# Patient Record
Sex: MALE | Race: WHITE | NOT HISPANIC OR LATINO | Employment: OTHER | URBAN - METROPOLITAN AREA
[De-identification: names, ages, dates, MRNs, and addresses within clinical notes are randomized per-mention and may not be internally consistent; named-entity substitution may affect disease eponyms.]

---

## 2017-04-05 ENCOUNTER — ALLSCRIPTS OFFICE VISIT (OUTPATIENT)
Dept: OTHER | Facility: OTHER | Age: 82
End: 2017-04-05

## 2017-06-12 ENCOUNTER — ALLSCRIPTS OFFICE VISIT (OUTPATIENT)
Dept: OTHER | Facility: OTHER | Age: 82
End: 2017-06-12

## 2017-09-21 ENCOUNTER — APPOINTMENT (OUTPATIENT)
Dept: LAB | Facility: MEDICAL CENTER | Age: 82
End: 2017-09-21
Payer: COMMERCIAL

## 2017-09-21 DIAGNOSIS — E78.5 HYPERLIPIDEMIA: ICD-10-CM

## 2017-09-21 DIAGNOSIS — I10 ESSENTIAL (PRIMARY) HYPERTENSION: ICD-10-CM

## 2017-09-21 DIAGNOSIS — E03.9 HYPOTHYROIDISM: ICD-10-CM

## 2017-09-21 LAB
ALBUMIN SERPL BCP-MCNC: 3.5 G/DL (ref 3.5–5)
ALP SERPL-CCNC: 57 U/L (ref 46–116)
ALT SERPL W P-5'-P-CCNC: 11 U/L (ref 12–78)
ANION GAP SERPL CALCULATED.3IONS-SCNC: 6 MMOL/L (ref 4–13)
AST SERPL W P-5'-P-CCNC: 20 U/L (ref 5–45)
BASOPHILS # BLD AUTO: 0.03 THOUSANDS/ΜL (ref 0–0.1)
BASOPHILS NFR BLD AUTO: 1 % (ref 0–1)
BILIRUB SERPL-MCNC: 0.61 MG/DL (ref 0.2–1)
BUN SERPL-MCNC: 26 MG/DL (ref 5–25)
CALCIUM SERPL-MCNC: 8.9 MG/DL (ref 8.3–10.1)
CHLORIDE SERPL-SCNC: 106 MMOL/L (ref 100–108)
CHOLEST SERPL-MCNC: 201 MG/DL (ref 50–200)
CO2 SERPL-SCNC: 29 MMOL/L (ref 21–32)
CREAT SERPL-MCNC: 1.24 MG/DL (ref 0.6–1.3)
EOSINOPHIL # BLD AUTO: 0.29 THOUSAND/ΜL (ref 0–0.61)
EOSINOPHIL NFR BLD AUTO: 6 % (ref 0–6)
ERYTHROCYTE [DISTWIDTH] IN BLOOD BY AUTOMATED COUNT: 13.4 % (ref 11.6–15.1)
GFR SERPL CREATININE-BSD FRML MDRD: 51 ML/MIN/1.73SQ M
GLUCOSE P FAST SERPL-MCNC: 91 MG/DL (ref 65–99)
HCT VFR BLD AUTO: 43.7 % (ref 36.5–49.3)
HDLC SERPL-MCNC: 56 MG/DL (ref 40–60)
HGB BLD-MCNC: 14.5 G/DL (ref 12–17)
LDLC SERPL CALC-MCNC: 120 MG/DL (ref 0–100)
LYMPHOCYTES # BLD AUTO: 2.27 THOUSANDS/ΜL (ref 0.6–4.47)
LYMPHOCYTES NFR BLD AUTO: 48 % (ref 14–44)
MCH RBC QN AUTO: 32.5 PG (ref 26.8–34.3)
MCHC RBC AUTO-ENTMCNC: 33.2 G/DL (ref 31.4–37.4)
MCV RBC AUTO: 98 FL (ref 82–98)
MONOCYTES # BLD AUTO: 0.46 THOUSAND/ΜL (ref 0.17–1.22)
MONOCYTES NFR BLD AUTO: 10 % (ref 4–12)
NEUTROPHILS # BLD AUTO: 1.61 THOUSANDS/ΜL (ref 1.85–7.62)
NEUTS SEG NFR BLD AUTO: 35 % (ref 43–75)
NRBC BLD AUTO-RTO: 0 /100 WBCS
PLATELET # BLD AUTO: 157 THOUSANDS/UL (ref 149–390)
PMV BLD AUTO: 11.1 FL (ref 8.9–12.7)
POTASSIUM SERPL-SCNC: 4 MMOL/L (ref 3.5–5.3)
PROT SERPL-MCNC: 7.2 G/DL (ref 6.4–8.2)
RBC # BLD AUTO: 4.46 MILLION/UL (ref 3.88–5.62)
SODIUM SERPL-SCNC: 141 MMOL/L (ref 136–145)
TRIGL SERPL-MCNC: 124 MG/DL
TSH SERPL DL<=0.05 MIU/L-ACNC: 1.92 UIU/ML (ref 0.36–3.74)
WBC # BLD AUTO: 4.68 THOUSAND/UL (ref 4.31–10.16)

## 2017-09-21 PROCEDURE — 80053 COMPREHEN METABOLIC PANEL: CPT

## 2017-09-21 PROCEDURE — 85025 COMPLETE CBC W/AUTO DIFF WBC: CPT

## 2017-09-21 PROCEDURE — 36415 COLL VENOUS BLD VENIPUNCTURE: CPT

## 2017-09-21 PROCEDURE — 84443 ASSAY THYROID STIM HORMONE: CPT

## 2017-09-21 PROCEDURE — 80061 LIPID PANEL: CPT

## 2017-09-25 DIAGNOSIS — I10 ESSENTIAL (PRIMARY) HYPERTENSION: ICD-10-CM

## 2017-09-25 DIAGNOSIS — E03.9 HYPOTHYROIDISM: ICD-10-CM

## 2017-09-25 DIAGNOSIS — E78.5 HYPERLIPIDEMIA: ICD-10-CM

## 2017-10-17 ENCOUNTER — ALLSCRIPTS OFFICE VISIT (OUTPATIENT)
Dept: OTHER | Facility: OTHER | Age: 82
End: 2017-10-17

## 2017-10-18 NOTE — PROGRESS NOTES
Assessment  1  Hyperlipidemia (272 4) (E78 5)   2  Hypertension (401 9) (I10)   3  Hypothyroidism (244 9) (E03 9)   4  Encounter for preventive health examination (V70 0) (Z00 00)    Plan  Chronic gouty arthropathy, Gout    · Allopurinol 100 MG Oral Tablet; take 2 tablets by mouth once daily  Health Maintenance    · Glucosamine HCl - 500 MG Oral Tablet; TAKE AS DIRECTED  Hypertension    · Triamterene-HCTZ 37 5-25 MG Oral Capsule; take 1 capsule daily  Hypothyroidism    · Levothyroxine Sodium 100 MCG Oral Tablet (Synthroid); Take 1 tablet by mouth    every day  Peripheral neuropathy    · Gabapentin 100 MG Oral Capsule    Chief Complaint  The patient is here today for a follow up  History of Present Illness  Patient is here for follow-up we tried gabapentin for his peripheral neuropathy  It did not help and it made him very sleepy during the day  He is not taking it any more  also treat him for gout  He takes allopurinol 200 mg every day he has not had any acute get gout attacks lately  is also treated for hypertension with max side his blood pressure is excellent  He also takes levothyroxine for his hypothyroidism his last TSH was normal   has no specific complaints today and overall feels well      Active Problems  1  Actinic keratosis (702 0) (L57 0)   2  Acute upper respiratory infection (465 9) (J06 9)   3  Chronic gouty arthropathy (274 02) (M1A 00X0)   4  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)   5  Diarrhea (787 91) (R19 7)   6  Disc degeneration, 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)   7  Gait disturbance (781 2) (R26 9)   8  Ganglion (727 43) (M67 40)   9  Gout (274 9) (M10 9)   10  HL (hearing loss) (389 9) (H91 90)   11  Hyperlipidemia (272 4) (E78 5)   12  Hypertension (401 9) (I10)   13  Hypothyroidism (244 9) (E03 9)   14  Leg pain (729 5) (M79 606)   15  Lower back pain (724 2) (M54 5)   16  Lumbar canal stenosis (724 02) (M48 061)   17  Lumbar radiculopathy (724 4) (M54 16)   18   Lumbar radiculopathy (724 4) (M54 16)   19  Need for diphtheria-tetanus-pertussis (Tdap) vaccine (V06 1) (Z23)   20  Need for prophylactic vaccination and inoculation against influenza (V04 81) (Z23)   21  Need for vaccination with 13-polyvalent pneumococcal conjugate vaccine (V03 82) (Z23)   22  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)   23  Pain in joint of right hip (719 45) (M25 551)   24  Peripheral neuropathy (356 9) (G62 9)   25  Preoperative evaluation to rule out surgical contraindication (V72 83) (Z01 818)   26  Sacroiliitis (720 2) (M46 1)   27  Screening for skin condition (V82 0) (Z13 89)   28  Seborrheic keratosis (702 19) (L82 1)   29  Spondylosis of lumbar region without myelopathy or radiculopathy (721 3) (M47 816)   30  Staggering Gait (781 2)   31  Tingling (782 0) (R20 2)   32  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)   33  Verrucous keratosis (702 8) (L82 1)    Past Medical History  1  History of Dizziness and giddiness (780 4) (R42)   2  History of hypertension (V12 59) (Z86 79)   3  History of sleep disturbance (V13 89) (Z87 898)   4  History of thyroid disease (V12 29) (Z86 39)   5  History of Hyperthyroidism (242 90) (E05 90)   6  History of Loss Of Hair From Head   7  History of Memory Lapses Or Loss (780 93)   8  Need for prophylactic vaccination and inoculation against influenza (V04 81) (Z23)    The active problems and past medical history were reviewed and updated today  Surgical History  1  Denied: History Of Prior Surgery   2  History of Kidney Surgery   3  History of Knee Replacement   4  History of Prostate Surgery    The surgical history was reviewed and updated today  Family History  Mother    1  Family history of Brain Tumor   2  Family history of Mother  At Age 58  Father    3  Family history of Father  At Age 80  Sister    3  Family history of Cancer  Brother    5  Family history of Diabetes Mellitus (V18 0)  Family History    6  Family history of Arthritis (V17 7)   7  Family history of Diabetes Mellitus (V18 0)   8  Family history of Knee Replacement   9  Family history of Thyroid Disorder (V18 19)    The family history was reviewed and updated today  Social History   · Being A Social Drinker   · Caffeine Use   · Denied: History of Drug Use   · Former smoker (V15 82) (T70 469)   · Never A Smoker   · Sexually Active   · Denied: History of Sexually Active With Persons At Risk For HIV-related Disease   · Work History  The social history was reviewed and updated today  The social history was reviewed and is unchanged  Current Meds   1  Allopurinol 100 MG Oral Tablet; take 2 tablets by mouth once daily; Therapy: 53Ayh5933 to (Evaluate:16Apr2018)  Requested for: 46Lnt1993; Last   Rx:00Wzw7594 Ordered   2  Aspirin 325 MG Oral Tablet; Take 1 tablet daily Recorded   3  Fluocinonide 0 05 % External Cream; APPLY TO AFFECTED AREA TWICE A DAY; Therapy: 23Ays4674 to (Evaluate:43Bpw7669)  Requested for: 50WXX1059; Last   Rx:22Hzi1889 Ordered   4  Gabapentin 100 MG Oral Capsule; TAKE ONE OR TWO CAPSULES BY MOUTH THREE   TIMES DAILY AS NEEDED; Therapy: 10AHJ2337 to (77 873 135)  Requested for: 20Hrv6031; Last   Rx:09Rch3298 Ordered   5  Levothyroxine Sodium 100 MCG Oral Tablet; Take 1 tablet by mouth   every day; Therapy: 82TWX5292 to (Evaluate:21Jan2018)  Requested for: 14Mzi0765; Last   Rx:60Ftz7970 Ordered   6  Triamterene-HCTZ 37 5-25 MG Oral Capsule; take 1 capsule daily; Therapy: 60GLW3848 to (Evaluate:21Jan2018)  Requested for: 05Ehp2169; Last   Rx:92Txk1329 Ordered   7  Zostavax 70190 UNT/0 65ML Subcutaneous Solution Reconstituted; adm  one dose as   directed; Therapy: 10AOP8754 to (Last Rx:05Oct2016) Ordered    The medication list was reviewed and updated today  Allergies  1   No Known Drug Allergies    Vitals  Vital Signs    Recorded: 27LAD8833 02:08PM   Heart Rate 80   Respiration 18   Systolic 363   Diastolic 76   Weight 443 lb 2 oz   BMI Calculated 25 26   BSA Calculated 1 89     Physical Exam    Constitutional   General appearance: No acute distress, well appearing and well nourished  Ears, Nose, Mouth, and Throat   External inspection of ears and nose: Normal     Otoscopic examination: Tympanic membrance translucent with normal light reflex  Canals patent without erythema  Nasal mucosa, septum, and turbinates: Normal without edema or erythema  Oropharynx: Normal with no erythema, edema, exudate or lesions  Pulmonary   Auscultation of lungs: Clear to auscultation, equal breath sounds bilaterally, no wheezes, no rales, no rhonci  Cardiovascular   Auscultation of heart: Normal rate and rhythm, normal S1 and S2, without murmurs  Abdomen   Abdomen: Non-tender, no masses  Health Management  Health Maintenance   Medicare Annual Wellness Visit; every 1 year; Last 11OIF2571; Next Due: 40PRD1431; Active    Future Appointments    Date/Time Provider Specialty Site   04/17/2018 01:00 PM BK Rodriguez  [address] [facility]   06/18/2018 12:15 PM BK Tabares   Dermatology [facility]     Signatures   Electronically signed by : BK Still ; Oct 17 2017  2:42PM EST                       (Author)

## 2018-01-12 VITALS
DIASTOLIC BLOOD PRESSURE: 74 MMHG | HEART RATE: 80 BPM | BODY MASS INDEX: 25.47 KG/M2 | RESPIRATION RATE: 18 BRPM | WEIGHT: 167.5 LBS | SYSTOLIC BLOOD PRESSURE: 146 MMHG

## 2018-01-14 VITALS
RESPIRATION RATE: 18 BRPM | BODY MASS INDEX: 25.26 KG/M2 | SYSTOLIC BLOOD PRESSURE: 126 MMHG | HEART RATE: 80 BPM | WEIGHT: 166.13 LBS | DIASTOLIC BLOOD PRESSURE: 76 MMHG

## 2018-01-18 ENCOUNTER — ALLSCRIPTS OFFICE VISIT (OUTPATIENT)
Dept: OTHER | Facility: OTHER | Age: 83
End: 2018-01-18

## 2018-01-19 NOTE — PROGRESS NOTES
Assessment   1  Actinic keratosis (702 0) (L57 0)   2  Seborrheic keratosis (702 19) (L82 1)   3  Screening for skin condition (V82 0) (Z13 89)    Plan    · Follow-up visit in 1 year Evaluation and Treatment  Follow-up  Status: Hold For -    Scheduling  Requested for: 91JYL7684   · Use a sun block product with an SPF of 15 or more ; Status:Complete;   Done:    43HWR1852    Discussion/Summary   Discussion Summary- [facility]:      Assessment #1: actinic keratosis  Care Plan:      Patient advise lesions are precancer should resolve with cryosurgery if not to let us know sunblock recommended  Assessment #2: Seborrheic keratosis  Care Plan:      Patient reassured these are normal growths we acquire with age no treatment needed  Assessment #3: Screening for dermatologic disorders  Care Plan:      Nothing else of concern noted on complete exam sun protection recommended follow-up yearly  Chief Complaint   Chief Complaint Free Text Note Form: Patient here to check head for spots and a check up      History of Present Illness   HPI: 42-year-old male presents for overall skin check concerned some scaling areas on his scalp no specific other concerns noted      Review of Systems   Complete Male Dermatology [facility] Patient:      Constitutional: Denies constitutional symptoms  Eyes: Denies eye symptoms  ENT:  denies ear symptoms, nasal symptoms, mouth or throat symptoms  Cardiovascular: Denies cardiovascular symptoms  Respiratory: Denies respiratory symptoms  Gastrointestinal: Denies gastrointestinal symptoms  Musculoskeletal: Denies musculoskeletal symptoms  Integumentary: Denies skin, hair and nail symptoms  Neurological: Denies neurologic symptoms  Psychiatric: Denies psychiatric symptoms  Endocrine: Denies endocrine symptoms  Hematologic/Lymphatic: Denies hematologic symptoms  Active Problems   1   Actinic keratosis (702 0) (L57 0)   2  Acute upper respiratory infection (465 9) (J06 9)   3  Chronic gouty arthropathy (274 02) (M1A 00X0)   4  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)   5  Diarrhea (787 91) (R19 7)   6  Disc degeneration, 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)   7  Gait disturbance (781 2) (R26 9)   8  Ganglion (727 43) (M67 40)   9  Gout (274 9) (M10 9)   10  HL (hearing loss) (389 9) (H91 90)   11  Hyperlipidemia (272 4) (E78 5)   12  Hypertension (401 9) (I10)   13  Hypothyroidism (244 9) (E03 9)   14  Leg pain (729 5) (M79 606)   15  Lower back pain (724 2) (M54 5)   16  Lumbar canal stenosis (724 02) (M48 061)   17  Lumbar radiculopathy (724 4) (M54 16)   18  Lumbar radiculopathy (724 4) (M54 16)   19  Need for diphtheria-tetanus-pertussis (Tdap) vaccine (V06 1) (Z23)   20  Need for prophylactic vaccination and inoculation against influenza (V04 81) (Z23)   21  Need for vaccination with 13-polyvalent pneumococcal conjugate vaccine (V03 82) (Z23)   22  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)   23  Pain in joint of right hip (719 45) (M25 551)   24  Peripheral neuropathy (356 9) (G62 9)   25  Preoperative evaluation to rule out surgical contraindication (V72 83) (Z01 818)   26  Sacroiliitis (720 2) (M46 1)   27  Screening for skin condition (V82 0) (Z13 89)   28  Seborrheic keratosis (702 19) (L82 1)   29  Spondylosis of lumbar region without myelopathy or radiculopathy (721 3) (M47 816)   30  Staggering Gait (781 2)   31  Tingling (782 0) (R20 2)   32  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)   33  Verrucous keratosis (702 8) (L82 1)    Past Medical History   1  History of Dizziness and giddiness (780 4) (R42)   2  History of hypertension (V12 59) (Z86 79)   3  History of sleep disturbance (V13 89) (Z87 898)   4  History of thyroid disease (V12 29) (Z86 39)   5  History of Hyperthyroidism (242 90) (E05 90)   6  History of Loss Of Hair From Head   7  History of Memory Lapses Or Loss (780 93)   8   Need for prophylactic vaccination and inoculation against influenza (V04 81) (Z23)  Past Medical History Reviewed- Derm:    The past medical history was reviewed  Surgical History   1  Denied: History Of Prior Surgery   2  History of Kidney Surgery   3  History of Knee Replacement   4  History of Prostate Surgery  Surgical History Reviewed [facility]- D[facility]:    Surgical History reviewed      Family History   Mother    1  Family history of Brain Tumor   2  Family history of Mother  At Age 58  Father    3  Family history of Father  At Age 80  Sister    3  Family history of Cancer  Brother    5  Family history of Diabetes Mellitus (V18 0)  Family History    6  Family history of Arthritis (V17 7)   7  Family history of Diabetes Mellitus (V18 0)   8  Family history of Knee Replacement   9  Family history of Thyroid Disorder (V18 19)  Family History Reviewed- Derm:    Family History was reviewed      Social History    · Being A Social Drinker   · Caffeine Use   · Denied: History of Drug Use   · Former smoker (V15 82) (L34 658)   · Never A Smoker   · Sexually Active   · Denied: History of Sexually Active With Persons At Risk For HIV-related Disease   · Work History  Social History Reviewed [facility]- Derm: The social history was reviewed      Current Meds    1  Allopurinol 100 MG Oral Tablet; take 2 tablets by mouth once daily; Therapy: 12Nqy1480 to (Adriel Martins)  Requested for: 04VYO3882; Last     Rx:2018 Ordered   2  Aspirin 325 MG Oral Tablet; Take 1 tablet daily Recorded   3  Glucosamine HCl - 500 MG Oral Tablet; TAKE AS DIRECTED; Therapy: 22BGU5087 to (Last Rx:2017) Ordered   4  Levothyroxine Sodium 100 MCG Oral Tablet; take 1 tablet by mouth once daily; Therapy: 94PXH1338 to (Adreil Martins)  Requested for: 77GUT6154; Last     Rx:2018 Ordered   5  Triamterene-HCTZ 37 5-25 MG Oral Capsule; take 1 capsule by mouth once daily; Therapy: 23TCN2016 to (Adriel Martins)  Requested for: 15YTT7598;  Last Rx:33Rkf3188 Ordered   6  Zostavax 74378 UNT/0 65ML Subcutaneous Solution Reconstituted; adm  one dose as     directed; Therapy: 17QJF3801 to (Last Rx:05Oct2016) Ordered  Medication List Reviewed: The medication list was reviewed and updated today  Allergies   1  No Known Drug Allergies    Physical Exam        Constitutional      General appearance: Appears healthy and well developed  Lymphatic      No visible disturbance  Musculoskeletal      Digits and nails: No clubbing, cyanosis or edema  Cutaneous and nail exam normal        Skin      Scalp skin texture and hair distribution: Abnormal        Head: Abnormal        Neck: Normal turgor, no rashes, no lesions  Chest: Normal turgor, no rashes, no lesions  Abdomen: Normal turgor, no rashes, no lesions  Back: Normal turgor, no rashes, no lesions  Right upper extremity: Normal turgor, no rashes, no lesions  Left upper extremity: Normal turgor, no rashes, no lesions  Right lower extremity: Normal turgor, no rashes, no lesions  Left lower extremity: Normal turgor, no rashes, no lesions  Examination for skin lesions: Abnormal   Skin Lesions: Actinic Keratosis: on area number 3 on the diagram           Neuro/Psych      Alert and oriented x 3  Displays comfort and cooperation during encounterl  Affect is normal        Finding Scaling erythematous areas noted above normal keratotic papules with greasy stuck on appearance nothing else atypical noted on exam       Procedure        Procedure: destruction of lesion  Indications for the procedure include actinic keratosis  Risks, benefits, alternatives, infection risk, bleeding risk, risk of allergic reaction and the risk of scarring were discussed with the patient--   verbal consent was obtained prior to the procedure  Procedure Note:      The lesion location: See Map        Destruction Technique: cryotherapy with liquid nitrogen application-- and-- 37-71 seconds via cryospray--   Destruction of 3 lesions  Post-Procedure:      Patient Status: the patient tolerated the procedure well  Complications: there were no complications  Health Management   Health Maintenance   Medicare Annual Wellness Visit; every 1 year; Last 15AHK8137; Next Due: 59ZLJ8900; Active    Future Appointments      Date/Time Provider Specialty Site   04/17/2018 01:00 PM BK Loya  [address] [facility]   06/18/2018 12:15 PM BK Vallejo   Dermatology [facility]     Signatures    Electronically signed by : BK Padilla ; Jan 18 2018  1:26PM EST                       (Author)

## 2018-04-17 ENCOUNTER — OFFICE VISIT (OUTPATIENT)
Dept: FAMILY MEDICINE CLINIC | Facility: MEDICAL CENTER | Age: 83
End: 2018-04-17
Payer: COMMERCIAL

## 2018-04-17 VITALS
BODY MASS INDEX: 24.75 KG/M2 | DIASTOLIC BLOOD PRESSURE: 70 MMHG | WEIGHT: 162.8 LBS | RESPIRATION RATE: 16 BRPM | HEART RATE: 68 BPM | SYSTOLIC BLOOD PRESSURE: 122 MMHG

## 2018-04-17 DIAGNOSIS — E03.9 ACQUIRED HYPOTHYROIDISM: ICD-10-CM

## 2018-04-17 DIAGNOSIS — M1A.09X0 IDIOPATHIC CHRONIC GOUT OF MULTIPLE SITES WITHOUT TOPHUS: ICD-10-CM

## 2018-04-17 DIAGNOSIS — M47.816 SPONDYLOSIS OF LUMBAR REGION WITHOUT MYELOPATHY OR RADICULOPATHY: ICD-10-CM

## 2018-04-17 DIAGNOSIS — M25.562 CHRONIC PAIN OF LEFT KNEE: Primary | ICD-10-CM

## 2018-04-17 DIAGNOSIS — I10 ESSENTIAL HYPERTENSION: ICD-10-CM

## 2018-04-17 DIAGNOSIS — Z13.220 SCREENING FOR LIPID DISORDERS: ICD-10-CM

## 2018-04-17 DIAGNOSIS — E78.00 PURE HYPERCHOLESTEROLEMIA: ICD-10-CM

## 2018-04-17 DIAGNOSIS — G89.29 CHRONIC PAIN OF LEFT KNEE: Primary | ICD-10-CM

## 2018-04-17 PROCEDURE — 99215 OFFICE O/P EST HI 40 MIN: CPT | Performed by: FAMILY MEDICINE

## 2018-04-17 PROCEDURE — 1101F PT FALLS ASSESS-DOCD LE1/YR: CPT | Performed by: FAMILY MEDICINE

## 2018-04-17 PROCEDURE — 3725F SCREEN DEPRESSION PERFORMED: CPT | Performed by: FAMILY MEDICINE

## 2018-04-17 RX ORDER — ASPIRIN 325 MG
1 TABLET ORAL DAILY
COMMUNITY
End: 2021-07-01 | Stop reason: HOSPADM

## 2018-04-17 RX ORDER — LEVOTHYROXINE SODIUM 0.1 MG/1
1 TABLET ORAL DAILY
COMMUNITY
Start: 2012-11-02 | End: 2018-10-17 | Stop reason: SDUPTHER

## 2018-04-17 RX ORDER — GLUCOSAMINE HCL 500 MG
TABLET ORAL
COMMUNITY
Start: 2017-10-17 | End: 2020-08-14 | Stop reason: ALTCHOICE

## 2018-04-17 RX ORDER — TRIAMTERENE AND HYDROCHLOROTHIAZIDE 37.5; 25 MG/1; MG/1
1 CAPSULE ORAL DAILY
COMMUNITY
Start: 2012-11-02 | End: 2018-10-17 | Stop reason: SDUPTHER

## 2018-04-17 RX ORDER — ALLOPURINOL 100 MG/1
2 TABLET ORAL DAILY
COMMUNITY
Start: 2014-08-20 | End: 2018-10-17 | Stop reason: SDUPTHER

## 2018-04-17 NOTE — ASSESSMENT & PLAN NOTE
Blood pressure is well controlled with triamterene HCTZ  He is tolerating the medication well  Continue triamterene HCTZ    Continue routine follow-up

## 2018-04-17 NOTE — ASSESSMENT & PLAN NOTE
Patient's thyroid disease has been well managed with levothyroxine 100 mcg  His last TSH was therapeutic  Continue levothyroxine  He will be due for TSH this fall

## 2018-04-17 NOTE — PROGRESS NOTES
Assessment/Plan:    Acquired hypothyroidism  Patient's thyroid disease has been well managed with levothyroxine 100 mcg  His last TSH was therapeutic  Continue levothyroxine  He will be due for TSH this fall  Essential hypertension  Blood pressure is well controlled with triamterene HCTZ  He is tolerating the medication well  Continue triamterene HCTZ  Continue routine follow-up    Idiopathic chronic gout of multiple sites without tophus  Patient was started on allopurinol within the last year to for chronic recurring gout  He is tolerating the allopurinol well  He has not had any gout attacks since we started the allopurinol  Continue allopurinol  Spondylosis of lumbar region without myelopathy or radiculopathy  This is a finding on x-ray  Patient really does not have any back pain  Of continue observation, treat if needed  Pure hypercholesterolemia  Patient has borderline hyperlipidemia  His last total cholesterol was 201  He tries to eat a low-fat diet  He does try to walk frequently  Continue lifestyle changes  Diagnoses and all orders for this visit:    Chronic pain of left knee  -     Ambulatory referral to Orthopedic Surgery; Future  -     Cancel: CBC and differential; Future  -     CBC and differential; Future    Acquired hypothyroidism  -     Cancel: TSH, 3rd generation with T4 reflex; Future  -     TSH, 3rd generation with T4 reflex; Future    Essential hypertension  -     Cancel: Comprehensive metabolic panel; Future  -     Cancel: CBC and differential; Future  -     Comprehensive metabolic panel; Future  -     CBC and differential; Future    Idiopathic chronic gout of multiple sites without tophus  -     Cancel: Comprehensive metabolic panel; Future  -     Cancel: CBC and differential; Future    Pure hypercholesterolemia  -     Cancel: Lipid Panel with Direct LDL reflex; Future  -     Lipid Panel with Direct LDL reflex;  Future  -     Comprehensive metabolic panel; Future    Screening for lipid disorders    Spondylosis of lumbar region without myelopathy or radiculopathy    Other orders  -     allopurinol (ZYLOPRIM) 100 mg tablet; Take 2 tablets by mouth daily  -     aspirin 325 mg tablet; Take 1 tablet by mouth daily  -     Glucosamine HCl 500 MG TABS; Take by mouth  -     levothyroxine 100 mcg tablet; Take 1 tablet by mouth daily  -     triamterene-hydrochlorothiazide (DYAZIDE) 37 5-25 mg per capsule; Take 1 capsule by mouth daily          Subjective:      Patient ID: Paco Amezquita is a 80 y o  male  Here for routine follow-up of ongoing medical problems  Please see assessment and plan  Donovan Skaggs is a vital energitic young appearing 80-year-old man  He lives with his niece in [place]  He is active in his [employer]  He still does all of his ADLs  He drives  The following portions of the patient's history were reviewed and updated as appropriate: allergies, current medications, past family history, past medical history, past social history, past surgical history and problem list     Review of Systems   Constitutional: Negative for activity change, fatigue and fever  HENT: Negative for congestion, ear discharge, ear pain, postnasal drip, rhinorrhea, sinus pain, sneezing and sore throat  Eyes: Negative for photophobia, pain, discharge and redness  Respiratory: Negative for apnea, cough, shortness of breath and wheezing  Cardiovascular: Negative for chest pain and palpitations  Gastrointestinal: Negative for abdominal pain, blood in stool, constipation, diarrhea, nausea and vomiting  Endocrine: Negative for polydipsia, polyphagia and polyuria  Genitourinary: Negative for decreased urine volume, difficulty urinating, discharge, dysuria, frequency, penile pain and urgency  Musculoskeletal: Positive for arthralgias (Pain over left knee, over the lateral malleolus  )  Negative for gait problem, joint swelling and neck pain     Skin: Negative for color change and rash  Neurological: Negative for dizziness, tremors, seizures, weakness and headaches  Psychiatric/Behavioral: Negative for agitation and sleep disturbance  The patient is not nervous/anxious  Objective:      /70 (BP Location: Left arm, Patient Position: Sitting, Cuff Size: Adult)   Pulse 68   Resp 16   Wt 73 8 kg (162 lb 12 8 oz)   BMI 24 75 kg/m²          Physical Exam   Constitutional: He is oriented to person, place, and time  Vital signs are normal  He appears well-developed and well-nourished  He is cooperative  HENT:   Head: Normocephalic  Right Ear: External ear normal    Left Ear: External ear normal    Nose: Nose normal    Mouth/Throat: Oropharynx is clear and moist    Eyes: Conjunctivae, EOM and lids are normal  Pupils are equal, round, and reactive to light  Neck: Normal range of motion  Neck supple  Carotid bruit is not present  No thyromegaly present  Cardiovascular: Normal rate, regular rhythm, S1 normal, S2 normal, normal heart sounds, intact distal pulses and normal pulses  No murmur heard  Pulmonary/Chest: Effort normal and breath sounds normal  No respiratory distress  He has no wheezes  He has no rales  Abdominal: Soft  Normal appearance and bowel sounds are normal  He exhibits no mass  There is no hepatosplenomegaly  There is no tenderness  Musculoskeletal: Normal range of motion  Lymphadenopathy:     He has no cervical adenopathy  Neurological: He is alert and oriented to person, place, and time  He has normal strength and normal reflexes  No cranial nerve deficit or sensory deficit  Skin: Skin is warm, dry and intact  No rash noted  No pallor  Psychiatric: He has a normal mood and affect  His behavior is normal  Judgment and thought content normal  Cognition and memory are normal    Nursing note and vitals reviewed

## 2018-04-17 NOTE — ASSESSMENT & PLAN NOTE
Patient has borderline hyperlipidemia  His last total cholesterol was 201  He tries to eat a low-fat diet  He does try to walk frequently  Continue lifestyle changes

## 2018-04-17 NOTE — ASSESSMENT & PLAN NOTE
Patient was started on allopurinol within the last year to for chronic recurring gout  He is tolerating the allopurinol well  He has not had any gout attacks since we started the allopurinol  Continue allopurinol

## 2018-04-17 NOTE — ASSESSMENT & PLAN NOTE
This is a finding on x-ray  Patient really does not have any back pain  Of continue observation, treat if needed

## 2018-05-09 ENCOUNTER — OFFICE VISIT (OUTPATIENT)
Dept: OBGYN CLINIC | Facility: MEDICAL CENTER | Age: 83
End: 2018-05-09
Payer: COMMERCIAL

## 2018-05-09 ENCOUNTER — APPOINTMENT (OUTPATIENT)
Dept: RADIOLOGY | Facility: MEDICAL CENTER | Age: 83
End: 2018-05-09
Payer: COMMERCIAL

## 2018-05-09 VITALS
HEART RATE: 73 BPM | WEIGHT: 160 LBS | HEIGHT: 68 IN | RESPIRATION RATE: 18 BRPM | SYSTOLIC BLOOD PRESSURE: 127 MMHG | DIASTOLIC BLOOD PRESSURE: 65 MMHG | BODY MASS INDEX: 24.25 KG/M2

## 2018-05-09 DIAGNOSIS — M22.2X2 PATELLOFEMORAL DISORDER OF LEFT KNEE: Primary | ICD-10-CM

## 2018-05-09 DIAGNOSIS — M25.562 ACUTE PAIN OF LEFT KNEE: ICD-10-CM

## 2018-05-09 PROCEDURE — 99203 OFFICE O/P NEW LOW 30 MIN: CPT | Performed by: ORTHOPAEDIC SURGERY

## 2018-05-09 PROCEDURE — 73562 X-RAY EXAM OF KNEE 3: CPT

## 2018-05-09 NOTE — PROGRESS NOTES
80 y o male presents as a new patient in consultation for left chronic knee pain which she states has been occurring for over a year  He describes the pain as located at the superior lateral aspect of his patella in the area of the quadriceps  He states that he has done two courses of physical therapy with knee strengthening with no relief  States the pain is more difficult with sitting for long periods time and improved with extension of his leg  He has difficulty with going up stairs as well  He denies any recent fevers or chills  He denies any effusions, giving way, or other mechanical symptoms  Review of Systems  Review of systems negative unless otherwise specified in HPI    Past Medical History  Past Medical History:   Diagnosis Date    Arthritis     Gout     Hypertension     Hyperthyroidism     Memory loss     Sleep disturbance     Thyroid disease        Past Surgical History  Past Surgical History:   Procedure Laterality Date    KIDNEY SURGERY      description: 30 yrs ago    PROSTATE SURGERY      description: stone removal 30 yrs ago    REPLACEMENT TOTAL KNEE BILATERAL      description: 18 yrs ago       Current Medications  Current Outpatient Prescriptions on File Prior to Visit   Medication Sig Dispense Refill    allopurinol (ZYLOPRIM) 100 mg tablet Take 2 tablets by mouth daily      aspirin 325 mg tablet Take 1 tablet by mouth daily      Glucosamine HCl 500 MG TABS Take by mouth      levothyroxine 100 mcg tablet Take 1 tablet by mouth daily      triamterene-hydrochlorothiazide (DYAZIDE) 37 5-25 mg per capsule Take 1 capsule by mouth daily       No current facility-administered medications on file prior to visit          Recent Labs [facility])    0  Lab Value Date/Time   HCT 43 7 09/21/2017 0827   HCT 43 1 09/21/2015 0815   HGB 14 5 09/21/2017 0827   HGB 14 1 09/21/2015 0815   WBC 4 68 09/21/2017 0827   WBC 4 94 09/21/2015 0815   ESR 16 (H) 02/17/2014 1412   CRP 2 8 02/17/2014 1412   GLUCOSE 92 09/19/2016 0812   GLUCOSE 98 09/21/2015 0815         Physical exam  General: Awake, Alert, Oriented  HEENT: No scleral injection, no evidence of facial trauma  Heart: Extremities warm and well perfused  Lungs: No audible wheezing  ·    left  Knee exam  · Midline incision well healed with no evidence of erythema, fluctuance, purulence  · Active range of motion 0-110 degrees without pain  · Able to perform straight leg raise, extensor mechanism intact  · No medial lateral joint line tenderness  · No tenderness over ITB band over Gerdy's tubercle, or pes anserine bursa  · Knee is stable to varus and valgus stress at full extension and 30° of flexion  · Patient has mild tenderness to palpation at the superolateral aspect of the patella at the area of the quadriceps muscle  · Pain with patellar grind    Imaging  Plain films of left knee reveal contact of patellar surface with metal of femoral component, lateral>medial   No evidence otherwise of osteolysis or loosening or malpositioning of implants    Assessment plan:  35-year-old male with left total knee arthroplasty with the pain consistent with wearing down of poly component of patella causing lateral patellofemoral contact  Weightbear as tolerated left lower extremity  Instructed patient on short arc extension knee exercises which she will do at home, no physical therapy formal course prescribed  Will call patient in 2 months to assess symptoms, no scheduled follow-up required        Rhina Farmer  05/09/18

## 2018-06-13 ENCOUNTER — APPOINTMENT (OUTPATIENT)
Dept: RADIOLOGY | Facility: MEDICAL CENTER | Age: 83
End: 2018-06-13
Payer: COMMERCIAL

## 2018-06-13 ENCOUNTER — OFFICE VISIT (OUTPATIENT)
Dept: FAMILY MEDICINE CLINIC | Facility: MEDICAL CENTER | Age: 83
End: 2018-06-13
Payer: COMMERCIAL

## 2018-06-13 VITALS
DIASTOLIC BLOOD PRESSURE: 60 MMHG | WEIGHT: 159 LBS | OXYGEN SATURATION: 91 % | BODY MASS INDEX: 24.18 KG/M2 | SYSTOLIC BLOOD PRESSURE: 124 MMHG | TEMPERATURE: 98.9 F | HEART RATE: 99 BPM

## 2018-06-13 DIAGNOSIS — R06.2 WHEEZING: ICD-10-CM

## 2018-06-13 DIAGNOSIS — R79.81 LOW O2 SATURATION: ICD-10-CM

## 2018-06-13 DIAGNOSIS — R05.9 COUGH: Primary | ICD-10-CM

## 2018-06-13 DIAGNOSIS — R06.89 ABNORMAL BREATH SOUNDS: ICD-10-CM

## 2018-06-13 DIAGNOSIS — R05.9 COUGH: ICD-10-CM

## 2018-06-13 PROCEDURE — 99214 OFFICE O/P EST MOD 30 MIN: CPT | Performed by: FAMILY MEDICINE

## 2018-06-13 PROCEDURE — 71046 X-RAY EXAM CHEST 2 VIEWS: CPT

## 2018-06-13 RX ORDER — ALBUTEROL SULFATE 2.5 MG/3ML
2.5 SOLUTION RESPIRATORY (INHALATION) ONCE
Status: SHIPPED | OUTPATIENT
Start: 2018-06-13

## 2018-06-13 RX ORDER — PREDNISONE 20 MG/1
40 TABLET ORAL DAILY
Qty: 10 TABLET | Refills: 0 | Status: SHIPPED | OUTPATIENT
Start: 2018-06-13 | End: 2018-06-18

## 2018-06-13 NOTE — PROGRESS NOTES
Assessment/Plan:    No problem-specific Assessment & Plan notes found for this encounter  Diagnoses and all orders for this visit:    Cough  -     albuterol inhalation solution 2 5 mg; Take 3 mL (2 5 mg total) by nebulization once   -     Nebulizer therapy; Future  -     XR chest pa & lateral; Future  -     predniSONE 20 mg tablet; Take 2 tablets (40 mg total) by mouth daily for 5 days    Wheezing  -     albuterol inhalation solution 2 5 mg; Take 3 mL (2 5 mg total) by nebulization once   -     Nebulizer therapy; Future  -     XR chest pa & lateral; Future  -     predniSONE 20 mg tablet; Take 2 tablets (40 mg total) by mouth daily for 5 days    Low O2 saturation  -     albuterol inhalation solution 2 5 mg; Take 3 mL (2 5 mg total) by nebulization once   -     Nebulizer therapy; Future  -     XR chest pa & lateral; Future  -     predniSONE 20 mg tablet; Take 2 tablets (40 mg total) by mouth daily for 5 days    Abnormal breath sounds  -     XR chest pa & lateral; Future  -     predniSONE 20 mg tablet; Take 2 tablets (40 mg total) by mouth daily for 5 days      Symptoms are new onset and requires workup  Patient to get nebulizer treatment in the office after which his lung sounds greatly improved but his oxygen only mildly improved  Exam findings prior to the nebulizer however were suspicious for pneumonia  I did have patient get a chest x-ray which was clear for pneumonia  Patient is likely having some reactive airway disease possibly to an upper respiratory infection  No antibiotics at this time  I did recommend a five day course of prednisone  Potential side effects of prednisone discussed and patient was agreeable to starting the medication  Follow-up in two days or sooner if needed  Subjective:      Patient ID: Paco Amezquita is a 80 y o  male  Patient presents with a cough  It started four days ago  It is described as productive of yellow sputum but no blood  He does feel it is improving  Nothing seems to exacerbate the cough but nothing seems to make it better  He has taken no over-the-counter medications for the cough  He does have an associated sore throat and runny nose  He is a former smoker  He smoked for about 20 years  The following portions of the patient's history were reviewed and updated as appropriate: allergies, current medications and problem list     Review of Systems   Constitutional: Negative for fever  Respiratory: Negative for shortness of breath  Cardiovascular: Negative for chest pain  Objective:      /60 (Cuff Size: Standard)   Pulse 99   Temp 98 9 °F (37 2 °C)   Wt 72 1 kg (159 lb)   SpO2 90%   BMI 24 18 kg/m²          Physical Exam   Constitutional: He appears well-developed and well-nourished  Cardiovascular: Normal rate, regular rhythm and normal heart sounds  Pulmonary/Chest: Effort normal  No accessory muscle usage  No respiratory distress  He has wheezes (  Diffuse expiratory wheezing with some coarse breath sounds as well ) in the right upper field, the right middle field, the right lower field, the left upper field, the left middle field and the left lower field  Wheezing resolved and course breath sounds resolved as well following nebulizer treatment  Oxygen however only increased to 91%

## 2018-06-15 ENCOUNTER — OFFICE VISIT (OUTPATIENT)
Dept: FAMILY MEDICINE CLINIC | Facility: MEDICAL CENTER | Age: 83
End: 2018-06-15
Payer: COMMERCIAL

## 2018-06-15 VITALS
WEIGHT: 155 LBS | BODY MASS INDEX: 23.57 KG/M2 | TEMPERATURE: 97.8 F | HEART RATE: 81 BPM | DIASTOLIC BLOOD PRESSURE: 70 MMHG | SYSTOLIC BLOOD PRESSURE: 130 MMHG | OXYGEN SATURATION: 96 %

## 2018-06-15 DIAGNOSIS — R05.9 COUGH: Primary | ICD-10-CM

## 2018-06-15 PROCEDURE — 99213 OFFICE O/P EST LOW 20 MIN: CPT | Performed by: FAMILY MEDICINE

## 2018-06-15 NOTE — PROGRESS NOTES
Assessment/Plan:    No problem-specific Assessment & Plan notes found for this encounter  Diagnoses and all orders for this visit:    Cough  Cough is much improved  Lung sounds are normal   Oxygen saturation is nice and high  Patient encouraged to complete the entire course of steroids  Follow-up in two weeks if symptoms persist or sooner if needed  Subjective:      Patient ID: Jose Ramon Valencia is a 80 y o  male  Patient presents for follow-up  His cough is improved  His oxygen is much better  He still feels little under the weather  Overall he is doing fine  He did mow his lawn yesterday with no problems  The following portions of the patient's history were reviewed and updated as appropriate: allergies, current medications and problem list     Review of Systems   Constitutional: Negative for fever  Respiratory: Negative for shortness of breath  Cardiovascular: Negative for chest pain  Objective:      /70 (BP Location: Left arm, Patient Position: Sitting, Cuff Size: Adult)   Pulse 81   Temp 97 8 °F (36 6 °C)   Wt 70 3 kg (155 lb)   SpO2 96%   BMI 23 57 kg/m²          Physical Exam   Constitutional: He appears well-developed and well-nourished  Cardiovascular: Normal rate, regular rhythm and normal heart sounds      Pulmonary/Chest: Effort normal and breath sounds normal

## 2018-07-10 ENCOUNTER — HOSPITAL ENCOUNTER (EMERGENCY)
Facility: HOSPITAL | Age: 83
Discharge: HOME | End: 2018-07-10
Payer: COMMERCIAL

## 2018-07-10 VITALS
HEART RATE: 67 BPM | RESPIRATION RATE: 16 BRPM | WEIGHT: 154 LBS | TEMPERATURE: 98.1 F | DIASTOLIC BLOOD PRESSURE: 70 MMHG | OXYGEN SATURATION: 98 % | SYSTOLIC BLOOD PRESSURE: 147 MMHG | BODY MASS INDEX: 23.42 KG/M2

## 2018-07-10 DIAGNOSIS — A69.20 ERYTHEMA MIGRANS (LYME DISEASE): Primary | ICD-10-CM

## 2018-07-10 PROCEDURE — 36415 COLL VENOUS BLD VENIPUNCTURE: CPT | Performed by: PHYSICIAN ASSISTANT

## 2018-07-10 PROCEDURE — 86617 LYME DISEASE ANTIBODY: CPT | Performed by: PHYSICIAN ASSISTANT

## 2018-07-10 PROCEDURE — 99283 EMERGENCY DEPT VISIT LOW MDM: CPT

## 2018-07-10 PROCEDURE — 86618 LYME DISEASE ANTIBODY: CPT | Performed by: PHYSICIAN ASSISTANT

## 2018-07-10 RX ORDER — DOXYCYCLINE HYCLATE 100 MG/1
100 CAPSULE ORAL 2 TIMES DAILY
Qty: 21 CAPSULE | Refills: 0 | Status: SHIPPED | OUTPATIENT
Start: 2018-07-10 | End: 2018-07-31

## 2018-07-10 NOTE — ED PROVIDER NOTES
History  Chief Complaint   Patient presents with    Insect Bite - Marked Reaction     Pt  with large red area to right forearm, states noticed yesterday and put neosporin on but today has gotten worse  States does a lot of gardening  History provided by:  Patient  Rash   Location: right forearm  Quality: redness    Severity:  Mild  Onset quality:  Gradual  Duration:  2 days  Timing:  Constant  Progression:  Unchanged  Chronicity:  New  Context: plant contact    Context: not animal contact, not chemical exposure, not diapers, not eggs, not exposure to similar rash, not food, not hot tub use, not insect bite/sting, not medications, not new detergent/soap, not nuts, not pollen, not pregnancy, not sick contacts and not sun exposure    Relieved by:  Nothing  Worsened by:  Nothing  Ineffective treatments:  None tried  Associated symptoms: no abdominal pain, no diarrhea, no fatigue, no fever, no headaches, no hoarse voice, no induration, no joint pain, no myalgias, no nausea, no periorbital edema, no shortness of breath, no sore throat, no throat swelling, no tongue swelling, no URI, not vomiting and not wheezing        Prior to Admission Medications   Prescriptions Last Dose Informant Patient Reported? Taking?    Glucosamine HCl 500 MG TABS   Yes No   Sig: Take by mouth   allopurinol (ZYLOPRIM) 100 mg tablet   Yes No   Sig: Take 2 tablets by mouth daily   aspirin 325 mg tablet   Yes No   Sig: Take 1 tablet by mouth daily   levothyroxine 100 mcg tablet   Yes No   Sig: Take 1 tablet by mouth daily   triamterene-hydrochlorothiazide (DYAZIDE) 37 5-25 mg per capsule   Yes No   Sig: Take 1 capsule by mouth daily      Facility-Administered Medications Last Administration Doses Remaining   albuterol inhalation solution 2 5 mg None recorded 1          Past Medical History:   Diagnosis Date    Arthritis     Gout     Hypertension     Hyperthyroidism     Memory loss     Sleep disturbance     Thyroid disease Past Surgical History:   Procedure Laterality Date    KIDNEY SURGERY      description: 30 yrs ago    PROSTATE SURGERY      description: stone removal 30 yrs ago    REPLACEMENT TOTAL KNEE BILATERAL      description: 25 yrs ago       Family History   Problem Relation Age of Onset    Other Mother         Brain tumor    Cancer Sister     Diabetes Brother     Arthritis Family     Diabetes Family     Other Family         knee replacement    Thyroid disease Family      I have reviewed and agree with the history as documented  Social History   Substance Use Topics    Smoking status: Never Smoker    Smokeless tobacco: Never Used      Comment: Per allscripts - never a smoker and former smoker quit approx 1997    Alcohol use Yes      Comment: social        Review of Systems   Constitutional: Negative for activity change, appetite change, chills, diaphoresis, fatigue and fever  HENT: Negative for congestion, dental problem, ear discharge, facial swelling, hoarse voice, mouth sores, postnasal drip, rhinorrhea, sinus pain, sinus pressure, sore throat and trouble swallowing  Eyes: Negative for pain, discharge, redness and itching  Respiratory: Negative for chest tightness, shortness of breath and wheezing  Gastrointestinal: Negative for abdominal pain, diarrhea, nausea and vomiting  Musculoskeletal: Negative for arthralgias and myalgias  Skin: Positive for color change and rash  Neurological: Negative for weakness and headaches  Hematological: Negative for adenopathy  Does not bruise/bleed easily  Psychiatric/Behavioral: Negative for confusion  All other systems reviewed and are negative  Physical Exam  Physical Exam   Constitutional: He is oriented to person, place, and time  He appears well-developed and well-nourished  No distress  HENT:   Head: Normocephalic     Right Ear: External ear normal    Left Ear: External ear normal    Nose: Nose normal    Mouth/Throat: Oropharynx is clear and moist    Eyes: Conjunctivae are normal  Right eye exhibits no discharge  Left eye exhibits no discharge  Neck: Neck supple  No JVD present  No tracheal deviation present  No thyromegaly present  Cardiovascular: Normal rate, regular rhythm and normal heart sounds  Exam reveals no gallop and no friction rub  No murmur heard  Pulmonary/Chest: Effort normal and breath sounds normal  No stridor  No respiratory distress  He has no wheezes  Musculoskeletal: He exhibits no edema  Lymphadenopathy:     He has no cervical adenopathy  Neurological: He is alert and oriented to person, place, and time  Skin: Capillary refill takes less than 2 seconds  He is not diaphoretic  Erythema migrans rash anterior right forearm  Psychiatric: He has a normal mood and affect  His behavior is normal  Judgment and thought content normal    Nursing note and vitals reviewed  Vital Signs  ED Triage Vitals [07/10/18 1525]   Temperature Pulse Respirations Blood Pressure SpO2   98 1 °F (36 7 °C) 67 16 147/70 98 %      Temp Source Heart Rate Source Patient Position - Orthostatic VS BP Location FiO2 (%)   Oral Monitor Sitting Left arm --      Pain Score       No Pain           Vitals:    07/10/18 1525   BP: 147/70   Pulse: 67   Patient Position - Orthostatic VS: Sitting       Visual Acuity      ED Medications  Medications - No data to display    Diagnostic Studies  Results Reviewed     Procedure Component Value Units Date/Time    Lyme Antibody Profile with reflex to [facility] [[id number]] Collected:  07/10/18 1551    Lab Status:   In process Specimen:  Blood from Arm, Left Updated:  07/10/18 1556                 No orders to display              Procedures  Procedures       Phone Contacts  ED Phone Contact    ED Course                               MDM  Number of Diagnoses or Management Options  Erythema migrans (Lyme disease): new and requires workup     Amount and/or Complexity of Data Reviewed  Clinical lab tests: ordered  Tests in the medicine section of CPT®: ordered    Risk of Complications, Morbidity, and/or Mortality  Presenting problems: moderate  Diagnostic procedures: moderate  Management options: moderate  General comments: Patient presents emergency room with an insect bite  He was seen and evaluated and diagnosed with erythema migrans  A Lyme titer was drawn  The patient was given a prescription for doxycycline  He was instructed to follow up with his family doctor in 2-3 days for recheck evaluation  We will notify him if his Lyme titer is positive  Patient Progress  Patient progress: stable    CritCare Time    Disposition  Final diagnoses:   Erythema migrans (Lyme disease)     Time reflects when diagnosis was documented in both MDM as applicable and the Disposition within this note     Time User Action Codes Description Comment    7/10/2018  3:41 PM Michelle Artist Add [A69 20] Erythema migrans (Lyme disease)       ED Disposition     ED Disposition Condition Comment    Discharge  Mariano Prim discharge to home/self care  Condition at discharge: Good        Follow-up Information     Follow up With Specialties Details Why [address] Freeman MD Family Medicine In 1 week  [facility]   [address] [place] [address] [address]  [phone]            Discharge Medication List as of 7/10/2018  3:42 PM      START taking these medications    Details   doxycycline hyclate (VIBRAMYCIN) 100 mg capsule Take 1 capsule (100 mg total) by mouth 2 (two) times a day for 21 days, Starting Tue 7/10/2018, Until Tue 7/31/2018, Normal         CONTINUE these medications which have NOT CHANGED    Details   allopurinol (ZYLOPRIM) 100 mg tablet Take 2 tablets by mouth daily, Starting Wed 8/20/2014, Historical Med      aspirin 325 mg tablet Take 1 tablet by mouth daily, Historical Med      Glucosamine HCl 500 MG TABS Take by mouth, Starting Tue 10/17/2017, Historical Med      levothyroxine 100 mcg tablet Take 1 tablet by mouth daily, Starting Fri 11/2/2012, Historical Med      triamterene-hydrochlorothiazide (DYAZIDE) 37 5-25 mg per capsule Take 1 capsule by mouth daily, Starting Fri 11/2/2012, Historical Med           No discharge procedures on file      ED Provider  Electronically Signed by           Gayle Bey PA-C  07/10/18 7214

## 2018-07-10 NOTE — DISCHARGE INSTRUCTIONS
Lyme Disease   WHAT YOU NEED TO KNOW:   Lyme disease is a bacterial infection caused by the bite of an infected tick  DISCHARGE INSTRUCTIONS:   Call 911 for any of the following:   · Your heart is beating faster than usual and you feel dizzy  · You have chest pain or trouble breathing  · You suddenly cannot talk or see well, or you have trouble moving an area of your body  Return to the emergency department if:   · You have a headache and a stiff neck  · You have trouble concentrating or thinking clearly  · You have numbness or tingling in your arms or legs, or you have trouble walking  Contact your healthcare provider if:   · Your rash grows or spreads to other areas of your body  · You suddenly have trouble falling or staying asleep  · You have new or worsening pain and swelling in your joints  · You have new or worsening weakness and muscle pain  · You have a new tick bite  · You have questions or concerns about your condition or care  Medicines:   · Antibiotics  treat a bacterial infection  · NSAIDs , such as ibuprofen, help decrease swelling, pain, and fever  This medicine is available with or without a doctor's order  NSAIDs can cause stomach bleeding or kidney problems in certain people  If you take blood thinner medicine, always ask your healthcare provider if NSAIDs are safe for you  Always read the medicine label and follow directions  · Take your medicine as directed  Contact your healthcare provider if you think your medicine is not helping or if you have side effects  Tell him of her if you are allergic to any medicine  Keep a list of the medicines, vitamins, and herbs you take  Include the amounts, and when and why you take them  Bring the list or the pill bottles to follow-up visits  Carry your medicine list with you in case of an emergency    Follow up with your healthcare provider as directed:  Write down your questions so you remember to ask them during your visits  Prevent a tick bite:  Ticks live in areas covered by brush and grass  They may even be found in your lawn if you live in certain areas  Outdoor pets can carry ticks inside the house  Ticks can grab onto you or your clothes when you walk by grass or brush  If you go into areas that contain many trees, tall grasses, and underbrush, do the following:  · Wear light colored pants and a long-sleeved shirt  Tuck your pants into your socks or boots  Tuck in your shirt  Wear sleeves that fit close to the skin at your wrists and neck  This will help prevent ticks from crawling through gaps in your clothing and onto your skin  Wear a hat in areas with trees  · Apply insect repellant on your skin  The insect repellant should contain DEET  Do not put insect repellant on skin that is cut, scratched, or irritated  Always use soap and water to wash the insect repellant off as soon as possible once you are indoors  Do not apply insect repellant on your child's face or hands  · Spray insect repellant onto your clothes  Use permethrin spray  This spray kills ticks that crawl on your clothing  Be sure to spray the tops of your boots, bottom of pant legs, and sleeve cuffs  As soon as possible, wash and dry clothing in hot water and high heat  · Check your and your child's clothing, hair, and skin for ticks  Shower within 2 hours of coming indoors  Carefully check the hairline, armpits, neck, and waist      · Decrease the risk for ticks in your yard  Ticks like to live in shady, moist areas  Sigmund Shaggy your lawn regularly to keep the grass short  Trim the grass around birdbaths and fences  Cut branches that are overgrown and take them out of the yard  Clear out leaf piles  [place] firewood in a dry, edith area  · Treat pets with tick control products  as directed  This will decrease your risk for a tick bite  Check your pets for ticks  Remove ticks from pets the same way as you remove them from people   Ask your pet's  about the best product to use on your pet  · Remove a tick with tweezers  Wear gloves  Grasp the tick as close to your skin as possible  Pull the tick straight up and out  Do not touch the tick with your bare hands  Check to make sure you removed the whole tick, including the head  Clean the area with soap and water or rubbing alcohol  Then wash your hands with soap and water  For more information:   · Centers for Disease Control and Prevention ([facility])  [address] , [address]  Phone: 9- [phone] - [phone]  Web Address: [employer] com br  © 2017 [address] Information is for End User's use only and may not be sold, redistributed or otherwise used for commercial purposes  All illustrations and images included in CareNotes® are the copyrighted property of [employer]  or Mateusz Barraza  The above information is an  only  It is not intended as medical advice for individual conditions or treatments  Talk to your doctor, nurse or pharmacist before following any medical regimen to see if it is safe and effective for you

## 2018-07-11 LAB
B BURGDOR IGG SER IA-ACNC: 0.8
B BURGDOR IGM SER IA-ACNC: 0.2

## 2018-07-12 LAB
B BURGDOR IGG PATRN SER IB-IMP: NEGATIVE
B BURGDOR IGM PATRN SER IB-IMP: NEGATIVE
B BURGDOR18KD IGG SER QL IB: ABNORMAL
B BURGDOR23KD IGG SER QL IB: PRESENT
B BURGDOR23KD IGM SER QL IB: ABNORMAL
B BURGDOR28KD IGG SER QL IB: ABNORMAL
B BURGDOR30KD IGG SER QL IB: ABNORMAL
B BURGDOR39KD IGG SER QL IB: ABNORMAL
B BURGDOR39KD IGM SER QL IB: ABNORMAL
B BURGDOR41KD IGG SER QL IB: ABNORMAL
B BURGDOR41KD IGM SER QL IB: ABNORMAL
B BURGDOR45KD IGG SER QL IB: ABNORMAL
B BURGDOR58KD IGG SER QL IB: ABNORMAL
B BURGDOR66KD IGG SER QL IB: ABNORMAL
B BURGDOR93KD IGG SER QL IB: ABNORMAL

## 2018-07-13 ENCOUNTER — TELEPHONE (OUTPATIENT)
Dept: FAMILY MEDICINE CLINIC | Facility: MEDICAL CENTER | Age: 83
End: 2018-07-13

## 2018-07-13 NOTE — TELEPHONE ENCOUNTER
DAVY ---Patient was seen at ER for a tick bite and treated  He was told to f/u with you  I said you normally didn't need to see back unless continuing problems  He said he was doing fine  I advised he should call if anything arises

## 2018-07-24 ENCOUNTER — OFFICE VISIT (OUTPATIENT)
Dept: FAMILY MEDICINE CLINIC | Facility: MEDICAL CENTER | Age: 83
End: 2018-07-24
Payer: COMMERCIAL

## 2018-07-24 VITALS
RESPIRATION RATE: 16 BRPM | DIASTOLIC BLOOD PRESSURE: 70 MMHG | HEART RATE: 70 BPM | SYSTOLIC BLOOD PRESSURE: 140 MMHG | BODY MASS INDEX: 23.59 KG/M2 | WEIGHT: 155.13 LBS
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
## 2018-07-24 PROCEDURE — 99213 OFFICE O/P EST LOW 20 MIN: CPT | Performed by: FAMILY MEDICINE

## 2018-07-24 PROCEDURE — 1160F RVW MEDS BY RX/DR IN RCRD: CPT | Performed by: FAMILY MEDICINE

## 2018-07-24 NOTE — PROGRESS NOTES
Patient was seen in the emergency room a couple of weeks ago  He had had a tick bite and within 24 hr had a fairly large red area surrounding it  The PA who saw him diagnosis Lyme disease and started a three week course of doxycycline  Lab work did come back from the ER as negative for Lyme disease  He is concerned that he is having symptoms of side effects from the doxycycline, most notably on sun-exposed areas  He also just does not feel right  /70   Pulse 70   Resp 16   Wt 70 4 kg (155 lb 2 oz)   BMI 23 59 kg/m²     HEENT examination is within normal limits no acute findings  Neck was supple  Chest clear  Cardiac exam revealed a regular rate and rhythm without murmur rub or gallop  Abdomen is soft and nontender  Area of healing at site of tick bite  Tick bite    Go ahead and stop the doxycycline at this point  Given that it was initiated so soon after the tick bite I do not think he needs to have the full three weeks    A and he never had a fever and his Lyme test was negative

## 2018-08-22 ENCOUNTER — HOSPITAL ENCOUNTER (EMERGENCY)
Facility: HOSPITAL | Age: 83
Discharge: HOME | End: 2018-08-22
Attending: EMERGENCY MEDICINE | Admitting: EMERGENCY MEDICINE
Payer: COMMERCIAL

## 2018-08-22 VITALS
RESPIRATION RATE: 16 BRPM | HEIGHT: 68 IN | HEART RATE: 97 BPM | TEMPERATURE: 98.2 F | OXYGEN SATURATION: 98 % | SYSTOLIC BLOOD PRESSURE: 148 MMHG | BODY MASS INDEX: 23.52 KG/M2 | WEIGHT: 155.2 LBS | DIASTOLIC BLOOD PRESSURE: 84 MMHG
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
## 2018-08-22 PROCEDURE — 86618 LYME DISEASE ANTIBODY: CPT | Performed by: EMERGENCY MEDICINE

## 2018-08-22 PROCEDURE — 36415 COLL VENOUS BLD VENIPUNCTURE: CPT | Performed by: EMERGENCY MEDICINE

## 2018-08-22 PROCEDURE — 99283 EMERGENCY DEPT VISIT LOW MDM: CPT

## 2018-08-22 PROCEDURE — 86617 LYME DISEASE ANTIBODY: CPT | Performed by: EMERGENCY MEDICINE

## 2018-08-22 RX ORDER — DOXYCYCLINE HYCLATE 100 MG/1
200 CAPSULE ORAL ONCE
Status: COMPLETED | OUTPATIENT
Start: 2018-08-22 | End: 2018-08-22

## 2018-08-22 RX ORDER — DIAPER,BRIEF,INFANT-TODD,DISP
EACH MISCELLANEOUS ONCE
Status: COMPLETED | OUTPATIENT
Start: 2018-08-22 | End: 2018-08-22

## 2018-08-22 RX ADMIN — DOXYCYCLINE 200 MG: 100 CAPSULE ORAL at 18:35

## 2018-08-22 RX ADMIN — HYDROCORTISONE: 1 CREAM TOPICAL at 18:36

## 2018-08-22 NOTE — ED PROVIDER NOTES
History  Chief Complaint   Patient presents with    Tick Bite     pt here for multiple tick bites, pt was already on doxycycline      Patient is a 61-year-old male with past medical history of hypothyroidism, gout and hypertension, presents to the emergency department after he was bitten by multiple ticks about 1 hour ago  Patient states he was outside and found for ticks on him  One was on his right forearm, 1 on his left forearm, 1 on his left ankle and 1 on his left neck  He was able to remove all of the ticks and is unsure what type of tick they were  He does not think they were on him for very long and 1st noticed it about 1 hour ago  He denies that they were engorged  He states over the past 2 months he has had multiple tick bites  In July, he was seen here for rash related to a tick bite and was started on doxycycline  His family doctor to come off the doxycycline after 2 weeks of treatment as his Lyme test came back negative  Patient has no systemic complaints at this time  He does report where he was bitten seems to be red and itchy  He denies any bull's eye/target sign-like rash  He denies any recent flu-like symptoms  No fevers, chills, headache, dizziness or near syncope, neck pain or stiffness, URI symptoms, cough, chest pain, shortness of breath, palpitations, visual disturbance or eye pain, abdominal pain, nausea, vomiting, diarrhea, constipation, urinary symptoms, extremity weakness or paresthesia or other focal neurologic deficits  History provided by:  Patient, spouse and medical records   used: No    Tick Bite   Associated symptoms: rash    Associated symptoms: no fever and no numbness        Prior to Admission Medications   Prescriptions Last Dose Informant Patient Reported? Taking?    Glucosamine HCl 500 MG TABS   Yes No   Sig: Take by mouth   allopurinol (ZYLOPRIM) 100 mg tablet   Yes No   Sig: Take 2 tablets by mouth daily   aspirin 325 mg tablet   Yes No   Sig: Take 1 tablet by mouth daily   levothyroxine 100 mcg tablet   Yes No   Sig: Take 1 tablet by mouth daily   triamterene-hydrochlorothiazide (DYAZIDE) 37 5-25 mg per capsule   Yes No   Sig: Take 1 capsule by mouth daily      Facility-Administered Medications Last Administration Doses Remaining   albuterol inhalation solution 2 5 mg None recorded 1          Past Medical History:   Diagnosis Date    Arthritis     Gout     Hypertension     Hyperthyroidism     Memory loss     Sleep disturbance     Thyroid disease        Past Surgical History:   Procedure Laterality Date    KIDNEY SURGERY      description: 30 yrs ago    PROSTATE SURGERY      description: stone removal 30 yrs ago    REPLACEMENT TOTAL KNEE BILATERAL      description: 25 yrs ago       Family History   Problem Relation Age of Onset    Other Mother         Brain tumor    Cancer Sister     Diabetes Brother     Arthritis Family     Diabetes Family     Other Family         knee replacement    Thyroid disease Family      I have reviewed and agree with the history as documented  Social History   Substance Use Topics    Smoking status: Never Smoker    Smokeless tobacco: Never Used      Comment: Per allscripts - never a smoker and former smoker quit approx 1997    Alcohol use Yes      Comment: social        Review of Systems   Constitutional: Negative for appetite change, chills, fatigue and fever  HENT: Negative for congestion, ear pain, rhinorrhea and sore throat  Eyes: Negative for photophobia, pain and visual disturbance  Respiratory: Negative for cough and shortness of breath  Cardiovascular: Negative for chest pain and palpitations  Gastrointestinal: Negative for abdominal pain, constipation, diarrhea, nausea and vomiting  Genitourinary: Negative for dysuria, flank pain, frequency and hematuria  Musculoskeletal: Negative for arthralgias, back pain, joint swelling, neck pain and neck stiffness     Skin: Positive for rash  Negative for color change, pallor and wound  Allergic/Immunologic: Negative for immunocompromised state  Neurological: Negative for dizziness, weakness, light-headedness, numbness and headaches  Hematological: Negative for adenopathy  Psychiatric/Behavioral: Negative for confusion and decreased concentration  All other systems reviewed and are negative  Physical Exam  Physical Exam   Constitutional: He is oriented to person, place, and time  He appears well-developed and well-nourished  No distress  HENT:   Head: Normocephalic and atraumatic  Mouth/Throat: Oropharynx is clear and moist    Eyes: Conjunctivae and EOM are normal  Pupils are equal, round, and reactive to light  Neck: Normal range of motion  Neck supple  No JVD present  Cardiovascular: Normal rate, regular rhythm, normal heart sounds and intact distal pulses  Exam reveals no gallop and no friction rub  No murmur heard  Pulmonary/Chest: Effort normal and breath sounds normal  No respiratory distress  He has no wheezes  He has no rales  Abdominal: Soft  Bowel sounds are normal  He exhibits no distension  There is no tenderness  There is no rebound and no guarding  Musculoskeletal: Normal range of motion  He exhibits no edema or tenderness  Lymphadenopathy:     He has no cervical adenopathy  Neurological: He is alert and oriented to person, place, and time  No cranial nerve deficit  Skin: Skin is warm and dry  No rash noted  He is not diaphoretic  There is erythema  No pallor  Localized erythema over left volar forearm, right volar forearm, and left neck  No evidence of retained tick or insect  No significant warmth to these areas  No fluctuance  No evidence of erythema migrans  Left lower leg has superficial abrasion from scratching/tick bite but no retained tick or surrounding erythema  Psychiatric: He has a normal mood and affect   His behavior is normal  Thought content normal    Nursing note and vitals reviewed  Vital Signs  ED Triage Vitals   Temperature Pulse Respirations Blood Pressure SpO2   08/22/18 1725 08/22/18 1724 08/22/18 1724 08/22/18 1725 08/22/18 1724   98 2 °F (36 8 °C) 97 16 148/84 98 %      Temp Source Heart Rate Source Patient Position - Orthostatic VS BP Location FiO2 (%)   08/22/18 1724 08/22/18 1724 08/22/18 1724 08/22/18 1724 --   Oral Monitor Sitting Left arm       Pain Score       08/22/18 1724       7           Vitals:    08/22/18 1724 08/22/18 1725   BP:  148/84   Pulse: 97    Patient Position - Orthostatic VS: Sitting        Visual Acuity      ED Medications  Medications   doxycycline hyclate (VIBRAMYCIN) capsule 200 mg (200 mg Oral Given 8/22/18 1835)   hydrocortisone 1 % cream ( Topical Given 8/22/18 1836)       Diagnostic Studies  Results Reviewed     Procedure Component Value Units Date/Time    Lyme Antibody Profile with reflex to [facility] [[id number]] Collected:  08/22/18 1838    Lab Status: In process Specimen:  Blood from Arm, Right Updated:  08/22/18 1841                 No orders to display              Procedures  Procedures       Phone Contacts  ED Phone Contact    ED Course           Identification of Seniors at Risk      Most Recent Value   (ISAR) Identification of Seniors at Risk   Before the illness or injury that brought you to the Emergency, did you need someone to help you on a regular basis? 0 Filed at: 08/22/2018 1725   In the last 24 hours, have you needed more help than usual?  0 Filed at: 08/22/2018 1725   Have you been hospitalized for one or more nights during the past 6 months? 0 Filed at: 08/22/2018 1725   In general, do you see well?  0 Filed at: 08/22/2018 1725   In general, do you have serious problems with your memory? 0 Filed at: 08/22/2018 1725   Do you take more than three different medications every day?   1 Filed at: 08/22/2018 1725   ISAR Score  1 Filed at: 08/22/2018 1725                          MDM  Number of Diagnoses or Management Options  Tick bites:   Diagnosis management comments: 69-year-old male presents after multiple tick bites found on him 1 hour ago  Most likely these were not engorged and not on the patient for longer than 72 hours  Will still give prophylactic dose of doxycycline 200 mg x1  Will retest for Lyme, more so for the tick bites from 1-2 months ago as initially tested negative however it would not show up in his blood initially  Advised him to return to his PCP for recheck of the wounds as well as in 6-8 weeks for repeat Lyme testing  Will give hydrocortisone cream for rash/itching which is likely a local inflammatory response to the tick bite and less likely a cellulitis given that the bite only occurred 1 hour ago  Advised him to keep an eye out on this area of erythema and to return if the rash is worsening or if he develops fever, chills or any flu-like symptoms  Also discussed the typical rash of Lyme disease, erythema migrans and what it looks like  Amount and/or Complexity of Data Reviewed  Clinical lab tests: ordered  Review and summarize past medical records: yes      CritCare Time    Disposition  Final diagnoses:   Tick bites     Time reflects when diagnosis was documented in both MDM as applicable and the Disposition within this note     Time User Action Codes Description Comment    8/22/2018  6:38 PM Nanda Roman Add [M60  XXXA] Tick bites       ED Disposition     ED Disposition Condition Comment    Discharge  Yun Zuñiga discharge to home/self care  Condition at discharge: Stable        Follow-up Information     Follow up With Specialties Details Why Marcos Gonzalez MD Family Medicine Schedule an appointment as soon as possible for a visit  [address] [place] [address]  [phone]            Patient's Medications   Discharge Prescriptions    No medications on file     No discharge procedures on file      ED Provider  Electronically Signed by           Payton Hernandez DO Keisha  08/22/18 1846

## 2018-08-22 NOTE — ED NOTES
D/c instructions and rx reviewed w/ pt  All questions and concerns addressed at this time         Andria Gautam RN  08/22/18 9750

## 2018-08-22 NOTE — DISCHARGE INSTRUCTIONS
Tick Bite   WHAT YOU NEED TO KNOW:   Most tick bites are not dangerous, but ticks can pass disease or infection when they bite  A tick will bite and then move further into the skin, where it stays to feed on blood  Ticks need to be removed quickly  Serious symptoms of a tick bite need immediate treatment  DISCHARGE INSTRUCTIONS:   Medicines:   · Antibiotics:  Healthcare providers may give you antibiotics if you get an infection from the tick bite  Do not stop taking the antibiotics until you talk to your healthcare provider, even if you feel better  · Antihistamines:  Antihistamines decrease swelling and itching  · Local anesthetic:  This medicine helps to decrease pain and itching  · Skin protectant:  Skin protectants help soothe itchy, red skin  They may also keep out infection  Some examples of these medicines are calamine and zinc oxide  · Steroids: You may need to take steroids if you have a very bad reaction to your tick bite  Take steroids with food to keep your stomach from becoming upset from the medicine  Do not stop taking this medicine until you talk to your healthcare provider  · Topical steroids:  A topical steroid is medicine that you rub into your skin to decrease redness and itching  Topical steroids are available without a doctor's order  Do not use this medicine on areas of skin that are cut, scratched, or infected  · Take your medicine as directed  Call your healthcare provider if you think your medicine is not helping or if you have side effects  Tell him if you are allergic to any medicine  Keep a list of the medicines, vitamins, and herbs you take  Include the amounts, and when and why you take them  Bring the list or the pill bottles to follow-up visits  Carry your medicine list with you in case of an emergency  Follow up with your healthcare provider as directed:  Write down your questions so you remember to ask them during your visits     How to remove a tick: Ticks must be removed as soon as possible to help prevent them from passing disease or infection  You are less likely to get sick from a tick bite if you remove the tick within 24 hours  Do the following to remove a tick:  · Soak a cotton ball with rubbing alcohol, or use a disposable alcohol wipe  Gently clean the skin around the tick  · Grasp the tick as close to your skin as possible  Pull the tick straight up and out with tweezers, or with fingertips protected by a tissue or gloves  Do not touch the tick with your bare hands  · Pull gently until the tick lets go  Do not twist or jerk the tick suddenly, because this may break off the tick's head or mouth parts  You can buy a special V-shaped device that help lift ticks out safely  Do not leave any part of the tick in your skin  · Do not crush or squeeze the tick since its body may be infected with germs  Flush the tick down the toilet  · Do not put a hot match, petroleum jelly, or fingernail polish on the tick  This does not help and may be dangerous  · After the tick is removed, clean the area of the bite with rubbing alcohol  Then wash your hands with soap and water  Care for your tick bite:  Apply ice to the bite paresh to help to decrease pain, itching and swelling  Put ice in a plastic bag  Cover the bag with a towel  Put the bag on your bite for 15 to 20 minutes every hour or as directed by your healthcare provider  Try not to scratch the bite  Prevent another tick bite:  Ticks live in areas covered by brush and grass  They may even be found in your lawn if you live in certain areas  Outdoor pets can carry ticks inside the house  Ticks can grab onto you or your clothes when you walk by grass or brush  If you go into areas that contain many trees, tall grasses, and underbrush, do the following:  · Wear protective clothing:  Wear pants and a long-sleeved shirt  Tuck your pants into your socks or boots  Tuck in your shirt   Wear sleeves that fit close to the skin at your wrists and neck  This will help prevent ticks from crawling through gaps in your clothing and onto your skin  Wear a hat in areas with trees  Wear light-colored clothing to make finding ticks easier  · Use insect repellant:  Put insect repellent on skin that is showing  The insect repellant should contain DEET  Do not put insect repellant on skin that is cut, scratched, or irritated  Do not put insect repellent on a child's face or hands  Always use soap and water to wash the insect repellant off as soon as possible once you are indoors  · Spray insect repellant onto your clothes:  Use permethrin spray  This spray kills ticks that crawl on your clothing  Be sure to spray the tops of your boots, bottom of pant legs, and sleeve cuffs  As soon as possible, wash and dry clothing that has been worn outdoors  · Check for ticks often:  Check your clothing, hair, and skin for ticks every 2 to 3 hours while you are outdoors  Carefully check the hairline, armpits, neck, and waist  Check your pets and children for ticks  Remove ticks from pets the same way as you remove them from people  · Decrease the risk of ticks in your yard:  Ticks like to live in [place], moist areas  [place] your lawn regularly to keep the grass short  Trim the grass around birdbaths and fences  Cut branches that are overgrown and take them out of the yard  Clear out leaf piles  Kaitlynn Lien firewood in a dry, edith area  Contact your healthcare provider if:   · You cannot remove the tick  · The tick's head is stuck in the skin  · You have questions or concerns about your condition or care  Seek care immediately or call 911 if:   · You get a fever, rash, headache, or muscle or joint pains within 1 month of a tick bite  These may be signs of a more serious disease  · You are having trouble walking or moving your legs    © 2016 [address] is for End User's use only and may not be sold, redistributed or otherwise used for commercial purposes  All illustrations and images included in CareNotes® are the copyrighted property of A D A M [employer]  or Mateusz Barraza  The above information is an  only  It is not intended as medical advice for individual conditions or treatments  Talk to your doctor, nurse or pharmacist before following any medical regimen to see if it is safe and effective for you

## 2018-08-23 LAB
B BURGDOR IGG SER IA-ACNC: 2.2
B BURGDOR IGM SER IA-ACNC: 0.46

## 2018-08-25 ENCOUNTER — TELEPHONE (OUTPATIENT)
Dept: EMERGENCY DEPT | Facility: HOSPITAL | Age: 83
End: 2018-08-25

## 2018-08-25 DIAGNOSIS — A69.20 LYME DISEASE: Primary | ICD-10-CM

## 2018-08-25 LAB
B BURGDOR IGG PATRN SER IB-IMP: POSITIVE
B BURGDOR IGM PATRN SER IB-IMP: NEGATIVE
B BURGDOR18KD IGG SER QL IB: PRESENT
B BURGDOR23KD IGG SER QL IB: PRESENT
B BURGDOR23KD IGM SER QL IB: PRESENT
B BURGDOR28KD IGG SER QL IB: ABNORMAL
B BURGDOR30KD IGG SER QL IB: ABNORMAL
B BURGDOR39KD IGG SER QL IB: PRESENT
B BURGDOR39KD IGM SER QL IB: ABNORMAL
B BURGDOR41KD IGG SER QL IB: ABNORMAL
B BURGDOR41KD IGM SER QL IB: ABNORMAL
B BURGDOR45KD IGG SER QL IB: ABNORMAL
B BURGDOR58KD IGG SER QL IB: PRESENT
B BURGDOR66KD IGG SER QL IB: ABNORMAL
B BURGDOR93KD IGG SER QL IB: PRESENT

## 2018-08-25 RX ORDER — DOXYCYCLINE HYCLATE 100 MG/1
100 CAPSULE ORAL 2 TIMES DAILY
Qty: 42 CAPSULE | Refills: 0 | Status: SHIPPED | OUTPATIENT
Start: 2018-08-25 | End: 2018-09-15

## 2018-08-27 ENCOUNTER — TELEPHONE (OUTPATIENT)
Dept: FAMILY MEDICINE CLINIC | Facility: MEDICAL CENTER | Age: 83
End: 2018-08-27

## 2018-08-27 NOTE — TELEPHONE ENCOUNTER
Seen @ [facility] [place] for tick bites  Given doxycyline 21 days worth  Got a call over the weekend it was positive  Will complete the meds  Do you want to see him?

## 2018-09-17 ENCOUNTER — TELEPHONE (OUTPATIENT)
Dept: FAMILY MEDICINE CLINIC | Facility: MEDICAL CENTER | Age: 83
End: 2018-09-17

## 2018-09-17 NOTE — TELEPHONE ENCOUNTER
Patient states he finished the antibiotic but the rash on his arms is still bothering him  Should I make an appt?

## 2018-09-24 ENCOUNTER — CLINICAL SUPPORT (OUTPATIENT)
Dept: FAMILY MEDICINE CLINIC | Facility: MEDICAL CENTER | Age: 83
End: 2018-09-24
Payer: COMMERCIAL

## 2018-09-24 ENCOUNTER — APPOINTMENT (OUTPATIENT)
Dept: LAB | Facility: MEDICAL CENTER | Age: 83
End: 2018-09-24
Payer: COMMERCIAL

## 2018-09-24 DIAGNOSIS — Z23 NEED FOR INFLUENZA VACCINATION: Primary | ICD-10-CM

## 2018-09-24 DIAGNOSIS — E78.00 PURE HYPERCHOLESTEROLEMIA: ICD-10-CM

## 2018-09-24 DIAGNOSIS — G89.29 CHRONIC PAIN OF LEFT KNEE: ICD-10-CM

## 2018-09-24 DIAGNOSIS — M25.562 CHRONIC PAIN OF LEFT KNEE: ICD-10-CM

## 2018-09-24 DIAGNOSIS — I10 ESSENTIAL HYPERTENSION: ICD-10-CM

## 2018-09-24 DIAGNOSIS — E03.9 ACQUIRED HYPOTHYROIDISM: ICD-10-CM

## 2018-09-24 LAB
ALBUMIN SERPL BCP-MCNC: 3.5 G/DL (ref 3.5–5)
ALP SERPL-CCNC: 57 U/L (ref 46–116)
ALT SERPL W P-5'-P-CCNC: 13 U/L (ref 12–78)
ANION GAP SERPL CALCULATED.3IONS-SCNC: 6 MMOL/L (ref 4–13)
AST SERPL W P-5'-P-CCNC: 23 U/L (ref 5–45)
BASOPHILS # BLD AUTO: 0.02 THOUSANDS/ΜL (ref 0–0.1)
BASOPHILS NFR BLD AUTO: 1 % (ref 0–1)
BILIRUB SERPL-MCNC: 0.6 MG/DL (ref 0.2–1)
BUN SERPL-MCNC: 19 MG/DL (ref 5–25)
CALCIUM SERPL-MCNC: 8.7 MG/DL (ref 8.3–10.1)
CHLORIDE SERPL-SCNC: 105 MMOL/L (ref 100–108)
CHOLEST SERPL-MCNC: 195 MG/DL (ref 50–200)
CO2 SERPL-SCNC: 30 MMOL/L (ref 21–32)
CREAT SERPL-MCNC: 1.04 MG/DL (ref 0.6–1.3)
EOSINOPHIL # BLD AUTO: 0.18 THOUSAND/ΜL (ref 0–0.61)
EOSINOPHIL NFR BLD AUTO: 4 % (ref 0–6)
ERYTHROCYTE [DISTWIDTH] IN BLOOD BY AUTOMATED COUNT: 13.2 % (ref 11.6–15.1)
GFR SERPL CREATININE-BSD FRML MDRD: 62 ML/MIN/1.73SQ M
GLUCOSE P FAST SERPL-MCNC: 93 MG/DL (ref 65–99)
HCT VFR BLD AUTO: 44.7 % (ref 36.5–49.3)
HDLC SERPL-MCNC: 65 MG/DL (ref 40–60)
HGB BLD-MCNC: 14.6 G/DL (ref 12–17)
IMM GRANULOCYTES # BLD AUTO: 0 THOUSAND/UL (ref 0–0.2)
IMM GRANULOCYTES NFR BLD AUTO: 0 % (ref 0–2)
LDLC SERPL CALC-MCNC: 115 MG/DL (ref 0–100)
LYMPHOCYTES # BLD AUTO: 1.87 THOUSANDS/ΜL (ref 0.6–4.47)
LYMPHOCYTES NFR BLD AUTO: 44 % (ref 14–44)
MCH RBC QN AUTO: 32.6 PG (ref 26.8–34.3)
MCHC RBC AUTO-ENTMCNC: 32.7 G/DL (ref 31.4–37.4)
MCV RBC AUTO: 100 FL (ref 82–98)
MONOCYTES # BLD AUTO: 0.43 THOUSAND/ΜL (ref 0.17–1.22)
MONOCYTES NFR BLD AUTO: 10 % (ref 4–12)
NEUTROPHILS # BLD AUTO: 1.71 THOUSANDS/ΜL (ref 1.85–7.62)
NEUTS SEG NFR BLD AUTO: 41 % (ref 43–75)
NRBC BLD AUTO-RTO: 0 /100 WBCS
PLATELET # BLD AUTO: 144 THOUSANDS/UL (ref 149–390)
PMV BLD AUTO: 10.7 FL (ref 8.9–12.7)
POTASSIUM SERPL-SCNC: 3.9 MMOL/L (ref 3.5–5.3)
PROT SERPL-MCNC: 7.1 G/DL (ref 6.4–8.2)
RBC # BLD AUTO: 4.48 MILLION/UL (ref 3.88–5.62)
SODIUM SERPL-SCNC: 141 MMOL/L (ref 136–145)
TRIGL SERPL-MCNC: 73 MG/DL
TSH SERPL DL<=0.05 MIU/L-ACNC: 1.37 UIU/ML (ref 0.36–3.74)
WBC # BLD AUTO: 4.21 THOUSAND/UL (ref 4.31–10.16)

## 2018-09-24 PROCEDURE — 80053 COMPREHEN METABOLIC PANEL: CPT

## 2018-09-24 PROCEDURE — 84443 ASSAY THYROID STIM HORMONE: CPT

## 2018-09-24 PROCEDURE — 90662 IIV NO PRSV INCREASED AG IM: CPT

## 2018-09-24 PROCEDURE — 36415 COLL VENOUS BLD VENIPUNCTURE: CPT

## 2018-09-24 PROCEDURE — 85025 COMPLETE CBC W/AUTO DIFF WBC: CPT

## 2018-09-24 PROCEDURE — 80061 LIPID PANEL: CPT

## 2018-09-24 PROCEDURE — G0008 ADMIN INFLUENZA VIRUS VAC: HCPCS

## 2018-10-17 ENCOUNTER — OFFICE VISIT (OUTPATIENT)
Dept: FAMILY MEDICINE CLINIC | Facility: MEDICAL CENTER | Age: 83
End: 2018-10-17
Payer: COMMERCIAL

## 2018-10-17 VITALS
SYSTOLIC BLOOD PRESSURE: 140 MMHG | BODY MASS INDEX: 24.42 KG/M2 | DIASTOLIC BLOOD PRESSURE: 70 MMHG | WEIGHT: 160.6 LBS | HEART RATE: 68 BPM | RESPIRATION RATE: 16 BRPM

## 2018-10-17 DIAGNOSIS — M1A.09X0 IDIOPATHIC CHRONIC GOUT OF MULTIPLE SITES WITHOUT TOPHUS: ICD-10-CM

## 2018-10-17 DIAGNOSIS — I10 ESSENTIAL HYPERTENSION: ICD-10-CM

## 2018-10-17 DIAGNOSIS — E03.9 ACQUIRED HYPOTHYROIDISM: Primary | ICD-10-CM

## 2018-10-17 DIAGNOSIS — E78.00 PURE HYPERCHOLESTEROLEMIA: ICD-10-CM

## 2018-10-17 PROCEDURE — 99214 OFFICE O/P EST MOD 30 MIN: CPT | Performed by: FAMILY MEDICINE

## 2018-10-17 PROCEDURE — 4040F PNEUMOC VAC/ADMIN/RCVD: CPT | Performed by: FAMILY MEDICINE

## 2018-10-17 RX ORDER — ALLOPURINOL 100 MG/1
200 TABLET ORAL DAILY
Qty: 180 TABLET | Refills: 3 | Status: SHIPPED | OUTPATIENT
Start: 2018-10-17 | End: 2019-10-21 | Stop reason: SDUPTHER

## 2018-10-17 RX ORDER — LEVOTHYROXINE SODIUM 0.1 MG/1
100 TABLET ORAL DAILY
Qty: 90 TABLET | Refills: 1 | Status: SHIPPED | OUTPATIENT
Start: 2018-10-17 | End: 2019-04-18 | Stop reason: SDUPTHER

## 2018-10-17 RX ORDER — TRIAMTERENE AND HYDROCHLOROTHIAZIDE 37.5; 25 MG/1; MG/1
1 CAPSULE ORAL DAILY
Qty: 90 CAPSULE | Refills: 3 | Status: SHIPPED | OUTPATIENT
Start: 2018-10-17 | End: 2019-10-21 | Stop reason: SDUPTHER

## 2018-10-18 NOTE — ASSESSMENT & PLAN NOTE
Blood pressure is well controlled with Dyazide  He tolerates that well    Blood pressure today 144/70    Continue Dyazide

## 2018-10-18 NOTE — ASSESSMENT & PLAN NOTE
Since we have had patient on allopurinol, he has had no gouty attacks      Will continue allopurinol, continue routine follow-up

## 2018-10-18 NOTE — ASSESSMENT & PLAN NOTE
Patient has hypothyroidism and it is well controlled with levothyroxine 100 mcg  Continue levothyroxine

## 2018-10-18 NOTE — PROGRESS NOTES
Assessment/Plan:    Acquired hypothyroidism  Patient has hypothyroidism and it is well controlled with levothyroxine 100 mcg  Continue levothyroxine  Essential hypertension  Blood pressure is well controlled with Dyazide  He tolerates that well  Blood pressure today 144/70    Continue Dyazide    Idiopathic chronic gout of multiple sites without tophus  Since we have had patient on allopurinol, he has had no gouty attacks  Will continue allopurinol, continue routine follow-up    Pure hypercholesterolemia  Patient's hyperlipidemia is borderline if not close to normal     He will continue low-fat diet  Diagnoses and all orders for this visit:    Acquired hypothyroidism  -     levothyroxine 100 mcg tablet; Take 1 tablet (100 mcg total) by mouth daily    Essential hypertension  -     triamterene-hydrochlorothiazide (DYAZIDE) 37 5-25 mg per capsule; Take 1 capsule by mouth daily    Idiopathic chronic gout of multiple sites without tophus  -     allopurinol (ZYLOPRIM) 100 mg tablet; Take 2 tablets (200 mg total) by mouth daily    Pure hypercholesterolemia          Subjective:      Patient ID: Chrystine Aase is a 80 y o  male  Here for routine follow up of medical problems  Please see assesment and plan for discussion  The following portions of the patient's history were reviewed and updated as appropriate: allergies, current medications, past family history, past medical history, past social history, past surgical history and problem list     Review of Systems   Constitutional: Negative for activity change, fatigue and fever  HENT: Negative for congestion, ear discharge, ear pain, postnasal drip, rhinorrhea, sinus pain, sneezing and sore throat  Eyes: Negative for photophobia, pain, discharge and redness  Respiratory: Negative for apnea, cough, shortness of breath and wheezing  Cardiovascular: Negative for chest pain and palpitations     Gastrointestinal: Negative for abdominal pain, blood in stool, constipation, diarrhea, nausea and vomiting  Endocrine: Negative for polydipsia, polyphagia and polyuria  Genitourinary: Negative for decreased urine volume, difficulty urinating, discharge, dysuria, frequency, penile pain and urgency  Musculoskeletal: Positive for arthralgias (Left knee pain  Chronic )  Negative for gait problem, joint swelling and neck pain  Skin: Negative for color change and rash  Neurological: Negative for dizziness, tremors, seizures, weakness and headaches  Psychiatric/Behavioral: Negative for agitation and sleep disturbance  The patient is not nervous/anxious  Objective:      /70 (Cuff Size: Large)   Pulse 68   Resp 16   Wt 72 8 kg (160 lb 9 6 oz)   BMI 24 42 kg/m²          Physical Exam   Constitutional: He is oriented to person, place, and time  Vital signs are normal  He appears well-developed and well-nourished  He is cooperative  HENT:   Head: Normocephalic  Right Ear: External ear normal    Left Ear: External ear normal    Nose: Nose normal    Mouth/Throat: Oropharynx is clear and moist    Eyes: Pupils are equal, round, and reactive to light  Conjunctivae, EOM and lids are normal    Neck: Normal range of motion  Neck supple  Carotid bruit is not present  No thyromegaly present  Cardiovascular: Normal rate, regular rhythm, S1 normal, S2 normal, normal heart sounds, intact distal pulses and normal pulses  No murmur heard  Pulmonary/Chest: Effort normal and breath sounds normal  No respiratory distress  He has no wheezes  He has no rales  Abdominal: Soft  Normal appearance and bowel sounds are normal  He exhibits no mass  There is no hepatosplenomegaly  There is no tenderness  Musculoskeletal: Normal range of motion  Lymphadenopathy:     He has no cervical adenopathy  Neurological: He is alert and oriented to person, place, and time  He has normal strength and normal reflexes  No cranial nerve deficit or sensory deficit  Skin: Skin is warm, dry and intact  No rash noted  No pallor  Psychiatric: He has a normal mood and affect  His behavior is normal  Judgment and thought content normal  Cognition and memory are normal    Nursing note and vitals reviewed

## 2018-10-25 ENCOUNTER — TELEPHONE (OUTPATIENT)
Dept: FAMILY MEDICINE CLINIC | Facility: MEDICAL CENTER | Age: 83
End: 2018-10-25

## 2018-10-25 NOTE — TELEPHONE ENCOUNTER
Patient came in to the office today to drop off [facility] Physical Exam form with envelope and address  Patient will like a call when form is completed  Routed to Dr Ethan Alvarado to advise  Placed in your bin on desk

## 2018-12-28 ENCOUNTER — TELEPHONE (OUTPATIENT)
Dept: FAMILY MEDICINE CLINIC | Facility: MEDICAL CENTER | Age: 83
End: 2018-12-28

## 2019-03-15 ENCOUNTER — TELEPHONE (OUTPATIENT)
Dept: FAMILY MEDICINE CLINIC | Facility: MEDICAL CENTER | Age: 84
End: 2019-03-15

## 2019-03-15 NOTE — TELEPHONE ENCOUNTER
SABRINA Almeida from [facility] called the office to let Dr Amanda Reis aware the patient was seen today and stated patient falls at least 10 x this past year, he loses his balance when he is outside, going up the steps, and has been bitten by ticks before  Vilma BENNETT recommend Brain MRI, physical therapy, and testing for Lyme Disease due to the patient being outside often  If any question can reach [phone] [address] Cunha at [phone]  She will send a summary visit in a few days

## 2019-04-12 ENCOUNTER — TELEPHONE (OUTPATIENT)
Dept: FAMILY MEDICINE CLINIC | Facility: MEDICAL CENTER | Age: 84
End: 2019-04-12

## 2019-04-18 DIAGNOSIS — E03.9 ACQUIRED HYPOTHYROIDISM: ICD-10-CM

## 2019-04-19 ENCOUNTER — OFFICE VISIT (OUTPATIENT)
Dept: FAMILY MEDICINE CLINIC | Facility: MEDICAL CENTER | Age: 84
End: 2019-04-19
Payer: COMMERCIAL

## 2019-04-19 ENCOUNTER — APPOINTMENT (OUTPATIENT)
Dept: LAB | Facility: MEDICAL CENTER | Age: 84
End: 2019-04-19
Payer: COMMERCIAL

## 2019-04-19 VITALS
BODY MASS INDEX: 25.31 KG/M2 | SYSTOLIC BLOOD PRESSURE: 150 MMHG | HEIGHT: 68 IN | RESPIRATION RATE: 16 BRPM | HEART RATE: 68 BPM | DIASTOLIC BLOOD PRESSURE: 76 MMHG | WEIGHT: 167 LBS

## 2019-04-19 DIAGNOSIS — I10 ESSENTIAL HYPERTENSION: ICD-10-CM

## 2019-04-19 DIAGNOSIS — Z00.00 PREVENTATIVE HEALTH CARE: ICD-10-CM

## 2019-04-19 DIAGNOSIS — R29.6 FALLS FREQUENTLY: Primary | ICD-10-CM

## 2019-04-19 LAB
ANION GAP SERPL CALCULATED.3IONS-SCNC: 5 MMOL/L (ref 4–13)
BUN SERPL-MCNC: 15 MG/DL (ref 5–25)
CALCIUM SERPL-MCNC: 8.5 MG/DL (ref 8.3–10.1)
CHLORIDE SERPL-SCNC: 110 MMOL/L (ref 100–108)
CO2 SERPL-SCNC: 29 MMOL/L (ref 21–32)
CREAT SERPL-MCNC: 0.99 MG/DL (ref 0.6–1.3)
GFR SERPL CREATININE-BSD FRML MDRD: 66 ML/MIN/1.73SQ M
GLUCOSE SERPL-MCNC: 83 MG/DL (ref 65–140)
POTASSIUM SERPL-SCNC: 4.1 MMOL/L (ref 3.5–5.3)
SODIUM SERPL-SCNC: 144 MMOL/L (ref 136–145)

## 2019-04-19 PROCEDURE — G0439 PPPS, SUBSEQ VISIT: HCPCS | Performed by: FAMILY MEDICINE

## 2019-04-19 PROCEDURE — 1170F FXNL STATUS ASSESSED: CPT | Performed by: FAMILY MEDICINE

## 2019-04-19 PROCEDURE — 36415 COLL VENOUS BLD VENIPUNCTURE: CPT

## 2019-04-19 PROCEDURE — 80048 BASIC METABOLIC PNL TOTAL CA: CPT

## 2019-04-19 PROCEDURE — 1125F AMNT PAIN NOTED PAIN PRSNT: CPT | Performed by: FAMILY MEDICINE

## 2019-04-19 PROCEDURE — 99214 OFFICE O/P EST MOD 30 MIN: CPT | Performed by: FAMILY MEDICINE

## 2019-04-22 RX ORDER — LEVOTHYROXINE SODIUM 0.1 MG/1
TABLET ORAL
Qty: 90 TABLET | Refills: 1 | Status: SHIPPED | OUTPATIENT
Start: 2019-04-22 | End: 2019-10-21 | Stop reason: SDUPTHER

## 2019-05-09 ENCOUNTER — OFFICE VISIT (OUTPATIENT)
Dept: DERMATOLOGY | Facility: CLINIC | Age: 84
End: 2019-05-09
Payer: COMMERCIAL

## 2019-05-09 DIAGNOSIS — Z13.89 SCREENING FOR SKIN CONDITION: ICD-10-CM

## 2019-05-09 DIAGNOSIS — L82.1 SEBORRHEIC KERATOSIS: Primary | ICD-10-CM

## 2019-05-09 PROCEDURE — 99213 OFFICE O/P EST LOW 20 MIN: CPT | Performed by: DERMATOLOGY

## 2019-05-28 ENCOUNTER — HOSPITAL ENCOUNTER (OUTPATIENT)
Dept: MRI IMAGING | Facility: HOSPITAL | Age: 84
Discharge: HOME | End: 2019-05-28
Payer: COMMERCIAL

## 2019-05-28 DIAGNOSIS — R29.6 FALLS FREQUENTLY: ICD-10-CM

## 2019-05-28 PROCEDURE — A9585 GADOBUTROL INJECTION: HCPCS | Performed by: FAMILY MEDICINE

## 2019-05-28 PROCEDURE — 70553 MRI BRAIN STEM W/O & W/DYE: CPT

## 2019-05-28 RX ADMIN — GADOBUTROL 7 ML: 604.72 INJECTION INTRAVENOUS at 16:33

## 2019-06-07 ENCOUNTER — TELEPHONE (OUTPATIENT)
Dept: FAMILY MEDICINE CLINIC | Facility: MEDICAL CENTER | Age: 84
End: 2019-06-07

## 2019-06-10 DIAGNOSIS — R93.0 ABNORMAL MRI OF HEAD: Primary | ICD-10-CM

## 2019-06-11 ENCOUNTER — CONSULT (OUTPATIENT)
Dept: NEUROLOGY | Facility: CLINIC | Age: 84
End: 2019-06-11
Payer: COMMERCIAL

## 2019-06-11 VITALS
DIASTOLIC BLOOD PRESSURE: 72 MMHG | WEIGHT: 161 LBS | HEART RATE: 68 BPM | SYSTOLIC BLOOD PRESSURE: 158 MMHG | BODY MASS INDEX: 24.4 KG/M2 | HEIGHT: 68 IN

## 2019-06-11 DIAGNOSIS — D32.0 MENINGIOMA, CEREBRAL (HCC): ICD-10-CM

## 2019-06-11 DIAGNOSIS — M48.07 SPINAL STENOSIS OF LUMBOSACRAL REGION: ICD-10-CM

## 2019-06-11 DIAGNOSIS — R93.0 ABNORMAL MRI OF HEAD: ICD-10-CM

## 2019-06-11 DIAGNOSIS — M47.816 SPONDYLOSIS OF LUMBAR REGION WITHOUT MYELOPATHY OR RADICULOPATHY: Primary | ICD-10-CM

## 2019-06-11 PROCEDURE — 99205 OFFICE O/P NEW HI 60 MIN: CPT | Performed by: PSYCHIATRY & NEUROLOGY

## 2019-06-12 ENCOUNTER — PATIENT OUTREACH (OUTPATIENT)
Dept: CASE MANAGEMENT | Facility: OTHER | Age: 84
End: 2019-06-12

## 2019-06-13 ENCOUNTER — TELEPHONE (OUTPATIENT)
Dept: FAMILY MEDICINE CLINIC | Facility: MEDICAL CENTER | Age: 84
End: 2019-06-13

## 2019-07-01 ENCOUNTER — OFFICE VISIT (OUTPATIENT)
Dept: URGENT CARE | Facility: MEDICAL CENTER | Age: 84
End: 2019-07-01
Payer: COMMERCIAL

## 2019-07-01 VITALS
DIASTOLIC BLOOD PRESSURE: 99 MMHG | HEIGHT: 68 IN | TEMPERATURE: 97.1 F | HEART RATE: 93 BPM | WEIGHT: 158.6 LBS | BODY MASS INDEX: 24.04 KG/M2 | OXYGEN SATURATION: 97 % | SYSTOLIC BLOOD PRESSURE: 156 MMHG

## 2019-07-01 DIAGNOSIS — S60.551A FOREIGN BODY, HAND, SUPERFICIAL, RIGHT, INITIAL ENCOUNTER: Primary | ICD-10-CM

## 2019-07-01 PROCEDURE — 99213 OFFICE O/P EST LOW 20 MIN: CPT | Performed by: PHYSICIAN ASSISTANT

## 2019-07-01 PROCEDURE — S9088 SERVICES PROVIDED IN URGENT: HCPCS | Performed by: PHYSICIAN ASSISTANT

## 2019-07-01 NOTE — PROGRESS NOTES
[facility]        NAME: Lori Ohara is a 80 y o  male  : 1927    MRN: [id number]  DATE: 2019  TIME: 1:26 PM    Assessment and Plan   Foreign body, hand, superficial, right, initial encounter [S60 551A]  1  Foreign body, hand, superficial, right, initial encounter  Foreign body removal     Small localized infection over the splinter, not necessitating oral antibiotic  Patient Instructions     Keep area clean, washing with soap and water  Apply antibiotic ointment and band-aid until healed  Follow up with PCP in 3-5 days  Proceed to  ER if symptoms worsen  Chief Complaint     Chief Complaint   Patient presents with    Foreign Body in Skin     Pt c o x2days cleaning a wooden floor and piece of wood in right palm  History of Present Illness       Pt is a 80 yr old male presenting for a wooden splinter in the palm of right hand  He was cleaning wooden floor 2 days ago when a splinter went in  He now has some pain and redness to the area  He had not attempted to remove the splinter as it did not bother him initially  Review of Systems   Review of Systems   Constitutional: Negative for chills and fever  Musculoskeletal: Positive for myalgias  Skin: Positive for wound  Neurological: Negative for numbness           Current Medications       Current Outpatient Medications:     allopurinol (ZYLOPRIM) 100 mg tablet, Take 2 tablets (200 mg total) by mouth daily, Disp: 180 tablet, Rfl: 3    aspirin 325 mg tablet, Take 1 tablet by mouth daily, Disp: , Rfl:     Glucosamine HCl 500 MG TABS, Take by mouth, Disp: , Rfl:     levothyroxine 100 mcg tablet, take 1 tablet by mouth once daily, Disp: 90 tablet, Rfl: 1    triamterene-hydrochlorothiazide (DYAZIDE) 37 5-25 mg per capsule, Take 1 capsule by mouth daily, Disp: 90 capsule, Rfl: 3    Current Facility-Administered Medications:     albuterol inhalation solution 2 5 mg, 2 5 mg, Nebulization, Once, Sheng Hammer DO    Current Allergies     Allergies as of 07/01/2019    (No Known Allergies)            The following portions of the patient's history were reviewed and updated as appropriate: allergies, current medications, past family history, past medical history, past social history, past surgical history and problem list      Past Medical History:   Diagnosis Date    Arthritis     Gout     Hypertension     Hyperthyroidism     Memory loss     Sleep disturbance     Thyroid disease        Past Surgical History:   Procedure Laterality Date    KIDNEY SURGERY      description: 30 yrs ago    PROSTATE SURGERY      description: stone removal 30 yrs ago    REPLACEMENT TOTAL KNEE BILATERAL      description: 25 yrs ago       Family History   Problem Relation Age of Onset    Other Mother         Brain tumor    Cancer Sister     Diabetes Brother     Arthritis Family     Diabetes Family     Other Family         knee replacement    Thyroid disease Family          Medications have been verified  Objective   /99 (BP Location: Left arm, Patient Position: Sitting, Cuff Size: Adult)   Pulse 93   Temp (!) 97 1 °F (36 2 °C)   Ht 5' 8" (1 727 m)   Wt 71 9 kg (158 lb 9 6 oz)   SpO2 97%   BMI 24 12 kg/m²        Physical Exam     Physical Exam   Constitutional: He is oriented to person, place, and time  He appears well-developed and well-nourished  No distress  HENT:   Head: Normocephalic and atraumatic  Pulmonary/Chest: Effort normal    Neurological: He is alert and oriented to person, place, and time  Skin: Skin is warm and dry  He is not diaphoretic  Dark splinter seen underneath the skin to thenar palm right hand  There is a 2mm pus pocket overlying the splinter with mild surrounding redness  Mild TTP to area  No swelling  Psychiatric: He has a normal mood and affect   His behavior is normal        Foreign body removal  Date/Time: 7/1/2019 1:21 PM  Performed by: Jas Cameron PA-C  Authorized by: Loraine Parker PA-C   Universal Protocol:Consent: Verbal consent obtained  Consent given by: patient    Body area: skin (right palm)  General location: upper extremity  Location details: right hand  Anesthesia method: topical PainEase spray  Removal mechanism: forceps  Dressing: antibiotic ointment and dressing applied  Tendon involvement: none  Complexity: simple  1 objects recovered  Objects recovered: wood splinter, 6mm  Post-procedure assessment: foreign body removed  Patient tolerance: Patient tolerated the procedure well with no immediate complications  Comments: Area cleaned with Betadine  Numbed with PainEase spray  An 18g needle used to poke the pus pocket and break open skin  A small amount of purulent discharge came out  The splinted was then able to be extracted with splinter forceps

## 2019-10-17 ENCOUNTER — TELEPHONE (OUTPATIENT)
Dept: FAMILY MEDICINE CLINIC | Facility: MEDICAL CENTER | Age: 84
End: 2019-10-17

## 2019-10-17 NOTE — TELEPHONE ENCOUNTER
Form from Dept of [id number] [facility] faxed from [facility] for Dr Katalina Santiago to complete and fax back    Form given to Clinical to [facility] for Dr Katalina Santiago

## 2019-10-21 DIAGNOSIS — M1A.09X0 IDIOPATHIC CHRONIC GOUT OF MULTIPLE SITES WITHOUT TOPHUS: ICD-10-CM

## 2019-10-21 DIAGNOSIS — E03.9 ACQUIRED HYPOTHYROIDISM: ICD-10-CM

## 2019-10-21 DIAGNOSIS — I10 ESSENTIAL HYPERTENSION: ICD-10-CM

## 2019-10-22 RX ORDER — ALLOPURINOL 100 MG/1
TABLET ORAL
Qty: 180 TABLET | Refills: 3 | Status: SHIPPED | OUTPATIENT
Start: 2019-10-22 | End: 2020-03-23 | Stop reason: SDUPTHER

## 2019-10-22 RX ORDER — LEVOTHYROXINE SODIUM 0.1 MG/1
TABLET ORAL
Qty: 90 TABLET | Refills: 1 | Status: SHIPPED | OUTPATIENT
Start: 2019-10-22 | End: 2020-03-23 | Stop reason: SDUPTHER

## 2019-10-22 RX ORDER — TRIAMTERENE AND HYDROCHLOROTHIAZIDE 37.5; 25 MG/1; MG/1
CAPSULE ORAL
Qty: 90 CAPSULE | Refills: 3 | Status: SHIPPED | OUTPATIENT
Start: 2019-10-22 | End: 2020-03-23 | Stop reason: SDUPTHER

## 2019-12-26 ENCOUNTER — TELEPHONE (OUTPATIENT)
Dept: NEUROLOGY | Facility: CLINIC | Age: 84
End: 2019-12-26

## 2020-01-30 ENCOUNTER — TELEPHONE (OUTPATIENT)
Dept: NEUROLOGY | Facility: CLINIC | Age: 85
End: 2020-01-30

## 2020-01-30 ENCOUNTER — TELEPHONE (OUTPATIENT)
Dept: FAMILY MEDICINE CLINIC | Facility: MEDICAL CENTER | Age: 85
End: 2020-01-30

## 2020-01-30 NOTE — TELEPHONE ENCOUNTER
Pt is now in a homeless shelter  He was removed from his home today by the   His  at the aging office needs to know if he is of fit mind(form on your desk) in order to help him get a placement  If he is not that can help him  If he is they cant since he refuses help

## 2020-01-31 NOTE — TELEPHONE ENCOUNTER
Paper work was found in the chart that Dr Toi Singh filled out for the [facility] in October Gavin Sessions called Gatito Carrera , the  at their office and we faxed the document at her request

## 2020-02-03 ENCOUNTER — HOSPITAL ENCOUNTER (OUTPATIENT)
Facility: HOSPITAL | Age: 85
Setting detail: OBSERVATION
Discharge: HOME | End: 2020-02-04
Attending: EMERGENCY MEDICINE | Admitting: INTERNAL MEDICINE
Payer: COMMERCIAL

## 2020-02-03 DIAGNOSIS — R06.2 WHEEZING: ICD-10-CM

## 2020-02-03 DIAGNOSIS — R79.81 LOW O2 SATURATION: ICD-10-CM

## 2020-02-03 DIAGNOSIS — Z59.00 HOMELESSNESS: Primary | ICD-10-CM

## 2020-02-03 DIAGNOSIS — R05.9 COUGH: ICD-10-CM

## 2020-02-03 LAB
ALBUMIN SERPL BCP-MCNC: 3.3 G/DL (ref 3.5–5)
ALP SERPL-CCNC: 44 U/L (ref 46–116)
ALT SERPL W P-5'-P-CCNC: 11 U/L (ref 12–78)
ANION GAP SERPL CALCULATED.3IONS-SCNC: 10 MMOL/L (ref 4–13)
AST SERPL W P-5'-P-CCNC: 19 U/L (ref 5–45)
BACTERIA UR QL AUTO: ABNORMAL /HPF
BASOPHILS # BLD AUTO: 0.03 THOUSANDS/ΜL (ref 0–0.1)
BASOPHILS NFR BLD AUTO: 1 % (ref 0–1)
BILIRUB SERPL-MCNC: 0.39 MG/DL (ref 0.2–1)
BILIRUB UR QL STRIP: NEGATIVE
BUN SERPL-MCNC: 16 MG/DL (ref 5–25)
CALCIUM SERPL-MCNC: 8.4 MG/DL (ref 8.3–10.1)
CHLORIDE SERPL-SCNC: 108 MMOL/L (ref 100–108)
CLARITY UR: CLEAR
CO2 SERPL-SCNC: 28 MMOL/L (ref 21–32)
COLOR UR: YELLOW
CREAT SERPL-MCNC: 1.08 MG/DL (ref 0.6–1.3)
EOSINOPHIL # BLD AUTO: 0.03 THOUSAND/ΜL (ref 0–0.61)
EOSINOPHIL NFR BLD AUTO: 1 % (ref 0–6)
ERYTHROCYTE [DISTWIDTH] IN BLOOD BY AUTOMATED COUNT: 13.5 % (ref 11.6–15.1)
GFR SERPL CREATININE-BSD FRML MDRD: 59 ML/MIN/1.73SQ M
GLUCOSE SERPL-MCNC: 111 MG/DL (ref 65–140)
GLUCOSE UR STRIP-MCNC: NEGATIVE MG/DL
HCT VFR BLD AUTO: 35.7 % (ref 36.5–49.3)
HGB BLD-MCNC: 11.8 G/DL (ref 12–17)
HGB UR QL STRIP.AUTO: NEGATIVE
IMM GRANULOCYTES # BLD AUTO: 0.02 THOUSAND/UL (ref 0–0.2)
IMM GRANULOCYTES NFR BLD AUTO: 0 % (ref 0–2)
KETONES UR STRIP-MCNC: NEGATIVE MG/DL
LEUKOCYTE ESTERASE UR QL STRIP: NEGATIVE
LYMPHOCYTES # BLD AUTO: 1.54 THOUSANDS/ΜL (ref 0.6–4.47)
LYMPHOCYTES NFR BLD AUTO: 34 % (ref 14–44)
MCH RBC QN AUTO: 33.8 PG (ref 26.8–34.3)
MCHC RBC AUTO-ENTMCNC: 33.1 G/DL (ref 31.4–37.4)
MCV RBC AUTO: 102 FL (ref 82–98)
MONOCYTES # BLD AUTO: 0.46 THOUSAND/ΜL (ref 0.17–1.22)
MONOCYTES NFR BLD AUTO: 10 % (ref 4–12)
NEUTROPHILS # BLD AUTO: 2.43 THOUSANDS/ΜL (ref 1.85–7.62)
NEUTS SEG NFR BLD AUTO: 54 % (ref 43–75)
NITRITE UR QL STRIP: NEGATIVE
NON-SQ EPI CELLS URNS QL MICRO: ABNORMAL /HPF
NRBC BLD AUTO-RTO: 0 /100 WBCS
PH UR STRIP.AUTO: 6 [PH]
PLATELET # BLD AUTO: 123 THOUSANDS/UL (ref 149–390)
PMV BLD AUTO: 10.6 FL (ref 8.9–12.7)
POTASSIUM SERPL-SCNC: 3.9 MMOL/L (ref 3.5–5.3)
PROT SERPL-MCNC: 6.4 G/DL (ref 6.4–8.2)
PROT UR STRIP-MCNC: ABNORMAL MG/DL
RBC # BLD AUTO: 3.49 MILLION/UL (ref 3.88–5.62)
RBC #/AREA URNS AUTO: ABNORMAL /HPF
SODIUM SERPL-SCNC: 146 MMOL/L (ref 136–145)
SP GR UR STRIP.AUTO: >=1.03 (ref 1–1.03)
TSH SERPL DL<=0.05 MIU/L-ACNC: 3.73 UIU/ML (ref 0.36–3.74)
UROBILINOGEN UR QL STRIP.AUTO: 0.2 E.U./DL
WBC # BLD AUTO: 4.51 THOUSAND/UL (ref 4.31–10.16)
WBC #/AREA URNS AUTO: ABNORMAL /HPF

## 2020-02-03 PROCEDURE — 85025 COMPLETE CBC W/AUTO DIFF WBC: CPT | Performed by: EMERGENCY MEDICINE

## 2020-02-03 PROCEDURE — 81001 URINALYSIS AUTO W/SCOPE: CPT | Performed by: EMERGENCY MEDICINE

## 2020-02-03 PROCEDURE — 80053 COMPREHEN METABOLIC PANEL: CPT | Performed by: EMERGENCY MEDICINE

## 2020-02-03 PROCEDURE — 93005 ELECTROCARDIOGRAM TRACING: CPT

## 2020-02-03 PROCEDURE — 84443 ASSAY THYROID STIM HORMONE: CPT | Performed by: EMERGENCY MEDICINE

## 2020-02-03 PROCEDURE — 99220 PR INITIAL OBSERVATION CARE/DAY 70 MINUTES: CPT | Performed by: INTERNAL MEDICINE

## 2020-02-03 PROCEDURE — 99285 EMERGENCY DEPT VISIT HI MDM: CPT

## 2020-02-03 PROCEDURE — 36415 COLL VENOUS BLD VENIPUNCTURE: CPT | Performed by: EMERGENCY MEDICINE

## 2020-02-03 PROCEDURE — 99283 EMERGENCY DEPT VISIT LOW MDM: CPT | Performed by: EMERGENCY MEDICINE

## 2020-02-03 RX ORDER — LEVOTHYROXINE SODIUM 0.1 MG/1
100 TABLET ORAL DAILY
Status: DISCONTINUED | OUTPATIENT
Start: 2020-02-04 | End: 2020-02-04 | Stop reason: HOSPADM

## 2020-02-03 RX ORDER — ASPIRIN 325 MG
325 TABLET ORAL DAILY
Status: DISCONTINUED | OUTPATIENT
Start: 2020-02-04 | End: 2020-02-04 | Stop reason: HOSPADM

## 2020-02-03 RX ORDER — ALLOPURINOL 100 MG/1
200 TABLET ORAL DAILY
Status: DISCONTINUED | OUTPATIENT
Start: 2020-02-04 | End: 2020-02-04 | Stop reason: HOSPADM

## 2020-02-03 RX ORDER — TRIAMTERENE AND HYDROCHLOROTHIAZIDE 37.5; 25 MG/1; MG/1
1 TABLET ORAL DAILY
Status: DISCONTINUED | OUTPATIENT
Start: 2020-02-04 | End: 2020-02-04 | Stop reason: HOSPADM

## 2020-02-03 RX ORDER — ALBUTEROL SULFATE 2.5 MG/3ML
2.5 SOLUTION RESPIRATORY (INHALATION) ONCE
Status: DISCONTINUED | OUTPATIENT
Start: 2020-02-03 | End: 2020-02-04 | Stop reason: HOSPADM

## 2020-02-03 SDOH — ECONOMIC STABILITY - HOUSING INSECURITY: HOMELESSNESS UNSPECIFIED: Z59.00

## 2020-02-03 NOTE — ED PROCEDURE NOTE
PROCEDURE  ECG 12 Lead Documentation Only  Date/Time: 2/3/2020 1:57 PM  Performed by: Zarina Magallanes MD  Authorized by: Zarina Magallanes MD     Indications / Diagnosis:  Placement  ECG reviewed by me, the ED Provider: yes    Patient location:  ED and bedside  Previous ECG:     Previous ECG:  Unavailable    Comparison to cardiac monitor: Yes    Interpretation:     Interpretation: non-specific    Rate:     ECG rate:  54    ECG rate assessment: bradycardic    Rhythm:     Rhythm: sinus bradycardia    Ectopy:     Ectopy: none    QRS:     QRS axis:  Normal    QRS intervals:  Normal  Conduction:     Conduction: normal    ST segments:     ST segments:  Normal  T waves:     T waves: flattening      Flattening:  AVL and V1  Q waves:     Q waves:  V1  Other findings:     Other findings: U wave    Comments:      Low voltage -- no ecg signs of ischemia/ injury / r heart strain          Zarina Magallanes MD  02/03/20 0206

## 2020-02-03 NOTE — ED NOTES
Patient re-directed back to room "I thought I was discharged"  Awaiting c/b from [facility] to see about patient plan        Lexis Garrido RN  02/03/20 9956

## 2020-02-03 NOTE — ED PROVIDER NOTES
History  Chief Complaint   Patient presents with    Behavior Problem     Patient from home via EMS: report that patient's family would like him evaluated "mentally"  patient reports "my family thinks I am crazy so they sent me here"  patient a&o x4  MD at bedside  report that "family is on the way"     80 yr male--  Currently homeless- has been living in hotel  Paid for family member for last 2 daYS-- NEPHEW WENT TO SEE PT [facility]--  PT Professor Gaby Arvizu [address]-- JUST WAS FRUSTrated -- pt has no past psych hx-- no other comps       History provided by:  Patient and relative   used: No        Prior to Admission Medications   Prescriptions Last Dose Informant Patient Reported? Taking?    Glucosamine HCl 500 MG TABS   Yes No   Sig: Take by mouth   allopurinol (ZYLOPRIM) 100 mg tablet   No No   Sig: take 2 tablets by mouth once daily   aspirin 325 mg tablet   Yes No   Sig: Take 1 tablet by mouth daily   levothyroxine 100 mcg tablet   No No   Sig: take 1 tablet by mouth once daily   triamterene-hydrochlorothiazide (DYAZIDE) 37 5-25 mg per capsule   No No   Sig: take 1 capsule by mouth once daily      Facility-Administered Medications Last Administration Doses Remaining   albuterol inhalation solution 2 5 mg None recorded 1          Past Medical History:   Diagnosis Date    Arthritis     Gout     Hypertension     Hyperthyroidism     Memory loss     Sleep disturbance     Thyroid disease        Past Surgical History:   Procedure Laterality Date    KIDNEY SURGERY      description: 30 yrs ago    PROSTATE SURGERY      description: stone removal 30 yrs ago    REPLACEMENT TOTAL KNEE BILATERAL      description: 25 yrs ago       Family History   Problem Relation Age of Onset    Other Mother         Brain tumor    Cancer Sister     Diabetes Brother     Arthritis Family     Diabetes Family     Other Family         knee replacement    Thyroid disease Family      I have reviewed and agree with the history as documented  Social History     Tobacco Use    Smoking status: Former Smoker    Smokeless tobacco: Never Used    Tobacco comment: "I smoked years ago"   Substance Use Topics    Alcohol use: Yes     Comment: social "vivien once in a while"    Drug use: No        Review of Systems   Constitutional: Negative  HENT: Negative  Eyes: Negative  Respiratory: Negative  Cardiovascular: Negative  Gastrointestinal: Negative  Endocrine: Negative  Genitourinary: Negative  Musculoskeletal: Negative  Skin: Negative  Allergic/Immunologic: Negative  Neurological: Negative  Hematological: Negative  Psychiatric/Behavioral: Negative  Physical Exam  Physical Exam   Constitutional: He is oriented to person, place, and time  He appears well-developed and well-nourished  No distress  avss-- pulse ox  99 % on ra - interpretation is normal- no intervention- in nad    HENT:   Head: Normocephalic and atraumatic  Right Ear: External ear normal    Left Ear: External ear normal    Nose: Nose normal    Mouth/Throat: Oropharynx is clear and moist  No oropharyngeal exudate  No scalp tenderness/heamtoma/ contusion   Eyes: Pupils are equal, round, and reactive to light  Conjunctivae and EOM are normal  Right eye exhibits no discharge  Left eye exhibits no discharge  No scleral icterus  Mm p;ink   Neck: Normal range of motion  Neck supple  No JVD present  No tracheal deviation present  No thyromegaly present  No pmt c/t/l/s spine-   Cardiovascular: Normal rate, regular rhythm, normal heart sounds and intact distal pulses  Exam reveals no gallop and no friction rub  No murmur heard  Pulmonary/Chest: Effort normal and breath sounds normal  No stridor  No respiratory distress  He has no wheezes  He has no rales  He exhibits no tenderness  Abdominal: Soft   Bowel sounds are normal  He exhibits no distension and no mass  There is no tenderness  There is no rebound and no guarding  No hernia  -soft nt/nd- no cva tenderness/ no peritoneal signs- no pulsatile abd mass/bruit/ tenderness   Musculoskeletal: Normal range of motion  He exhibits no edema, tenderness or deformity  Equal bilateral radial/dp pulses- no ble edema/calf tenderness/asym/ erythema   Lymphadenopathy:     He has no cervical adenopathy  Neurological: He is alert and oriented to person, place, and time  No cranial nerve deficit or sensory deficit  He exhibits normal muscle tone  Coordination normal    No nystagmus- neg test of sckew/ normal finger to nose- normal gait- no n focal neuro exam    Skin: Skin is warm  Capillary refill takes less than 2 seconds  No rash noted  He is not diaphoretic  No erythema  No pallor  Psychiatric: He has a normal mood and affect  His behavior is normal  Judgment and thought content normal    Nursing note and vitals reviewed        Vital Signs  ED Triage Vitals [02/03/20 1121]   Temperature Pulse Respirations Blood Pressure SpO2   98 °F (36 7 °C) 65 16 151/73 98 %      Temp Source Heart Rate Source Patient Position - Orthostatic VS BP Location FiO2 (%)   Oral Monitor Sitting Right arm --      Pain Score       No Pain           Vitals:    02/03/20 1121 02/03/20 1330   BP: 151/73 145/70   Pulse: 65 60   Patient Position - Orthostatic VS: Sitting          Visual Acuity      ED Medications  Medications - No data to display    Diagnostic Studies  Results Reviewed     Procedure Component Value Units Date/Time    Urine Microscopic [[id number]]  (Abnormal) Collected:  02/03/20 1304    Lab Status:  Final result Specimen:  Urine, Clean Catch Updated:  02/03/20 1328     RBC, UA None Seen /hpf      WBC, UA 0-1 /hpf      Epithelial Cells Occasional /hpf      Bacteria, UA Occasional /hpf     UA (URINE) with reflex to Scope [[id number]]  (Abnormal) Collected:  02/03/20 1304    Lab Status:  Final result Specimen:  Urine, Clean Catch Updated:  02/03/20 1325     Color, UA Yellow     Clarity, UA Clear     Specific Gravity, UA >=1 030     pH, UA 6 0     Leukocytes, UA Negative     Nitrite, UA Negative     Protein, UA Trace mg/dl      Glucose, UA Negative mg/dl      Ketones, UA Negative mg/dl      Urobilinogen, UA 0 2 E U /dl      Bilirubin, UA Negative     Blood, UA Negative    TSH, 3rd generation [[id number]]  (Normal) Collected:  02/03/20 1215    Lab Status:  Final result Specimen:  Blood from Arm, Right Updated:  02/03/20 1247     TSH 3RD GENERATON 3 731 uIU/mL     Narrative:       Patients undergoing fluorescein dye angiography may retain small amounts of fluorescein in the body for 48-72 hours post procedure  Samples containing fluorescein can produce falsely depressed TSH values  If the patient had this procedure,a specimen should be resubmitted post fluorescein clearance        Comprehensive metabolic panel [[id number]]  (Abnormal) Collected:  02/03/20 1215    Lab Status:  Final result Specimen:  Blood from Arm, Right Updated:  02/03/20 1238     Sodium 146 mmol/L      Potassium 3 9 mmol/L      Chloride 108 mmol/L      CO2 28 mmol/L      ANION GAP 10 mmol/L      BUN 16 mg/dL      Creatinine 1 08 mg/dL      Glucose 111 mg/dL      Calcium 8 4 mg/dL      AST 19 U/L      ALT 11 U/L      Alkaline Phosphatase 44 U/L      Total Protein 6 4 g/dL      Albumin 3 3 g/dL      Total Bilirubin 0 39 mg/dL      eGFR 59 ml/min/1 73sq m     Narrative:       [facility] guidelines for Chronic Kidney Disease (CKD):     Stage 1 with normal or high GFR (GFR > 90 mL/min/1 73 square meters)    Stage 2 Mild CKD (GFR = 60-89 mL/min/1 73 square meters)    Stage 3A Moderate CKD (GFR = 45-59 mL/min/1 73 square meters)    Stage 3B Moderate CKD (GFR = 30-44 mL/min/1 73 square meters)    Stage 4 Severe CKD (GFR = 15-29 mL/min/1 73 square meters)    Stage 5 End Stage CKD (GFR <15 mL/min/1 73 square meters)  Note: GFR calculation is accurate only with a steady state creatinine    CBC and differential [[id number]]  (Abnormal) Collected:  02/03/20 1215    Lab Status:  Final result Specimen:  Blood from Arm, Right Updated:  02/03/20 1222     WBC 4 51 Thousand/uL      RBC 3 49 Million/uL      Hemoglobin 11 8 g/dL      Hematocrit 35 7 %       fL      MCH 33 8 pg      MCHC 33 1 g/dL      RDW 13 5 %      MPV 10 6 fL      Platelets 603 Thousands/uL      nRBC 0 /100 WBCs      Neutrophils Relative 54 %      Immat GRANS % 0 %      Lymphocytes Relative 34 %      Monocytes Relative 10 %      Eosinophils Relative 1 %      Basophils Relative 1 %      Neutrophils Absolute 2 43 Thousands/µL      Immature Grans Absolute 0 02 Thousand/uL      Lymphocytes Absolute 1 54 Thousands/µL      Monocytes Absolute 0 46 Thousand/µL      Eosinophils Absolute 0 03 Thousand/µL      Basophils Absolute 0 03 Thousands/µL                  No orders to display              Procedures  Procedures         ED Course  ED Course as of Feb 03 1617   Mon Feb 03, 2020   1214 - er md note- case d/w- pt nephew- his number on chart-- an d case management- pt is homeless at present       56 - er md note- 2019 mri of brain report reviewed by er md and 2-18 labs reviewed by er md       200 - er md note- pt seen by case management- and [facility] to [facility] to see pt-- family contacted by management                                  MDM      Disposition  Final diagnoses:   None     ED Disposition     None      Follow-up Information    None         Patient's Medications   Discharge Prescriptions    No medications on file     No discharge procedures on file      ED Provider  Electronically Signed by           Joann Franks MD  02/03/20 0769

## 2020-02-03 NOTE — SOCIAL WORK
LOS: 4 HRS  PATIENT IS NOT A BUNDLE, PATIENT IS NOT A READMISSION  CM met with patient at bedside, patient alert and appears oriented  Patient denies SI/HI/AH/VH  Patient reports that he lost his home to a 's sale last week  Patient was living with his niece Karthik Ngo is currently at [facility] for SNF  Initially he discharged to a homeless shelter but was then picked up by his cousins to stay at a hotel for two days  His two days were paid for by family and they ran out today so his nephew Gurugulshan Anna came to pick him up  At that point, patient reports "one of these days I'm gonna kill myself" at which point [facility] was called  Patient continues to decline SI/HI/AH/VH but reports that he didn't seriously mean it and he just said it to the police to get them to "be quiet" and leave him alone  Patient reports that he's independent with all ADLs and denies the use of DME  Patient utilizes [facility] in [place], has Rx plan and is able to afford all copays  Patient denies POA but does have AD  Patient reports he does rely on his nephew Yasmin Guadarrama as well as his niece Pamela Antonio as his HCRs  Patient can drive but he does not have a car at the moment and he reports that he relies on them for transport  CM called patient's nephew Guru Clcarter who reports last he knew, patient was at the homeless shelter  Guru Anna was unaware that his cousin, Francy Tinajero, had taken patient to a hotel and was surprised to get a call from him today  Guru Anna wasn't sure where he was going to take patient other than back to the homeless shelter as it is not an option for patient to stay with him at this time  Guru Anna reports patient has been involved in numerous "get rich quick" schemes over the last 10+ years and has stolen money from family  Guru Anna does not plan to come back to the ED today to provide transport  CM also called patient's niece Pamela who confirms the same information as above and reports that patient can not stay with her       CM met with patient at bedside again, he was accompanied by Som Husain, [address] with [facility], [phone]  Patient refuses to discharge to a shelter and said that he'd rather just stay with Raeann and her family; he confirmed this with his cousin Parth Gramajo, Raeann's mother, today  Patient said he can stay with the [facility] for forever if he'd like  Patient does not have contact information for [facility] or Raeann  Contact information for Raeann obtained from Yisel--she got this information from her sister, Sujey Osborne  CM called Raeann at [phone] and Bentley Johnson stated she's a very distant cousin and unfortunately, she and her 80year old mother are unable to open their doors for patient  He is unable to reside with them  She paid for 2 nights at a hotel after receiving frantic call from Sujey Osborne, patient's niece at [facility], stating he was going to jump off of a bridge if someone didn't come pick him up  This is when Raeann picked him up and paid for 2 night hospital stay to cover until patient received his SSI check today  CM informed patient that he cannot stay with Raeann and he reports "ah I figured, I'll just stay with my friend Sean then " Patient does not have contact information or address for Sean either  Multiple family members report patient is convinced he's supposed to be coming into big sums of money, however, he is truly just the victim of scammers who have cheated him out of his money  Kayode Rojas reports she's tried working with PCP to see if they would document patient doesn't have capacity  Kayode Rojas reports that unless he's deemed not to have capacity, patient is able to make his own decisions-which is what he's been doing for the last few months and has been rejecting her assistance  Kayode Rojas reports at one point the PCP documented patient doesn't have financial capacity but this is not the same   If a provider were to comment that they feel patient doesn't have capacity, AAA can petition for emergency guardianship and then they can apply for county funding for respite at [place]  If patient does have capacity, he has the option to choose whether he would like to discharge to a shelter or find his own other safe placement  CM discussed with ED provider Dr Roxanna Sotomayor  At this time, plan is for admission under observation with consult to Psychiatry to comment on capacity  AAA CM Kayode Rojas made aware of same  CM will continue to follow patient and will follow up with AAA tomorrow

## 2020-02-03 NOTE — H&P
H&P- Rickie Cowart 5/24/1927, 80 y o  male MRN: [id number]    Unit/Bed#: JACKY Encounter: [id number]    Primary Care Provider: Hoyt Hammans, MD   Date and time admitted to hospital: 2/3/2020 11:03 AM    Homeless  Assessment & Plan  · Patient recently lost his home due to reported financial is management per family  · There is concern for possible underlying dementia  · Patient denies any suicidal or homicidal ideation  · Neuropsych consult placed for capacity evaluation  ·  consult for placement  He has being followed by the [facility] for [facility]    Essential hypertension  Assessment & Plan  · Blood pressure stable  Continue Dyazide    Acquired hypothyroidism  Assessment & Plan  · Continue levothyroxine  · TSH done today within normal limits    Idiopathic chronic gout of multiple sites without tophus  Assessment & Plan  · Continue allopurinol  No acute flares at this time      VTE Prophylaxis: Enoxaparin (Lovenox)      Code Status:  Full code    POLST: POLST form is not discussed and not completed at this time  Anticipated Length of Stay:  Patient will be admitted on an Observation basis with an anticipated length of stay of  > 2 midnights  Justification for Hospital Stay:  Behavioral issues/homeless    Chief Complaint:     "My Family family thinks I am crazy so they me here"    History of Present Illness:  Rickie Cowart is a 80 y o  male who presents with behavioral for issues  The patient was sent in by his family for evaluation  Per ED accounts, the patient recently lost his house due to bad financial decisions  According to the patient, he states my niece and nephew are just upset because they thought they will inherited the house  He has been homeless and reports a friend paid for 3 night stay at a hotel so he could get some sleep and that ended today  Per ED records, patient's nephew went to see him today at which time patient stated that he might as well kill himself    At the time of my evaluation, patient admitted to stating that he [facility] well-healed himself, however he stated this out of frustration and has no intention of hurting himself  The patient denied any suicidal or homicidal ideation at the time of my evaluation  He had absolutely no complaints other than losing his home and not having a place to live  Review of Systems   Constitutional: Negative  HENT: Negative  Respiratory: Negative  Cardiovascular: Negative  Gastrointestinal: Negative  Genitourinary: Negative  Musculoskeletal: Negative  Neurological: Negative  Psychiatric/Behavioral: Positive for behavioral problems  All other systems reviewed and are negative        Past Medical History:   Diagnosis Date    Arthritis     Gout     Hypertension     Hyperthyroidism     Memory loss     Sleep disturbance     Thyroid disease        Past Surgical History:   Procedure Laterality Date    KIDNEY SURGERY      description: 30 yrs ago    PROSTATE SURGERY      description: stone removal 30 yrs ago    REPLACEMENT TOTAL KNEE BILATERAL      description: 18 yrs ago       Family History:  non-contributory    Home Meds:  all medications and allergies reviewed    Allergies: No Known Allergies      Marital Status: Single     Social History     Substance and Sexual Activity   Alcohol Use Yes    Comment: social "burbon once in a while"     Social History     Tobacco Use   Smoking Status Former Smoker   Smokeless Tobacco Never Used   Tobacco Comment    "I smoked years ago"     Social History     Substance and Sexual Activity   Drug Use No           Physical Exam:   Vitals:   Blood Pressure: 145/70 (02/03/20 1330)  Pulse: 60 (02/03/20 1330)  Temperature: 98 °F (36 7 °C) (02/03/20 1121)  Temp Source: Oral (02/03/20 1121)  Respirations: 16 (02/03/20 1330)  Height: 5' 7" (170 2 cm) (02/03/20 1121)  Weight - Scale: 70 3 kg (154 lb 15 7 oz) (02/03/20 1121)  SpO2: 99 % (02/03/20 1330)    Physical Exam Constitutional: He is oriented to person, place, and time  He appears well-developed and well-nourished  Eyes: EOM are normal  Right eye exhibits no discharge  Left eye exhibits no discharge  No scleral icterus  Neck: Normal range of motion  Neck supple  Cardiovascular: Normal rate and regular rhythm  Pulmonary/Chest: Effort normal and breath sounds normal  No respiratory distress  He has no wheezes  Abdominal: Soft  Bowel sounds are normal  He exhibits no distension and no mass  There is no tenderness  Neurological: He is alert and oriented to person, place, and time  No cranial nerve deficit  He exhibits normal muscle tone  Coordination normal    Skin: Skin is warm and dry  Psychiatric: He has a normal mood and affect  His behavior is normal    Very pleasant   Vitals reviewed  Lab Results: I have personally reviewed pertinent reports  Results from last 7 days   Lab Units 02/03/20  1215   WBC Thousand/uL 4 51   HEMOGLOBIN g/dL 11 8*   HEMATOCRIT % 35 7*   PLATELETS Thousands/uL 123*   NEUTROS PCT % 54   LYMPHS PCT % 34   MONOS PCT % 10   EOS PCT % 1     Results from last 7 days   Lab Units 02/03/20  1215   POTASSIUM mmol/L 3 9   CHLORIDE mmol/L 108   CO2 mmol/L 28   BUN mg/dL 16   CREATININE mg/dL 1 08   CALCIUM mg/dL 8 4   ALK PHOS U/L 44*   ALT U/L 11*   AST U/L 19           ** Please Note: Dragon 360 Dictation voice to text software may have been used in the creation of this document   **

## 2020-02-03 NOTE — ED NOTES
TARIQ AT BEDSIDE WITH PATIENT  Will d/c with resources for homeless shelter or stay w/ family   Will speak with dr Brandy Hamilton, KAITLIN  02/03/20 8756

## 2020-02-04 VITALS
HEIGHT: 68 IN | DIASTOLIC BLOOD PRESSURE: 73 MMHG | RESPIRATION RATE: 18 BRPM | OXYGEN SATURATION: 98 % | SYSTOLIC BLOOD PRESSURE: 133 MMHG | TEMPERATURE: 98.2 F | BODY MASS INDEX: 24.14 KG/M2 | HEART RATE: 60 BPM | WEIGHT: 159.3 LBS

## 2020-02-04 LAB
ATRIAL RATE: 54 BPM
P AXIS: 82 DEGREES
PR INTERVAL: 192 MS
QRS AXIS: 38 DEGREES
QRSD INTERVAL: 88 MS
QT INTERVAL: 446 MS
QTC INTERVAL: 422 MS
T WAVE AXIS: 1 DEGREES
VENTRICULAR RATE: 54 BPM

## 2020-02-04 PROCEDURE — 93010 ELECTROCARDIOGRAM REPORT: CPT | Performed by: INTERNAL MEDICINE

## 2020-02-04 PROCEDURE — 99203 OFFICE O/P NEW LOW 30 MIN: CPT | Performed by: PSYCHIATRY & NEUROLOGY

## 2020-02-04 PROCEDURE — 99217 PR OBSERVATION CARE DISCHARGE MANAGEMENT: CPT | Performed by: PHYSICIAN ASSISTANT

## 2020-02-04 RX ADMIN — ALLOPURINOL 200 MG: 100 TABLET ORAL at 08:17

## 2020-02-04 RX ADMIN — ASPIRIN 325 MG ORAL TABLET 325 MG: 325 PILL ORAL at 08:16

## 2020-02-04 RX ADMIN — ENOXAPARIN SODIUM 40 MG: 40 INJECTION SUBCUTANEOUS at 08:17

## 2020-02-04 RX ADMIN — TRIAMTERENE AND HYDROCHLOROTHIAZIDE 1 TABLET: 37.5; 25 TABLET ORAL at 08:16

## 2020-02-04 RX ADMIN — LEVOTHYROXINE SODIUM 100 MCG: 100 TABLET ORAL at 08:17

## 2020-02-04 NOTE — PROGRESS NOTES
Progress Note - Nadia Soulier 1927, 80 y o  male MRN: [id number]  Unit/Bed#: W -01 Encounter: [id number] DOS: 2020  Primary Care Provider: Vipin Maldonado MD   Date and time admitted to hospital: 2/3/2020 11:03 AM    * Homeless  Assessment & Plan  · Patient recently lost his home due to reported financial problems   · There is concern for possible underlying dementia  · Patient continues denies any suicidal or homicidal ideation  · Neuropsych consult placed for capacity evaluation which will help determine disposition   ·  consult for placement  He has being followed by the [facility] for aging    Acquired hypothyroidism  Assessment & Plan  · Continue levothyroxine  · TSH normal     Essential hypertension  Assessment & Plan  · Blood pressure stable  Continue Dyazide    Idiopathic chronic gout of multiple sites without tophus  Assessment & Plan  · Continue allopurinol  No acute flares at this time    VTE Pharmacologic Prophylaxis:   Pharmacologic: Enoxaparin (Lovenox)  Mechanical VTE Prophylaxis in Place: Yes    Patient Centered Rounds: I have performed bedside rounds with nursing staff today  Discussions with Specialists or Other Care Team Provider: nursing     Education and Discussions with Family / Patient: patient     Time Spent for Care: 30 minutes  More than 50% of total time spent on counseling and coordination of care as described above  Current Length of Stay: 0 day(s)    Current Patient Status: Observation   Certification Statement: The patient will continue to require additional inpatient hospital stay due to pending safe discharge planning     Discharge Plan: pending clinical course     Code Status: Level 1 - Full Code    Subjective:   Pt seen and examined at bedside  States he was an  and loves the room he is in  States, "never get a reverse mortgage " denies pain, sob, fevers or chills       Objective:     Vitals:   Temp (24hrs), Av °F (36 7 °C), Min:97 8 °F (36 6 °C), Max:98 2 °F (36 8 °C)    Temp:  [97 8 °F (36 6 °C)-98 2 °F (36 8 °C)] 98 2 °F (36 8 °C)  HR:  [59-65] 60  Resp:  [16-18] 18  BP: (107-151)/(57-75) 107/57  SpO2:  [98 %-100 %] 98 %  Body mass index is 24 22 kg/m²  Input and Output Summary (last 24 hours):     No intake or output data in the 24 hours ending 02/04/20 0748    Physical Exam:     Physical Exam   Constitutional: He is oriented to person, place, and time  He appears well-developed and well-nourished  No distress  Appears younger than stated age    HENT:   Head: Normocephalic and atraumatic  Eyes: EOM are normal  No scleral icterus  Neck: Normal range of motion  Neck supple  Cardiovascular: Normal rate, regular rhythm and normal heart sounds  No murmur heard  Pulmonary/Chest: Effort normal and breath sounds normal    Abdominal: Soft  Bowel sounds are normal    Musculoskeletal: Normal range of motion  He exhibits no edema  Neurological: He is alert and oriented to person, place, and time  Skin: Skin is warm and dry  Psychiatric: He has a normal mood and affect  Additional Data:     Labs:    Results from last 7 days   Lab Units 02/03/20  1215   WBC Thousand/uL 4 51   HEMOGLOBIN g/dL 11 8*   HEMATOCRIT % 35 7*   PLATELETS Thousands/uL 123*   NEUTROS PCT % 54   LYMPHS PCT % 34   MONOS PCT % 10   EOS PCT % 1     Results from last 7 days   Lab Units 02/03/20  1215   SODIUM mmol/L 146*   POTASSIUM mmol/L 3 9   CHLORIDE mmol/L 108   CO2 mmol/L 28   BUN mg/dL 16   CREATININE mg/dL 1 08   ANION GAP mmol/L 10   CALCIUM mg/dL 8 4   ALBUMIN g/dL 3 3*   TOTAL BILIRUBIN mg/dL 0 39   ALK PHOS U/L 44*   ALT U/L 11*   AST U/L 19   GLUCOSE RANDOM mg/dL 111                           * I Have Reviewed All Lab Data Listed Above  * Additional Pertinent Lab Tests Reviewed:  All Labs For Current Hospital Admission Reviewed    Imaging:    Imaging Reports Reviewed Today Include: all  Imaging Personally Reviewed by Myself Includes: none    Recent Cultures (last 7 days):           Last 24 Hours Medication List:     Current Facility-Administered Medications:  albuterol 2 5 mg Nebulization Once Gaston Phillips MD   allopurinol 200 mg Oral Daily Gaston Phillips MD   aspirin 325 mg Oral Daily Gaston Phillips MD   enoxaparin 40 mg Subcutaneous Q24H [facility] Gaston Phillips MD   levothyroxine 100 mcg Oral Daily Gaston Phillips MD   triamterene-hydrochlorothiazide 1 tablet Oral Daily Gaston Phillips MD        Today, Patient Was Seen By: Olivia Austin PA-C    ** Please Note: Dictation voice to text software may have been used in the creation of this document   **

## 2020-02-04 NOTE — ASSESSMENT & PLAN NOTE
· Patient recently lost his home due to reported financial problems   · Patient denies any suicidal or homicidal ideation  ·  consult for placement  He is followed by the [facility] for aging and has outpatient  who gave him a cell phone on day of admission    · Per psych, and neuropsych pt has capacity   · Patient upset about discharge to shelter - family unable to care for him at home and present at bedside   · Resources given to patient and lyft ride arranged through case management

## 2020-02-04 NOTE — UTILIZATION REVIEW
Initial Clinical Review    Admission: Date/Time/Statement: observation 02/03/20 1643  Orders Placed This Encounter   Procedures    Place in Observation     Standing Status:   Standing     Number of Occurrences:   1     Order Specific Question:   Admitting Physician     Answer:   Elliott Proctor [[id number]]     Order Specific Question:   Level of Care     Answer:   Med Surg [16]     ED Arrival Information     Expected Arrival Acuity Means of Arrival Escorted By Service Admission Type    - 2/3/2020 11:02 [facility] Emergency    Arrival Complaint    Behavior Eval        Chief Complaint   Patient presents with    Behavior Problem     Patient from home via EMS: report that patient's family would like him evaluated "mentally"  patient reports "my family thinks I am crazy so they sent me here"  patient a&o x4  MD at bedside  report that "family is on the way"     Assessment/Plan: 80 y o  male to ED by EMS presents with behavioral for issues  The patient was sent in by his family for evaluation  Per ED accounts, the patient recently lost his house due to bad financial decisions  According to the patient, he states my niece and nephew are just upset because they thought they will inherited the house  He has been homeless and reports a friend paid for 3 night stay at a hotel so he could get some sleep and that ended today  Per ED records, patient's nephew went to see him today at which time patient stated that he might as well kill himself  At the time of my evaluation, patient admitted to stating that he [facility] well-healed himself, however he stated this out of frustration and has no intention of hurting himself  The patient denied any suicidal or homicidal ideation at the time of my evaluation    Homeless  Assessment & Plan  · Patient recently lost his home due to reported financial is management per family  · There is concern for possible underlying dementia  · Patient denies any suicidal or homicidal ideation  · Neuropsych consult placed for capacity evaluation  ·  consult for placement  He has being followed by the [facility] for [facility]  Essential hypertension  Assessment & Plan  · Blood pressure stable  Continue Dyazide  Acquired hypothyroidism  Assessment & Plan  · Continue levothyroxine  · TSH done today within normal limits  Idiopathic chronic gout of multiple sites without tophus  Assessment & Plan  · Continue allopurinol  No acute flares at this time    2/4 Behavioral health:  Summary/Impression: Results of Neuropsychological Exam revealed adequate cognitive functioning and on a measure assessing awareness of personal health status and ability to evaluate health problems, handle medical emergencies and take safety precautions, patient performed within normal limits  At this time, patient appears to have capacity to make informed medical and self care decisions      Recommendations:  Continue to monitor patient's cognitive functioning   Should continue to be followed by outpatient Psychiatry      ED Triage Vitals [02/03/20 1121]   Temperature Pulse Respirations Blood Pressure SpO2   98 °F (36 7 °C) 65 16 151/73 98 %      Temp Source Heart Rate Source Patient Position - Orthostatic VS BP Location FiO2 (%)   Oral Monitor Sitting Right arm --      Pain Score       No Pain        Wt Readings from Last 1 Encounters:   02/03/20 72 3 kg (159 lb 4 8 oz)     Additional Vital Signs:   02/04/20 07:22:20  98 2 °F (36 8 °C)  60  18  133/73    98 %       02/04/20 01:03:10  97 8 °F (36 6 °C)  59  18      98 %       02/04/20 0000              None (Room air)     02/03/20 2300        107/57           02/03/20 1812  97 8 °F (36 6 °C)  62  18  146/75  99  99 %       02/03/20 1700    61  18  149/62    100 %  None (Room air)  Sitting   02/03/20 1400              None (Room air)     02/03/20 1330    60  16  145/70    99 % None (Room air)     02/03/20 1121  98 °F (36 7 °C)  65  16  151/73    98 %  None (Room air)  Sitting      Weights (last 14 days)     Date/Time  Weight  Weight Method  Height   02/03/20 1812  72 3 kg (159 lb 4 8 oz)  Standing scale  5' 8" (1 727 m)   02/03/20 1121  70 3 kg (154 lb 15 7 oz)  Bed scale  5' 7" (1 702 m)       Pertinent Labs/Diagnostic Test Results:   Results from last 7 days   Lab Units 02/03/20  1215   WBC Thousand/uL 4 51   HEMOGLOBIN g/dL 11 8*   HEMATOCRIT % 35 7*   PLATELETS Thousands/uL 123*   NEUTROS ABS Thousands/µL 2 43         Results from last 7 days   Lab Units 02/03/20  1215   SODIUM mmol/L 146*   POTASSIUM mmol/L 3 9   CHLORIDE mmol/L 108   CO2 mmol/L 28   ANION GAP mmol/L 10   BUN mg/dL 16   CREATININE mg/dL 1 08   EGFR ml/min/1 73sq m 59   CALCIUM mg/dL 8 4     Results from last 7 days   Lab Units 02/03/20  1215   AST U/L 19   ALT U/L 11*   ALK PHOS U/L 44*   TOTAL PROTEIN g/dL 6 4   ALBUMIN g/dL 3 3*   TOTAL BILIRUBIN mg/dL 0 39         Results from last 7 days   Lab Units 02/03/20  1215   GLUCOSE RANDOM mg/dL 111       Results from last 7 days   Lab Units 02/03/20  1215   TSH 3RD GENERATON uIU/mL 3 731           Results from last 7 days   Lab Units 02/03/20  1304   CLARITY UA  Clear   COLOR UA  Yellow   SPEC GRAV UA  >=1 030   PH UA  6 0   GLUCOSE UA mg/dl Negative   KETONES UA mg/dl Negative   BLOOD UA  Negative   PROTEIN UA mg/dl Trace*   NITRITE UA  Negative   BILIRUBIN UA  Negative   UROBILINOGEN UA E U /dl 0 2   LEUKOCYTES UA  Negative   WBC UA /hpf 0-1*   RBC UA /hpf None Seen   BACTERIA UA /hpf Occasional   EPITHELIAL CELLS WET PREP /hpf Occasional     2/3 ekg: sinus bradycardia:  Twaves:     T waves: flattening      Flattening:  AVL and V1  Q waves:     Q waves:  V1  Other findings:     Other findings: U wave        Past Medical History:   Diagnosis Date    Arthritis     Gout     Hypertension     Hyperthyroidism     Memory loss     Sleep disturbance     Thyroid disease Present on Admission:   Essential hypertension   Acquired hypothyroidism   Idiopathic chronic gout of multiple sites without tophus      Admitting Diagnosis: Wheezing [R06 2]  Cough [R05]  Homelessness [Z59 0]  Low O2 saturation [R79 81]  Encounter for screening for unspecified developmental delays [Z13 40]  Age/Sex: 80 y o  male  Admission Orders:  IP CONSULT TO CASE MANAGEMENT  IP CONSULT TO NEUROPSYCHOLOGY  IP CONSULT TO PSYCHIATRY  Activity as tolerated    Scheduled Medications:    Medications:  albuterol 2 5 mg Nebulization Once   allopurinol 200 mg Oral Daily   aspirin 325 mg Oral Daily   enoxaparin 40 mg Subcutaneous Q24H DON   levothyroxine 100 mcg Oral Daily   triamterene-hydrochlorothiazide 1 tablet Oral Daily     Continuous IV Infusions:     PRN Meds:     Network Utilization Review Department  [email or web address]  ATTENTION: Please call with any questions or concerns to [phone] and carefully listen to the prompts so that you are directed to the right person  All voicemails are confidential   Morelia Muss all requests for admission clinical reviews, approved or denied determinations and any other requests to dedicated fax number below belonging to the campus where the patient is receiving treatment   List of dedicated fax numbers for the Facilities:  [address] [place] [address] DENIALS (Administrative/Medical Necessity) [phone]   [address] (Maternity/NICU/Pediatrics) [phone]   Liborio Gomez [phone]   Darrin [place] [phone]   Jackelin Damico [phone]   Britt De Dios [phone]   [address] [place] [address] [address] [place] [address] [phone]   [facility]  [phone]   [address] [facility] [address]  [address] [phone]

## 2020-02-04 NOTE — PLAN OF CARE
Problem: Potential for Falls  Goal: Patient will remain free of falls  Description  INTERVENTIONS:  - Assess patient frequently for physical needs  -  Identify cognitive and physical deficits and behaviors that affect risk of falls    -  [place] fall precautions as indicated by assessment   - Educate patient/family on patient safety including physical limitations  - Instruct patient to call for assistance with activity based on assessment  - Modify environment to reduce risk of injury  - Consider OT/PT consult to assist with strengthening/mobility  Outcome: Progressing

## 2020-02-04 NOTE — CONSULTS
Consultation - Neuropsychology/Psychology Department  Rickie Cowart 80 y o  male MRN: [id number]  Unit/Bed#: W -63 Encounter: [id number]        Reason for Consultation:  Rickie Cowart is a 80y o  year old male who was referred for a Neuropsychological Exam to assess cognitive functioning and comment on capacity to make informed medical and self care decisions        History of Present Illness  losing home and not having a place to stay  Physician Requesting Consult: Cortez Chávez MD    PROBLEM LIST:  Patient Active Problem List   Diagnosis    Idiopathic chronic gout of multiple sites without tophus    Pure hypercholesterolemia    Essential hypertension    Acquired hypothyroidism    Spondylosis of lumbar region without myelopathy or radiculopathy    Patellofemoral disorder of left knee    Spinal stenosis of lumbosacral region    Meningioma, cerebral ([facility] [address] )    Homeless         Historical Information   Past Medical History:   Diagnosis Date    Arthritis     Gout     Hypertension     Hyperthyroidism     Memory loss     Sleep disturbance     Thyroid disease      Past Surgical History:   Procedure Laterality Date    KIDNEY SURGERY      description: 30 yrs ago    PROSTATE SURGERY      description: stone removal 30 yrs ago    REPLACEMENT TOTAL KNEE BILATERAL      description: 18 yrs ago     Social History   Social History     Substance and Sexual Activity   Alcohol Use Yes    Comment: social "burbon once in a while"     Social History     Substance and Sexual Activity   Drug Use No     Social History     Tobacco Use   Smoking Status Former Smoker   Smokeless Tobacco Never Used   Tobacco Comment    "I smoked years ago"     Family History:   Family History   Problem Relation Age of Onset    Other Mother         Brain tumor   Valaria Reil Cancer Sister     Diabetes Brother     Arthritis Family     Diabetes Family     Other Family         knee replacement    Thyroid disease Family Meds/Allergies   current meds:   Current Facility-Administered Medications   Medication Dose Route Frequency    albuterol inhalation solution 2 5 mg  2 5 mg Nebulization Once    allopurinol (ZYLOPRIM) tablet 200 mg  200 mg Oral Daily    aspirin tablet 325 mg  325 mg Oral Daily    enoxaparin (LOVENOX) subcutaneous injection 40 mg  40 mg Subcutaneous Q24H [facility]    levothyroxine tablet 100 mcg  100 mcg Oral Daily    triamterene-hydrochlorothiazide (MAXZIDE-25) 37 5-25 mg per tablet 1 tablet  1 tablet Oral Daily       No Known Allergies      Family and Social Support:   Living Arrangements: Alone (Pt states he lives with neice)  Support Systems: Family members  Assistance Needed: Shelter  Type of Current Residence: Homeless  Current Home Care Services: No  Discharge planning discussed with[de-identified] patient, extended family niece Kathy Good and nephew Emi Angel of Choice: Yes  Transportation at Discharge?: No  CM Handoff Comments: 2/3-will be admitted under OBS; psych to see to comment on capacity      Behavioral Observations: Alert, oriented x 3, cooperative with pleasant affect; denied depressed mood and admitted to mild difficulty with recall; no overt evidence of psychotic process; patient discussed losing his home and is unsure what his options are upon d/c  Cognitive Examination    General Cognitive Functioning MMSE = Average 26/28; General Fund of Information = Borderline/Low Average    Attention/Concentration Auditory Selective Attention = Average; Auditory Vigilance = Average; Information Processing Speed = Average    Frontal Systems/Executive Functioning Mental Flexibility/Cognitive Control = Borderline; Working Memory = Average Abstract Reasoning = Low Average; Novel  Generative Ability = Average, Commonsense Reasoning and Judgement = Average    Language Functioning Confrontation naming = Average, Phonemic Fluency = Average; Semantic Retrieval = Average;  Comprehension of Complex Ideational Material = Average;  Praxis = Within Normal Limits; Repetition = Within Normal Limits; Basic Reading = Within Normal Limits; Following Commands = Within Normal Limits    Memory Functioning Narrative Recall - Short Delay = Low Average; Long Delay Narrative Recall = Low Average; Visual Recognition = Average    Visuo-Spatial Abilities Not Assessed    Functional Knowledge  Health & Safety Knowledge = Average;     Summary/Impression: Results of Neuropsychological Exam revealed adequate cognitive functioning and on a measure assessing awareness of personal health status and ability to evaluate health problems, handle medical emergencies and take safety precautions, patient performed within normal limits  At this time, patient appears to have capacity to make informed medical and self care decisions  Recommendations:  Continue to monitor patient's cognitive functioning   Should continue to be followed by outpatient Psychiatry

## 2020-02-04 NOTE — SOCIAL WORK
CM received call from patient's niece Alma Mary Lou who asked for update regarding patient; she still is unable to have him stay with her at this time  CM explained that Neuropsychiatry as well as Psychiatry have been consulted to determine if patient has capacity at this time  If he is determined not to have capacity, CM will work with AAA to obtain emergency guardianship and placement  Yisel inquired about placement at [facility] and said that family may eventually want to pursue guardianship of patient  RUBA explained that once emergency guardian is appointed, if this is the case for patient, then family can work directly with them to determine more permanent placement/plans for patient  Alma Barreto asked for CM to continue to provide updates as she wants to make sure patient is safe  CM will continue to follow patient

## 2020-02-04 NOTE — PLAN OF CARE
Problem: Potential for Falls  Goal: Patient will remain free of falls  Description  INTERVENTIONS:  - Assess patient frequently for physical needs  -  Identify cognitive and physical deficits and behaviors that affect risk of falls    -  [place] fall precautions as indicated by assessment   - Educate patient/family on patient safety including physical limitations  - Instruct patient to call for assistance with activity based on assessment  - Modify environment to reduce risk of injury  - Consider OT/PT consult to assist with strengthening/mobility  Outcome: Progressing

## 2020-02-04 NOTE — DISCHARGE SUMMARY
Discharge- Pete Snyder 5/24/1927, 80 y o  male MRN: [id number]  Unit/Bed#: W -01 Encounter: [id number] DOS: 2/4/2020  Primary Care Provider: Chang Lopez MD   Date and time admitted to hospital: 2/3/2020 11:03 AM    * [facility]  · Patient recently lost his home due to reported financial problems   · Patient denies any suicidal or homicidal ideation  ·  consult for placement  He is followed by the [facility] and has outpatient  who gave him a cell phone on day of admission  · Per psych, and neuropsych pt has capacity   · Patient upset about discharge to shelter - family unable to care for him at home and present at bedside   · Resources given to patient and lyft ride arranged through case management     Acquired hypothyroidism  Assessment & Plan  · Continue levothyroxine  · TSH normal     Essential hypertension  Assessment & Plan  · Blood pressure stable  Continue Dyazide    Idiopathic chronic gout of multiple sites without tophus  Assessment & Plan  · Continue allopurinol  No acute flares at this time    Discharging Physician / Practitioner: Camilla Thomas PA-C  PCP: Chang Lopez MD  Admission Date:   Admission Orders (From admission, onward)     Ordered        02/03/20 1643  Place in Observation  Once                   Discharge Date: 02/04/20    Resolved Problems  Date Reviewed: 2/4/2020    None        Consultations During Hospital Stay:  · Case management   · Psychiatry  · Neuropsychology     Procedures Performed:   · None     Significant Findings / Test Results:   · Adjustment disorder     Incidental Findings:   · None      Test Results Pending at Discharge (will require follow up):    · None      Outpatient Tests Requested:  · Follow up with PCP     Complications:   Aggressive behavior at time of d/c     Reason for Admission: homelessness     Hospital Course:     Pete Snyder is a 80 y o  male patient with past medical history significant for hypertension, hypothyroidism, GERD who originally presented to the hospital on 2/3/2020 due to homelessness  Patient reportedly lost his home and was staying in a hotel that was paid for by a family member  Patient notified another family member who came and picked him up who found him talking about "scammers bringing him money " He expressed desire to hurt himself and family was concerned and called 911 and he was brought to the emergency department  He was admitted for psychiatry and neuropsychology evaluation to determine further plan of action for patient  He was evaluated by psychiatry who felt patient had no inpatient psych needs  Case management and neuropsychology were also involved and neuropsych physician determine patient does indeed have decision-making capacity  Patient however is homeless and he lived with his niece who is currently in a nursing home for rehabilitation  Patient was unable to find a family member or friend to help care for him in the outpatient setting  Early in hospital course patient was quite pleasant and enjoyed being taking care of and was very happy with his room and his meals  Unfortunately when myself and case management presented to the patient in the afternoon regarding discharge home he became verbally abusive and refused to leave his room  He was apparently per patient's nephew who presented to the hospital around the same time, waiting for a phone call from a potential "scammer" to bring him money which patient stated will be tomorrow  Patient refused to be discharged despite being medically stable  Security was called  Patient was later agreeable to discharge to shelter after a list of resources given to the patient and ride arranged  He was medically stable for discharge from the hospital with close outpatient follow-up  Please see above list of diagnoses and related plan for additional information       Condition at Discharge: stable     Discharge Day Visit / Exam:     * Please refer to separate progress note for these details *    Discussion with Family: nephew at bedside     Discharge instructions/Information to patient and family:   See after visit summary for information provided to patient and family  Provisions for Follow-Up Care:  See after visit summary for information related to follow-up care and any pertinent home health orders  Disposition:     Other: homeless shelter     For Discharges to [facility] SNF:   · Not Applicable to this Patient - Not Applicable to this Patient    Planned Readmission: none      Discharge Statement:  I spent 50 minutes discharging the patient  This time was spent on the day of discharge  I had direct contact with the patient on the day of discharge  Greater than 50% of the total time was spent examining patient, answering all patient questions, arranging and discussing plan of care with patient as well as directly providing post-discharge instructions  Additional time then spent on discharge activities  Discharge Medications:  See after visit summary for reconciled discharge medications provided to patient and family        ** Please Note: This note has been constructed using a voice recognition system **

## 2020-02-04 NOTE — PLAN OF CARE
Problem: Potential for Falls  Goal: Patient will remain free of falls  Description  INTERVENTIONS:  - Assess patient frequently for physical needs  -  Identify cognitive and physical deficits and behaviors that affect risk of falls    -  [place] fall precautions as indicated by assessment   - Educate patient/family on patient safety including physical limitations  - Instruct patient to call for assistance with activity based on assessment  - Modify environment to reduce risk of injury  - Consider OT/PT consult to assist with strengthening/mobility  2/4/2020 1729 by Gabriella Alcantar RN  Outcome: Completed  2/4/2020 0735 by Gabriella Alcantar RN  Outcome: Progressing

## 2020-02-04 NOTE — ASSESSMENT & PLAN NOTE
· Patient recently lost his home due to reported financial problems   · There is concern for possible underlying dementia  · Patient continues denies any suicidal or homicidal ideation  · Neuropsych consult placed for capacity evaluation which will help determine disposition   ·  consult for placement    He has being followed by the [facility] for [facility]

## 2020-02-04 NOTE — SOCIAL WORK
CM LMOVM for patient's AAA CW, Karen Stakes, at [phone], requesting return call to discuss discharge planning  CM also LMOVM for Jared Mcghee from [facility] and requested return call to discuss dispo  As per Neuropsychology consult and Psychiatry consult, patient appears to have capacity to make decisions at this time  CM received call back from [facility] [address] and informed her of the same  She reports she will continue to follow patient in the community and will continue to pursue resources for housing for him as much as he will allow  CM also spoke with legal, Jared Mcghee  As patient has capacity, he is able to choose to discharge wherever he would like once medically stable  CM can assist in providing resources and a ride but patient has the ability and freedom to guide his discharge plan and destination  CM spoke with CM Director Chin Felipe who also supports the conversation CM had with Ester Hwang from [place]  CM and SLIM met with patient at bedside to discuss discharge plan  As per SLIM, patient is medically stable for discharge today  CM reviewed options for discharge with patient including discharge to a shelter; patient had previously been at [facility], or discharge to a hotel at patient's expense  At this time, patient reports he is not interested in discharge to a shelter and would like for CM to assist in setting up transport to somewhere [facility] [place] [facility]  At this time, patient's nephew Cristiane Christine arrived  CM and SLIM provided update that Neuropsych and Psychiatry determined patient has capacity  Patient's nephew disagrees and reports he cannot personally care for patient and will not provide transport nor housing  As per patient and nephew, staying with family is not an option at this time  Patient does not have any other family or friend resources he can utilize   Patient's nephew does have some of his belongings at home that he will gladly obtain for patient and bring wherever he will be staying  CM reviewed multiple resources with patient and his nephew and answered all questions  Resources provided and reviewed included the following: local shelter list, [facility] and [facility]  CM reviewed with patient that Corinne Hail from [facility] will continue to follow in the community  Patient reports he will leave the hospital tomorrow as his "scammers" will pay him and his niece money that is owed to them and then his niece will pick him up upon her discharge from SNF  Cheryl Mckeon reports that his sister is not discharging from SNF tomorrow  Patient became frustrated and reports he cannot afford a hotel and will not leave with his family but he will consider a shelter in [facility]  Patient continues to report he is "normal and will leave the hospital tomorrow " RUBA and RAJANI continued to endorse his is medically stable for discharge and hospital admission is no longer appropriate  Patient reports he will be able to work on housing tomorrow once he meets up with his niece  CM called [facility] in [place] and spoke with Kelsey; she reports they do not have any resident beds available, however, there is a warming shelter from 8 pm to 7 am that patient is welcome to come for  Patient reports he will only go to [facility] and will not go to any shelter in [place]  Patient requested CM arrange transportation for him  Dagoberto clemente signed and placed on chart  CM called Jeane De Leon at [facility]; Dagoberto arranged for 8 pm to [facility] in [facility]  SLIM and RN aware; [facility] contact information placed on AVS  RN will review shelter hours with patient and these were also placed on AVS  No further CM intervention at this time

## 2020-02-05 ENCOUNTER — TRANSITIONAL CARE MANAGEMENT (OUTPATIENT)
Dept: FAMILY MEDICINE CLINIC | Facility: MEDICAL CENTER | Age: 85
End: 2020-02-05

## 2020-02-11 ENCOUNTER — TELEPHONE (OUTPATIENT)
Dept: FAMILY MEDICINE CLINIC | Facility: MEDICAL CENTER | Age: 85
End: 2020-02-11

## 2020-03-23 DIAGNOSIS — M1A.09X0 IDIOPATHIC CHRONIC GOUT OF MULTIPLE SITES WITHOUT TOPHUS: ICD-10-CM

## 2020-03-23 DIAGNOSIS — I10 ESSENTIAL HYPERTENSION: ICD-10-CM

## 2020-03-23 DIAGNOSIS — E03.9 ACQUIRED HYPOTHYROIDISM: ICD-10-CM

## 2020-03-23 RX ORDER — LEVOTHYROXINE SODIUM 0.1 MG/1
100 TABLET ORAL DAILY
Qty: 90 TABLET | Refills: 1 | Status: SHIPPED | OUTPATIENT
Start: 2020-03-23 | End: 2020-09-17 | Stop reason: SDUPTHER

## 2020-03-23 RX ORDER — TRIAMTERENE AND HYDROCHLOROTHIAZIDE 37.5; 25 MG/1; MG/1
1 CAPSULE ORAL DAILY
Qty: 90 CAPSULE | Refills: 3 | Status: SHIPPED | OUTPATIENT
Start: 2020-03-23 | End: 2021-03-10 | Stop reason: SDUPTHER

## 2020-03-23 RX ORDER — ALLOPURINOL 100 MG/1
200 TABLET ORAL DAILY
Qty: 180 TABLET | Refills: 3 | Status: SHIPPED | OUTPATIENT
Start: 2020-03-23 | End: 2021-03-10 | Stop reason: SDUPTHER

## 2020-05-11 ENCOUNTER — TELEPHONE (OUTPATIENT)
Dept: FAMILY MEDICINE CLINIC | Facility: MEDICAL CENTER | Age: 85
End: 2020-05-11

## 2020-05-12 ENCOUNTER — OFFICE VISIT (OUTPATIENT)
Dept: FAMILY MEDICINE CLINIC | Facility: MEDICAL CENTER | Age: 85
End: 2020-05-12
Payer: COMMERCIAL

## 2020-05-12 VITALS
DIASTOLIC BLOOD PRESSURE: 62 MMHG | SYSTOLIC BLOOD PRESSURE: 112 MMHG | OXYGEN SATURATION: 99 % | BODY MASS INDEX: 23.25 KG/M2 | WEIGHT: 153.38 LBS | RESPIRATION RATE: 16 BRPM | HEART RATE: 81 BPM | HEIGHT: 68 IN | TEMPERATURE: 97 F

## 2020-05-12 DIAGNOSIS — I10 ESSENTIAL HYPERTENSION: ICD-10-CM

## 2020-05-12 DIAGNOSIS — E03.9 ACQUIRED HYPOTHYROIDISM: ICD-10-CM

## 2020-05-12 DIAGNOSIS — D64.9 ANEMIA, UNSPECIFIED TYPE: Primary | ICD-10-CM

## 2020-05-12 DIAGNOSIS — Z59.00 HOMELESS: ICD-10-CM

## 2020-05-12 DIAGNOSIS — E78.00 PURE HYPERCHOLESTEROLEMIA: ICD-10-CM

## 2020-05-12 PROCEDURE — 99214 OFFICE O/P EST MOD 30 MIN: CPT | Performed by: FAMILY MEDICINE

## 2020-05-12 PROCEDURE — 1036F TOBACCO NON-USER: CPT | Performed by: FAMILY MEDICINE

## 2020-05-12 PROCEDURE — 4040F PNEUMOC VAC/ADMIN/RCVD: CPT | Performed by: FAMILY MEDICINE

## 2020-05-12 PROCEDURE — 3074F SYST BP LT 130 MM HG: CPT | Performed by: FAMILY MEDICINE

## 2020-05-12 PROCEDURE — 1160F RVW MEDS BY RX/DR IN RCRD: CPT | Performed by: FAMILY MEDICINE

## 2020-05-12 PROCEDURE — 3078F DIAST BP <80 MM HG: CPT | Performed by: FAMILY MEDICINE

## 2020-05-12 SDOH — ECONOMIC STABILITY - HOUSING INSECURITY: HOMELESSNESS UNSPECIFIED: Z59.00

## 2020-05-13 ENCOUNTER — APPOINTMENT (OUTPATIENT)
Dept: LAB | Facility: MEDICAL CENTER | Age: 85
End: 2020-05-13
Payer: COMMERCIAL

## 2020-05-13 DIAGNOSIS — D64.9 ANEMIA, UNSPECIFIED TYPE: ICD-10-CM

## 2020-05-13 LAB
BASOPHILS # BLD AUTO: 0.03 THOUSANDS/ΜL (ref 0–0.1)
BASOPHILS NFR BLD AUTO: 1 % (ref 0–1)
EOSINOPHIL # BLD AUTO: 0.06 THOUSAND/ΜL (ref 0–0.61)
EOSINOPHIL NFR BLD AUTO: 1 % (ref 0–6)
ERYTHROCYTE [DISTWIDTH] IN BLOOD BY AUTOMATED COUNT: 13.1 % (ref 11.6–15.1)
HCT VFR BLD AUTO: 40.2 % (ref 36.5–49.3)
HGB BLD-MCNC: 13.4 G/DL (ref 12–17)
IMM GRANULOCYTES # BLD AUTO: 0.02 THOUSAND/UL (ref 0–0.2)
IMM GRANULOCYTES NFR BLD AUTO: 0 % (ref 0–2)
LYMPHOCYTES # BLD AUTO: 2.29 THOUSANDS/ΜL (ref 0.6–4.47)
LYMPHOCYTES NFR BLD AUTO: 39 % (ref 14–44)
MCH RBC QN AUTO: 33.5 PG (ref 26.8–34.3)
MCHC RBC AUTO-ENTMCNC: 33.3 G/DL (ref 31.4–37.4)
MCV RBC AUTO: 101 FL (ref 82–98)
MONOCYTES # BLD AUTO: 0.6 THOUSAND/ΜL (ref 0.17–1.22)
MONOCYTES NFR BLD AUTO: 10 % (ref 4–12)
NEUTROPHILS # BLD AUTO: 2.88 THOUSANDS/ΜL (ref 1.85–7.62)
NEUTS SEG NFR BLD AUTO: 49 % (ref 43–75)
NRBC BLD AUTO-RTO: 0 /100 WBCS
PLATELET # BLD AUTO: 139 THOUSANDS/UL (ref 149–390)
PMV BLD AUTO: 10.6 FL (ref 8.9–12.7)
RBC # BLD AUTO: 4 MILLION/UL (ref 3.88–5.62)
WBC # BLD AUTO: 5.88 THOUSAND/UL (ref 4.31–10.16)

## 2020-05-13 PROCEDURE — 85025 COMPLETE CBC W/AUTO DIFF WBC: CPT

## 2020-05-13 PROCEDURE — 36415 COLL VENOUS BLD VENIPUNCTURE: CPT

## 2020-06-23 ENCOUNTER — TELEPHONE (OUTPATIENT)
Dept: FAMILY MEDICINE CLINIC | Facility: MEDICAL CENTER | Age: 85
End: 2020-06-23

## 2020-08-14 ENCOUNTER — OFFICE VISIT (OUTPATIENT)
Dept: FAMILY MEDICINE CLINIC | Facility: MEDICAL CENTER | Age: 85
End: 2020-08-14
Payer: COMMERCIAL

## 2020-08-14 VITALS
DIASTOLIC BLOOD PRESSURE: 68 MMHG | WEIGHT: 160 LBS | HEART RATE: 68 BPM | RESPIRATION RATE: 16 BRPM | TEMPERATURE: 98 F | SYSTOLIC BLOOD PRESSURE: 116 MMHG | HEIGHT: 68 IN | BODY MASS INDEX: 24.25 KG/M2

## 2020-08-14 DIAGNOSIS — I10 ESSENTIAL HYPERTENSION: ICD-10-CM

## 2020-08-14 DIAGNOSIS — L08.9 BACTERIAL SKIN INFECTION: Primary | ICD-10-CM

## 2020-08-14 DIAGNOSIS — M1A.09X0 IDIOPATHIC CHRONIC GOUT OF MULTIPLE SITES WITHOUT TOPHUS: ICD-10-CM

## 2020-08-14 DIAGNOSIS — E03.9 ACQUIRED HYPOTHYROIDISM: ICD-10-CM

## 2020-08-14 DIAGNOSIS — B96.89 BACTERIAL SKIN INFECTION: Primary | ICD-10-CM

## 2020-08-14 DIAGNOSIS — E78.00 PURE HYPERCHOLESTEROLEMIA: ICD-10-CM

## 2020-08-14 PROCEDURE — 4040F PNEUMOC VAC/ADMIN/RCVD: CPT | Performed by: FAMILY MEDICINE

## 2020-08-14 PROCEDURE — 1160F RVW MEDS BY RX/DR IN RCRD: CPT | Performed by: FAMILY MEDICINE

## 2020-08-14 PROCEDURE — 3078F DIAST BP <80 MM HG: CPT | Performed by: FAMILY MEDICINE

## 2020-08-14 PROCEDURE — 3008F BODY MASS INDEX DOCD: CPT | Performed by: FAMILY MEDICINE

## 2020-08-14 PROCEDURE — 1036F TOBACCO NON-USER: CPT | Performed by: FAMILY MEDICINE

## 2020-08-14 PROCEDURE — 3074F SYST BP LT 130 MM HG: CPT | Performed by: FAMILY MEDICINE

## 2020-08-14 PROCEDURE — 99214 OFFICE O/P EST MOD 30 MIN: CPT | Performed by: FAMILY MEDICINE

## 2020-08-14 NOTE — PROGRESS NOTES
Skin Infection    scratching his upper arm, secondary infection  Rx bactroban, stop scratching  Assessment/Plan:    Acquired hypothyroidism  Patient has hypothyroidism and it is well controlled with levothyroxine 100 mcg  Continue levothyroxine  Essential hypertension  Blood pressure is well controlled with Dyazide  He tolerates that well  Blood pressure today 116/68    Continue Dyazide    Idiopathic chronic gout of multiple sites without tophus  Since we have had patient on allopurinol, he has had no gouty attacks  Will continue allopurinol, continue routine follow-up    Pure hypercholesterolemia  Patient's hyperlipidemia is borderline if not close to normal     He will continue low-fat diet  Diagnoses and all orders for this visit:    Bacterial skin infection  -     mupirocin (BACTROBAN) 2 % ointment; Apply topically 3 (three) times a day    Acquired hypothyroidism    Essential hypertension    Idiopathic chronic gout of multiple sites without tophus    Pure hypercholesterolemia    Other orders  -     Discontinue: Multiple Vitamin (MULTIVITAMINS PO); TAKE ONE TABLET BY MOUTH EVERY DAY        Subjective:      Patient ID: Gabi Warren is a 80 y o  male  HPI    The following portions of the patient's history were reviewed and updated as appropriate: allergies, current medications, past family history, past medical history, past social history, past surgical history and problem list     Review of Systems   Constitutional: Negative for activity change, fatigue and fever  HENT: Negative for congestion, ear discharge, ear pain, postnasal drip, rhinorrhea, sinus pain, sneezing and sore throat  Eyes: Negative for photophobia, pain, discharge and redness  Respiratory: Negative for apnea, cough, shortness of breath and wheezing  Cardiovascular: Negative for chest pain and palpitations     Gastrointestinal: Negative for abdominal pain, blood in stool, constipation, diarrhea, nausea and vomiting  Endocrine: Negative for polydipsia, polyphagia and polyuria  Genitourinary: Negative for decreased urine volume, difficulty urinating, discharge, dysuria, frequency, penile pain and urgency  Musculoskeletal: Negative for arthralgias, gait problem, joint swelling and neck pain  Skin: Positive for wound (Skin infection upper arms)  Negative for color change and rash  Neurological: Negative for dizziness, tremors, seizures, weakness and headaches  Psychiatric/Behavioral: Negative for agitation and sleep disturbance  The patient is not nervous/anxious  Objective:      /68 (BP Location: Left arm, Patient Position: Sitting, Cuff Size: Adult)   Pulse 68   Temp 98 °F (36 7 °C) (Tympanic)   Resp 16   Ht 5' 8" (1 727 m)   Wt 72 6 kg (160 lb)   BMI 24 33 kg/m²          Physical Exam  Constitutional:       General: He is not in acute distress  Appearance: He is well-developed  He is not diaphoretic  HENT:      Head: Normocephalic  Eyes:      Pupils: Pupils are equal, round, and reactive to light  Neck:      Musculoskeletal: Normal range of motion  Cardiovascular:      Rate and Rhythm: Normal rate and regular rhythm  Pulses: Normal pulses  Heart sounds: Normal heart sounds  Pulmonary:      Effort: Pulmonary effort is normal  No respiratory distress  Breath sounds: Normal breath sounds  Skin:     Findings: Lesion (Excoriations upper arm bilat with secondary infection ) present  Neurological:      Mental Status: He is alert and oriented to person, place, and time  Psychiatric:         Behavior: Behavior normal          Thought Content:  Thought content normal          Judgment: Judgment normal

## 2020-08-14 NOTE — ASSESSMENT & PLAN NOTE
Blood pressure is well controlled with Dyazide  He tolerates that well    Blood pressure today 116/68    Continue Dyazide

## 2020-08-26 DIAGNOSIS — L08.9 BACTERIAL SKIN INFECTION: ICD-10-CM

## 2020-08-26 DIAGNOSIS — B96.89 BACTERIAL SKIN INFECTION: ICD-10-CM

## 2020-09-08 ENCOUNTER — TELEPHONE (OUTPATIENT)
Dept: FAMILY MEDICINE CLINIC | Facility: MEDICAL CENTER | Age: 85
End: 2020-09-08

## 2020-09-08 NOTE — TELEPHONE ENCOUNTER
Edil Reynolds, his cousin, is helping him get some services  He would like us to call her at [phone] or [phone] to see what she needs to complete paperwork

## 2020-09-08 NOTE — TELEPHONE ENCOUNTER
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

## 2020-09-09 NOTE — TELEPHONE ENCOUNTER
Letter re-done with Dr Aidan Menchaca revisions  Spoke with Harshad Calvin to let her know letter is ready for pickup  She will have her sister come pick it up and let her know to call office before she comes in    Letter is in drawer at

## 2020-09-16 DIAGNOSIS — E03.9 ACQUIRED HYPOTHYROIDISM: ICD-10-CM

## 2020-09-17 RX ORDER — LEVOTHYROXINE SODIUM 0.1 MG/1
TABLET ORAL
Qty: 90 TABLET | Refills: 1 | Status: SHIPPED | OUTPATIENT
Start: 2020-09-17 | End: 2021-03-10 | Stop reason: SDUPTHER

## 2020-12-03 ENCOUNTER — OFFICE VISIT (OUTPATIENT)
Dept: DERMATOLOGY | Facility: CLINIC | Age: 85
End: 2020-12-03
Payer: COMMERCIAL

## 2020-12-03 VITALS — TEMPERATURE: 96.8 F

## 2020-12-03 DIAGNOSIS — L82.1 SEBORRHEIC KERATOSIS: Primary | ICD-10-CM

## 2020-12-03 DIAGNOSIS — L28.1 PRURIGO NODULARIS: ICD-10-CM

## 2020-12-03 DIAGNOSIS — Z13.89 SCREENING FOR SKIN CONDITION: ICD-10-CM

## 2020-12-03 PROCEDURE — 99213 OFFICE O/P EST LOW 20 MIN: CPT | Performed by: DERMATOLOGY

## 2020-12-03 RX ORDER — CLOBETASOL PROPIONATE 0.5 MG/G
OINTMENT TOPICAL 2 TIMES DAILY
Qty: 30 G | Refills: 1 | Status: SHIPPED | OUTPATIENT
Start: 2020-12-03 | End: 2021-02-10 | Stop reason: SDUPTHER

## 2020-12-11 ENCOUNTER — OFFICE VISIT (OUTPATIENT)
Dept: FAMILY MEDICINE CLINIC | Facility: MEDICAL CENTER | Age: 85
End: 2020-12-11

## 2020-12-11 VITALS
OXYGEN SATURATION: 97 % | SYSTOLIC BLOOD PRESSURE: 124 MMHG | WEIGHT: 162.4 LBS | HEIGHT: 68 IN | HEART RATE: 101 BPM | BODY MASS INDEX: 24.61 KG/M2 | TEMPERATURE: 97.4 F | DIASTOLIC BLOOD PRESSURE: 68 MMHG

## 2020-12-11 DIAGNOSIS — Z00.00 GENERAL MEDICAL EXAM: Primary | ICD-10-CM

## 2020-12-11 DIAGNOSIS — Z23 IMMUNIZATION DUE: ICD-10-CM

## 2020-12-11 DIAGNOSIS — E78.00 PURE HYPERCHOLESTEROLEMIA: ICD-10-CM

## 2020-12-11 DIAGNOSIS — E03.9 ACQUIRED HYPOTHYROIDISM: ICD-10-CM

## 2020-12-11 DIAGNOSIS — Z12.5 SCREENING FOR PROSTATE CANCER: ICD-10-CM

## 2020-12-11 DIAGNOSIS — I10 ESSENTIAL HYPERTENSION: ICD-10-CM

## 2020-12-11 DIAGNOSIS — E55.9 VITAMIN D DEFICIENCY: ICD-10-CM

## 2020-12-11 PROCEDURE — 3725F SCREEN DEPRESSION PERFORMED: CPT | Performed by: NURSE PRACTITIONER

## 2020-12-11 PROCEDURE — G0008 ADMIN INFLUENZA VIRUS VAC: HCPCS | Performed by: NURSE PRACTITIONER

## 2020-12-11 PROCEDURE — 1036F TOBACCO NON-USER: CPT | Performed by: NURSE PRACTITIONER

## 2020-12-11 PROCEDURE — 3288F FALL RISK ASSESSMENT DOCD: CPT | Performed by: NURSE PRACTITIONER

## 2020-12-11 PROCEDURE — 1160F RVW MEDS BY RX/DR IN RCRD: CPT | Performed by: NURSE PRACTITIONER

## 2020-12-11 PROCEDURE — 1125F AMNT PAIN NOTED PAIN PRSNT: CPT | Performed by: NURSE PRACTITIONER

## 2020-12-11 PROCEDURE — 1170F FXNL STATUS ASSESSED: CPT | Performed by: NURSE PRACTITIONER

## 2020-12-11 PROCEDURE — 90662 IIV NO PRSV INCREASED AG IM: CPT | Performed by: NURSE PRACTITIONER

## 2020-12-11 PROCEDURE — G0439 PPPS, SUBSEQ VISIT: HCPCS | Performed by: NURSE PRACTITIONER

## 2021-02-10 DIAGNOSIS — L28.1 PRURIGO NODULARIS: ICD-10-CM

## 2021-02-10 RX ORDER — CLOBETASOL PROPIONATE 0.5 MG/G
OINTMENT TOPICAL 2 TIMES DAILY
Qty: 30 G | Refills: 1 | Status: SHIPPED | OUTPATIENT
Start: 2021-02-10 | End: 2021-07-09

## 2021-03-02 ENCOUNTER — TELEPHONE (OUTPATIENT)
Dept: FAMILY MEDICINE CLINIC | Facility: MEDICAL CENTER | Age: 86
End: 2021-03-02

## 2021-03-02 NOTE — TELEPHONE ENCOUNTER
Joceline Kwon dropped the form off  Patient has seen Stan Gagnon in the past, so I put it in her in box

## 2021-03-02 NOTE — TELEPHONE ENCOUNTER
Chon Gomez, pt's POA, called to ask if Dr Wesley Hu would be able fill out a form Yulisadimitrios Barry received from the [place]  She said he just gave it to her, but it's dated December  She will drop it off, along with the letter that came with it, so Dr Wesley Hu or Dr Annalee Palacios can look it over  If a visit is needed, just contact her  She knows to call the office when she gets here to drop off the papers      Routed to Clerical - please put papers in Dr Sandeep Caraballo and forward note the her and Dr Wesley Hu once the forms arrive

## 2021-03-03 NOTE — TELEPHONE ENCOUNTER
Finally got a hold of POMARIELY, she had wrong phone number listed on the letter  She will  tomorrow  They are in the  drawer, and scanned into chart

## 2021-03-03 NOTE — TELEPHONE ENCOUNTER
Tried calling William Rousseau (pts POA) several times today and line is busy  Ada Wheatley needs to know if pt administers his own medications and does he take care of his own finances?

## 2021-03-05 ENCOUNTER — LAB (OUTPATIENT)
Dept: LAB | Facility: MEDICAL CENTER | Age: 86
End: 2021-03-05
Payer: COMMERCIAL

## 2021-03-05 DIAGNOSIS — I10 ESSENTIAL HYPERTENSION: ICD-10-CM

## 2021-03-05 DIAGNOSIS — E78.00 PURE HYPERCHOLESTEROLEMIA: ICD-10-CM

## 2021-03-05 DIAGNOSIS — Z12.5 SCREENING FOR PROSTATE CANCER: ICD-10-CM

## 2021-03-05 DIAGNOSIS — E03.9 ACQUIRED HYPOTHYROIDISM: ICD-10-CM

## 2021-03-05 DIAGNOSIS — E55.9 VITAMIN D DEFICIENCY: ICD-10-CM

## 2021-03-05 LAB
25(OH)D3 SERPL-MCNC: 16.5 NG/ML (ref 30–100)
ALBUMIN SERPL BCP-MCNC: 4.1 G/DL (ref 3.5–5)
ALP SERPL-CCNC: 74 U/L (ref 46–116)
ALT SERPL W P-5'-P-CCNC: 20 U/L (ref 12–78)
ANION GAP SERPL CALCULATED.3IONS-SCNC: 5 MMOL/L (ref 4–13)
AST SERPL W P-5'-P-CCNC: 37 U/L (ref 5–45)
BACTERIA UR QL AUTO: NORMAL /HPF
BASOPHILS # BLD MANUAL: 0 THOUSAND/UL (ref 0–0.1)
BASOPHILS NFR MAR MANUAL: 0 % (ref 0–1)
BILIRUB SERPL-MCNC: 0.72 MG/DL (ref 0.2–1)
BILIRUB UR QL STRIP: NEGATIVE
BUN SERPL-MCNC: 27 MG/DL (ref 5–25)
CALCIUM SERPL-MCNC: 9.9 MG/DL (ref 8.3–10.1)
CHLORIDE SERPL-SCNC: 105 MMOL/L (ref 100–108)
CHOLEST SERPL-MCNC: 203 MG/DL (ref 50–200)
CLARITY UR: CLEAR
CO2 SERPL-SCNC: 30 MMOL/L (ref 21–32)
COLOR UR: YELLOW
CREAT SERPL-MCNC: 1.34 MG/DL (ref 0.6–1.3)
EOSINOPHIL # BLD MANUAL: 0.07 THOUSAND/UL (ref 0–0.4)
EOSINOPHIL NFR BLD MANUAL: 1 % (ref 0–6)
ERYTHROCYTE [DISTWIDTH] IN BLOOD BY AUTOMATED COUNT: 13.5 % (ref 11.6–15.1)
GFR SERPL CREATININE-BSD FRML MDRD: 45 ML/MIN/1.73SQ M
GLUCOSE P FAST SERPL-MCNC: 96 MG/DL (ref 65–99)
GLUCOSE UR STRIP-MCNC: NEGATIVE MG/DL
HCT VFR BLD AUTO: 40.7 % (ref 36.5–49.3)
HDLC SERPL-MCNC: 37 MG/DL
HGB BLD-MCNC: 13.5 G/DL (ref 12–17)
HGB UR QL STRIP.AUTO: NEGATIVE
HYALINE CASTS #/AREA URNS LPF: NORMAL /LPF
KETONES UR STRIP-MCNC: NEGATIVE MG/DL
LDLC SERPL CALC-MCNC: 129 MG/DL (ref 0–100)
LEUKOCYTE ESTERASE UR QL STRIP: NEGATIVE
LYMPHOCYTES # BLD AUTO: 3.3 THOUSAND/UL (ref 0.6–4.47)
LYMPHOCYTES # BLD AUTO: 48 % (ref 14–44)
MCH RBC QN AUTO: 34.4 PG (ref 26.8–34.3)
MCHC RBC AUTO-ENTMCNC: 33.2 G/DL (ref 31.4–37.4)
MCV RBC AUTO: 104 FL (ref 82–98)
MONOCYTES # BLD AUTO: 0.76 THOUSAND/UL (ref 0–1.22)
MONOCYTES NFR BLD: 11 % (ref 4–12)
NEUTROPHILS # BLD MANUAL: 2.34 THOUSAND/UL (ref 1.85–7.62)
NEUTS SEG NFR BLD AUTO: 34 % (ref 43–75)
NITRITE UR QL STRIP: NEGATIVE
NON-SQ EPI CELLS URNS QL MICRO: NORMAL /HPF
NONHDLC SERPL-MCNC: 166 MG/DL
NRBC BLD AUTO-RTO: 0 /100 WBCS
PH UR STRIP.AUTO: 6.5 [PH]
PLATELET # BLD AUTO: 119 THOUSANDS/UL (ref 149–390)
PLATELET BLD QL SMEAR: ADEQUATE
PMV BLD AUTO: 11 FL (ref 8.9–12.7)
POTASSIUM SERPL-SCNC: 3.6 MMOL/L (ref 3.5–5.3)
PROT SERPL-MCNC: 8.7 G/DL (ref 6.4–8.2)
PROT UR STRIP-MCNC: ABNORMAL MG/DL
PSA SERPL-MCNC: 2.4 NG/ML (ref 0–4)
RBC # BLD AUTO: 3.93 MILLION/UL (ref 3.88–5.62)
RBC #/AREA URNS AUTO: NORMAL /HPF
SODIUM SERPL-SCNC: 140 MMOL/L (ref 136–145)
SP GR UR STRIP.AUTO: 1.02 (ref 1–1.03)
T4 FREE SERPL-MCNC: 1.39 NG/DL (ref 0.76–1.46)
TRIGL SERPL-MCNC: 184 MG/DL
TSH SERPL DL<=0.05 MIU/L-ACNC: 0.17 UIU/ML (ref 0.36–3.74)
UROBILINOGEN UR QL STRIP.AUTO: 1 E.U./DL
VARIANT LYMPHS # BLD AUTO: 6 %
WBC # BLD AUTO: 6.87 THOUSAND/UL (ref 4.31–10.16)
WBC #/AREA URNS AUTO: NORMAL /HPF

## 2021-03-05 PROCEDURE — 81001 URINALYSIS AUTO W/SCOPE: CPT

## 2021-03-05 PROCEDURE — 36415 COLL VENOUS BLD VENIPUNCTURE: CPT

## 2021-03-05 PROCEDURE — 85007 BL SMEAR W/DIFF WBC COUNT: CPT

## 2021-03-05 PROCEDURE — 84443 ASSAY THYROID STIM HORMONE: CPT

## 2021-03-05 PROCEDURE — G0103 PSA SCREENING: HCPCS

## 2021-03-05 PROCEDURE — 82306 VITAMIN D 25 HYDROXY: CPT

## 2021-03-05 PROCEDURE — 80061 LIPID PANEL: CPT

## 2021-03-05 PROCEDURE — 84439 ASSAY OF FREE THYROXINE: CPT

## 2021-03-05 PROCEDURE — 80053 COMPREHEN METABOLIC PANEL: CPT

## 2021-03-05 PROCEDURE — 85027 COMPLETE CBC AUTOMATED: CPT

## 2021-03-10 DIAGNOSIS — M1A.09X0 IDIOPATHIC CHRONIC GOUT OF MULTIPLE SITES WITHOUT TOPHUS: ICD-10-CM

## 2021-03-10 DIAGNOSIS — I10 ESSENTIAL HYPERTENSION: ICD-10-CM

## 2021-03-10 DIAGNOSIS — E03.9 ACQUIRED HYPOTHYROIDISM: ICD-10-CM

## 2021-03-12 ENCOUNTER — TELEPHONE (OUTPATIENT)
Dept: FAMILY MEDICINE CLINIC | Facility: MEDICAL CENTER | Age: 86
End: 2021-03-12

## 2021-03-12 RX ORDER — TRIAMTERENE AND HYDROCHLOROTHIAZIDE 37.5; 25 MG/1; MG/1
1 CAPSULE ORAL DAILY
Qty: 90 CAPSULE | Refills: 3 | Status: SHIPPED | OUTPATIENT
Start: 2021-03-12 | End: 2021-07-01 | Stop reason: HOSPADM

## 2021-03-12 RX ORDER — LEVOTHYROXINE SODIUM 0.1 MG/1
100 TABLET ORAL DAILY
Qty: 90 TABLET | Refills: 1 | Status: SHIPPED | OUTPATIENT
Start: 2021-03-12 | End: 2021-09-17 | Stop reason: SDUPTHER

## 2021-03-12 RX ORDER — ALLOPURINOL 100 MG/1
200 TABLET ORAL DAILY
Qty: 180 TABLET | Refills: 3 | Status: SHIPPED | OUTPATIENT
Start: 2021-03-12 | End: 2021-09-17 | Stop reason: SDUPTHER

## 2021-03-12 NOTE — TELEPHONE ENCOUNTER
----- Message from Alejo Gr MD sent at 3/12/2021  1:00 PM EST -----  Per SB--Needs appt with Dr Carlos Salguero to review his BW

## 2021-03-16 ENCOUNTER — APPOINTMENT (OUTPATIENT)
Dept: LAB | Facility: MEDICAL CENTER | Age: 86
End: 2021-03-16
Payer: COMMERCIAL

## 2021-03-16 ENCOUNTER — OFFICE VISIT (OUTPATIENT)
Dept: FAMILY MEDICINE CLINIC | Facility: MEDICAL CENTER | Age: 86
End: 2021-03-16
Payer: COMMERCIAL

## 2021-03-16 VITALS
BODY MASS INDEX: 23.79 KG/M2 | DIASTOLIC BLOOD PRESSURE: 88 MMHG | RESPIRATION RATE: 16 BRPM | SYSTOLIC BLOOD PRESSURE: 146 MMHG | TEMPERATURE: 97.4 F | HEART RATE: 90 BPM | HEIGHT: 68 IN | WEIGHT: 157 LBS

## 2021-03-16 DIAGNOSIS — D32.0 MENINGIOMA, CEREBRAL (HCC): ICD-10-CM

## 2021-03-16 DIAGNOSIS — R21 RASH: ICD-10-CM

## 2021-03-16 DIAGNOSIS — D69.6 THROMBOCYTOPENIA (HCC): Primary | ICD-10-CM

## 2021-03-16 DIAGNOSIS — E55.9 VITAMIN D DEFICIENCY: ICD-10-CM

## 2021-03-16 DIAGNOSIS — R77.9 ELEVATED BLOOD PROTEIN: ICD-10-CM

## 2021-03-16 DIAGNOSIS — I10 ESSENTIAL HYPERTENSION: ICD-10-CM

## 2021-03-16 DIAGNOSIS — D69.6 THROMBOCYTOPENIA (HCC): ICD-10-CM

## 2021-03-16 DIAGNOSIS — D64.9 ANEMIA, UNSPECIFIED TYPE: ICD-10-CM

## 2021-03-16 DIAGNOSIS — E03.9 ACQUIRED HYPOTHYROIDISM: ICD-10-CM

## 2021-03-16 LAB
ALBUMIN SERPL BCP-MCNC: 4.3 G/DL (ref 3.5–5)
ALP SERPL-CCNC: 79 U/L (ref 46–116)
ALT SERPL W P-5'-P-CCNC: 21 U/L (ref 12–78)
ANION GAP SERPL CALCULATED.3IONS-SCNC: 8 MMOL/L (ref 4–13)
ANISOCYTOSIS BLD QL SMEAR: PRESENT
AST SERPL W P-5'-P-CCNC: 40 U/L (ref 5–45)
BACTERIA UR QL AUTO: NORMAL /HPF
BASOPHILS # BLD MANUAL: 0 THOUSAND/UL (ref 0–0.1)
BASOPHILS NFR MAR MANUAL: 0 % (ref 0–1)
BILIRUB SERPL-MCNC: 0.6 MG/DL (ref 0.2–1)
BILIRUB UR QL STRIP: NEGATIVE
BUN SERPL-MCNC: 26 MG/DL (ref 5–25)
CALCIUM SERPL-MCNC: 9.8 MG/DL (ref 8.3–10.1)
CHLORIDE SERPL-SCNC: 102 MMOL/L (ref 100–108)
CLARITY UR: CLEAR
CO2 SERPL-SCNC: 26 MMOL/L (ref 21–32)
COLOR UR: YELLOW
CREAT SERPL-MCNC: 1.35 MG/DL (ref 0.6–1.3)
EOSINOPHIL # BLD MANUAL: 0 THOUSAND/UL (ref 0–0.4)
EOSINOPHIL NFR BLD MANUAL: 0 % (ref 0–6)
ERYTHROCYTE [DISTWIDTH] IN BLOOD BY AUTOMATED COUNT: 13.5 % (ref 11.6–15.1)
GFR SERPL CREATININE-BSD FRML MDRD: 45 ML/MIN/1.73SQ M
GLUCOSE P FAST SERPL-MCNC: 104 MG/DL (ref 65–99)
GLUCOSE UR STRIP-MCNC: NEGATIVE MG/DL
HCT VFR BLD AUTO: 42.5 % (ref 36.5–49.3)
HGB BLD-MCNC: 14.4 G/DL (ref 12–17)
HGB UR QL STRIP.AUTO: NEGATIVE
HYALINE CASTS #/AREA URNS LPF: NORMAL /LPF
KETONES UR STRIP-MCNC: NEGATIVE MG/DL
LEUKOCYTE ESTERASE UR QL STRIP: ABNORMAL
LYMPHOCYTES # BLD AUTO: 4.05 THOUSAND/UL (ref 0.6–4.47)
LYMPHOCYTES # BLD AUTO: 57 % (ref 14–44)
MACROCYTES BLD QL AUTO: PRESENT
MCH RBC QN AUTO: 34.9 PG (ref 26.8–34.3)
MCHC RBC AUTO-ENTMCNC: 33.9 G/DL (ref 31.4–37.4)
MCV RBC AUTO: 103 FL (ref 82–98)
MONOCYTES # BLD AUTO: 0.92 THOUSAND/UL (ref 0–1.22)
MONOCYTES NFR BLD: 13 % (ref 4–12)
NEUTROPHILS # BLD MANUAL: 2.13 THOUSAND/UL (ref 1.85–7.62)
NEUTS SEG NFR BLD AUTO: 30 % (ref 43–75)
NITRITE UR QL STRIP: NEGATIVE
NON-SQ EPI CELLS URNS QL MICRO: NORMAL /HPF
NRBC BLD AUTO-RTO: 0 /100 WBCS
PH UR STRIP.AUTO: 6.5 [PH]
PLATELET # BLD AUTO: 128 THOUSANDS/UL (ref 149–390)
PLATELET BLD QL SMEAR: ABNORMAL
PMV BLD AUTO: 10.9 FL (ref 8.9–12.7)
POIKILOCYTOSIS BLD QL SMEAR: PRESENT
POTASSIUM SERPL-SCNC: 3.8 MMOL/L (ref 3.5–5.3)
PROT SERPL-MCNC: 9.4 G/DL (ref 6.4–8.2)
PROT UR STRIP-MCNC: NEGATIVE MG/DL
RBC # BLD AUTO: 4.13 MILLION/UL (ref 3.88–5.62)
RBC #/AREA URNS AUTO: NORMAL /HPF
RBC MORPH BLD: PRESENT
SODIUM SERPL-SCNC: 136 MMOL/L (ref 136–145)
SP GR UR STRIP.AUTO: 1.01 (ref 1–1.03)
UROBILINOGEN UR QL STRIP.AUTO: 1 E.U./DL
VIT B12 SERPL-MCNC: 312 PG/ML (ref 100–900)
WBC # BLD AUTO: 7.1 THOUSAND/UL (ref 4.31–10.16)
WBC #/AREA URNS AUTO: NORMAL /HPF

## 2021-03-16 PROCEDURE — 86334 IMMUNOFIX E-PHORESIS SERUM: CPT | Performed by: PATHOLOGY

## 2021-03-16 PROCEDURE — 86334 IMMUNOFIX E-PHORESIS SERUM: CPT

## 2021-03-16 PROCEDURE — 99215 OFFICE O/P EST HI 40 MIN: CPT | Performed by: FAMILY MEDICINE

## 2021-03-16 PROCEDURE — 82607 VITAMIN B-12: CPT

## 2021-03-16 PROCEDURE — 84165 PROTEIN E-PHORESIS SERUM: CPT | Performed by: PATHOLOGY

## 2021-03-16 PROCEDURE — 81001 URINALYSIS AUTO W/SCOPE: CPT | Performed by: FAMILY MEDICINE

## 2021-03-16 PROCEDURE — 84166 PROTEIN E-PHORESIS/URINE/CSF: CPT | Performed by: PATHOLOGY

## 2021-03-16 PROCEDURE — 85007 BL SMEAR W/DIFF WBC COUNT: CPT

## 2021-03-16 PROCEDURE — 36415 COLL VENOUS BLD VENIPUNCTURE: CPT

## 2021-03-16 PROCEDURE — 84166 PROTEIN E-PHORESIS/URINE/CSF: CPT | Performed by: FAMILY MEDICINE

## 2021-03-16 PROCEDURE — 86335 IMMUNFIX E-PHORSIS/URINE/CSF: CPT | Performed by: PATHOLOGY

## 2021-03-16 PROCEDURE — 80053 COMPREHEN METABOLIC PANEL: CPT

## 2021-03-16 PROCEDURE — 84165 PROTEIN E-PHORESIS SERUM: CPT

## 2021-03-16 PROCEDURE — 86335 IMMUNFIX E-PHORSIS/URINE/CSF: CPT | Performed by: FAMILY MEDICINE

## 2021-03-16 PROCEDURE — 85027 COMPLETE CBC AUTOMATED: CPT

## 2021-03-17 LAB
ALBUMIN SERPL ELPH-MCNC: 4.76 G/DL (ref 3.5–5)
ALBUMIN SERPL ELPH-MCNC: 53.5 % (ref 52–65)
ALBUMIN UR ELPH-MCNC: 15.5 %
ALPHA1 GLOB MFR UR ELPH: 7 %
ALPHA1 GLOB SERPL ELPH-MCNC: 0.45 G/DL (ref 0.1–0.4)
ALPHA1 GLOB SERPL ELPH-MCNC: 5.1 % (ref 2.5–5)
ALPHA2 GLOB MFR UR ELPH: 16.2 %
ALPHA2 GLOB SERPL ELPH-MCNC: 0.75 G/DL (ref 0.4–1.2)
ALPHA2 GLOB SERPL ELPH-MCNC: 8.4 % (ref 7–13)
B-GLOBULIN MFR UR ELPH: 14.7 %
BETA GLOB ABNORMAL SERPL ELPH-MCNC: 0.45 G/DL (ref 0.4–0.8)
BETA1 GLOB SERPL ELPH-MCNC: 5 % (ref 5–13)
BETA2 GLOB SERPL ELPH-MCNC: 3 % (ref 2–8)
BETA2+GAMMA GLOB SERPL ELPH-MCNC: 0.27 G/DL (ref 0.2–0.5)
GAMMA GLOB ABNORMAL SERPL ELPH-MCNC: 2.23 G/DL (ref 0.5–1.6)
GAMMA GLOB MFR UR ELPH: 46.6 %
GAMMA GLOB SERPL ELPH-MCNC: 25 % (ref 12–22)
IGG/ALB SER: 1.15 {RATIO} (ref 1.1–1.8)
INTERPRETATION UR IFE-IMP: NORMAL
INTERPRETATION UR IFE-IMP: NORMAL
M PROTEIN 1 MFR SERPL ELPH: 21.5 %
M PROTEIN 1 SERPL ELPH-MCNC: 1.91 G/DL
M PROTEIN UR-MCNC: 13.5 %
PROT PATTERN SERPL ELPH-IMP: ABNORMAL
PROT PATTERN UR ELPH-IMP: ABNORMAL
PROT SERPL-MCNC: 8.9 G/DL (ref 6.4–8.2)
PROT UR-MCNC: 21 MG/DL

## 2021-03-17 NOTE — PROGRESS NOTES
Richard Muse is here for follow-up of his blood work  Seen   By Itz for routine health maintenance in December   Blood work was ordered  He has no complaints  He is now living in assisted living at [facility]  This is going well  He was seen for a meningioma by Neurology in 2019  He was advised six-month follow-up MRI but he declines any follow-up  he saw dermatology Dr Angela camarena or his  Rash on his arm  Told he has 's nodules  Nodules  Steroid cream did not help  He would like to get another opinion  Energy level is good  He has no urinary complaints  O: /88 (BP Location: Left arm, Patient Position: Sitting, Cuff Size: Adult)   Pulse 90   Temp (!) 97 4 °F (36 3 °C)   Resp 16   Ht 5' 8" (1 727 m)   Wt 71 2 kg (157 lb)   BMI 23 87 kg/m²      Neck no adenopathy thyromegaly bruits   chest is clear with good breath sounds   Cardiac regular rate without murmur  Skin- numerous scattered large nodules and scab nodules as well as scars  On his right arm    BW   03/05/2021   CBC CMP lipid profile TSH PSA vitamin-D 3   vitamin D3 16 5   CBC shows  Atypical lymphocytes   thrombocytopenia   urinalysis normal   protein 8 6    Assessment   1  Abnormal CBC- abnormal differential along with atypical lymphocytes and elevated protein- need to consider a myeloproliferative disorder etc   Will repeat the blood work  2  Hyperproteinemia -will repeat  Needs serum and urine electrophoresis   3  Vitamin-D deficiency-will need replacement   4  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

## 2021-03-18 ENCOUNTER — TELEPHONE (OUTPATIENT)
Dept: FAMILY MEDICINE CLINIC | Facility: MEDICAL CENTER | Age: 86
End: 2021-03-18

## 2021-03-18 DIAGNOSIS — D47.2 MONOCLONAL GAMMOPATHY: ICD-10-CM

## 2021-03-18 DIAGNOSIS — D69.6 THROMBOCYTOPENIA (HCC): Primary | ICD-10-CM

## 2021-03-18 NOTE — TELEPHONE ENCOUNTER
----- Message from Josh Paredes MD sent at 3/18/2021 11:01 AM EDT -----  SEE ME  His follow-up blood work still shows the  abnormal CBC  Elevated protein   SPEP  Has a monoclonal gammopathy  He will need to see Hematology

## 2021-03-18 NOTE — TELEPHONE ENCOUNTER
Pt aware - understands he will bet a call from scheduling   Aware to start Vitamin D OTC 2,000 units

## 2021-03-22 ENCOUNTER — TELEPHONE (OUTPATIENT)
Dept: HEMATOLOGY ONCOLOGY | Facility: CLINIC | Age: 86
End: 2021-03-22

## 2021-03-22 NOTE — TELEPHONE ENCOUNTER
New Patient Intake Heme Malignancy    Patient Details:    Mira Conroy    5/24/1927    [id number]     Background Information:    [address] starts by opening a telephone encounter and gathering the following information   Who is calling to schedule?  self   Referring Provider Jerrod   To Which Speciality? Medical Oncology   Reason for Visit? New Diagnosis   Tumor Type/Diagnosis? Monoclonal gammopathy   Is there a confirmed diagnosis from Biopsy/tissue reviewed by Pathology?  no   Date of Tissue Diagnosis  If done outside of [facility] obtain report and slides    Is the patient aware of diagnosis? yes   Has Imaging been done? no   If so, when and where? If outside of [facility], obtain records and disk    Has Bloodwork been done? yes   If so, when and where? If outside of [facility], obtain records  SL   Is there a personal history of cancer? If yes, what kind? no   Is there a family history of cancer? If so, what kind? brain   Scheduling Information:    Preferred [address]   Are there any days the patient cannot be seen? Miscellaneous:    After completing the above information, please route to finance, nurse navigation and clinical trials for review

## 2021-04-15 ENCOUNTER — CONSULT (OUTPATIENT)
Dept: HEMATOLOGY ONCOLOGY | Facility: CLINIC | Age: 86
End: 2021-04-15
Payer: COMMERCIAL

## 2021-04-15 VITALS
DIASTOLIC BLOOD PRESSURE: 60 MMHG | TEMPERATURE: 97.6 F | HEART RATE: 102 BPM | WEIGHT: 156 LBS | RESPIRATION RATE: 16 BRPM | HEIGHT: 64 IN | BODY MASS INDEX: 26.63 KG/M2 | SYSTOLIC BLOOD PRESSURE: 138 MMHG | OXYGEN SATURATION: 94 %

## 2021-04-15 DIAGNOSIS — C90.00 MULTIPLE MYELOMA, REMISSION STATUS UNSPECIFIED (HCC): Primary | ICD-10-CM

## 2021-04-15 DIAGNOSIS — D47.2 MONOCLONAL GAMMOPATHY: ICD-10-CM

## 2021-04-15 DIAGNOSIS — D69.6 THROMBOCYTOPENIA (HCC): ICD-10-CM

## 2021-04-15 PROCEDURE — 99205 OFFICE O/P NEW HI 60 MIN: CPT | Performed by: INTERNAL MEDICINE

## 2021-04-15 NOTE — PROGRESS NOTES
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  Urine immunofixation revealed  A monoclonal gammopathy identified as free kappa light chains at 2 84  The patient was referred to see us for this  The patient does have mild renal insufficiency  With a creatinine of 1 35  Hemoglobin was normal   Calcium was normal   The patient himself has no bony pain  He is 80years of age and reasonably healthy  Denies any nausea denies any vomiting denies any diarrhea  The rest of his 14 point review of systems today was negative  Test Results:    Imaging: No results found  Labs:   Lab Results   Component Value Date    WBC 7 10 03/16/2021    HGB 14 4 03/16/2021    HCT 42 5 03/16/2021     (H) 03/16/2021     (L) 03/16/2021     Lab Results   Component Value Date     09/21/2015    K 3 8 03/16/2021     03/16/2021    CO2 26 03/16/2021    ANIONGAP 4 09/21/2015    BUN 26 (H) 03/16/2021    CREATININE 1 35 (H) 03/16/2021    GLUCOSE 98 09/21/2015    GLUF 104 (H) 03/16/2021    CALCIUM 9 8 03/16/2021    AST 40 03/16/2021    ALT 21 03/16/2021    ALKPHOS 79 03/16/2021    PROT 7 2 09/21/2015    BILITOT 0 58 09/21/2015    EGFR 45 03/16/2021         Lab Results   Component Value Date    SPEP See Comment 03/16/2021    UPEP See Comment 03/16/2021       Lab Results   Component Value Date    PSA 2 4 03/05/2021    PSA 2 0 09/21/2015       Lab Results   Component Value Date    ZKAMVCPE16 312 03/16/2021       ROS: As stated in history of present illness otherwise her 14 point review of systems today was negative        Active Problems:   Patient Active Problem List   Diagnosis    Idiopathic chronic gout of multiple sites without tophus    Pure hypercholesterolemia    Essential hypertension    Acquired hypothyroidism    Spondylosis of lumbar region without myelopathy or radiculopathy    Patellofemoral disorder of left knee    Spinal stenosis of lumbosacral region    Meningioma, cerebral ([address] )    Homeless    General medical exam    Immunization due       Past Medical History:   Past Medical History:   Diagnosis Date    Arthritis     Gout     Hypertension     Hyperthyroidism     Memory loss     Sleep disturbance     Thyroid disease        Surgical History:   Past Surgical History:   Procedure Laterality Date    KIDNEY SURGERY      description: 30 yrs ago    PROSTATE SURGERY      description: stone removal 30 yrs ago    REPLACEMENT TOTAL KNEE BILATERAL      description: 25 yrs ago       Family History:    Family History   Problem Relation Age of Onset    Other Mother         Brain tumor    Cancer Sister     Diabetes Brother     Arthritis Family     Diabetes Family     Other Family         knee replacement    Thyroid disease Family        Cancer-related family history includes Cancer in his sister      Social History:   Social History     Socioeconomic History    Marital status: Single     Spouse name: Not on file    Number of children: Not on file    Years of education: Not on file    Highest education level: Not on file   Occupational History    Occupation:    Social Needs    Financial resource strain: Not on file    Food insecurity     Worry: Not on file     Inability: Not on file   [employer] needs     Medical: Not on file     Non-medical: Not on file   Tobacco Use    Smoking status: Former Smoker    Smokeless tobacco: Never Used    Tobacco comment: "I smoked years ago"   Substance and Sexual Activity    Alcohol use: Yes     Comment: social "burbon once in a while"    Drug use: No    Sexual activity: Not on file     Comment: per allscripts - sexually act and denied sexually activity    Lifestyle    Physical activity     Days per week: Not on file     Minutes per session: Not on file    Stress: Not on file   Relationships    Social connections     Talks on phone: Not on file     Gets together: Not on file     Attends Mand[employer] service: Not on file     Active member of club or organization: Not on file     Attends meetings of clubs or organizations: Not on file     Relationship status: Not on file    Intimate partner violence     Fear of current or ex partner: Not on file     Emotionally abused: Not on file     Physically abused: Not on file     Forced sexual activity: Not on file   Other Topics Concern    Not on file   Social History Narrative    Not on file       Current Medications:   Current Outpatient Medications   Medication Sig Dispense Refill    allopurinol (ZYLOPRIM) 100 mg tablet Take 2 tablets (200 mg total) by mouth daily 180 tablet 3    aspirin 325 mg tablet Take 1 tablet by mouth daily      clobetasol (TEMOVATE) 0 05 % ointment Apply topically 2 (two) times a day To itching areas of till resolved 30 g 1    levothyroxine 100 mcg tablet Take 1 tablet (100 mcg total) by mouth daily 90 tablet 1    mupirocin (BACTROBAN) 2 % ointment apply to affected area three times a day 22 g 0    triamterene-hydrochlorothiazide (DYAZIDE) 37 5-25 mg per capsule Take 1 capsule by mouth daily 90 capsule 3     Current Facility-Administered Medications   Medication Dose Route Frequency Provider Last Rate Last Admin    albuterol inhalation solution 2 5 mg  2 5 mg Nebulization Once Mary Ellen Folds, DO           Allergies: Allergies   Allergen Reactions    Horse Protein          Physical Exam:    Body surface area is 1 76 meters squared      Wt Readings from Last 3 Encounters:   04/15/21 70 8 kg (156 lb)   03/16/21 71 2 kg (157 lb)   12/11/20 73 7 kg (162 lb 6 4 oz)        Temp Readings from Last 3 Encounters:   04/15/21 97 6 °F (36 4 °C) (Temporal)   03/16/21 (!) 97 4 °F (36 3 °C)   12/11/20 (!) 97 4 °F (36 3 °C)        BP Readings from Last 3 Encounters:   04/15/21 138/60   03/16/21 146/88   12/11/20 124/68         Pulse Readings from Last 3 Encounters:   04/15/21 102   03/16/21 90   12/11/20 101         Physical Exam     Constitutional   General appearance: No acute distress, well appearing and well nourished  Eyes   Conjunctiva and lids: No swelling, erythema or discharge  Pupils and irises: Equal, round and reactive to light  Ears, Nose, Mouth, and Throat   External inspection of ears and nose: Normal     Nasal mucosa, septum, and turbinates: Normal without edema or erythema  Oropharynx: Normal with no erythema, edema, exudate or lesions  Pulmonary   Respiratory effort: No increased work of breathing or signs of respiratory distress  Auscultation of lungs: Clear to auscultation  Cardiovascular   Palpation of heart: Normal PMI, no thrills  Auscultation of heart: Normal rate and rhythm, normal S1 and S2, without murmurs  Examination of extremities for edema and/or varicosities: Normal     Carotid pulses: Normal     Abdomen   Abdomen: Non-tender, no masses  Liver and spleen: No hepatomegaly or splenomegaly  Lymphatic   Palpation of lymph nodes in neck: No lymphadenopathy  Musculoskeletal   Gait and station: Normal     Digits and nails: Normal without clubbing or cyanosis  Inspection/palpation of joints, bones, and muscles: Normal     Skin   Skin and subcutaneous tissue: Normal without rashes or lesions  Neurologic   Cranial nerves: Cranial nerves 2-12 intact  Sensation: No sensory loss  Psychiatric   Orientation to person, place, and time: Normal     Mood and affect: Normal       Assessment/ Plan:        The patient is a pleasant 51-year-old male who was referred to see us for an M spike of 1 91 and an M spike on his urine also    My recommendation at this point since he does have some renal insufficiency is to do a bone marrow biopsy to evaluate for multiple myeloma  I explained this to the patient  He is agreeable to having a bone marrow biopsy done  Again differential includes MGUS versus myeloma  I will arrange for an IR guided bone marrow biopsy and see the patient back with results within 3-4 weeks  Until then if he has any questions he will call our office  I explained multiple myeloma versus MGUS to the patient  I explained what a bone marrow biopsy entails  He is agreeable to proceed  I will see him back with results in 3-4 weeks  Until then if he has any questions he will call our office  Goals and Barriers:  Current Goal:   Prolong Survival from myeloma Cancer  Barriers: None  Patient's Capacity to Self Care:  Patient  able to self care  Portions of the record may have been created with voice recognition software   Occasional wrong word or "sound a like" substitutions may have occurred due to the inherent limitations of voice recognition software   Read the chart carefully and recognize, using context, where substitutions have occurred

## 2021-04-15 NOTE — H&P (VIEW-ONLY)
Oncology Outpatient Consult Note  Martha Osman 80 y o  male MRN: @ Encounter: [id number]        Date:  4/15/2021        CC:    MGUS versus myeloma  Patient has an M spike of 1 91      HPI:      Martha Osman is seen for initial consultation 4/15/2021 regarding  A newly diagnosed M spike of 1 91  Looking at the SPEP with immunofixation it appears  That the immunofixation showed a monoclonal gammopathy identified as IgG kappa at 1 91  Urine immunofixation revealed  A monoclonal gammopathy identified as free kappa light chains at 2 84  The patient was referred to see us for this  The patient does have mild renal insufficiency  With a creatinine of 1 35  Hemoglobin was normal   Calcium was normal   The patient himself has no bony pain  He is 80years of age and reasonably healthy  Denies any nausea denies any vomiting denies any diarrhea  The rest of his 14 point review of systems today was negative  Test Results:    Imaging: No results found  Labs:   Lab Results   Component Value Date    WBC 7 10 03/16/2021    HGB 14 4 03/16/2021    HCT 42 5 03/16/2021     (H) 03/16/2021     (L) 03/16/2021     Lab Results   Component Value Date     09/21/2015    K 3 8 03/16/2021     03/16/2021    CO2 26 03/16/2021    ANIONGAP 4 09/21/2015    BUN 26 (H) 03/16/2021    CREATININE 1 35 (H) 03/16/2021    GLUCOSE 98 09/21/2015    GLUF 104 (H) 03/16/2021    CALCIUM 9 8 03/16/2021    AST 40 03/16/2021    ALT 21 03/16/2021    ALKPHOS 79 03/16/2021    PROT 7 2 09/21/2015    BILITOT 0 58 09/21/2015    EGFR 45 03/16/2021         Lab Results   Component Value Date    SPEP See Comment 03/16/2021    UPEP See Comment 03/16/2021       Lab Results   Component Value Date    PSA 2 4 03/05/2021    PSA 2 0 09/21/2015       Lab Results   Component Value Date    [id number] 312 03/16/2021       ROS: As stated in history of present illness otherwise her 14 point review of systems today was negative        Active Problems:   Patient Active Problem List   Diagnosis    Idiopathic chronic gout of multiple sites without tophus    Pure hypercholesterolemia    Essential hypertension    Acquired hypothyroidism    Spondylosis of lumbar region without myelopathy or radiculopathy    Patellofemoral disorder of left knee    Spinal stenosis of lumbosacral region    Meningioma, cerebral ([address] )    Homeless    General medical exam    Immunization due       Past Medical History:   Past Medical History:   Diagnosis Date    Arthritis     Gout     Hypertension     Hyperthyroidism     Memory loss     Sleep disturbance     Thyroid disease        Surgical History:   Past Surgical History:   Procedure Laterality Date    KIDNEY SURGERY      description: 30 yrs ago    PROSTATE SURGERY      description: stone removal 30 yrs ago    REPLACEMENT TOTAL KNEE BILATERAL      description: 25 yrs ago       Family History:    Family History   Problem Relation Age of Onset    Other Mother         Brain tumor    Cancer Sister     Diabetes Brother     Arthritis Family     Diabetes Family     Other Family         knee replacement    Thyroid disease Family        Cancer-related family history includes Cancer in his sister      Social History:   Social History     Socioeconomic History    Marital status: Single     Spouse name: Not on file    Number of children: Not on file    Years of education: Not on file    Highest education level: Not on file   Occupational History    Occupation:    Social Needs    Financial resource strain: Not on file    Food insecurity     Worry: Not on file     Inability: Not on file   [employer] needs     Medical: Not on file     Non-medical: Not on file   Tobacco Use    Smoking status: Former Smoker    Smokeless tobacco: Never Used    Tobacco comment: "I smoked years ago"   Substance and Sexual Activity    Alcohol use: Yes     Comment: social "burbon once in a while"    Drug use: No    Sexual activity: Not on file     Comment: per allscripts - sexually act and denied sexually activity    Lifestyle    Physical activity     Days per week: Not on file     Minutes per session: Not on file    Stress: Not on file   Relationships    Social connections     Talks on phone: Not on file     Gets together: Not on file     Attends [employer] service: Not on file     Active member of club or organization: Not on file     Attends meetings of clubs or organizations: Not on file     Relationship status: Not on file    Intimate partner violence     Fear of current or ex partner: Not on file     Emotionally abused: Not on file     Physically abused: Not on file     Forced sexual activity: Not on file   Other Topics Concern    Not on file   Social History Narrative    Not on file       Current Medications:   Current Outpatient Medications   Medication Sig Dispense Refill    allopurinol (ZYLOPRIM) 100 mg tablet Take 2 tablets (200 mg total) by mouth daily 180 tablet 3    aspirin 325 mg tablet Take 1 tablet by mouth daily      clobetasol (TEMOVATE) 0 05 % ointment Apply topically 2 (two) times a day To itching areas of till resolved 30 g 1    levothyroxine 100 mcg tablet Take 1 tablet (100 mcg total) by mouth daily 90 tablet 1    mupirocin (BACTROBAN) 2 % ointment apply to affected area three times a day 22 g 0    triamterene-hydrochlorothiazide (DYAZIDE) 37 5-25 mg per capsule Take 1 capsule by mouth daily 90 capsule 3     Current Facility-Administered Medications   Medication Dose Route Frequency Provider Last Rate Last Admin    albuterol inhalation solution 2 5 mg  2 5 mg Nebulization Once Zadie Rickei, DO           Allergies: Allergies   Allergen Reactions    Horse Protein          Physical Exam:    Body surface area is 1 76 meters squared      Wt Readings from Last 3 Encounters:   04/15/21 70 8 kg (156 lb)   03/16/21 71 2 kg (157 lb)   12/11/20 73 7 kg (162 lb 6 4 oz)        Temp Readings from Last 3 Encounters:   04/15/21 97 6 °F (36 4 °C) (Temporal)   03/16/21 (!) 97 4 °F (36 3 °C)   12/11/20 (!) 97 4 °F (36 3 °C)        BP Readings from Last 3 Encounters:   04/15/21 138/60   03/16/21 146/88   12/11/20 124/68         Pulse Readings from Last 3 Encounters:   04/15/21 102   03/16/21 90   12/11/20 101         Physical Exam     Constitutional   General appearance: No acute distress, well appearing and well nourished  Eyes   Conjunctiva and lids: No swelling, erythema or discharge  Pupils and irises: Equal, round and reactive to light  Ears, Nose, Mouth, and Throat   External inspection of ears and nose: Normal     Nasal mucosa, septum, and turbinates: Normal without edema or erythema  Oropharynx: Normal with no erythema, edema, exudate or lesions  Pulmonary   Respiratory effort: No increased work of breathing or signs of respiratory distress  Auscultation of lungs: Clear to auscultation  Cardiovascular   Palpation of heart: Normal PMI, no thrills  Auscultation of heart: Normal rate and rhythm, normal S1 and S2, without murmurs  Examination of extremities for edema and/or varicosities: Normal     Carotid pulses: Normal     Abdomen   Abdomen: Non-tender, no masses  Liver and spleen: No hepatomegaly or splenomegaly  Lymphatic   Palpation of lymph nodes in neck: No lymphadenopathy  Musculoskeletal   Gait and station: Normal     Digits and nails: Normal without clubbing or cyanosis  Inspection/palpation of joints, bones, and muscles: Normal     Skin   Skin and subcutaneous tissue: Normal without rashes or lesions  Neurologic   Cranial nerves: Cranial nerves 2-12 intact  Sensation: No sensory loss  Psychiatric   Orientation to person, place, and time: Normal     Mood and affect: Normal       Assessment/ Plan:        The patient is a pleasant 72-year-old male who was referred to see us for an M spike of 1 91 and an M spike on his urine also    My recommendation at this point since he does have some renal insufficiency is to do a bone marrow biopsy to evaluate for multiple myeloma  I explained this to the patient  He is agreeable to having a bone marrow biopsy done  Again differential includes MGUS versus myeloma  I will arrange for an IR guided bone marrow biopsy and see the patient back with results within 3-4 weeks  Until then if he has any questions he will call our office  I explained multiple myeloma versus MGUS to the patient  I explained what a bone marrow biopsy entails  He is agreeable to proceed  I will see him back with results in 3-4 weeks  Until then if he has any questions he will call our office  Goals and Barriers:  Current Goal:   Prolong Survival from myeloma Cancer  Barriers: None  Patient's Capacity to Self Care:  Patient  able to self care  Portions of the record may have been created with voice recognition software   Occasional wrong word or "sound a like" substitutions may have occurred due to the inherent limitations of voice recognition software   Read the chart carefully and recognize, using context, where substitutions have occurred

## 2021-05-11 ENCOUNTER — HOSPITAL ENCOUNTER (OUTPATIENT)
Dept: CT IMAGING | Facility: HOSPITAL | Age: 86
Discharge: HOME | End: 2021-05-11
Attending: INTERNAL MEDICINE | Admitting: RADIOLOGY
Payer: COMMERCIAL

## 2021-05-11 ENCOUNTER — TELEPHONE (OUTPATIENT)
Dept: HEMATOLOGY ONCOLOGY | Facility: CLINIC | Age: 86
End: 2021-05-11

## 2021-05-11 VITALS
DIASTOLIC BLOOD PRESSURE: 58 MMHG | RESPIRATION RATE: 20 BRPM | OXYGEN SATURATION: 98 % | TEMPERATURE: 97.2 F | SYSTOLIC BLOOD PRESSURE: 112 MMHG | HEART RATE: 74 BPM

## 2021-05-11 DIAGNOSIS — C90.00 MULTIPLE MYELOMA, REMISSION STATUS UNSPECIFIED (HCC): ICD-10-CM

## 2021-05-11 DIAGNOSIS — D47.2 MONOCLONAL GAMMOPATHY: ICD-10-CM

## 2021-05-11 DIAGNOSIS — D69.6 THROMBOCYTOPENIA (HCC): ICD-10-CM

## 2021-05-11 PROCEDURE — 88341 IMHCHEM/IMCYTCHM EA ADD ANTB: CPT | Performed by: PATHOLOGY

## 2021-05-11 PROCEDURE — 88364 INSITU HYBRIDIZATION (FISH): CPT | Performed by: PATHOLOGY

## 2021-05-11 PROCEDURE — 88365 INSITU HYBRIDIZATION (FISH): CPT | Performed by: PATHOLOGY

## 2021-05-11 PROCEDURE — 88342 IMHCHEM/IMCYTCHM 1ST ANTB: CPT | Performed by: PATHOLOGY

## 2021-05-11 PROCEDURE — 77012 CT SCAN FOR NEEDLE BIOPSY: CPT | Performed by: RADIOLOGY

## 2021-05-11 PROCEDURE — 88313 SPECIAL STAINS GROUP 2: CPT | Performed by: PATHOLOGY

## 2021-05-11 PROCEDURE — 88360 TUMOR IMMUNOHISTOCHEM/MANUAL: CPT | Performed by: PATHOLOGY

## 2021-05-11 PROCEDURE — 88262 CHROMOSOME ANALYSIS 15-20: CPT | Performed by: INTERNAL MEDICINE

## 2021-05-11 PROCEDURE — 38222 DX BONE MARROW BX & ASPIR: CPT

## 2021-05-11 PROCEDURE — 88311 DECALCIFY TISSUE: CPT | Performed by: PATHOLOGY

## 2021-05-11 PROCEDURE — 85097 BONE MARROW INTERPRETATION: CPT | Performed by: PATHOLOGY

## 2021-05-11 PROCEDURE — 88184 FLOWCYTOMETRY/ TC 1 MARKER: CPT | Performed by: INTERNAL MEDICINE

## 2021-05-11 PROCEDURE — 88305 TISSUE EXAM BY PATHOLOGIST: CPT | Performed by: PATHOLOGY

## 2021-05-11 PROCEDURE — 88237 TISSUE CULTURE BONE MARROW: CPT | Performed by: INTERNAL MEDICINE

## 2021-05-11 PROCEDURE — 99152 MOD SED SAME PHYS/QHP 5/>YRS: CPT

## 2021-05-11 PROCEDURE — 77012 CT SCAN FOR NEEDLE BIOPSY: CPT

## 2021-05-11 PROCEDURE — 88185 FLOWCYTOMETRY/TC ADD-ON: CPT

## 2021-05-11 PROCEDURE — 38222 DX BONE MARROW BX & ASPIR: CPT | Performed by: RADIOLOGY

## 2021-05-11 RX ORDER — LIDOCAINE WITH 8.4% SOD BICARB 0.9%(10ML)
SYRINGE (ML) INJECTION CODE/TRAUMA/SEDATION MEDICATION
Status: COMPLETED | OUTPATIENT
Start: 2021-05-11 | End: 2021-05-11

## 2021-05-11 RX ORDER — MIDAZOLAM HYDROCHLORIDE 2 MG/2ML
INJECTION, SOLUTION INTRAMUSCULAR; INTRAVENOUS CODE/TRAUMA/SEDATION MEDICATION
Status: COMPLETED | OUTPATIENT
Start: 2021-05-11 | End: 2021-05-11

## 2021-05-11 RX ORDER — FENTANYL CITRATE 50 UG/ML
INJECTION, SOLUTION INTRAMUSCULAR; INTRAVENOUS CODE/TRAUMA/SEDATION MEDICATION
Status: COMPLETED | OUTPATIENT
Start: 2021-05-11 | End: 2021-05-11

## 2021-05-11 RX ORDER — SODIUM CHLORIDE 9 MG/ML
75 INJECTION, SOLUTION INTRAVENOUS CONTINUOUS
Status: DISCONTINUED | OUTPATIENT
Start: 2021-05-11 | End: 2021-05-12 | Stop reason: HOSPADM

## 2021-05-11 RX ADMIN — Medication 5 ML: at 10:36

## 2021-05-11 RX ADMIN — MIDAZOLAM HYDROCHLORIDE 0.5 MG: 1 INJECTION, SOLUTION INTRAMUSCULAR; INTRAVENOUS at 10:35

## 2021-05-11 RX ADMIN — FENTANYL CITRATE 25 MCG: 50 INJECTION, SOLUTION INTRAMUSCULAR; INTRAVENOUS at 10:35

## 2021-05-11 NOTE — DISCHARGE INSTRUCTIONS
Bone Marrow Biopsy     WHAT YOU NEED TO KNOW:   A bone marrow biopsy is a procedure to remove a small amount of bone marrow from your bone  Bone marrow is the soft tissue inside your bone that helps to make blood cells  The sample is tested for disease or infection  DISCHARGE INSTRUCTIONS:     1  Limit your activities day of biopsy as directed by your doctor  2  Use medication as ordered  3  Return to your normal diet  Small sips of flat soda will help with nausea  4  Remove band-aid or dressing 24 hours after procedure  Contact Interventional Radiology at [phone] [place] PATIENTS: Contact Interventional Radiology at [phone]) Orie Dancer PATIENTS: Contact Interventional Radiology at [phone]) if:    1  Difficulty breathing, nausea or vomiting  2  Chills or fever above 101 F     3  Pain at biopsy site not relieved by medication  4  Develop any redness, swelling, heat, unusual drainage, heavy bruising or bleeding from biopsy site

## 2021-05-11 NOTE — SEDATION DOCUMENTATION
Procedure ended  Bone marrow biopsy completed without incident  Bandaid to site   Patient tolerated well

## 2021-05-11 NOTE — TELEPHONE ENCOUNTER
Magan Cobb from [facility] is calling to have Jose Florence form faxed ASAP to [phone]  Patient has a 9:30 AM IR bone marrow biopsy today

## 2021-05-11 NOTE — BRIEF OP NOTE (RAD/CATH)
INTERVENTIONAL RADIOLOGY PROCEDURE NOTE    Date: 5/11/2021    Procedure: IR BIOPSY BONE MARROW    Preoperative diagnosis:   1  Thrombocytopenia ([address] )    2  Monoclonal gammopathy    3  Multiple myeloma, remission status unspecified (HCC)         Postoperative diagnosis: Same  Surgeon: Jossue Diana MD     Assistant: None  No qualified resident was available  Blood loss: Minimal    Specimens: 6 cc bone marrow and bone core     Findings: BM biopsy    Complications: None immediate      Anesthesia: conscious sedation

## 2021-05-11 NOTE — INTERVAL H&P NOTE
Update: (This section must be completed if the H&P was completed greater than 24 hrs to procedure or admission)    H&P reviewed  After examining the patient, I find no changed to the H&P since it had been written  Patient re-evaluated   Accept as history and physical     Fauzia Cheney MD/May 11, 2021/10:42 AM

## 2021-05-11 NOTE — TELEPHONE ENCOUNTER
This form was sent on the day patient was seen 4/15    I hand delivered another form once obtaining Dr Vanessa Newton

## 2021-05-13 LAB — SCAN RESULT: NORMAL

## 2021-05-19 LAB — SCAN RESULT: NORMAL

## 2021-05-25 ENCOUNTER — OFFICE VISIT (OUTPATIENT)
Dept: HEMATOLOGY ONCOLOGY | Facility: CLINIC | Age: 86
End: 2021-05-25
Payer: COMMERCIAL

## 2021-05-25 VITALS
DIASTOLIC BLOOD PRESSURE: 74 MMHG | HEART RATE: 107 BPM | SYSTOLIC BLOOD PRESSURE: 144 MMHG | HEIGHT: 64 IN | RESPIRATION RATE: 16 BRPM | OXYGEN SATURATION: 97 % | TEMPERATURE: 97.7 F | WEIGHT: 152 LBS | BODY MASS INDEX: 25.95 KG/M2

## 2021-05-25 DIAGNOSIS — D47.2 MONOCLONAL GAMMOPATHY: ICD-10-CM

## 2021-05-25 DIAGNOSIS — C90.00 MULTIPLE MYELOMA NOT HAVING ACHIEVED REMISSION (HCC): Primary | ICD-10-CM

## 2021-05-25 DIAGNOSIS — C90.00 MULTIPLE MYELOMA, REMISSION STATUS UNSPECIFIED (HCC): ICD-10-CM

## 2021-05-25 PROCEDURE — 1160F RVW MEDS BY RX/DR IN RCRD: CPT | Performed by: INTERNAL MEDICINE

## 2021-05-25 PROCEDURE — 99214 OFFICE O/P EST MOD 30 MIN: CPT | Performed by: INTERNAL MEDICINE

## 2021-05-25 PROCEDURE — 1036F TOBACCO NON-USER: CPT | Performed by: INTERNAL MEDICINE

## 2021-05-25 RX ORDER — PANTOPRAZOLE SODIUM 20 MG/1
20 TABLET, DELAYED RELEASE ORAL DAILY
Qty: 90 TABLET | Refills: 3 | Status: SHIPPED | OUTPATIENT
Start: 2021-05-25 | End: 2021-07-09

## 2021-05-25 NOTE — PROGRESS NOTES
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  Patient has an M spike of 1 91  Biopsy proven to be myeloma  HPI:    Placido Malcolm is seen for initial consultation 4/15/2021 regarding  A newly diagnosed M spike of 1 91  Looking at the SPEP with immunofixation it appears  That the immunofixation showed a monoclonal gammopathy identified as IgG kappa at 1 91  Urine immunofixation revealed  A monoclonal gammopathy identified as free kappa light chains at 2 84  The patient was referred to see us for this  The patient does have mild renal insufficiency  With a creatinine of 1 35  Hemoglobin was normal   Calcium was normal   The patient himself has no bony pain    Previous Hematologic/ Oncologic History:      Workup    Current Hematologic/ Oncologic Treatment:     patient is here to discuss bone marrow biopsy results    Interval History:       The patient returns for follow-up visit  He states he is doing well  Denies any new complaints  His bone marrow biopsy actually revealed multiple myeloma  As such at 80years of age he is not a candidate for transplant but is willing to consider Velcade and Decadron which I think should have a nice response in terms of his disease  As far symptoms are concerned denies any nausea denies any vomiting denies any diarrhea  The rest of his 14 point review of systems today is negative  Test Results:    Imaging: Ir Biopsy Bone Marrow    Result Date: 5/11/2021  Narrative: PROCEDURE: CT-guided bone marrow biopsy and bone marrow aspiration STAFF: BK Wilson  [place]: 145 49 mGy-cm  This examination, like all CT scans performed in the [facility], was performed utilizing techniques to minimize radiation dose exposure, including the use of iterative reconstruction and automated exposure control  NUMBER OF IMAGES: Multiple  COMPLICATIONS: None  MEDICATIONS: Versed 0 5 milligrams iv  Fentanyl 25 micrograms iv  Moderate sedation was monitored by radiology nursing staff  Monitoring of conscious sedation totaled 10 minutes  INDICATION: Rule out multiple myeloma PROCEDURE: Using intermittent CT guidance, the MTPV system was used to perform bone marrow aspirate via the left posterior superior iliac spine  Approximately 6 mL of marrow was removed and sent to pathology  Next, bone marrow biopsy was performed  The biopsy specimen was placed in formalin and sent to pathology  The needle was then removed and hemostasis was achieved using manual compression  Impression: Bone marrow aspiration and biopsy  Workstation performed: [id number]       Labs:   Lab Results   Component Value Date    WBC 7 10 03/16/2021    HGB 14 4 03/16/2021    HCT 42 5 03/16/2021     (H) 03/16/2021     (L) 03/16/2021     Lab Results   Component Value Date     09/21/2015    K 3 8 03/16/2021     03/16/2021    CO2 26 03/16/2021    ANIONGAP 4 09/21/2015    BUN 26 (H) 03/16/2021    CREATININE 1 35 (H) 03/16/2021    GLUCOSE 98 09/21/2015    GLUF 104 (H) 03/16/2021    CALCIUM 9 8 03/16/2021    AST 40 03/16/2021    ALT 21 03/16/2021    ALKPHOS 79 03/16/2021    PROT 7 2 09/21/2015    BILITOT 0 58 09/21/2015    EGFR 45 03/16/2021         Lab Results   Component Value Date    SPEP See Comment 03/16/2021    UPEP See Comment 03/16/2021       Lab Results   Component Value Date    PSA 2 4 03/05/2021    PSA 2 0 09/21/2015       Lab Results   Component Value Date    BANFEJZN89 312 03/16/2021       ROS: As stated in the history of present illness otherwise his 14 point review of systems today was negative        Active Problems:   Patient Active Problem List   Diagnosis    Idiopathic chronic gout of multiple sites without tophus    Pure hypercholesterolemia    Essential hypertension    Acquired hypothyroidism    Spondylosis of lumbar region without myelopathy or radiculopathy    Patellofemoral disorder of left knee    Spinal stenosis of lumbosacral region    Meningioma, cerebral ([facility] [address] )    Homeless    General medical exam    Immunization due       Past Medical History:   Past Medical History:   Diagnosis Date    Arthritis     Gout     Hypertension     Hyperthyroidism     Memory loss     Sleep disturbance     Thyroid disease        Surgical History:   Past Surgical History:   Procedure Laterality Date    IR BIOPSY BONE MARROW  5/11/2021    KIDNEY SURGERY      description: 30 yrs ago    PROSTATE SURGERY      description: stone removal 30 yrs ago    REPLACEMENT TOTAL KNEE BILATERAL      description: 18 yrs ago       Family History:    Family History   Problem Relation Age of Onset    Other Mother         Brain tumor    Cancer Sister     Diabetes Brother     Arthritis Family     Diabetes Family     Other Family         knee replacement    Thyroid disease Family        Cancer-related family history includes Cancer in his sister      Social History:   Social History     Socioeconomic History    Marital status: Single     Spouse name: Not on file    Number of children: Not on file    Years of education: Not on file    Highest education level: Not on file   Occupational History    Occupation:    Social Needs    Financial resource strain: Not on file    Food insecurity     Worry: Not on file     Inability: Not on file   [facility] needs     Medical: Not on file     Non-medical: Not on file   Tobacco Use    Smoking status: Former Smoker    Smokeless tobacco: Never Used    Tobacco comment: "I smoked years ago"   Substance and Sexual Activity    Alcohol use: Yes     Comment: social "burbon once in a while"    Drug use: No    Sexual activity: Not on file     Comment: per allscripts - sexually act and denied sexually activity    Lifestyle    Physical activity     Days per week: Not on file     Minutes per session: Not on file    Stress: Not on file   Relationships    Social connections     Talks on phone: Not on file     Gets together: Not on file     Attends [employer] service: Not on file     Active member of club or organization: Not on file     Attends meetings of clubs or organizations: Not on file     Relationship status: Not on file    Intimate partner violence     Fear of current or ex partner: Not on file     Emotionally abused: Not on file     Physically abused: Not on file     Forced sexual activity: Not on file   Other Topics Concern    Not on file   Social History Narrative    Not on file       Current Medications:   Current Outpatient Medications   Medication Sig Dispense Refill    allopurinol (ZYLOPRIM) 100 mg tablet Take 2 tablets (200 mg total) by mouth daily 180 tablet 3    aspirin 325 mg tablet Take 1 tablet by mouth daily      clobetasol (TEMOVATE) 0 05 % ointment Apply topically 2 (two) times a day To itching areas of till resolved 30 g 1    levothyroxine 100 mcg tablet Take 1 tablet (100 mcg total) by mouth daily 90 tablet 1    mupirocin (BACTROBAN) 2 % ointment apply to affected area three times a day 22 g 0    triamterene-hydrochlorothiazide (DYAZIDE) 37 5-25 mg per capsule Take 1 capsule by mouth daily 90 capsule 3     Current Facility-Administered Medications   Medication Dose Route Frequency Provider Last Rate Last Admin    albuterol inhalation solution 2 5 mg  2 5 mg Nebulization Once Deadra DO Palmer           Allergies: Allergies   Allergen Reactions    Horse Protein        Physical Exam:    Body surface area is 1 74 meters squared      Wt Readings from Last 3 Encounters:   05/25/21 68 9 kg (152 lb)   04/15/21 70 8 kg (156 lb)   03/16/21 71 2 kg (157 lb)        Temp Readings from Last 3 Encounters:   05/25/21 97 7 °F (36 5 °C) (Temporal)   05/11/21 (!) 97 2 °F (36 2 °C) (Temporal)   04/15/21 97 6 °F (36 4 °C) (Temporal)        BP Readings from Last 3 Encounters:   05/25/21 144/74   05/11/21 112/58   04/15/21 138/60         Pulse Readings from Last 3 Encounters:   05/25/21 (!) 107   05/11/21 74   04/15/21 102     @LASTSAO2(3)@      Physical Exam     Constitutional   General appearance: No acute distress, well appearing and well nourished  Eyes   Conjunctiva and lids: No swelling, erythema or discharge  Pupils and irises: Equal, round and reactive to light  Ears, Nose, Mouth, and Throat   External inspection of ears and nose: Normal     Nasal mucosa, septum, and turbinates: Normal without edema or erythema  Oropharynx: Normal with no erythema, edema, exudate or lesions  Pulmonary   Respiratory effort: No increased work of breathing or signs of respiratory distress  Auscultation of lungs: Clear to auscultation  Cardiovascular   Palpation of heart: Normal PMI, no thrills  Auscultation of heart: Normal rate and rhythm, normal S1 and S2, without murmurs  Examination of extremities for edema and/or varicosities: Normal     Carotid pulses: Normal     Abdomen   Abdomen: Non-tender, no masses  Liver and spleen: No hepatomegaly or splenomegaly  Lymphatic   Palpation of lymph nodes in neck: No lymphadenopathy  Musculoskeletal   Gait and station: Normal     Digits and nails: Normal without clubbing or cyanosis  Inspection/palpation of joints, bones, and muscles: Normal     Skin   Skin and subcutaneous tissue: Normal without rashes or lesions  Neurologic   Cranial nerves: Cranial nerves 2-12 intact  Sensation: No sensory loss  Psychiatric   Orientation to person, place, and time: Normal     Mood and affect: Normal         Assessment / Plan: This is a pleasant 75-year-old male who recently celebrated his birthday yesterday  He had a bone marrow biopsy for what was thought to be MGUS but it turns out he has multiple myeloma    A 80years of age he is not a candidate for transplant but his ECOG performance status is 0-1 so he is definitely a candidate for treatment  I went over the risks benefits and alternatives to Velcade and Decadron  I explained the possible side effects to include but not limited to low blood counts risk of infection reactivation of shingles, neuropathy and even death  The patient is agreeable to proceeding  I will set him up to start Velcade and Decadron weekly 4 weeks on 1 week off  He will get blood work every 2 weeks  I will see him back in a month  We will repeat his myeloma blood work in 2 months  If he has any questions he will call our office  His cousin accompanied him today was in agreement with the plan as was he  I will get him started as soon as possible  He will be on Decadron 20 mg along with Velcade 1 3 milligrams/meter subcutaneously every week 4 weeks on 1 week off  We will prescribe acyclovir to decrease his risk of shingles  Goals and Barriers:  Current Goal:  Prolong Survival from myeloma  Barriers: None  Patient's Capacity to Self Care:  Patient able to self care  Portions of the record may have been created with voice recognition software   Occasional wrong word or "sound a like" substitutions may have occurred due to the inherent limitations of voice recognition software   Read the chart carefully and recognize, using context, where substitutions have occurred

## 2021-05-26 DIAGNOSIS — C90.00 MULTIPLE MYELOMA, REMISSION STATUS UNSPECIFIED (HCC): Primary | ICD-10-CM

## 2021-05-26 RX ORDER — PROCHLORPERAZINE MALEATE 10 MG
10 TABLET ORAL EVERY 6 HOURS PRN
Qty: 45 TABLET | Refills: 3 | Status: SHIPPED | OUTPATIENT
Start: 2021-05-26 | End: 2021-07-09

## 2021-05-26 RX ORDER — ACYCLOVIR 400 MG/1
400 TABLET ORAL DAILY
Qty: 30 TABLET | Refills: 6 | Status: SHIPPED | OUTPATIENT
Start: 2021-05-26 | End: 2021-11-18 | Stop reason: SDUPTHER

## 2021-05-27 DIAGNOSIS — C90.00 MULTIPLE MYELOMA NOT HAVING ACHIEVED REMISSION (HCC): Primary | ICD-10-CM

## 2021-05-27 RX ORDER — DEXAMETHASONE 4 MG/1
20 TABLET ORAL ONCE
Status: CANCELLED | OUTPATIENT
Start: 2021-06-03

## 2021-06-01 ENCOUNTER — APPOINTMENT (OUTPATIENT)
Dept: LAB | Facility: MEDICAL CENTER | Age: 86
End: 2021-06-01
Payer: COMMERCIAL

## 2021-06-01 LAB
ALBUMIN SERPL BCP-MCNC: 3.5 G/DL (ref 3.5–5)
ALP SERPL-CCNC: 130 U/L (ref 46–116)
ALT SERPL W P-5'-P-CCNC: 19 U/L (ref 12–78)
ANION GAP SERPL CALCULATED.3IONS-SCNC: 4 MMOL/L (ref 4–13)
AST SERPL W P-5'-P-CCNC: 50 U/L (ref 5–45)
BASOPHILS # BLD AUTO: 0.03 THOUSANDS/ΜL (ref 0–0.1)
BASOPHILS NFR BLD AUTO: 0 % (ref 0–1)
BILIRUB SERPL-MCNC: 0.65 MG/DL (ref 0.2–1)
BUN SERPL-MCNC: 25 MG/DL (ref 5–25)
CALCIUM SERPL-MCNC: 10.1 MG/DL (ref 8.3–10.1)
CHLORIDE SERPL-SCNC: 107 MMOL/L (ref 100–108)
CO2 SERPL-SCNC: 25 MMOL/L (ref 21–32)
CREAT SERPL-MCNC: 1.32 MG/DL (ref 0.6–1.3)
EOSINOPHIL # BLD AUTO: 0.02 THOUSAND/ΜL (ref 0–0.61)
EOSINOPHIL NFR BLD AUTO: 0 % (ref 0–6)
ERYTHROCYTE [DISTWIDTH] IN BLOOD BY AUTOMATED COUNT: 14.9 % (ref 11.6–15.1)
GFR SERPL CREATININE-BSD FRML MDRD: 46 ML/MIN/1.73SQ M
GLUCOSE SERPL-MCNC: 104 MG/DL (ref 65–140)
HCT VFR BLD AUTO: 36.8 % (ref 36.5–49.3)
HGB BLD-MCNC: 12.1 G/DL (ref 12–17)
IMM GRANULOCYTES # BLD AUTO: 0.13 THOUSAND/UL (ref 0–0.2)
IMM GRANULOCYTES NFR BLD AUTO: 1 % (ref 0–2)
LYMPHOCYTES # BLD AUTO: 8.37 THOUSANDS/ΜL (ref 0.6–4.47)
LYMPHOCYTES NFR BLD AUTO: 42 % (ref 14–44)
MCH RBC QN AUTO: 35.3 PG (ref 26.8–34.3)
MCHC RBC AUTO-ENTMCNC: 32.9 G/DL (ref 31.4–37.4)
MCV RBC AUTO: 107 FL (ref 82–98)
MONOCYTES # BLD AUTO: 9.17 THOUSAND/ΜL (ref 0.17–1.22)
MONOCYTES NFR BLD AUTO: 46 % (ref 4–12)
NEUTROPHILS # BLD AUTO: 2.24 THOUSANDS/ΜL (ref 1.85–7.62)
NEUTS SEG NFR BLD AUTO: 11 % (ref 43–75)
NRBC BLD AUTO-RTO: 0 /100 WBCS
PLATELET # BLD AUTO: 83 THOUSANDS/UL (ref 149–390)
PMV BLD AUTO: 11.5 FL (ref 8.9–12.7)
POTASSIUM SERPL-SCNC: 3.5 MMOL/L (ref 3.5–5.3)
PROT SERPL-MCNC: 9.2 G/DL (ref 6.4–8.2)
RBC # BLD AUTO: 3.43 MILLION/UL (ref 3.88–5.62)
SODIUM SERPL-SCNC: 136 MMOL/L (ref 136–145)
WBC # BLD AUTO: 19.96 THOUSAND/UL (ref 4.31–10.16)

## 2021-06-01 PROCEDURE — 85025 COMPLETE CBC W/AUTO DIFF WBC: CPT | Performed by: INTERNAL MEDICINE

## 2021-06-01 PROCEDURE — 80053 COMPREHEN METABOLIC PANEL: CPT | Performed by: INTERNAL MEDICINE

## 2021-06-01 PROCEDURE — 36415 COLL VENOUS BLD VENIPUNCTURE: CPT | Performed by: INTERNAL MEDICINE

## 2021-06-02 ENCOUNTER — TELEPHONE (OUTPATIENT)
Dept: INFUSION CENTER | Facility: CLINIC | Age: 86
End: 2021-06-02

## 2021-06-03 ENCOUNTER — HOSPITAL ENCOUNTER (OUTPATIENT)
Dept: INFUSION CENTER | Facility: CLINIC | Age: 86
Discharge: HOME | End: 2021-06-03
Payer: COMMERCIAL

## 2021-06-03 VITALS
WEIGHT: 151 LBS | DIASTOLIC BLOOD PRESSURE: 66 MMHG | HEIGHT: 65 IN | HEART RATE: 88 BPM | TEMPERATURE: 96.6 F | BODY MASS INDEX: 25.16 KG/M2 | OXYGEN SATURATION: 95 % | RESPIRATION RATE: 14 BRPM | SYSTOLIC BLOOD PRESSURE: 124 MMHG

## 2021-06-03 DIAGNOSIS — C90.00 MULTIPLE MYELOMA NOT HAVING ACHIEVED REMISSION (HCC): Primary | ICD-10-CM

## 2021-06-03 DIAGNOSIS — Z78.9 NEED FOR FOLLOW-UP BY SOCIAL WORKER: Primary | ICD-10-CM

## 2021-06-03 PROCEDURE — 96401 CHEMO ANTI-NEOPL SQ/IM: CPT

## 2021-06-03 RX ORDER — DEXAMETHASONE 4 MG/1
20 TABLET ORAL ONCE
Status: CANCELLED | OUTPATIENT
Start: 2021-06-24

## 2021-06-03 RX ORDER — DEXAMETHASONE 4 MG/1
20 TABLET ORAL ONCE
Status: COMPLETED | OUTPATIENT
Start: 2021-06-03 | End: 2021-06-03

## 2021-06-03 RX ORDER — DEXAMETHASONE 4 MG/1
20 TABLET ORAL ONCE
Status: CANCELLED | OUTPATIENT
Start: 2021-06-17

## 2021-06-03 RX ORDER — DEXAMETHASONE 4 MG/1
20 TABLET ORAL ONCE
Status: CANCELLED | OUTPATIENT
Start: 2021-06-10

## 2021-06-03 RX ADMIN — DEXAMETHASONE 20 MG: 4 TABLET ORAL at 08:34

## 2021-06-03 RX ADMIN — BORTEZOMIB 2.3 MG: 3.5 INJECTION, POWDER, LYOPHILIZED, FOR SOLUTION INTRAVENOUS; SUBCUTANEOUS at 09:11

## 2021-06-03 NOTE — PROGRESS NOTES
Pt here for First Velcade injection, offers no complaints, oriented to infusion center  All questions answered at this time  Educated about Velcade and Dexamethasone  Confirmed for patient that he can start acyclovir and protonix that were prescribed  Vitals stable upon admission  Labs reviewed-within parameters for injection  [id number] made to [id number] [address] in reach, will continue to monitor

## 2021-06-03 NOTE — PATIENT INSTRUCTIONS
Start taking Acyclovir once a day with your existing medications  Start taking Pantoprazole once a day with your existing medications  Monitor your Velcade injection site that you do not have any significant irritation  You do not need blood work for appointment on 6/10  You will need blood work for appointment on 6/17  We will work on getting [facility] set up for you  Juan Darío will be notified today regarding your concern for transportation  Please call [phone] with questions

## 2021-06-03 NOTE — PROGRESS NOTES
Velcade injection given in RLQ  Pt tolerated well  Aware of future appts   AVS printed and given to pt

## 2021-06-03 NOTE — PROGRESS NOTES
Pt discharged to Joon-emmanuel, who is helping to navigate patient through his treatments  Explained treatment process to Joon-all questions answered at this time

## 2021-06-04 ENCOUNTER — PATIENT OUTREACH (OUTPATIENT)
Dept: CASE MANAGEMENT | Facility: HOSPITAL | Age: 86
End: 2021-06-04

## 2021-06-04 NOTE — PROGRESS NOTES
LSW received DT and problem list via referral process  Pt self scored 5/10 and noted concerns with transportation, fears, worry and 3/22 physical concerns  LSW contacted pt to review DT and offer support  PT stated he is doing well and is in need of transportation  LSW advised pt that [facility] will transport if he can change his appts to prior to 2pm, pt agreed to call and change appts  LSW will schedule for [facility] when appts are changed  PT was very pleasant and stated he appreciated the call and the offer of transportation as this will be easier on his niece who transports him for his appts

## 2021-06-08 ENCOUNTER — DOCUMENTATION (OUTPATIENT)
Dept: HEMATOLOGY ONCOLOGY | Facility: HOSPITAL | Age: 86
End: 2021-06-08

## 2021-06-08 DIAGNOSIS — C90.00 MULTIPLE MYELOMA NOT HAVING ACHIEVED REMISSION (HCC): Primary | ICD-10-CM

## 2021-06-08 NOTE — PROGRESS NOTES
Sounds good  I think we have different views in Epic too  Thanks for explaining    ----- Message -----   From: Juhi Ledezma RN   Sent: 6/8/2021   1:19 PM EDT   To: Darian Fowler      It didn't appear that they were ordered that way in the plan maybe in his chart but if its not in the plan no one knows to look for them for sure  It looks like it was just on D1  I keep parameters in on all treatment days just in case someone happens to order labs other than us and they get done in between there is no way the patient could get treated if the labs fall below parameter  If there is no labs on that day or parameters that could happen  That is just the way I have always done it to be safe     ----- Message -----   From: Darian Fowler   Sent: 6/8/2021  12:54 PM EDT   To: Juhi Ledezma RN      Ok  I think they were already ordered that way  Sometimes the office nurse adds a note to the plan so theres no confusion because of the parameters listed  Thanks for your help!   ----- Message -----   From: Juhi Ledezma RN   Sent: 6/8/2021  12:17 PM EDT   To: Darian Fowler      I added labs to every 2 weeks per Dr Guanaco Resendiz note    ----- Message -----   From: Darian Fowler   Sent: 6/8/2021  11:24 AM EDT   To: Juhi Ledezma RN                  Hi! Just looking for clarification on labs for THE [facility]  There are parameters on all days in the plan but Dr Honorio Restrepo note states the patient should have labs every 2 weeks  Can you confirm this and add a note to the plan or remove the parameters for D8 and D22? Thanks!

## 2021-06-10 ENCOUNTER — PATIENT OUTREACH (OUTPATIENT)
Dept: CASE MANAGEMENT | Facility: HOSPITAL | Age: 86
End: 2021-06-10

## 2021-06-10 ENCOUNTER — HOSPITAL ENCOUNTER (OUTPATIENT)
Dept: INFUSION CENTER | Facility: CLINIC | Age: 86
Discharge: HOME | End: 2021-06-10
Payer: COMMERCIAL

## 2021-06-10 ENCOUNTER — TELEPHONE (OUTPATIENT)
Dept: INFUSION CENTER | Facility: CLINIC | Age: 86
End: 2021-06-10

## 2021-06-10 ENCOUNTER — TELEPHONE (OUTPATIENT)
Dept: HEMATOLOGY ONCOLOGY | Facility: CLINIC | Age: 86
End: 2021-06-10

## 2021-06-10 VITALS
SYSTOLIC BLOOD PRESSURE: 134 MMHG | HEIGHT: 65 IN | RESPIRATION RATE: 14 BRPM | DIASTOLIC BLOOD PRESSURE: 76 MMHG | HEART RATE: 99 BPM | BODY MASS INDEX: 24.74 KG/M2 | TEMPERATURE: 97.8 F | WEIGHT: 148.5 LBS | OXYGEN SATURATION: 95 %

## 2021-06-10 DIAGNOSIS — C90.00 MULTIPLE MYELOMA NOT HAVING ACHIEVED REMISSION (HCC): Primary | ICD-10-CM

## 2021-06-10 PROCEDURE — 96401 CHEMO ANTI-NEOPL SQ/IM: CPT

## 2021-06-10 RX ORDER — DEXAMETHASONE 4 MG/1
20 TABLET ORAL ONCE
Status: COMPLETED | OUTPATIENT
Start: 2021-06-10 | End: 2021-06-10

## 2021-06-10 RX ADMIN — BORTEZOMIB 2.3 MG: 3.5 INJECTION, POWDER, LYOPHILIZED, FOR SOLUTION INTRAVENOUS; SUBCUTANEOUS at 15:13

## 2021-06-10 RX ADMIN — DEXAMETHASONE 20 MG: 4 TABLET ORAL at 15:04

## 2021-06-10 NOTE — PROGRESS NOTES
LSW received message that pt cancelled treatment due to no transportation  LSW had advised pt to change appts to before 2pm so [facility] transportation could be utilized, this was not done  LSW attempted to contact pt, no answer and mailbox is not set up

## 2021-06-10 NOTE — PROGRESS NOTES
Pt to clinic for Velcade injection  Pt tolerated injection in left lower abdomen without issues by Celine Jeans RN  Pt aware of next appointment with infusion center  Sent note to [facility] RN about scheduling follow up with Dr Karly KING provided

## 2021-06-10 NOTE — TELEPHONE ENCOUNTER
Received a call from Darin's niece to move his appnts times earlier than 2pm so that he can utilize [employer] transport  We were able to adjust all except July 24th due to availability  I let her know that we would keep an eye on the schedule for that date and change the time when a spot opens  She requested that we notify her so that she can tell Dominique Silverman as he doesn't hear well over the phone  She was confused about his treatment schedule as his frequency is different from what Dominique Silverman told her  She states that his nephew went with him to see the doctor and they do not speak with each other  I suggested she speak with the person who attends his appointments as that is the purpose of having a visitor at the appointment  She expressed frustration at the lack of communication within the family but was agreeable that she would speak with Darin's nephew  She appreciated the assistance in getting his schedule adjusted

## 2021-06-10 NOTE — PROGRESS NOTES
LSW attempted to contact pts nijavier no answer  LSW left message with information regarding transportation and rescheduling appt  LSW received Call back from, Francine Mosley, pts ping  LSW explained the issue with transportation and requested niece change appts for infusion to before 2pm   Francine Mosley stated she will call and change the appts  When appt times are changed LSW will set up [facility]

## 2021-06-10 NOTE — TELEPHONE ENCOUNTER
Call from patient [phone]  Patient's transportation cancelled yesterday  Patient will not be able to make infusion appt for today  Patient is very anxious  Will reach out to Dr Lacey bazan RN and Arsh PITTMAN  Emotional support given  Patient aware of plan

## 2021-06-10 NOTE — TELEPHONE ENCOUNTER
Patient is returning call confirming he will be at his appt today at 3 pm  He was able to arrange  with his niece to come in for his infusion appt  Patient apologizes to everyone for all the confusion

## 2021-06-10 NOTE — PROGRESS NOTES
LSW received message from Jose F at [facility], [facility] does not transport from assisted living facilities  LSW called the facility, [place] [phone], to check if they could transport pt  Dmitry Johnson advised this LSW that the facility does not have a [place], but she will attempt to find a way to transport pt to appts and return call to LS

## 2021-06-14 ENCOUNTER — PATIENT OUTREACH (OUTPATIENT)
Dept: CASE MANAGEMENT | Facility: HOSPITAL | Age: 86
End: 2021-06-14

## 2021-06-14 NOTE — PROGRESS NOTES
DORENEW received phone message from Dennis Mayer at [facility] requesting a copy of pts schedule  LSW faxed copy of pts schedule to Dennis Mayer

## 2021-06-15 ENCOUNTER — APPOINTMENT (OUTPATIENT)
Dept: LAB | Facility: MEDICAL CENTER | Age: 86
End: 2021-06-15
Payer: COMMERCIAL

## 2021-06-15 DIAGNOSIS — C90.00 MULTIPLE MYELOMA NOT HAVING ACHIEVED REMISSION (HCC): ICD-10-CM

## 2021-06-17 ENCOUNTER — HOSPITAL ENCOUNTER (OUTPATIENT)
Dept: INFUSION CENTER | Facility: CLINIC | Age: 86
Discharge: HOME | End: 2021-06-17
Payer: COMMERCIAL

## 2021-06-17 VITALS
BODY MASS INDEX: 24.83 KG/M2 | OXYGEN SATURATION: 99 % | DIASTOLIC BLOOD PRESSURE: 62 MMHG | HEART RATE: 88 BPM | SYSTOLIC BLOOD PRESSURE: 114 MMHG | TEMPERATURE: 97.2 F | RESPIRATION RATE: 14 BRPM | HEIGHT: 65 IN | WEIGHT: 149 LBS

## 2021-06-17 DIAGNOSIS — C90.00 MULTIPLE MYELOMA NOT HAVING ACHIEVED REMISSION (HCC): Primary | ICD-10-CM

## 2021-06-17 PROCEDURE — 96401 CHEMO ANTI-NEOPL SQ/IM: CPT

## 2021-06-17 RX ORDER — DEXAMETHASONE 4 MG/1
20 TABLET ORAL ONCE
Status: COMPLETED | OUTPATIENT
Start: 2021-06-17 | End: 2021-06-17

## 2021-06-17 RX ADMIN — DEXAMETHASONE 20 MG: 4 TABLET ORAL at 11:30

## 2021-06-17 RX ADMIN — BORTEZOMIB 2.3 MG: 3.5 INJECTION, POWDER, LYOPHILIZED, FOR SOLUTION INTRAVENOUS; SUBCUTANEOUS at 11:55

## 2021-06-17 NOTE — PROGRESS NOTES
Patient to [facility] for Velcade: Offers no complaints at present time: Lab work ( 06/15/21 ) reviewed:  Within parameters to treat

## 2021-06-17 NOTE — PROGRESS NOTES
Velcade per MD order: Injection given in Abdomen ( RLQ ) without incident: No adverse reactions noted: Verified follow up appt with patient: AVS offered and declined

## 2021-06-22 ENCOUNTER — OFFICE VISIT (OUTPATIENT)
Dept: HEMATOLOGY ONCOLOGY | Facility: CLINIC | Age: 86
End: 2021-06-22
Payer: COMMERCIAL

## 2021-06-22 VITALS
HEART RATE: 106 BPM | BODY MASS INDEX: 24.66 KG/M2 | HEIGHT: 65 IN | WEIGHT: 148 LBS | RESPIRATION RATE: 20 BRPM | TEMPERATURE: 96.7 F | SYSTOLIC BLOOD PRESSURE: 130 MMHG | DIASTOLIC BLOOD PRESSURE: 80 MMHG

## 2021-06-22 DIAGNOSIS — K59.00 CONSTIPATION, UNSPECIFIED CONSTIPATION TYPE: ICD-10-CM

## 2021-06-22 DIAGNOSIS — C90.00 MULTIPLE MYELOMA NOT HAVING ACHIEVED REMISSION (HCC): Primary | ICD-10-CM

## 2021-06-22 PROCEDURE — 99214 OFFICE O/P EST MOD 30 MIN: CPT | Performed by: NURSE PRACTITIONER

## 2021-06-22 RX ORDER — DEXAMETHASONE 4 MG/1
20 TABLET ORAL ONCE
Status: CANCELLED | OUTPATIENT
Start: 2021-07-29

## 2021-06-22 RX ORDER — DEXAMETHASONE 4 MG/1
20 TABLET ORAL ONCE
Status: CANCELLED | OUTPATIENT
Start: 2021-07-22

## 2021-06-22 RX ORDER — POLYETHYLENE GLYCOL 3350 17 G/17G
17 POWDER, FOR SOLUTION ORAL DAILY
Qty: 507 G | Refills: 1 | Status: SHIPPED | OUTPATIENT
Start: 2021-06-22 | End: 2022-01-25

## 2021-06-22 RX ORDER — DEXAMETHASONE 4 MG/1
20 TABLET ORAL ONCE
Status: CANCELLED | OUTPATIENT
Start: 2021-07-15

## 2021-06-22 RX ORDER — DEXAMETHASONE 4 MG/1
20 TABLET ORAL ONCE
Status: CANCELLED | OUTPATIENT
Start: 2021-08-05

## 2021-06-24 ENCOUNTER — HOSPITAL ENCOUNTER (OUTPATIENT)
Dept: INFUSION CENTER | Facility: CLINIC | Age: 86
Discharge: HOME | End: 2021-06-24
Payer: COMMERCIAL

## 2021-06-24 VITALS
WEIGHT: 148.5 LBS | HEIGHT: 65 IN | BODY MASS INDEX: 24.74 KG/M2 | HEART RATE: 97 BPM | OXYGEN SATURATION: 99 % | TEMPERATURE: 97.8 F | DIASTOLIC BLOOD PRESSURE: 64 MMHG | RESPIRATION RATE: 18 BRPM | SYSTOLIC BLOOD PRESSURE: 122 MMHG

## 2021-06-24 DIAGNOSIS — C90.00 MULTIPLE MYELOMA NOT HAVING ACHIEVED REMISSION (HCC): Primary | ICD-10-CM

## 2021-06-24 PROCEDURE — 96401 CHEMO ANTI-NEOPL SQ/IM: CPT

## 2021-06-24 RX ORDER — DEXAMETHASONE 4 MG/1
20 TABLET ORAL ONCE
Status: COMPLETED | OUTPATIENT
Start: 2021-06-24 | End: 2021-06-24

## 2021-06-24 RX ADMIN — BORTEZOMIB 2.3 MG: 3.5 INJECTION, POWDER, LYOPHILIZED, FOR SOLUTION INTRAVENOUS; SUBCUTANEOUS at 13:18

## 2021-06-24 RX ADMIN — DEXAMETHASONE 20 MG: 4 TABLET ORAL at 12:56

## 2021-06-28 ENCOUNTER — APPOINTMENT (EMERGENCY)
Dept: CT IMAGING | Facility: HOSPITAL | Age: 86
DRG: 683 | End: 2021-06-28
Payer: COMMERCIAL

## 2021-06-28 ENCOUNTER — HOSPITAL ENCOUNTER (INPATIENT)
Facility: HOSPITAL | Age: 86
LOS: 1 days | Discharge: HOME HEALTH | DRG: 683 | End: 2021-07-01
Attending: EMERGENCY MEDICINE | Admitting: HOSPITALIST
Payer: COMMERCIAL

## 2021-06-28 ENCOUNTER — NURSE TRIAGE (OUTPATIENT)
Dept: HEMATOLOGY ONCOLOGY | Facility: CLINIC | Age: 86
End: 2021-06-28

## 2021-06-28 ENCOUNTER — APPOINTMENT (EMERGENCY)
Dept: RADIOLOGY | Facility: HOSPITAL | Age: 86
DRG: 683 | End: 2021-06-28
Payer: COMMERCIAL

## 2021-06-28 DIAGNOSIS — R79.81 LOW O2 SATURATION: ICD-10-CM

## 2021-06-28 DIAGNOSIS — I95.1 ORTHOSTATIC HYPOTENSION: ICD-10-CM
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
## 2021-06-28 DIAGNOSIS — N17.9 AKI (ACUTE KIDNEY INJURY) (HCC): Primary | ICD-10-CM
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
## 2021-06-28 DIAGNOSIS — T14.8XXA BRUISING: ICD-10-CM

## 2021-06-28 DIAGNOSIS — T14.8XXA ABRASION: ICD-10-CM

## 2021-06-28 DIAGNOSIS — R05.9 COUGH: ICD-10-CM

## 2021-06-28 DIAGNOSIS — R06.2 WHEEZING: ICD-10-CM

## 2021-06-28 DIAGNOSIS — D69.6 THROMBOCYTOPENIA (HCC): ICD-10-CM

## 2021-06-28 LAB
ALBUMIN SERPL BCP-MCNC: 2.9 G/DL (ref 3.5–5)
ALP SERPL-CCNC: 155 U/L (ref 46–116)
ALT SERPL W P-5'-P-CCNC: 30 U/L (ref 12–78)
ANION GAP SERPL CALCULATED.3IONS-SCNC: 13 MMOL/L (ref 4–13)
APTT PPP: 24 SECONDS (ref 23–37)
AST SERPL W P-5'-P-CCNC: 30 U/L (ref 5–45)
BASOPHILS # BLD AUTO: 0.01 THOUSANDS/ΜL (ref 0–0.1)
BASOPHILS NFR BLD AUTO: 0 % (ref 0–1)
BILIRUB SERPL-MCNC: 0.38 MG/DL (ref 0.2–1)
BUN SERPL-MCNC: 97 MG/DL (ref 5–25)
CALCIUM ALBUM COR SERPL-MCNC: 8.9 MG/DL (ref 8.3–10.1)
CALCIUM SERPL-MCNC: 8 MG/DL (ref 8.3–10.1)
CHLORIDE SERPL-SCNC: 101 MMOL/L (ref 100–108)
CO2 SERPL-SCNC: 19 MMOL/L (ref 21–32)
CREAT SERPL-MCNC: 2.26 MG/DL (ref 0.6–1.3)
EOSINOPHIL # BLD AUTO: 0 THOUSAND/ΜL (ref 0–0.61)
EOSINOPHIL NFR BLD AUTO: 0 % (ref 0–6)
ERYTHROCYTE [DISTWIDTH] IN BLOOD BY AUTOMATED COUNT: 15.7 % (ref 11.6–15.1)
GFR SERPL CREATININE-BSD FRML MDRD: 24 ML/MIN/1.73SQ M
GLUCOSE SERPL-MCNC: 117 MG/DL (ref 65–140)
HCT VFR BLD AUTO: 34.1 % (ref 36.5–49.3)
HGB BLD-MCNC: 11.6 G/DL (ref 12–17)
IMM GRANULOCYTES # BLD AUTO: 0.05 THOUSAND/UL (ref 0–0.2)
IMM GRANULOCYTES NFR BLD AUTO: 1 % (ref 0–2)
INR PPP: 1.21 (ref 0.84–1.19)
LYMPHOCYTES # BLD AUTO: 1.4 THOUSANDS/ΜL (ref 0.6–4.47)
LYMPHOCYTES NFR BLD AUTO: 20 % (ref 14–44)
MCH RBC QN AUTO: 36.4 PG (ref 26.8–34.3)
MCHC RBC AUTO-ENTMCNC: 34 G/DL (ref 31.4–37.4)
MCV RBC AUTO: 107 FL (ref 82–98)
MONOCYTES # BLD AUTO: 0.84 THOUSAND/ΜL (ref 0.17–1.22)
MONOCYTES NFR BLD AUTO: 12 % (ref 4–12)
NEUTROPHILS # BLD AUTO: 4.8 THOUSANDS/ΜL (ref 1.85–7.62)
NEUTS SEG NFR BLD AUTO: 67 % (ref 43–75)
NRBC BLD AUTO-RTO: 0 /100 WBCS
PLATELET # BLD AUTO: 44 THOUSANDS/UL (ref 149–390)
POTASSIUM SERPL-SCNC: 3.9 MMOL/L (ref 3.5–5.3)
PROT SERPL-MCNC: 6.3 G/DL (ref 6.4–8.2)
PROTHROMBIN TIME: 15.4 SECONDS (ref 11.6–14.5)
RBC # BLD AUTO: 3.19 MILLION/UL (ref 3.88–5.62)
SODIUM SERPL-SCNC: 133 MMOL/L (ref 136–145)
TROPONIN I SERPL-MCNC: 0.04 NG/ML
WBC # BLD AUTO: 7.1 THOUSAND/UL (ref 4.31–10.16)

## 2021-06-28 PROCEDURE — 93005 ELECTROCARDIOGRAM TRACING: CPT

## 2021-06-28 PROCEDURE — 99285 EMERGENCY DEPT VISIT HI MDM: CPT

## 2021-06-28 PROCEDURE — G1004 CDSM NDSC: HCPCS

## 2021-06-28 PROCEDURE — 71046 X-RAY EXAM CHEST 2 VIEWS: CPT

## 2021-06-28 PROCEDURE — 80053 COMPREHEN METABOLIC PANEL: CPT | Performed by: EMERGENCY MEDICINE

## 2021-06-28 PROCEDURE — 84484 ASSAY OF TROPONIN QUANT: CPT | Performed by: EMERGENCY MEDICINE

## 2021-06-28 PROCEDURE — 85730 THROMBOPLASTIN TIME PARTIAL: CPT | Performed by: EMERGENCY MEDICINE

## 2021-06-28 PROCEDURE — 99220 PR INITIAL OBSERVATION CARE/DAY 70 MINUTES: CPT | Performed by: INTERNAL MEDICINE

## 2021-06-28 PROCEDURE — 36415 COLL VENOUS BLD VENIPUNCTURE: CPT | Performed by: EMERGENCY MEDICINE

## 2021-06-28 PROCEDURE — 99285 EMERGENCY DEPT VISIT HI MDM: CPT | Performed by: EMERGENCY MEDICINE

## 2021-06-28 PROCEDURE — 99282 EMERGENCY DEPT VISIT SF MDM: CPT | Performed by: SURGERY

## 2021-06-28 PROCEDURE — 85610 PROTHROMBIN TIME: CPT | Performed by: EMERGENCY MEDICINE

## 2021-06-28 PROCEDURE — 85025 COMPLETE CBC W/AUTO DIFF WBC: CPT | Performed by: EMERGENCY MEDICINE

## 2021-06-28 PROCEDURE — 70450 CT HEAD/BRAIN W/O DYE: CPT

## 2021-06-28 RX ORDER — GINSENG 100 MG
1 CAPSULE ORAL 2 TIMES DAILY
Status: DISCONTINUED | OUTPATIENT
Start: 2021-06-28 | End: 2021-07-01 | Stop reason: HOSPADM

## 2021-06-28 RX ORDER — ALBUTEROL SULFATE 2.5 MG/3ML
2.5 SOLUTION RESPIRATORY (INHALATION) ONCE
Status: COMPLETED | OUTPATIENT
Start: 2021-06-28 | End: 2021-06-28

## 2021-06-28 RX ORDER — GINSENG 100 MG
2 CAPSULE ORAL ONCE
Status: COMPLETED | OUTPATIENT
Start: 2021-06-28 | End: 2021-06-28

## 2021-06-28 RX ORDER — SENNOSIDES 8.6 MG
1 TABLET ORAL
Status: DISCONTINUED | OUTPATIENT
Start: 2021-06-28 | End: 2021-07-01 | Stop reason: HOSPADM

## 2021-06-28 RX ORDER — SODIUM CHLORIDE 9 MG/ML
100 INJECTION, SOLUTION INTRAVENOUS CONTINUOUS
Status: DISCONTINUED | OUTPATIENT
Start: 2021-06-28 | End: 2021-07-01

## 2021-06-28 RX ORDER — ACETAMINOPHEN 325 MG/1
650 TABLET ORAL EVERY 6 HOURS PRN
Status: DISCONTINUED | OUTPATIENT
Start: 2021-06-28 | End: 2021-07-01 | Stop reason: HOSPADM

## 2021-06-28 RX ORDER — LEVOTHYROXINE SODIUM 0.1 MG/1
100 TABLET ORAL
Status: DISCONTINUED | OUTPATIENT
Start: 2021-06-29 | End: 2021-07-01 | Stop reason: HOSPADM

## 2021-06-28 RX ORDER — ALLOPURINOL 100 MG/1
200 TABLET ORAL DAILY
Status: DISCONTINUED | OUTPATIENT
Start: 2021-06-29 | End: 2021-07-01 | Stop reason: HOSPADM

## 2021-06-28 RX ORDER — PANTOPRAZOLE SODIUM 20 MG/1
20 TABLET, DELAYED RELEASE ORAL DAILY
Status: DISCONTINUED | OUTPATIENT
Start: 2021-06-29 | End: 2021-07-01 | Stop reason: HOSPADM

## 2021-06-28 RX ORDER — ONDANSETRON 4 MG/1
4 TABLET, ORALLY DISINTEGRATING ORAL EVERY 6 HOURS PRN
Status: DISCONTINUED | OUTPATIENT
Start: 2021-06-28 | End: 2021-07-01 | Stop reason: HOSPADM

## 2021-06-28 RX ORDER — POLYETHYLENE GLYCOL 3350 17 G/17G
17 POWDER, FOR SOLUTION ORAL DAILY PRN
Status: DISCONTINUED | OUTPATIENT
Start: 2021-06-28 | End: 2021-07-01 | Stop reason: HOSPADM

## 2021-06-28 RX ORDER — DOCUSATE SODIUM 100 MG/1
100 CAPSULE, LIQUID FILLED ORAL 2 TIMES DAILY
Status: DISCONTINUED | OUTPATIENT
Start: 2021-06-28 | End: 2021-07-01 | Stop reason: HOSPADM

## 2021-06-28 RX ADMIN — DOCUSATE SODIUM 100 MG: 100 CAPSULE, LIQUID FILLED ORAL at 20:14

## 2021-06-28 RX ADMIN — SODIUM CHLORIDE 100 ML/HR: 0.9 INJECTION, SOLUTION INTRAVENOUS at 20:12

## 2021-06-28 RX ADMIN — BACITRACIN ZINC 1 SMALL APPLICATION: 500 OINTMENT TOPICAL at 18:40

## 2021-06-28 RX ADMIN — ALBUTEROL SULFATE 2.5 MG: 2.5 SOLUTION RESPIRATORY (INHALATION) at 20:15

## 2021-06-28 RX ADMIN — SODIUM CHLORIDE 1000 ML: 0.9 INJECTION, SOLUTION INTRAVENOUS at 13:53

## 2021-06-28 RX ADMIN — BACITRACIN 2 SMALL APPLICATION: 500 OINTMENT TOPICAL at 13:08

## 2021-06-28 NOTE — ASSESSMENT & PLAN NOTE
· Platelets of 44 POA, previous to that was 70  · Likely secondary to malignancy/bone marrow infiltration vs side effect of chemotherapy  · Monitor with CBC  · No active bleeding

## 2021-06-28 NOTE — ASSESSMENT & PLAN NOTE
· Creatinine of 2 26 POA  · Baseline creatinine 1-1 3, most recently 1 5  · Likely secondary to dehydration/volume depletion from poor p o   Intake  · IVF  · Hold triamterene-hydrochlorothiazide  · Monitor with daily BMP  · Hold nephrotoxins  · Avoid hypotension

## 2021-06-28 NOTE — CONSULTS
Evaluation - Trauma   Laurie Vásquez 80 y o  male MRN: [id number]  Unit/Bed#: ED 28 Encounter: [id number]    Assessment/Plan   Trauma Alert: Evaluation  Model of Arrival: Self  Trauma Team: Attending Desmond Oneill  and JAKI Steele [facility]  Consultants: None    Trauma Active Problems:   Multiple abrasions -BUE  R cheek contusion x2   VINICIUS- Cr 2 26  H/O  Multiple myeloma    Trauma Plan:   Admit to SLIM service   Tylenol PRN  Tertiary exam in [facility]    Chief Complaint:   "I fell"      History of Present Illness   Physician Requesting Evaluation: Juana Garner MD  Reason for Evaluation / Principal Problem: fall   HPI:  Laurie Vásquez is a 80 y o  male from assisted living on [facility] with history of multiple myeloma who presents to ED after falling secondary to dizziness after going to the bathroom hitting his head on a chair on the way down  Complains of SOB only after getting out of bed which improves with activity  Denies SOB currently, LOC, N/V  Was trauma alert C in ED and we are asked to evaluate    Mechanism:Fall    Consults    Review of Systems   Constitutional: Positive for activity change  Negative for appetite change, chills, fatigue and fever  HENT: Negative for facial swelling and trouble swallowing  Eyes: Negative for visual disturbance  Respiratory: Positive for shortness of breath  Negative for cough, choking and chest tightness  Cardiovascular: Negative for chest pain, palpitations and leg swelling  Gastrointestinal: Negative for abdominal distention, abdominal pain, diarrhea, nausea and vomiting  Genitourinary: Negative for difficulty urinating  Musculoskeletal: Negative for arthralgias, back pain, gait problem, joint swelling, myalgias, neck pain and neck stiffness  Neurological: Positive for dizziness, weakness and light-headedness  Negative for tremors, seizures, syncope, facial asymmetry, speech difficulty, numbness and headaches  Psychiatric/Behavioral: Negative for agitation  Historical Information        Past Medical History:   Diagnosis Date    Arthritis     Gout     Hypertension     Hyperthyroidism     Memory loss     [facility]     Myeloma [facility]     Sleep disturbance     Thyroid disease      Past Surgical History:   Procedure Laterality Date    IR BIOPSY BONE MARROW  2021    KIDNEY SURGERY      description: 30 yrs ago    PROSTATE SURGERY      description: stone removal 30 yrs ago    REPLACEMENT TOTAL KNEE BILATERAL      description: 18 yrs ago     Social History   Social History     Substance and Sexual Activity   Alcohol Use Yes    Comment: social "burbon once in a while"     Social History     Substance and Sexual Activity   Drug Use No     E-Cigarette/Vaping    E-Cigarette Use Never User      E-Cigarette/Vaping Substances     Social History     Tobacco Use   Smoking Status Former Smoker    Packs/day: 0 50    Years: 25 00    Pack years: 12 50    Quit date: 36    Years since quittin 5   Smokeless Tobacco Never Used   Tobacco Comment    "I smoked years ago"     Immunization History   Administered Date(s) Administered    INFLUENZA 10/05/2016    Influenza Split High Dose Preservative Free IM 10/07/2013, 10/14/2014, 10/14/2015, 10/05/2016, 2017, 10/24/2019    Influenza, high dose seasonal 0 7 mL 2018, 2020    Influenza, seasonal, injectable 10/22/2012    Pneumococcal Conjugate 13-Valent 10/05/2016    Pneumococcal Polysaccharide PPV23 10/22/2012    SARS-CoV-2 / COVID-19 mRNA IM (Wilhemenia Vera) 2020, 2021    Tdap 12/15/2015    Zoster 10/06/2016     Last Tetanus: 2015  Family History: Non-contributory         Meds/Allergies   all current active meds have been reviewed    Allergies   Allergen Reactions    Horse Protein          Objective   Vitals:   First set: Temperature: 97 9 °F (36 6 °C) (21 1144)  Pulse: 104 (21 1143)  Respirations: 18 (21 1143)  Blood Pressure: 118/71 (21 1143)    Invasive Devices     Peripheral Intravenous Line            Peripheral IV 06/28/21 Left Antecubital <1 day                Neurologic Exam     Mental Status   Oriented to person, place, and time  Cranial Nerves     CN III, IV, VI   Pupils are equal, round, and reactive to light  Physical Exam  Constitutional:       General: He is not in acute distress  Appearance: He is normal weight  He is not ill-appearing  HENT:      Head: Normocephalic  Right Ear: External ear normal       Left Ear: External ear normal       Nose: Nose normal       Mouth/Throat:      Mouth: Mucous membranes are moist       Pharynx: Oropharynx is clear  Eyes:      Extraocular Movements: Extraocular movements intact  Conjunctiva/sclera: Conjunctivae normal       Pupils: Pupils are equal, round, and reactive to light  Cardiovascular:      Rate and Rhythm: Normal rate and regular rhythm  Pulses: Normal pulses  Heart sounds: Normal heart sounds  No murmur heard  Pulmonary:      Effort: Pulmonary effort is normal  No respiratory distress  Breath sounds: Normal breath sounds  No stridor  No wheezing, rhonchi or rales  Comments: BACK: nontender, no stepoffs  Chest:      Chest wall: No tenderness  Abdominal:      General: Bowel sounds are normal  There is no distension  Palpations: Abdomen is soft  Tenderness: There is no abdominal tenderness  There is no guarding or rebound  Musculoskeletal:         General: No swelling, tenderness, deformity or signs of injury  Normal range of motion  Cervical back: Normal range of motion and neck supple  No rigidity or tenderness  Skin:     General: Skin is warm  Capillary Refill: Capillary refill takes less than 2 seconds  Coloration: Skin is not jaundiced or pale  Findings: Bruising (Corner of mouth on right, right temple, left shin and right forearm) present  Neurological:      General: No focal deficit present  Mental Status: He is alert and oriented to person, place, and time  Cranial Nerves: No cranial nerve deficit  Sensory: No sensory deficit  Psychiatric:         Mood and Affect: Mood normal          Behavior: Behavior normal          Thought Content: Thought content normal          Judgment: Judgment normal             Lab Results:   BMP/CMP:   Lab Results   Component Value Date    SODIUM 133 (L) 06/28/2021    K 3 9 06/28/2021     06/28/2021    CO2 19 (L) 06/28/2021    BUN 97 (H) 06/28/2021    CREATININE 2 26 (H) 06/28/2021    CALCIUM 8 0 (L) 06/28/2021    AST 30 06/28/2021    ALT 30 06/28/2021    ALKPHOS 155 (H) 06/28/2021    EGFR 24 06/28/2021    and CBC:   Lab Results   Component Value Date    WBC 7 10 06/28/2021    HGB 11 6 (L) 06/28/2021    HCT 34 1 (L) 06/28/2021     (H) 06/28/2021    PLT 44 (LL) 06/28/2021    MCH 36 4 (H) 06/28/2021    MCHC 34 0 06/28/2021    RDW 15 7 (H) 06/28/2021    NRBC 0 06/28/2021     Imaging/EKG Studies: Results: I have personally reviewed pertinent reports      Other Studies:      Code Status: Prior  Advance Directive and Living Will:      Power of :    POLST:

## 2021-06-28 NOTE — ED PROVIDER NOTES
Emergency Department Trauma Note  Jose Ceballos 80 y o  male MRN: [id number]  Unit/Bed#: ED 28/ED 28 Encounter: [id number]      Trauma Alert: Trauma Acuity: C  Model of Arrival:   via    Trauma Team: Current Providers  Attending Provider: Hui Schaffer MD  Attending Provider: Adelaide Soto MD  ED Technician: Daniel Dixon  Registered Nurse: Sadia Wu RN  Consultants: None      History of Present Illness     Chief Complaint:   Chief Complaint   Patient presents with   [employer] Fall     pt fell yesterday hitting head on floor, +headstrike, takes asa daily  denies loc     HPI:  Jose Ceballos is a 80 y o  male who presents with see below  Mechanism:Details of Incident: Fall from standing position yesterday  R sided head strike  No LOC  Pt states he became SOB and then fell  Injury Date: 06/27/21 Injury Time: 1500      80 y o  male presents with chief complaint of a fall with headstrike  Patient lives at a local assisted care facility and reports lately he has been getting short of breath  When he has these episodes he becomes weak and has fallen  He has a skin tear to his right temple, left forearm and right arm  He is on aspirin but no other blood thinners  No loc, no nausea, vomiting  No neck pain            History provided by:  Patient and relative   used: No    Fall  Mechanism of injury: fall    Injury location:  Shoulder/arm  Shoulder/arm injury location:  L forearm and R forearm  Incident location:  Nursing home  Arrived directly from scene: yes    Fall:     Fall occurred:  Standing and walking    Impact surface:  [employer] of impact:  Outstretched arms and head    Entrapped after fall: no    Suspicion of alcohol use: no    Suspicion of drug use: no    Tetanus status:  Up to date  Prior to arrival data:     Bystander interventions:  First aid    Patient ambulatory at scene: yes      Blood loss:  Minimal    Responsiveness at scene:  Alert    Orientation at scene:  Person, place, situation and time    Loss of consciousness: no      Amnesic to event: no    Associated symptoms: difficulty breathing (occasionally intermittent)    Associated symptoms: no chest pain, no nausea and no vomiting    Risk factors: no anticoagulation therapy      Review of Systems   Constitutional: Negative for chills, diaphoresis and fever  Respiratory: Positive for shortness of breath (intermittent)  Cardiovascular: Negative for chest pain and palpitations  Gastrointestinal: Negative for diarrhea, nausea and vomiting  Genitourinary: Negative for dysuria and frequency  Skin: Positive for wound (multiple small skin tears)  Negative for rash  Neurological: Positive for weakness  Hematological: Bruises/bleeds easily  All other systems reviewed and are negative        Historical Information     Immunizations:   Immunization History   Administered Date(s) Administered    INFLUENZA 10/05/2016    Influenza Split High Dose Preservative Free IM 10/07/2013, 10/14/2014, 10/14/2015, 10/05/2016, 09/21/2017, 10/24/2019    Influenza, high dose seasonal 0 7 mL 09/24/2018, 12/11/2020    Influenza, seasonal, injectable 10/22/2012    Pneumococcal Conjugate 13-Valent 10/05/2016    Pneumococcal Polysaccharide PPV23 10/22/2012    SARS-CoV-2 / COVID-19 mRNA IM (Laurel Machuca) 12/29/2020, 01/26/2021    Tdap 12/15/2015    Zoster 10/06/2016       Past Medical History:   Diagnosis Date    Arthritis     Gout     Hypertension     Hyperthyroidism     Memory loss     [facility]     Sleep disturbance     Thyroid disease        Family History   Problem Relation Age of Onset    Other Mother         Brain tumor    Cancer Sister     Diabetes Brother     Arthritis Family     Diabetes Family     Other Family         knee replacement    Thyroid disease Family      Past Surgical History:   Procedure Laterality Date    IR BIOPSY BONE MARROW  5/11/2021    KIDNEY SURGERY      description: 30 yrs ago    PROSTATE SURGERY      description: stone removal 30 yrs ago    REPLACEMENT TOTAL KNEE BILATERAL      description: 18 yrs ago     Social History     Tobacco Use    Smoking status: Former Smoker    Smokeless tobacco: Never Used    Tobacco comment: "I smoked years ago"   Vaping Use    Vaping Use: Never used   Substance Use Topics    Alcohol use: Yes     Comment: social "burbon once in a while"    Drug use: No     E-Cigarette/Vaping    E-Cigarette Use Never User      E-Cigarette/Vaping Substances       Family History: non-contributory    Meds/Allergies   Prior to Admission Medications   Prescriptions Last Dose Informant Patient Reported?  Taking?   acyclovir (ZOVIRAX) 400 MG tablet  Self No No   Sig: Take 1 tablet (400 mg total) by mouth daily While on Velcade therapy   allopurinol (ZYLOPRIM) 100 mg tablet  Self No No   Sig: Take 2 tablets (200 mg total) by mouth daily   aspirin 325 mg tablet  Self Yes No   Sig: Take 1 tablet by mouth daily   clobetasol (TEMOVATE) 0 05 % ointment  Self No No   Sig: Apply topically 2 (two) times a day To itching areas of till resolved   Patient not taking: Reported on 6/10/2021   levothyroxine 100 mcg tablet  Self No No   Sig: Take 1 tablet (100 mcg total) by mouth daily   mupirocin (BACTROBAN) 2 % ointment  Self No No   Sig: apply to affected area three times a day   Patient not taking: Reported on 6/10/2021   pantoprazole (PROTONIX) 20 mg tablet  Self No No   Sig: Take 1 tablet (20 mg total) by mouth daily   polyethylene glycol (GLYCOLAX) 17 GM/SCOOP powder   No No   Sig: Take 17 g by mouth daily   prochlorperazine (COMPAZINE) 10 mg tablet  Self No No   Sig: Take 1 tablet (10 mg total) by mouth every 6 (six) hours as needed for nausea or vomiting   triamterene-hydrochlorothiazide (DYAZIDE) 37 5-25 mg per capsule  Self No No   Sig: Take 1 capsule by mouth daily      Facility-Administered Medications Last Administration Doses Remaining   albuterol inhalation solution 2 5 mg None recorded 1 Allergies   Allergen Reactions    Horse Protein        PHYSICAL EXAM    PE limited by: nothing    Objective   Vitals:   First set: Temperature: 97 9 °F (36 6 °C) (06/28/21 1144)  Pulse: 104 (06/28/21 1143)  Respirations: 18 (06/28/21 1143)  Blood Pressure: 118/71 (06/28/21 1143)  SpO2: 98 % (06/28/21 1143)    Primary Survey:   (A) Airway: intact  (B) Breathing: ctab, non labored  (C) Circulation: Pulses:   radial  4/4  (D) Disabliity:  GCS Total:  15  (E) Expose:  Completed    Secondary Survey: (Click on Physical Exam tab above)  Physical Exam  Vitals and nursing note reviewed  Constitutional:       General: He is not in acute distress  Appearance: He is well-developed  HENT:      Head: Normocephalic and atraumatic  Eyes:      Pupils: Pupils are equal, round, and reactive to light  Neck:      Vascular: No JVD  Cardiovascular:      Rate and Rhythm: Normal rate and regular rhythm  Heart sounds: Normal heart sounds  No murmur heard  No friction rub  No gallop  Pulmonary:      Effort: Pulmonary effort is normal  No respiratory distress  Breath sounds: Normal breath sounds  No wheezing or rales  Chest:      Chest wall: No tenderness  Musculoskeletal:         General: No tenderness  Normal range of motion  Cervical back: Normal range of motion  Right lower leg: No edema  Left lower leg: No edema  Skin:     General: Skin is warm and dry  Comments: Skin tears to left and right arms, right temple   Neurological:      General: No focal deficit present  Mental Status: He is alert and oriented to person, place, and time  Psychiatric:         Behavior: Behavior normal          Thought Content: Thought content normal          Judgment: Judgment normal          Cervical spine cleared by clinical criteria?  Yes     Invasive Devices     Peripheral Intravenous Line            Peripheral IV 06/28/21 Left Antecubital <1 day                Lab Results:   Results Reviewed     Procedure Component Value Units Date/Time    CBC and differential [[id number]]  (Abnormal) Collected: 06/28/21 1157    Lab Status: Final result Specimen: Blood from Arm, Left Updated: 06/28/21 1328     WBC 7 10 Thousand/uL      RBC 3 19 Million/uL      Hemoglobin 11 6 g/dL      Hematocrit 34 1 %       fL      MCH 36 4 pg      MCHC 34 0 g/dL      RDW 15 7 %      Platelets 44 Thousands/uL      nRBC 0 /100 WBCs      Neutrophils Relative 67 %      Immat GRANS % 1 %      Lymphocytes Relative 20 %      Monocytes Relative 12 %      Eosinophils Relative 0 %      Basophils Relative 0 %      Neutrophils Absolute 4 80 Thousands/µL      Immature Grans Absolute 0 05 Thousand/uL      Lymphocytes Absolute 1 40 Thousands/µL      Monocytes Absolute 0 84 Thousand/µL      Eosinophils Absolute 0 00 Thousand/µL      Basophils Absolute 0 01 Thousands/µL     Comprehensive metabolic panel [[id number]]  (Abnormal) Collected: 06/28/21 1157    Lab Status: Final result Specimen: Blood from Arm, Left Updated: 06/28/21 1241     Sodium 133 mmol/L      Potassium 3 9 mmol/L      Chloride 101 mmol/L      CO2 19 mmol/L      ANION GAP 13 mmol/L      BUN 97 mg/dL      Creatinine 2 26 mg/dL      Glucose 117 mg/dL      Calcium 8 0 mg/dL      Corrected Calcium 8 9 mg/dL      AST 30 U/L      ALT 30 U/L      Alkaline Phosphatase 155 U/L      Total Protein 6 3 g/dL      Albumin 2 9 g/dL      Total Bilirubin 0 38 mg/dL      eGFR 24 ml/min/1 73sq m     Narrative:      [place] guidelines for Chronic Kidney Disease (CKD):     Stage 1 with normal or high GFR (GFR > 90 mL/min/1 73 square meters)    Stage 2 Mild CKD (GFR = 60-89 mL/min/1 73 square meters)    Stage 3A Moderate CKD (GFR = 45-59 mL/min/1 73 square meters)    Stage 3B Moderate CKD (GFR = 30-44 mL/min/1 73 square meters)    Stage 4 Severe CKD (GFR = 15-29 mL/min/1 73 square meters)    Stage 5 End Stage CKD (GFR <15 mL/min/1 73 square meters)  Note: GFR calculation is accurate only with a steady state creatinine    Troponin I [[id number]]  (Normal) Collected: 06/28/21 1157    Lab Status: Final result Specimen: Blood from Arm, Left Updated: 06/28/21 1238     Troponin I 0 04 ng/mL     Protime-INR [[id number]]  (Abnormal) Collected: 06/28/21 1157    Lab Status: Final result Specimen: Blood from Arm, Left Updated: 06/28/21 1236     Protime 15 4 seconds      INR 1 21    APTT [[id number]]  (Normal) Collected: 06/28/21 1157    Lab Status: Final result Specimen: Blood from Arm, Left Updated: 06/28/21 1236     PTT 24 seconds                  Imaging Studies:   Direct to CT: No  XR chest 2 views   ED Interpretation by Seda Flores MD (06/28 5801)   This film was interpreted independently by me  No infiltrate, cardiac silhouette normal, no pleural effusions or pulmonary edema  Final Result by Holly Villa MD (06/28 4957)   No acute cardiopulmonary disease  Findings are stable            Workstation performed: [id number]         CT head without contrast   Final Result by Cynthia Alcantara MD (06/28 1315)      No acute intracranial abnormality        Workstation performed: [id number]               Procedures  ECG 12 Lead Documentation Only    Date/Time: 6/28/2021 1:07 PM  Performed by: Seda Flores MD  Authorized by: Seda Flores MD     Indications / Diagnosis:  Intermittent dyspnea  ECG reviewed by me, the ED Provider: yes    Patient location:  ED  Previous ECG:     Previous ECG:  Compared to current    Comparison ECG info:  02/03/2020    Similarity:  Changes noted  Interpretation:     Interpretation: non-specific    Rate:     ECG rate:  98    ECG rate assessment: normal    Rhythm:     Rhythm: sinus rhythm    Ectopy:     Ectopy: none    QRS:     QRS axis:  Normal    QRS intervals:  Normal  Conduction:     Conduction: normal    ST segments:     ST segments:  Non-specific  T waves:     T waves: flattening      Flattening:  V4 and V5 (present previously)             ED Course           MDM  Number of Diagnoses or Management Options  Abrasion: new and requires workup  VINICIUS (acute kidney injury) [facility]): new and requires workup  Bruising: new and requires workup  Fall: new and requires workup  Thrombocytopenia [facility]): new and requires workup  Diagnosis management comments: Background: 80 y o  male presents with weakness, intermittent shortness of breath, falls    Differential DX includes but is not limited to: traumatic injuries, arrhyhtmia, anemia, metabolic derangement, side effect from chemo therapy    Plan: cardiac workup, ct head, chest xray          Amount and/or Complexity of Data Reviewed  Clinical lab tests: ordered and reviewed  Tests in the radiology section of CPT®: ordered and reviewed    Risk of Complications, Morbidity, and/or Mortality  Presenting problems: high  Diagnostic procedures: high  Management options: high    Patient Progress  Patient progress: stable          Disposition  [facility] Transfer: No  Final diagnoses:   VINICIUS (acute kidney injury) ([facility] )   Fall   Abrasion - acute right forehead   Thrombocytopenia ([place] [address] )   Bruising     Time reflects when diagnosis was documented in both MDM as applicable and the Disposition within this note     Time User Action Codes Description Comment    6/28/2021  1:36 PM Moises Husain B Add [N17 9] VINICIUS (acute kidney [facility]) [facility] )     6/28/2021  1:36 PM Sourav Alina Add [C33  XXXA] Fall     6/28/2021  1:36 PM Sourav Alina Add [M86 302L] Skin tear of forearm without complication     9/85/9349  1:36 PM Sourav Alina Modify [I25 050J] Skin tear of forearm without complication acute bilateral    6/28/2021  1:36 PM Grupo Sep  8XXA] Abrasion     6/28/2021  1:36 PM Destin Garcia Maria G  8XXA] Abrasion acute right forehead    6/28/2021  1:37 PM Lisane Zoroastrian B Add [D69 6] Thrombocytopenia ([facility] )     6/28/2021  2:16 PM Sourav Alina Remove [C76 496I] Skin tear of forearm without complication acute bilateral    6/28/2021  2:16 PM Bry Garcia  [address] Bruising       ED Disposition     ED Disposition Condition Date/Time Comment    Admit Stable Mon Jun 28, 2021  2:14 PM Case was discussed with Dr Maribel Leos and the patient's admission status was agreed to be Admission Status: observation status to the service of Dr Maribel Leos   Follow-up Information    None       Patient's Medications   Discharge Prescriptions    No medications on file     No discharge procedures on file      PDMP Review     None          ED Provider  Electronically Signed by         Taz Beck MD  06/28/21 2596

## 2021-06-28 NOTE — ASSESSMENT & PLAN NOTE
Patient from [facility]  Reports 2 falls with head strike, no LOC- once yesterday and once this morning  Not on AC  He reports that he gets dizzy with standing up and short of breath with ambulation, resulting in falls  Denies palpitations, chest pain, visual disturbance or weakness  Reports poor p o  Intake for the last few days  Recently started chemotherapy for multiple myeloma with Velcade, last session was 1 week ago  · Head CT with no acute intracranial abnormality  · Vital stable  · EKG:  NSR at 98 bpm  · Labs reveal VINICIUS    Assessment:  Likely secondary to dehydration from poor p o   Intake  Plan:  · Received 1 L bolus in the ED  · Will continue IVF  · Check orthostatic BP  · Will hold home aspirin for now  · PT/OT evaluation  · Fall precautions

## 2021-06-28 NOTE — TELEPHONE ENCOUNTER
Patient's family member Aly Tejada called to report that patient is having moderate rectal bleeding at his assisted living center  Assisted living center wanted to 911 patient to be taken to the ER but patient refused to go by 911  Aly Tejada is on her way to [facility] to take patient to [address] [place] [facility] notified via Epic      Will forward to Dr Nigel Arechiga RN to notify MD     Reason for Disposition   Nursing judgment or information in reference   Nursing judgment or information in reference    Protocols used: NO GUIDELINE AVAILABLE-ADULT-

## 2021-06-28 NOTE — ASSESSMENT & PLAN NOTE
· Had bone marrow biopsy on 05/11/2021 consistent with plasma cell myeloma  · Getting treatment with weekly Velcade and Decadron  · Has undergone 4 sessions so far, last one being 1 week ago  · Sees Dr Yu Delgadillo for oncology as outpatient  · Continue to follow-up outpatient

## 2021-06-28 NOTE — H&P
[address] 5/24/1927, 80 y o  male MRN: [id number]  Unit/Bed#: ED 28 Encounter: [id number]  Primary Care Provider: Kanchan Torres MD   Date and time admitted to hospital: 6/28/2021 11:39 AM    * Falls  Assessment & Plan  Patient from [facility]  Reports 2 falls with head strike, no LOC- once yesterday and once this morning  Not on AC  He reports that he gets dizzy with standing up and short of breath with ambulation, resulting in falls  Denies palpitations, chest pain, visual disturbance or weakness  Reports poor p o  Intake for the last few days  Recently started chemotherapy for multiple myeloma with Velcade, last session was 1 week ago  · Head CT with no acute intracranial abnormality  · Vital stable  · EKG:  NSR at 98 bpm  · Labs reveal VINICIUS    Assessment:  Likely secondary to dehydration from poor p o  Intake  Plan:  · Received 1 L bolus in the ED  · Will continue IVF  · Check orthostatic BP  · Will hold home aspirin for now  · PT/OT evaluation  · Fall precautions    VINICIUS (acute kidney injury) ([facility] [address] )  Assessment & Plan  · Creatinine of 2 26 POA  · Baseline creatinine 1-1 3, most recently 1 5  · Likely secondary to dehydration/volume depletion from poor p o   Intake  · IVF  · Hold triamterene-hydrochlorothiazide  · Monitor with daily BMP  · Hold nephrotoxins  · Avoid hypotension    Multiple [facility] not having [facility]  Assessment & Plan  · Had bone marrow biopsy on 05/11/2021 consistent with plasma cell myeloma  · Getting treatment with weekly Velcade and Decadron  · Has undergone 4 sessions so far, last one being 1 week ago  · Sees Dr Juan Sinclair for oncology as outpatient  · Continue to follow-up outpatient    Thrombocytopenia [facility])  Assessment & Plan  · Platelets of 44 POA, previous to that was 70  · Likely secondary to malignancy/bone marrow infiltration vs side effect of chemotherapy  · Monitor with CBC  · No active bleeding    Acquired hypothyroidism  Assessment & Plan  Continue levothyroxine    Essential hypertension  Assessment & Plan  · BP stable  · Will hold triamterene-hydrochlorothiazide for VINICIUS   · Monitor BMP    VTE Prophylaxis: On hold for now status post fall with head strike  / sequential compression device   Code Status:  Level 1  POLST: POLST form is not discussed and not completed at this time  Anticipated Length of Stay:  Patient will be admitted on an Observation basis with an anticipated length of stay of  <2 midnights  Justification for Hospital Stay:  Fall with head strike    Chief Complaint:  Multiple falls    History of Present Illness:    Laurie Vásquez is a 80 y o  male with history of hypertension, hypothyroidism, and multiple myeloma currently receiving chemotherapy who presents with falls  Patient is from assisted living facility and reports 2 falls in the last 2 days  He reports that he fell yesterday after getting up from bed  Patient is poor historian but reports that he initially felt dizzy and with few steps, he felt short of breath, resulting in fall with head strike  He denies LOC, chest pain, palpitations, leg weakness, or visual disturbance  He reports same thing occurred this morning upon arising from bed- he felt dizzy with standing and short of breath with ambulation, resulting in a fall  He reports that both falls were unwitnessed  He sustained skin tears to right-sided forehead and bilateral forearms  He is not on anticoagulation, only takes baby aspirin daily  Denies history of seizures  He is currently receiving chemotherapy for multiple myeloma, last chemo session was 1 week ago  He reports some weakness associated with initiation of chemotherapy  Also reports poor p o  Intake for the last few days  In the ED, vitals were stable  Noted hyponatremia, VINICIUS, and worsening thrombocytopenia    Head CT revealed no acute intracranial abnormalities and chest x-ray was negative  EKG was normal sinus rhythm with no signs of ischemia and troponin was negative x1  He received 1 L normal saline bolus and seen in no acute distress  Review of Systems:    Review of Systems   Constitutional: Positive for appetite change  Negative for chills, diaphoresis, fatigue and fever  HENT: Negative for trouble swallowing  Eyes: Negative for visual disturbance  Respiratory: Positive for shortness of breath  Negative for cough  Cardiovascular: Negative for chest pain, palpitations and leg swelling  Gastrointestinal: Negative for abdominal pain, nausea and vomiting  Genitourinary: Negative for dysuria  Musculoskeletal: Negative for neck pain and neck stiffness  Skin: Negative for rash  Neurological: Positive for dizziness and syncope  Negative for seizures, weakness, light-headedness and headaches  Past Medical and Surgical History:     Past Medical History:   Diagnosis Date    Arthritis     Gout     Hypertension     Hyperthyroidism     Memory loss     [facility]     Myeloma ([facility] )     Sleep disturbance     Thyroid disease        Past Surgical History:   Procedure Laterality Date    IR BIOPSY BONE MARROW  5/11/2021    KIDNEY SURGERY      description: 30 yrs ago    PROSTATE SURGERY      description: stone removal 30 yrs ago    REPLACEMENT TOTAL KNEE BILATERAL      description: 18 yrs ago       Meds/Allergies:    Prior to Admission medications    Medication Sig Start Date End Date Taking?  Authorizing Provider   acyclovir (ZOVIRAX) 400 MG tablet Take 1 tablet (400 mg total) by mouth daily While on Velcade therapy 5/26/21 11/28/21  Javier Bennett MD   allopurinol (ZYLOPRIM) 100 mg tablet Take 2 tablets (200 mg total) by mouth daily 3/12/21   Tory Hughes MD   aspirin 325 mg tablet Take 1 tablet by mouth daily    Historical Provider, MD   clobetasol (TEMOVATE) 0 05 % ointment Apply topically 2 (two) times a day To itching areas of till resolved  Patient not taking: Reported on 6/10/2021 2/10/21   Essie Jeong MD   levothyroxine 100 mcg tablet Take 1 tablet (100 mcg total) by mouth daily 3/12/21   Torsten Mayorga MD   mupirocin Danis Moulding) 2 % ointment apply to affected area three times a day  Patient not taking: Reported on 6/10/2021 8/28/20   Kg Garcia MD   pantoprazole (PROTONIX) 20 mg tablet Take 1 tablet (20 mg total) by mouth daily 21   Baldemar Petty MD   polyethylene glycol [facility]) 17 GM/SCOOP powder Take 17 g by mouth daily 21   SABRINA Frankel   prochlorperazine (COMPAZINE) 10 mg tablet Take 1 tablet (10 mg total) by mouth every 6 (six) hours as needed for nausea or vomiting 21   Baldemar Petty MD   triamterene-hydrochlorothiazide (DYAZIDE) 37 5-25 mg per capsule Take 1 capsule by mouth daily 3/12/21   Torsten Mayorga MD     I have reviewed home medications with patient personally  Allergies: Allergies   Allergen Reactions    Horse Protein        Social History:     Marital Status: Single   Occupation:   Patient Pre-hospital Living Situation:  Assisted living  Patient Pre-hospital Level of Mobility:  Uses assistive device  Patient Pre-hospital Diet Restrictions:  None  Substance Use History:   Social History     Substance and Sexual Activity   Alcohol Use Yes    Comment: social "burbon once in a while"     Social History     Tobacco Use   Smoking Status Former Smoker    Packs/day: 0 50    Years: 25 00    Pack years: 12 50    Quit date: 36    Years since quittin 5   Smokeless Tobacco Never Used   Tobacco Comment    "I smoked years ago"     Social History     Substance and Sexual Activity   Drug Use No       Family History:    non-contributory    Physical Exam:     Vitals:   Blood Pressure: 111/70 (21 1749)  Pulse: 91 (21 1749)  Temperature: 97 9 °F (36 6 °C) (21 1144)  Temp Source: Oral (21 1144)  Respirations: 16 (21 1630)  SpO2: 98 % (21 1749)    Physical Exam  Vitals reviewed  Constitutional:       General: He is not in acute distress  HENT:      Mouth/Throat:      Mouth: Mucous membranes are dry  Pharynx: Oropharynx is clear  Eyes:      Extraocular Movements: Extraocular movements intact  Pupils: Pupils are equal, round, and reactive to light  Comments: + cataract   Cardiovascular:      Rate and Rhythm: Normal rate and regular rhythm  Pulses: Normal pulses  Heart sounds: Normal heart sounds  Pulmonary:      Effort: Pulmonary effort is normal  No respiratory distress  Breath sounds: Normal breath sounds  Abdominal:      General: Bowel sounds are normal  There is no distension  Palpations: Abdomen is soft  Tenderness: There is no abdominal tenderness  Musculoskeletal:         General: No swelling or tenderness  Normal range of motion  Cervical back: Normal range of motion and neck supple  Skin:     Comments: Skin tears to left and right forearms  Skin tear by right temple and by right side of mouth  No active bleeding   Neurological:      General: No focal deficit present  Mental Status: He is alert and oriented to person, place, and time  Additional Data:     Lab Results: I have personally reviewed pertinent films in PACS    Results from last 7 days   Lab Units 06/28/21  1157   WBC Thousand/uL 7 10   HEMOGLOBIN g/dL 11 6*   HEMATOCRIT % 34 1*   PLATELETS Thousands/uL 44*   NEUTROS PCT % 67   LYMPHS PCT % 20   MONOS PCT % 12   EOS PCT % 0     Results from last 7 days   Lab Units 06/28/21  1157   POTASSIUM mmol/L 3 9   CHLORIDE mmol/L 101   CO2 mmol/L 19*   BUN mg/dL 97*   CREATININE mg/dL 2 26*   CALCIUM mg/dL 8 0*   ALK PHOS U/L 155*   ALT U/L 30   AST U/L 30     Results from last 7 days   Lab Units 06/28/21  1157   INR  1 21*       Imaging: I have personally reviewed pertinent films in PACS    XR chest 2 views    Result Date: 6/28/2021  Narrative: CHEST INDICATION:   shortness of breath   COMPARISON:  6/13/2018 EXAM PERFORMED/VIEWS:  XR CHEST PA & LATERAL Images: 3 FINDINGS: Cardiomediastinal silhouette appears unremarkable  The lungs are clear  No pneumothorax or pleural effusion  Osseous structures appear within normal limits for patient age  Impression: No acute cardiopulmonary disease  Findings are stable Workstation performed: [id number]     CT head without contrast    Result Date: 6/28/2021  Narrative: CT BRAIN - WITHOUT CONTRAST INDICATION:   Head trauma, minor (Age => 65y) trauma, on aspirin  COMPARISON:  MRI brain 5/20/2019 TECHNIQUE:  CT examination of the brain was performed  In addition to axial images, sagittal and coronal 2D reformatted images were created and submitted for interpretation  Radiation dose length product (DLP) for this visit:  862 mGy-cm   This examination, like all CT scans performed in the [facility], was performed utilizing techniques to minimize radiation dose exposure, including the use of iterative reconstruction and automated exposure control  IMAGE QUALITY:  Diagnostic  FINDINGS: PARENCHYMA: Decreased attenuation is noted in periventricular and subcortical white matter demonstrating an appearance that is statistically most likely to represent mild microangiopathic change; this appearance is similar when compared to most recent prior examination  No CT signs of acute infarction  No intracranial mass, mass effect or midline shift  No acute parenchymal hemorrhage  VENTRICLES AND EXTRA-AXIAL SPACES:  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

## 2021-06-29 PROBLEM — T07.XXXA ABRASIONS OF MULTIPLE SITES: Status: ACTIVE | Noted: 2021-06-29

## 2021-06-29 LAB
ALBUMIN SERPL BCP-MCNC: 2.5 G/DL (ref 3.5–5)
ALP SERPL-CCNC: 130 U/L (ref 46–116)
ALT SERPL W P-5'-P-CCNC: 24 U/L (ref 12–78)
ANION GAP SERPL CALCULATED.3IONS-SCNC: 10 MMOL/L (ref 4–13)
AST SERPL W P-5'-P-CCNC: 34 U/L (ref 5–45)
BILIRUB SERPL-MCNC: 0.44 MG/DL (ref 0.2–1)
BUN SERPL-MCNC: 76 MG/DL (ref 5–25)
CALCIUM ALBUM COR SERPL-MCNC: 8.9 MG/DL (ref 8.3–10.1)
CALCIUM SERPL-MCNC: 7.7 MG/DL (ref 8.3–10.1)
CHLORIDE SERPL-SCNC: 107 MMOL/L (ref 100–108)
CO2 SERPL-SCNC: 21 MMOL/L (ref 21–32)
CREAT SERPL-MCNC: 1.59 MG/DL (ref 0.6–1.3)
ERYTHROCYTE [DISTWIDTH] IN BLOOD BY AUTOMATED COUNT: 15.8 % (ref 11.6–15.1)
GFR SERPL CREATININE-BSD FRML MDRD: 37 ML/MIN/1.73SQ M
GLUCOSE SERPL-MCNC: 103 MG/DL (ref 65–140)
GLUCOSE SERPL-MCNC: 150 MG/DL (ref 65–140)
HCT VFR BLD AUTO: 30.6 % (ref 36.5–49.3)
HGB BLD-MCNC: 10.3 G/DL (ref 12–17)
MCH RBC QN AUTO: 36 PG (ref 26.8–34.3)
MCHC RBC AUTO-ENTMCNC: 33.7 G/DL (ref 31.4–37.4)
MCV RBC AUTO: 107 FL (ref 82–98)
PLATELET # BLD AUTO: 36 THOUSANDS/UL (ref 149–390)
PMV BLD AUTO: 14.4 FL (ref 8.9–12.7)
POTASSIUM SERPL-SCNC: 3.9 MMOL/L (ref 3.5–5.3)
PROT SERPL-MCNC: 5.5 G/DL (ref 6.4–8.2)
RBC # BLD AUTO: 2.86 MILLION/UL (ref 3.88–5.62)
SODIUM SERPL-SCNC: 138 MMOL/L (ref 136–145)
WBC # BLD AUTO: 6.46 THOUSAND/UL (ref 4.31–10.16)

## 2021-06-29 PROCEDURE — 99232 SBSQ HOSP IP/OBS MODERATE 35: CPT | Performed by: HOSPITALIST

## 2021-06-29 PROCEDURE — 97167 OT EVAL HIGH COMPLEX 60 MIN: CPT

## 2021-06-29 PROCEDURE — 80053 COMPREHEN METABOLIC PANEL: CPT | Performed by: INTERNAL MEDICINE

## 2021-06-29 PROCEDURE — 82948 REAGENT STRIP/BLOOD GLUCOSE: CPT

## 2021-06-29 PROCEDURE — 85027 COMPLETE CBC AUTOMATED: CPT | Performed by: INTERNAL MEDICINE

## 2021-06-29 PROCEDURE — 99214 OFFICE O/P EST MOD 30 MIN: CPT | Performed by: SURGERY

## 2021-06-29 PROCEDURE — 97163 PT EVAL HIGH COMPLEX 45 MIN: CPT

## 2021-06-29 RX ADMIN — PANTOPRAZOLE SODIUM 20 MG: 20 TABLET, DELAYED RELEASE ORAL at 08:46

## 2021-06-29 RX ADMIN — SENNOSIDES 8.6 MG: 8.6 TABLET, FILM COATED ORAL at 22:07

## 2021-06-29 RX ADMIN — DOCUSATE SODIUM 100 MG: 100 CAPSULE, LIQUID FILLED ORAL at 17:27

## 2021-06-29 RX ADMIN — BACITRACIN ZINC 1 SMALL APPLICATION: 500 OINTMENT TOPICAL at 08:46

## 2021-06-29 RX ADMIN — SODIUM CHLORIDE 100 ML/HR: 0.9 INJECTION, SOLUTION INTRAVENOUS at 12:34

## 2021-06-29 RX ADMIN — BACITRACIN ZINC 1 SMALL APPLICATION: 500 OINTMENT TOPICAL at 17:27

## 2021-06-29 RX ADMIN — DOCUSATE SODIUM 100 MG: 100 CAPSULE, LIQUID FILLED ORAL at 08:46

## 2021-06-29 RX ADMIN — ALLOPURINOL 200 MG: 100 TABLET ORAL at 08:46

## 2021-06-29 RX ADMIN — LEVOTHYROXINE SODIUM 100 MCG: 100 TABLET ORAL at 05:11

## 2021-06-29 NOTE — ASSESSMENT & PLAN NOTE
· Had bone marrow biopsy on 05/11/2021 consistent with plasma cell myeloma  · Getting treatment with weekly Velcade and Decadron  · Has undergone 4 sessions so far, last one being 1 week ago  · Sees Dr Manuel Garay for oncology as outpatient  plan  · Continue to follow-up oncology outpatient

## 2021-06-29 NOTE — ASSESSMENT & PLAN NOTE
· Creatinine of 2 26 POA  · Baseline creatinine 1-1 3,  · Admission : poor oral intake  · Volume status: euvolemic, no electrolytes imbalances  · Possible etiologies: prerenal in the setting of dehydration/ vol depletion, questionable if there is a post renal component  · Initial management : IVF--> UO +1L, no concerns for urinary retention at this time  Plan  Bmp am  Continue monitoring  Avoid hypotension  Avoid nephrotoxins

## 2021-06-29 NOTE — ASSESSMENT & PLAN NOTE
· Had bone marrow biopsy on 05/11/2021 consistent with plasma cell myeloma  · Getting treatment with weekly Velcade and Decadron  · Has undergone 4 sessions so far, last one being 1 week ago  · Sees Dr Nusrat Reece for oncology as outpatient  plan  · Continue to follow-up oncology outpatient

## 2021-06-29 NOTE — UTILIZATION REVIEW
Initial Clinical Review    Admission: Date/Time/Statement:   Admission Orders (From admission, onward)     Ordered        06/28/21 1415  Place in Observation  Once                   Orders Placed This Encounter   Procedures    Place in Observation     Standing Status:   Standing     Number of Occurrences:   1     Order Specific Question:   Level of Care     Answer:   Med Surg [16]     ED Arrival Information     Expected Arrival Acuity    - 6/28/2021 11:31 Emergent         Means of arrival Escorted by Service Admission type    [facility] Emergency         Arrival complaint    [address] (+) BABY ASPRIN        Chief Complaint   Patient presents with    Fall     pt fell yesterday hitting head on floor, +headstrike, takes asa daily  denies loc       Initial Presentation: 80 y o  male with history of hypertension, hypothyroidism, and multiple myeloma currently receiving chemotherapy with last session 1 week ago who presents to ED from assisted living facility with 2 falls in 2 days  Fall preceded by dizziness, SOB, + headstrike, no LOC when getting OOB  On exam, pt has skin tears bilat forearms, R temple and R side of mouth  No focal neuro deficit, AAOx3, has dry mucous membranes with poor po intake x several days  Labs show low sodium, elevated creat of 2 26 from baseline 1-1 3 and worsenibng thrombocytopenia  Head CT shows nothing acute, CXR neg, EKG was normal sinus rhythm with no signs of ischemia and troponin was negative x1  He received 1 L normal saline bolus in ED  Pt admitted as OBS with falls likely 2/2 dehydration from poor po intake, with VINICIUS and thrombocytopenia    Plan-continue IVF, orthostatic BP x1, hold home ASA, PT/OT evals, fall monitoring, hold triamterene-hydrochlorothiazide, daily BMP, monitor CBC and any active bleeding  Date:   Trauma-Creat continues to be above baseline, continuing IVF  HCTZ and triamterene on hold secondary to VINICIUS  Platelets decreased to 36 today   Pt tolerating diet, voiding  GCS=15,has R preauricular and corner of right mouth contusion, abrasions healing  ED Triage Vitals   Temperature Pulse Respirations Blood Pressure SpO2   06/28/21 1144 06/28/21 1143 06/28/21 1143 06/28/21 1143 06/28/21 1143   97 9 °F (36 6 °C) 104 18 118/71 98 %      Temp Source Heart Rate Source Patient Position - Orthostatic VS BP Location FiO2 (%)   06/28/21 1144 06/28/21 1143 06/28/21 1147 06/28/21 1524 --   Oral Monitor Sitting Left arm       Pain Score       06/29/21 0236       No Pain          Wt Readings from Last 1 Encounters:   06/24/21 67 4 kg (148 lb 8 oz)     Additional Vital Signs:   Date/Time  Temp  Pulse  Resp  BP  MAP (mmHg)  SpO2    06/29/21 07:15:50  97 7 °F (36 5 °C)  88  12  111/61  --  96 %    06/29/21 02:35:14  97 7 °F (36 5 °C)  95  --  122/62  --  97 %    06/29/21 0200  --  88  18  107/55  74  96 %    06/29/21 0100  --  80  18  112/58  79  97 %      Date and Time Eye Opening Best Verbal Response Best Motor Response [place] Coma Scale Score   06/28/21 1433 4 5 6 15   06/28/21 1400 4 5 6 15   06/28/21 1330 4 5 6 15   06/28/21 1300 4 5 6 15   06/28/21 1245 4 5 6 15   06/28/21 1230 4 5 6 15   06/28/21 1215 4 5 6 15   06/28/21 1200 4 5 6 15   06/28/21 1147 4 5 6 15       Pertinent Labs/Diagnostic Test Results:   6/28   ECG-ED-  ECG rate:  98     ECG rate assessment: normal     Rhythm:     Rhythm: sinus rhythm     Ectopy:     Ectopy: none     QRS:     QRS axis:  Normal     QRS intervals:  Normal   Conduction:     Conduction: normal     ST segments:     ST segments:  Non-specific   T waves:     T waves: flattening       Flattening:  V4 and V5 (present previously)       CT head-No acute intracranial abnormality    CXR-No acute cardiopulmonary disease    Findings are stable        Results from last 7 days   Lab Units 06/29/21  0451 06/28/21  1157   WBC Thousand/uL 6 46 7 10   HEMOGLOBIN g/dL 10 3* 11 6*   HEMATOCRIT % 30 6* 34 1*   PLATELETS Thousands/uL 36* 44*   NEUTROS ABS Thousands/µL  --  4 80         Results from last 7 days   Lab Units 06/29/21  0451 06/28/21  1157   SODIUM mmol/L 138 133*   POTASSIUM mmol/L 3 9 3 9   CHLORIDE mmol/L 107 101   CO2 mmol/L 21 19*   ANION GAP mmol/L 10 13   BUN mg/dL 76* 97*   CREATININE mg/dL 1 59* 2 26*   EGFR ml/min/1 73sq m 37 24   CALCIUM mg/dL 7 7* 8 0*     Results from last 7 days   Lab Units 06/29/21  0451 06/28/21  1157   AST U/L 34 30   ALT U/L 24 30   ALK PHOS U/L 130* 155*   TOTAL PROTEIN g/dL 5 5* 6 3*   ALBUMIN g/dL 2 5* 2 9*   TOTAL BILIRUBIN mg/dL 0 44 0 38         Results from last 7 days   Lab Units 06/29/21  0451 06/28/21  1157   GLUCOSE RANDOM mg/dL 103 117           Results from last 7 days   Lab Units 06/28/21  1157   TROPONIN I ng/mL 0 04         Results from last 7 days   Lab Units 06/28/21  1157   PROTIME seconds 15 4*   INR  1 21*   PTT seconds 24                 ED Treatment:   Medication Administration from 06/28/2021 1054 to 06/29/2021 0234       Date/Time Order Dose Route Action     06/28/2021 1308 bacitracin topical ointment 2 small application 2 small application Topical Given     06/28/2021 1353 sodium chloride 0 9 % bolus 1,000 mL 1,000 mL Intravenous New Bag     06/28/2021 1840 bacitracin topical ointment 1 small application 1 small application Topical Given     06/28/2021 2015 albuterol inhalation solution 2 5 mg 2 5 mg Nebulization Given     06/28/2021 2012 sodium chloride 0 9 % infusion 100 mL/hr Intravenous New Bag     06/28/2021 2014 docusate sodium (COLACE) capsule 100 mg 100 mg Oral Given        Past Medical History:   Diagnosis Date    Arthritis     Gout     Hypertension     Hyperthyroidism     Memory loss     [facility]     [facility]     Sleep disturbance     Thyroid disease      Present on Admission:   Acquired hypothyroidism   [facility] achieved [facility]   Essential hypertension   Abrasions of multiple sites      Admitting Diagnosis: Wheezing [R06 2]  Cough [R05]  Thrombocytopenia (HCC) [D69 6]  Bruising [T14  8XXA]  Abrasion [T14  8XXA]  VINICIUS (acute kidney injury) ([facility] ) [N17 9]  Low O2 saturation [R79 81]  Age/Sex: 80 y o  male  Admission Orders:  Scheduled Medications:  allopurinol, 200 mg, Oral, Daily  bacitracin, 1 small application, Topical, BID  docusate sodium, 100 mg, Oral, BID  levothyroxine, 100 mcg, Oral, Early Morning  pantoprazole, 20 mg, Oral, Daily  senna, 1 tablet, Oral, HS      Continuous IV Infusions:  sodium chloride, 100 mL/hr, Intravenous, Continuous      PRN Meds:  acetaminophen, 650 mg, Oral, Q6H PRN  ondansetron, 4 mg, Oral, Q6H PRN  polyethylene glycol, 17 g, Oral, Daily PRN      SCD's   monitor for urinary retention  Local wound care with sterile water or saline wash, bacitracin and dressing    Network Utilization Review Department  ATTENTION: Please call with any questions or concerns to [phone] and carefully listen to the prompts so that you are directed to the right person  All voicemails are confidential   Stephanie List all requests for admission clinical reviews, approved or denied determinations and any other requests to dedicated fax number below belonging to the campus where the patient is receiving treatment   List of dedicated fax numbers for the Facilities:  [address] DENIALS (Administrative/Medical Necessity) [phone]   [address] (Maternity/NICU/Pediatrics) [phone] [address] [address] [facility] Ajay Torres [address] [address] [facility] [phone] 7191 [facility] [phone]

## 2021-06-29 NOTE — ASSESSMENT & PLAN NOTE
Improving  - Cr 1 59 today but still above baseline cr  - contininue IVF and hold BP meds as above  - SLIM following

## 2021-06-29 NOTE — ED NOTES
Patient ambulated to BR, voided  PVR 60  Will continue to monitor        Chas Corral RN  06/28/21 4111

## 2021-06-29 NOTE — PROGRESS NOTES
[facility]  Progress Note Scottie Garcia 5/24/1927, 80 y o  male MRN: [id number]  Unit/Bed#: S -01 Encounter: [id number]  Primary Care Provider: Camacho Lemos MD   Date and time admitted to hospital: 6/28/2021 11:39 AM    * Falls  Assessment & Plan  Patient from [facility]  Reports 2 falls with head strike, no LOC- once yesterday and once this morning  Not on AC  He reports that he gets dizzy with standing up and short of breath with ambulation, resulting in falls  Denies palpitations, chest pain, visual disturbance or weakness  Reports poor p o  Intake for the last few days  Recently started chemotherapy for multiple myeloma with Velcade, last session was 1 week ago     · Head CT with no acute intracranial abnormality  · Vital stable  · EKG:  NSR at 98 bpm  · Labs reveal VINICIUS   -Likely etiology Dehydration in the settting of poor po intake, triamterene-hctz also contributory  - noted orthostic, low bps, lightheaded    Plan  IV fluid bolus  Compression stockings  Orthostatic vitals  Continue monitoring    VINICIUS (acute kidney injury) (HCC)  Assessment & Plan  · Creatinine of 2 26 POA  · Baseline creatinine 1-1 3,  · Admission : poor oral intake  · Volume status: euvolemic, no electrolytes imbalances  · Possible etiologies: prerenal in the setting of dehydration/ vol depletion, questionable if there is a post renal component  · Initial management : IVF--> UO +1L, no concerns for urinary retention at this time  Plan  Bmp am  Continue monitoring  Avoid hypotension  Avoid nephrotoxins    Thrombocytopenia (HCC)  Assessment & Plan  · Platelets of 44 POA, previous to that was 71  · Likely secondary to malignancy/bone marrow infiltration vs side effect of chemotherapy  · Monitor with CBC  · No active bleeding  Plan  · Cbc am  · Follow up gaudencio w/ hematology 7/6      Multiple myeloma not having achieved remission ([facility] [address] )  Assessment & Plan  · Had bone marrow biopsy on 2021 consistent with plasma cell myeloma  · Getting treatment with weekly Velcade and Decadron  · Has undergone 4 sessions so far, last one being 1 week ago  · Sees Dr Von King for oncology as outpatient  plan  · Continue to follow-up oncology outpatient    Acquired hypothyroidism  Assessment & Plan  Continue levothyroxine    Essential hypertension  Assessment & Plan  · BP stable  Plan  Continue to hold triamterene-hctz        VTE Pharmacologic Prophylaxis:   VTE Score: 5 Moderate Risk (Score 3-4) - Pharmacological DVT Prophylaxis Ordered: held in the setting of thrombocytopenia  Mechanical VTE Prophylaxis in Place: Yes    Patient Centered Rounds: I have performed bedside rounds with nursing staff today  Discussions with Specialists or Other Care Team Provider: attending physician    Education and Discussions with Family / Patient: Updated  Hillary Del Real ) via phone  Current Length of Stay: 0 day(s)    Current Patient Status: Observation     Discharge Plan / Estimated Discharge Date: Anticipate discharge tomorrow to prior assisted or independent living facility  Code Status: Level 1 - Full Code      Subjective:    bp noted to be low, patient lightheaded, denied chest pain, sob , palpitations  Objective:     Vitals:   Temp (24hrs), Av 7 °F (36 5 °C), Min:97 7 °F (36 5 °C), Max:97 7 °F (36 5 °C)    Temp:  [97 7 °F (36 5 °C)] 97 7 °F (36 5 °C)  HR:  [] 106  Resp:  [12-18] 17  BP: ()/(39-70) 65/45  SpO2:  [95 %-98 %] 95 %  There is no height or weight on file to calculate BMI  Input and Output Summary (last 24 hours): Intake/Output Summary (Last 24 hours) at 2021 1544  Last data filed at 2021 1234  Gross per 24 hour   Intake 120 ml   Output 700 ml   Net -580 ml       Physical Exam:     Physical Exam  Vitals and nursing note reviewed  Constitutional:       General: He is not in acute distress  Appearance: He is normal weight  He is not toxic-appearing  HENT:      Head: Normocephalic and atraumatic  Right Ear: Tympanic membrane normal  There is no impacted cerumen  Left Ear: Tympanic membrane normal  There is no impacted cerumen  Nose: Nose normal  No congestion or rhinorrhea  Mouth/Throat:      Mouth: Mucous membranes are moist       Pharynx: Oropharynx is clear  No oropharyngeal exudate or posterior oropharyngeal erythema  Eyes:      Extraocular Movements: Extraocular movements intact  Conjunctiva/sclera: Conjunctivae normal       Pupils: Pupils are equal, round, and reactive to light  Cardiovascular:      Rate and Rhythm: Normal rate  Pulses: Normal pulses  Heart sounds: No murmur heard  No gallop  Pulmonary:      Effort: Pulmonary effort is normal  No respiratory distress  Breath sounds: No wheezing or rales  Chest:      Chest wall: No tenderness  Abdominal:      General: Bowel sounds are normal  There is no distension  Palpations: Abdomen is soft  Tenderness: There is no abdominal tenderness  Musculoskeletal:         General: No swelling or tenderness  Normal range of motion  Cervical back: Normal range of motion  No rigidity  Lymphadenopathy:      Cervical: No cervical adenopathy  Skin:     General: Skin is warm  Capillary Refill: Capillary refill takes 2 to 3 seconds  Neurological:      Mental Status: He is alert and oriented to person, place, and time  Psychiatric:         Mood and Affect: Mood normal          Behavior: Behavior normal          Thought Content:  Thought content normal          Judgment: Judgment normal           Additional Data:     Labs:  Results from last 7 days   Lab Units 06/29/21  0451 06/28/21  1157   WBC Thousand/uL 6 46 7 10   HEMOGLOBIN g/dL 10 3* 11 6*   HEMATOCRIT % 30 6* 34 1*   PLATELETS Thousands/uL 36* 44*   NEUTROS PCT %  --  67   LYMPHS PCT %  --  20   MONOS PCT %  --  12   EOS PCT %  --  0     Results from last 7 days   Lab Units 06/29/21  0451   SODIUM mmol/L 138   POTASSIUM mmol/L 3 9   CHLORIDE mmol/L 107   CO2 mmol/L 21   BUN mg/dL 76*   CREATININE mg/dL 1 59*   ANION GAP mmol/L 10   CALCIUM mg/dL 7 7*   ALBUMIN g/dL 2 5*   TOTAL BILIRUBIN mg/dL 0 44   ALK PHOS U/L 130*   ALT U/L 24   AST U/L 34   GLUCOSE RANDOM mg/dL 103     Results from last 7 days   Lab Units 06/28/21  1157   INR  1 21*     Results from last 7 days   Lab Units 06/29/21  1423   POC GLUCOSE mg/dl 150*               Imaging: No pertinent imaging reviewed  Recent Cultures (last 7 days):           Lines/Drains:  Invasive Devices     None                 Telemetry:        Last 24 Hours Medication List:   Current Facility-Administered Medications   Medication Dose Route Frequency Provider Last Rate    acetaminophen  650 mg Oral Q6H PRN Ladan Jones MD      allopurinol  200 mg Oral Daily Ladan Jones MD      bacitracin  1 small application Topical BID Ladan Jones MD      docusate sodium  100 mg Oral BID Ladan Jones MD      levothyroxine  100 mcg Oral Early Morning Ladan Jones MD      ondansetron  4 mg Oral Q6H PRN Ladan Jones MD      pantoprazole  20 mg Oral Daily Ladan Jones MD      polyethylene glycol  17 g Oral Daily PRN Ladan Jones MD      senna  1 tablet Oral HS Ladan Jones MD      sodium chloride  100 mL/hr Intravenous Continuous Ladan Jones  mL/hr (06/29/21 1234)        Today, Patient Was Seen By: Farhana Foy MD    ** Please Note: This note has been constructed using a voice recognition system   **

## 2021-06-29 NOTE — DISCHARGE INSTRUCTIONS
Dehydration   WHAT YOU NEED TO KNOW:   Dehydration is a condition that develops when your body does not have enough fluid  You may become dehydrated if you do not drink enough water or lose too much fluid  Fluid loss may also cause loss of electrolytes (minerals), such as sodium  DISCHARGE INSTRUCTIONS:   Seek care immediately if:   · You have a seizure  · You are confused or cannot think clearly  · You are extremely sleepy, or another person cannot wake you  · You become dizzy or faint when you stand  · You are not able to urinate  · You have trouble breathing  · You have a fast or irregular heartbeat  · Your hands or feet are cold, or your face is pale  Contact your healthcare provider if:   · You have trouble drinking liquids because you are vomiting  · Your symptoms get worse  · You have a fever  · You feel very weak or tired  · You have questions or concerns about your condition or care  Follow up with your healthcare provider as directed:  Write down your questions so you remember to ask them during your visits  Prevent or manage dehydration:   · Drink liquids as directed  Liquids that contain water, sugar, and minerals can help your body hold in fluid and help prevent dehydration  Drink liquids throughout the day, not just when you feel thirsty  Men should drink about 3 liters (13 eight-ounce cups) of liquid each day  Women should drink about 2 liters (9 eight-ounce cups) of liquid each day  Drink even more liquid if you will be outdoors, in the sun for a long time, or exercising  · Stay cool  Limit the time you spend outdoors during the hottest part of the day  Dress in lightweight clothes  · Keep track of how often you urinate  If you urinate less than usual or your urine is darker, drink more liquids      © Copyright [address] Information is for End User's use only and may not be sold, redistributed or otherwise used for commercial purposes  All illustrations and images included in CareNotes® are the copyrighted property of A D A M , Inc  or Zurdo Dewitt  The above information is an  only  It is not intended as medical advice for individual conditions or treatments  Talk to your doctor, nurse or pharmacist before following any medical regimen to see if it is safe and effective for you

## 2021-06-29 NOTE — PLAN OF CARE
Problem: OCCUPATIONAL THERAPY ADULT  Goal: Performs self-care activities at highest level of function for planned discharge setting  See evaluation for individualized goals  Description: Treatment Interventions: ADL retraining, Functional transfer training, UE strengthening/ROM, Endurance training, Cognitive reorientation, Patient/family training, Equipment evaluation/education, Compensatory technique education, Energy conservation, Activityengagement          See flowsheet documentation for full assessment, interventions and recommendations  Note: Limitation: Decreased ADL status, Decreased UE strength, Decreased Safe judgement during ADL, Decreased cognition, Decreased endurance, Decreased self-care trans, Decreased high-level ADLs  Prognosis: Good  Assessment: Patient is a 80 y o  male admitted to [address] [place] [address] on 6/28/2021 due to Falls  Comorbidities affecting pt's physical performance at time of assessment include HTN, hypothyroidism, multiple myeloma, VINICIUS, abrasions  Patient has active OT orders and activity orders for Up with assistance  PTA pt living at [facility], unclear if pt residing in long-term vs ILF, pt (I) with ADLs and requires (A) with IADLs, no use of AD at baseline, (+)falls, (-)drives  Personal factors affecting pt at time of IE include:difficulty performing ADLS, difficulty performing IADLS , limited insight into deficits and decreased initiation and engagement   At the time of evaluation patient currently requires (S) for UB ADLs, min A for LB ADLs, (S) for functional transfers, and min A for functional mobility  The following deficits affected patient's occupational performance weakness, decreased functional strength, decreased functional balance, decreased activity tolerance, decreased safety awareness, impaired memory, impaired sequencing, impaired problem solving, impaired interpersonal skills and decreased coping skills   Patient would benefit from skilled OT services while in the hospital to address above deficits  Occupational performance areas to be addressed include ADL retraining, bed mobility, functional transfer training, endurance training, cognitive reorientation, patient/family training, equipment evaluation/education, activity engagement and activity tolerance in order to maximize patient's level of function  The patient's raw score on the AM-PAC Daily Activity inpatient short form is 20, standardized score is 42 03, greater than 39 4  Patients at this level are likely to benefit from discharge to home  However, recommend d/c to PAR if pt currently residing in ILF as pt requires to be mod I with all ADLs, if pt resides in [facility] recommend return to [facility] with [address] when medically cleared  Will continue to follow 2-3x/wk to address goals listed below        OT Discharge Recommendation: Post acute rehabilitation services (if pt residing in ILF, if living in [facility] recommend [address])

## 2021-06-29 NOTE — ED NOTES
Bladder scan performed, patient reports not voiding in "what feels like a year", reports no current urge to void  Bladder scan volume 811 ml        Luis Fernando De Luna RN  06/28/21 2040

## 2021-06-29 NOTE — PLAN OF CARE
Problem: MOBILITY - ADULT  Goal: Maintain or return to baseline ADL function  Description: INTERVENTIONS:  -  Assess patient's ability to carry out ADLs; assess patient's baseline for ADL function and identify physical deficits which impact ability to perform ADLs (bathing, care of mouth/teeth, toileting, grooming, dressing, etc )  - Assess/evaluate cause of self-care deficits   - Assess range of motion  - Assess patient's mobility; develop plan if impaired  - Assess patient's need for assistive devices and provide as appropriate  - Encourage maximum independence but intervene and supervise when necessary  - Involve family in performance of ADLs  - Assess for home care needs following discharge   - Consider OT consult to assist with ADL evaluation and planning for discharge  - Provide patient education as appropriate  Outcome: Progressing  Goal: Maintains/Returns to pre admission functional level  Description: INTERVENTIONS:  - Perform BMAT or MOVE assessment daily    - Set and communicate daily mobility goal to care team and patient/family/caregiver  - Collaborate with rehabilitation services on mobility goals if consulted  - Perform Range of Motion 2 times a day  - Reposition patient every 2 hours    - Dangle patient 2 times a day  - Stand patient 2 times a day  - Ambulate patient 2 times a day  - Out of bed to chair 2 times a day   - Out of bed for meals 2 times a day  - Out of bed for toileting  - Record patient progress and toleration of activity level   Outcome: Progressing     Problem: Potential for Falls  Goal: Patient will remain free of falls  Description: INTERVENTIONS:  - Educate patient/family on patient safety including physical limitations  - Instruct patient to call for assistance with activity   - Consult OT/PT to assist with strengthening/mobility   - Keep Call bell within reach  - Keep bed low and locked with side rails adjusted as appropriate  - Keep care items and personal belongings within reach  - Initiate and maintain comfort rounds  - Make Fall Risk Sign visible to staff  - Offer Toileting every 2  Hours, in advance of need  - Initiate/Maintain bed alarm  - Obtain necessary fall risk management equipment: bed  - Apply yellow socks and bracelet for high fall risk patients  - Consider moving patient to room near nurses station  Outcome: Progressing

## 2021-06-29 NOTE — PLAN OF CARE
Problem: PHYSICAL THERAPY ADULT  Goal: Performs mobility at highest level of function for planned discharge setting  See evaluation for individualized goals  Description: Treatment/Interventions: Functional transfer training, LE strengthening/ROM, Therapeutic exercise, Endurance training, Cognitive reorientation, Patient/family training, Equipment eval/education, Bed mobility, Gait training  Equipment Recommended: [place]       See flowsheet documentation for full assessment, interventions and recommendations  Note: Prognosis: Fair  Problem List: Decreased strength, Decreased endurance, Impaired balance, Decreased mobility, Decreased cognition  Assessment: Yun Zuñiga is a 80 y o  Male who presents to [address] [place] [address] on 6/28/2021 from [facility] w/ c/o s/p falls and diagnosis of falls  Orders for PT eval and treat received, w/ activity orders of up w/ A and fall precautions  Pt presents w/ comorbidities of HTN, multiple myeloma, cerebral meningioma and personal factors including: advanced age, decreased cognition, positive fall history and inability to perform IADLs  At baseline, pt mobilizes independently without AD, and reports 2 falls in the last 6 months  Upon evaluation, pt presents w/ the following deficits: weakness, impaired balance, decreased endurance and decreased cognition  Pt requires S for bed mobility, S for transfers, and min A x 1  for gait  Pt's clinical presentation is unstable/unpredictable due to need for assist w/ all phases of mobility when usually mobilizing independently, need for input for task focus and mobility technique, recent drastic decline in mobility compared to baseline and recent history of falls  Pt is at an increased risk of falls due to physical, safety awareness and cognitive deficits  Given the above findings, discharge recommendation is for STR if pt from ILF, however if pt admitted from [facility], could return to half-way w/ PT services and use of RW   During this admission, pt would benefit from skilled acute inpatient PT in order to address the abovementioned deficits to maximize function and mobility before DC from acute care  PT Discharge Recommendation: Post acute rehabilitation services (see additional comments below)          See flowsheet documentation for full assessment

## 2021-06-29 NOTE — DISCHARGE SUMMARY
[facility]  Discharge- [facility] Prim 5/24/1927, 80 y o  male MRN: [id number]  Unit/Bed#: S -01 Encounter: [id number]  Primary Care Provider: Maribell Mckenzie MD   Date and time admitted to hospital: 6/28/2021 11:39 AM    * Falls  Assessment & Plan  Patient from [facility]  Reports 2 falls with head strike, no LOC- once yesterday and once this morning  Not on AC  He reports that he gets dizzy with standing up and short of breath with ambulation, resulting in falls  Denies palpitations, chest pain, visual disturbance or weakness  Reports poor p o  Intake for the last few days  Recently started chemotherapy for multiple myeloma with Velcade, last session was 1 week ago     · Head CT with no acute intracranial abnormality  · Vital stable  · EKG:  NSR at 98 bpm  · Labs reveal VINICIUS  --Likely etiology Dehydration in the settting of poor po intake, triamterene-hctz also contributory,    orthostatic hypotension also noted during hospital stay likely age related venous compliance,- bp soft side , stable from medicine standpoint for dc    Plan  Continue follow up with primary care physician  Compression stockings  Continue fall precautions in nursing home facility      VINICIUS (acute kidney injury) ([facility] [address] )  Assessment & Plan  · Creatinine of 2 26 POA  · Baseline creatinine 1-1 3,  · Admission : poor oral intake  · Volume status: euvolemic, no electrolytes imbalances  · Possible etiologies: prerenal in the setting of dehydration/ vol depletion, questionable if there is a post renal component  · Initial management : IVF--> UO +1L, no concerns for urinary retention at this time  Plan  Bmp am  Continue monitoring  Avoid hypotension  Avoid nephrotoxins    Thrombocytopenia (HCC)  Assessment & Plan  · Platelets of 44 POA, previous to that was 71  · Likely secondary to malignancy/bone marrow infiltration vs side effect of chemotherapy  · Monitor with CBC  · No active bleeding, stable at 36  Plan  · Cbc am  · Continue fall precautions at the facility  · Continue to hold aspirin  · Follow up gaudencio w/ hematology 7/6      Multiple myeloma not having achieved remission ([address] )  Assessment & Plan  · Had bone marrow biopsy on 05/11/2021 consistent with plasma cell myeloma  · Getting treatment with weekly Velcade and Decadron  · Has undergone 4 sessions so far, last one being 1 week ago  · Sees Dr Kana Wharton for oncology as outpatient  plan  · Continue to follow-up oncology outpatient    Acquired hypothyroidism  Assessment & Plan  Continue levothyroxine    Essential hypertension  Assessment & Plan  · BP stable  Plan  Continue to hold triamterene-hctz        Discharging Resident Physician: Angelica Lee MD  Attending: Cecil Muñiz MD  PCP: Erica Velasco MD  Admission Date: 6/28/2021  Discharge Date: 06/30/21    Disposition:     Home    Reason for Admission: fall    Consultations During Hospital Stay:  · trauma    Procedures Performed:     · none    Significant Findings / Test Results:     · none    Incidental Findings:   · none    Test Results Pending at Discharge (will require follow up):   · none     Outpatient Tests Requested:  · Bmp in 3 days    Complications:  none    Hospital Course:     Yun Zuñiga is a 80 y o  male patient who originally presented to the hospital on 6/28/2021 due to fall   history of hypertension, hypothyroidism, and multiple myeloma currently receiving chemotherapy who presents with falls  Patient is from assisted living facility and reports 2 falls in the last 2 days  He reports that he fell yesterday after getting up from bed  Patient is poor historian but reports that he initially felt dizzy and with few steps, he felt short of breath, resulting in fall with head strike  He denies LOC, chest pain, palpitations, leg weakness, or visual disturbance    He reports same thing occurred this morning upon arising from bed- he felt dizzy with standing and short of breath with ambulation, resulting in a fall  He reports that both falls were unwitnessed  He sustained skin tears to right-sided forehead and bilateral forearms  He is not on anticoagulation, only takes baby aspirin daily  Denies history of seizures  He is currently receiving chemotherapy for multiple myeloma, last chemo session was 1 week ago  He reports some weakness associated with initiation of chemotherapy  Also reports poor p o  Intake for the last few days  In the ED, vitals were stable  Noted hyponatremia, VINICIUS, and worsening thrombocytopenia  Head CT revealed no acute intracranial abnormalities and chest x-ray was negative  EKG was normal sinus rhythm with no signs of ischemia and troponin was negative x1  He received 1 L normal saline bolus and seen in no acute distress  During his hospital stay, patient remained stable, did not endorse any acute complaints, vinicius and hyponatremia improving with iv fluid hydration , evaluated by physical therapy, patient can continue therapy at his facility  Stable from medicine standpoint for discharge with above recommendations  Condition at Discharge: good     Discharge Day Visit / Exam:     Subjective:  Patient mentioned that he feels good, did not endorsed any acute complaints    Vitals: Blood Pressure: 92/58 (06/30/21 1300)  Pulse: 93 (06/30/21 1107)  Temperature: (!) 97 3 °F (36 3 °C) (06/30/21 1107)  Temp Source: Oral (06/30/21 0300)  Respirations: 16 (06/30/21 1107)  Height: 5' 4 75" (164 5 cm) (06/29/21 1702)  Weight - Scale: 67 4 kg (148 lb 9 4 oz) (06/29/21 1702)  SpO2: 98 % (06/30/21 1107)  Exam:   Physical Exam  Vitals and nursing note reviewed  Constitutional:       General: He is not in acute distress  Appearance: He is normal weight  He is not toxic-appearing  HENT:      Head: Normocephalic and atraumatic  Right Ear: Tympanic membrane normal  There is no impacted cerumen  Left Ear: Tympanic membrane normal  There is no impacted cerumen        Nose: Nose normal  No congestion or rhinorrhea  Mouth/Throat:      Mouth: Mucous membranes are moist       Pharynx: Oropharynx is clear  No oropharyngeal exudate or posterior oropharyngeal erythema  Eyes:      General:         Right eye: No discharge  Left eye: No discharge  Extraocular Movements: Extraocular movements intact  Conjunctiva/sclera: Conjunctivae normal       Pupils: Pupils are equal, round, and reactive to light  Cardiovascular:      Rate and Rhythm: Normal rate  Pulses: Normal pulses  Heart sounds: No murmur heard  No gallop  Pulmonary:      Effort: Pulmonary effort is normal  No respiratory distress  Breath sounds: No wheezing or rales  Chest:      Chest wall: No tenderness  Abdominal:      General: Bowel sounds are normal  There is no distension  Palpations: Abdomen is soft  Tenderness: There is no abdominal tenderness  Musculoskeletal:         General: No swelling or tenderness  Normal range of motion  Cervical back: Normal range of motion  No rigidity  Lymphadenopathy:      Cervical: No cervical adenopathy  Skin:     General: Skin is warm  Capillary Refill: Capillary refill takes 2 to 3 seconds  Neurological:      Mental Status: He is alert and oriented to person, place, and time  Psychiatric:         Mood and Affect: Mood normal          Behavior: Behavior normal          Thought Content: Thought content normal          Judgment: Judgment normal        Discussion with Family:  I spoke with Boris Durham , primary contact of patient, all questions answered  Discharge instructions/Information to patient and family:   See after visit summary for information provided to patient and family  Provisions for Follow-Up Care:  See after visit summary for information related to follow-up care and any pertinent home health orders        Planned Readmission: no     Discharge Medications:  See after visit summary for reconciled discharge medications provided to patient and family        ** Please Note: This note has been constructed using a voice recognition system **

## 2021-06-29 NOTE — ASSESSMENT & PLAN NOTE
Patient from [facility]  Reports 2 falls with head strike, no LOC- once yesterday and once this morning  Not on AC  He reports that he gets dizzy with standing up and short of breath with ambulation, resulting in falls  Denies palpitations, chest pain, visual disturbance or weakness  Reports poor p o  Intake for the last few days  Recently started chemotherapy for multiple myeloma with Velcade, last session was 1 week ago     · Head CT with no acute intracranial abnormality  · Vital stable  · EKG:  NSR at 98 bpm  · Labs reveal VINICIUS   -Likely etiology Dehydration in the settting of poor po intake, triamterene-hctz also contributory, orthostic, low bp    Plan  IV fluid bolus  Compression stockings  Orthostatic vitals  Continue monitoring

## 2021-06-29 NOTE — MALNUTRITION/BMI
This medical record reflects one or more clinical indicators suggestive of malnutrition and/or morbid obesity  Malnutrition Findings:   Adult Malnutrition type: Acute illness (in the setting of chronic illness)  Adult Degree of Malnutrition: Malnutrition of mild degree (related to inadequate oral intake)  Malnutrition Characteristics:  (as evidenced by temporal indentation, 5 8% weight decrease x < 3 5 months  Poor intake prior to admission  Currently treated with oral supplementation )    BMI Findings: Body mass index is 24 92 kg/m²  See Nutrition note dated 6/29/2021 for additional details  Completed nutrition assessment is viewable in the nutrition documentation

## 2021-06-29 NOTE — CASE MANAGEMENT
RUBA LMOVM for Nursing Office at [facility] requesting a return call to review Patient's baseline and ability to return  CM dept will follow for return call  CM spoke with Tyreselizbeth Rodrigez- owner of [facility], to review Patient's baseline and ability to return  Gilbertgulshan Rodrigez reports that Patient is independent at baseline and utilizes no DME  CM reviewed Patient's PT eval and his ability to return to his previous LOC  Julfabylizbeth Rodrigez confirms that Patient is able to return with a RW and will need a PT/OT eval and treat Rx to receive therapy at [facility]  CM met with the Patient at the bedside; Patient was alert and oriented, sitting in the recliner  Patient confirmed that he resides at [facility] and his preference is to return  Patient states that he loves it there and is eager to return  CM reviewed the recommendation for a RW upon dc  Patient states that he does not feel that he would need one  CM reviewed the safety concerns and encouraged the Patient to use the RW until he is feeling stronger and is able to get around safely  Patient verbalized his understanding and is requesting CM to obtain a walker  Patient states that his cousin, Flor Nelson, will provide transport at dc  CM informed SLIM of same and requested PT/OT eval and treat Rx's  CM sent order for RW to [facility] via Sherif Luo's liaison to deliver to Patient's bedside  Cm dept will continue to follow the Patient through dc

## 2021-06-29 NOTE — PHYSICAL THERAPY NOTE
PHYSICAL THERAPY EVALUATION NOTE    Patient Name: Shania Coles  LGUUI'Z Date: 2021     AGE:   80 y o  Mrn:   [id number]  ADMIT DX:  Wheezing [R06 2]  Cough [R05]  Thrombocyt[facility] ([facility] ) [D69 6]  Bruising [T14  8XXA]  Abrasion [T14  8XXA]  VINICIUS (acute kidney injury) ([facility] ) [N17 9]  Low O2 saturation [R79 81]    Past Medical History:   Diagnosis Date    Arthritis     Gout     Hypertension     Hyperthyroidism     Memory loss     Multiple myeloma ([facility] )     Myeloma ([place] [address] )     Sleep disturbance     Thyroid disease      Length Of Stay: 0  PHYSICAL THERAPY EVALUATION :   Patient's identity confirmed via 2 patient identifiers (full name and ) at start of session       21 0910   PT Last Visit   PT Visit Date 21   Note Type   Note type Evaluation   Pain Assessment   Pain Assessment Tool Pain Assessment not indicated - pt denies pain   Pain Score No Pain   Home Living   Type of Home Other (Comment)  Oswaldo Nash III)   Home Layout One level   Bathroom Shower/Tub Walk-in shower   Bathroom Equipment Grab bars in shower; Shower chair   Additional Comments "No walker, no cane, just my legs"; no DME use PTA  Unclear if pt from ILF vs [facility],will f/u w/ CM   Prior Function   Level of [place] Independent with ADLs and functional mobility   Lives With Alone   Receives Help From Friend(s); Family   ADL Assistance Independent   IADLs Needs assistance  (- drives, ambulates to dining reed)   Falls in the last 6 months 1 to 4  (2)   Vocational Retired  ()   Restrictions/Precautions   Wells Alessandro Bearing Precautions Per Order No   Other Precautions Cognitive; Chair Alarm; Bed Alarm; Fall Risk;Multiple lines  (IV pole)   General   Family/Caregiver Present Yes  (Friend at bedside)   Cognition   Overall Cognitive Status Impaired   Arousal/Participation Alert   Orientation Level Oriented X4  (Pt identified by full name and ) Memory Decreased short term memory   Following Commands Follows one step commands with increased time or repetition   Comments Pt is very pleasant and agreeable to participate in PT evaluation, impaired insight to deficits and decreased STM ( see OT for cog eval)  Pt does report feeling more off balance than normal   RLE Assessment   RLE Assessment WFL   Strength RLE   RLE Overall Strength 4-/5   LLE Assessment   LLE Assessment WFL   Strength LLE   LLE Overall Strength 4-/5   Bed Mobility   Supine to Sit 5  Supervision   Additional items Assist x 1;HOB elevated; Increased time required;Verbal cues  (increased time)   Sit to Supine Unable to assess   Additional Comments Pt seated OOB in recliner chair at end of eval w/ chair alarm activated, call bell and room phone within reach w/ all needs met   Transfers   Sit to Stand 5  Supervision   Additional items Assist x 1; Increased time required   Stand to Sit 5  Supervision   Additional items Assist x 1; Increased time required   Additional Comments Pt w/ multiple small LOBS when ambulating without AD, benefits from use of RW but still unsafe w/ RW without VC at this time (especially w/ turns)   Ambulation/Elevation   Gait pattern Decreased foot clearance; Improper Weight shift; Short stride   Gait Assistance 4  Minimal assist   Additional items Assist x 1;Verbal cues  (RW management when RW introduced)   Assistive Device None;Rolling walker   Distance 40 ft, 120 ft  (40 no AD, 120 w/ RW)   Balance   Static Sitting Fair +   Static Standing Fair   Ambulatory Poor +   Activity Tolerance   Activity Tolerance Other (Comment)  (limited due to decreased cognition)   Medical Staff Made Aware OT Phyllis Bennett from trauma   Nurse Made Aware Spoke to RN Hazel   Assessment   Prognosis Fair   Problem List Decreased strength;Decreased endurance; Impaired balance;Decreased mobility; Decreased cognition   Assessment Laurie Vásquez is a 80 y o   Male who presents to [facility] on 6/28/2021 from [facility] w/ c/o s/p falls and diagnosis of falls  Orders for PT eval and treat received, w/ activity orders of up w/ A and fall precautions  Pt presents w/ comorbidities of HTN, multiple myeloma, cerebral meningioma and personal factors including: advanced age, decreased cognition, positive fall history and inability to perform IADLs  At baseline, pt mobilizes independently without AD, and reports 2 falls in the last 6 months  Upon evaluation, pt presents w/ the following deficits: weakness, impaired balance, decreased endurance and decreased cognition  Pt requires S for bed mobility, S for transfers, and min A x 1  for gait  Pt's clinical presentation is unstable/unpredictable due to need for assist w/ all phases of mobility when usually mobilizing independently, need for input for task focus and mobility technique, recent drastic decline in mobility compared to baseline and recent history of falls  Pt is at an increased risk of falls due to physical, safety awareness and cognitive deficits  Given the above findings, discharge recommendation is for STR if pt from ILF, however if pt admitted from [facility], could return to [facility] w/ PT services and use of RW  During this admission, pt would benefit from skilled acute inpatient PT in order to address the abovementioned deficits to maximize function and mobility before DC from acute care  Goals   Patient Goals to go home today   STG Expiration Date 07/09/21   Short Term Goal #1 Patient will:  Increase bilateral LE strength 1/2 grade to facilitate independent mobility, Perform all bed mobility tasks modified independent to decrease fall risk factors, Perform all transfers modified independent to improve independence, Ambulate at least 150 ft  +/- LRAD modified independent w/o LOB, Increase all balance 1/2 grade to decrease risk for falls, Complete exercise program independently and Tolerate 3 hr OOB to faciliate upright tolerance    PT Treatment Day 0   Plan Treatment/Interventions Functional transfer training;LE strengthening/ROM; Therapeutic exercise; Endurance training;Cognitive reorientation;Patient/family training;Equipment eval/education; Bed mobility;Gait training   PT Frequency Other (Comment)  (3-5x/wk)   Recommendation   PT Discharge Recommendation Post acute rehabilitation services  (see additional comments below)   Equipment Recommended T[employer]energy walker   Change/add to [employer]? No   Additional Comments Pending clarification of pt's previous living environment, would benefit from STR if from ILF, if from assisted could return w/ PT pending level of A available   AM-PAC Basic Mobility Inpatient   Turning in Bed Without Bedrails 3   Lying on Back to Sitting on Edge of Flat Bed 3   Moving Bed to Chair 3   Standing Up From Chair 3   Walk in Room 3   Climb 3-5 Stairs 2   Basic Mobility Inpatient Raw Score 17   Basic Mobility Standardized Score 39 67   Barthel Index   Feeding 10   Bathing 0   Grooming Score 5   Dressing Score 5   Bladder Score 10   Bowels Score 10   Toilet Use Score 5   Transfers (Bed/Chair) Score 10   Mobility (Level Surface) Score 10   Stairs Score 0   Barthel Index Score 65     Pt seen as a co-eval due to the patient's co-morbidities, clinically unstable presentation, and present impairments which are a regression from the patient's baseline  The patient's AM-[facility] Basic Mobility Inpatient Short Form Raw Score is 17, Standardized Score is 39 67  A standardized score less than 42 9 suggests the patient may benefit from discharge to post-acute rehabilitation services which may not  coincide with above PT recommendations  However please refer to therapist recommendation for discharge planning given other factors that may influence destination  Adapted from Gustavo Elias Use of 6-clicks to Provide Decision Support in the [facility] Setting  [address], [facility] [place]  [facility]  Pt would benefit from skilled inpatient PT during this admission in order to facilitate progress towards goals to maximize functional independence    Anderson Davis, PT, DPT

## 2021-06-29 NOTE — DISCHARGE INSTR - AVS FIRST PAGE
Dear Nicky Proctor,     It was our pleasure to care for you here at [facility]  It is our hope that we were always able to exceed the expected standards for your care during your stay  You were hospitalized due to fall  You were cared for on the 2nd floor by Jada Persaud MD under the service of Sharonda Dao MD with the [facility] who covers for your primary care physician (PCP), Cinthya Mckinney MD, while you were hospitalized  If you have any questions or concerns related to this hospitalization, you may contact us at [phone]  For follow up as well as any medication refills, we recommend that you follow up with your primary care physician  A registered nurse will reach out to you by phone within a few days after your discharge to answer any additional questions that you may have after going home  However, at this time we provide for you here, the most important instructions / recommendations at discharge:     · Notable Medication Adjustments -   · Aspirin and triamterene hctz were held   · Testing Required after Discharge -   · Bmp in 3 days  · Important follow up information -   · Please follow as instructed below  · Other Instructions -   · Please continue the rest of your home medications  · Please continue to follow with hematology outpatient  · An order for a bmp in 3 days has been submitted, please have it done in order for your pcp evaluate your kidney function   · Please continue to hold asa and triamterene hctz until further discussion with your pcp  · Please continue physical therapy and occupational therapy at your facility  · Please review this entire after visit summary as additional general instructions including medication list, appointments, activity, diet, any pertinent wound care, and other additional recommendations from your care team that may be provided for you        Sincerely,     Jada Persaud MD

## 2021-06-29 NOTE — ASSESSMENT & PLAN NOTE
Patient from [facility]  Reports 2 falls with head strike, no LOC- once yesterday and once this morning  Not on AC  He reports that he gets dizzy with standing up and short of breath with ambulation, resulting in falls  Denies palpitations, chest pain, visual disturbance or weakness  Reports poor p o  Intake for the last few days  Recently started chemotherapy for multiple myeloma with Velcade, last session was 1 week ago     · Head CT with no acute intracranial abnormality  · Vital stable  · EKG:  NSR at 98 bpm  · Labs reveal VINICISU  --Likely etiology Dehydration in the settting of poor po intake, triamterene-hctz also contributory,    -orthostatic hypotension also noted during hospital stay likely age related venous compliance,- bp soft side ,could not tolerate standing  Plan  Continue IV fluids  Patient may benefit of midodrine  Compression stockings  Continue fall precautions

## 2021-06-29 NOTE — ASSESSMENT & PLAN NOTE
· Platelets of 44 POA, previous to that was 70  · Likely secondary to malignancy/bone marrow infiltration vs side effect of chemotherapy  · Monitor with CBC  · No active bleeding  Plan  · Cbc am  · Follow up gaudencio w/ hematology 7/6

## 2021-06-29 NOTE — OCCUPATIONAL THERAPY NOTE
Occupational Therapy Evaluation     Patient Name: Yelena MARIEU Date: 6/29/2021  Problem List  Principal Problem:    Falls  Active Problems:    Essential hypertension    Acquired hypothyroidism    Multiple myeloma not having achieved remission ([facility] [address] )    Thrombocytopenia ([facility] [address] )    VINICIUS (acute kidney injury) ([facility] [address] )    Abrasions of multiple sites    Past Medical History  Past Medical History:   Diagnosis Date    Arthritis     Gout     Hypertension     Hyperthyroidism     Memory loss     Multiple myeloma ([facility] )     Myeloma ([facility] [facility] )     Sleep disturbance     Thyroid disease      Past Surgical History  Past Surgical History:   Procedure Laterality Date    IR BIOPSY BONE MARROW  5/11/2021    KIDNEY SURGERY      description: 30 yrs ago    PROSTATE SURGERY      description: stone removal 30 yrs ago    REPLACEMENT TOTAL KNEE BILATERAL      description: 18 yrs ago             06/29/21 0856   OT Last Visit   OT Visit Date 06/29/21   Note Type   Note type Evaluation   Restrictions/Precautions   Weight Bearing Precautions Per Order No   Other Precautions Cognitive; Chair Alarm; Bed Alarm; Fall Risk;Multiple lines  (IV pole)   Pain Assessment   Pain Assessment Tool Pain Assessment not indicated - pt denies pain   Pain Score No Pain   Home Living   Type of Home   ([place] (questionable [facility] vs ILF))   Home Layout One level; Able to live on main level with bedroom/bathroom   Bathroom Shower/Tub Walk-in shower   Bathroom Toilet Standard   Bathroom Equipment Grab bars in shower; Shower chair   Bathroom Accessibility Accessible   Additional Comments no use of AD at baseline   Prior Function   Level of [place] Independent with ADLs and functional mobility   Lives With Alone   Receives Help From Family;Friend(s)   ADL Assistance Independent   IADLs Needs assistance  (goes to dining halls for meals, assist with meds)   Falls in the last 6 months 1 to 4  (2)   Vocational Retired  ()   Comments (-)drives Lifestyle   Autonomy PTA pt living at [facility], unclear if pt residing in [facility] vs ILF, pt (I) with ADLs and requires (A) with IADLs, no use of AD at baseline, (+)falls, (-)drives   Reciprocal Relationships supportive friends   Service to Others retired, worked as an    [address] enjoys talking on the phone   Subjective   Subjective "I have the best luck with all the pretty nurses"   ADL   Eating Assistance 5  Supervision/Setup   Grooming Assistance 5  [address] [facility] 5  [address] [facility] 5  [address] [facility] 5  Supervision/Setup   LB Dressing Deficit Increased time to complete;Verbal cueing;Supervision/safety; Don/doff R sock; Don/doff L sock   Toileting Assistance  4  Minimal Assistance   Bed Mobility   Supine to Sit 5  Supervision   Additional items Increased time required   Additional Comments OOB and in chair at end of session   Transfers   Sit to Stand 5  Supervision   Additional items Increased time required;Verbal cues   Stand to Sit 5  Supervision   Additional items Increased time required;Verbal cues   Functional Mobility   Functional Mobility 4  Minimal assistance   Additional Comments Ax1, limited distance, x3 LOB, pt initially walking without AD and reaching out to furniture for support, demonstrating improved balance with RW with only x1 LOB   Additional items Rolling walker   Balance   Static Sitting Fair +   Dynamic Sitting Fair +   Static Standing Fair   Dynamic Standing Fair -   Ambulatory Poor +   Activity Tolerance   Activity Tolerance Patient tolerated treatment well   Medical Staff Made Aware PT Maco MADISON Trauma   Nurse Made Aware KAITLIN PEÑA Assessment   RUE Assessment WFL   LUE Assessment   LUE Assessment WFL   Hand Function   Gross Motor Coordination Functional   Fine Motor Coordination Functional   Cognition   Overall Cognitive Status Impaired   Arousal/Participation Alert; Cooperative   Attention Attends with cues to redirect   Orientation Level Oriented X4   Memory Decreased recall of recent events;Decreased short term memory   Following Commands Follows one step commands with increased time or repetition   Comments Pt pleasantly confused, decreased STM (reports having difficulty with remembering names and lists)  Pt educated on techniques for improving memory at home and overall safety  Pt participating in Short Blessed Test and scoring 12/28, scores >9 indicate impairment consistent with dementia   Cognition Assessment Tools   (Short Blessed Test)   Score 9   Assessment   Limitation Decreased ADL status; Decreased UE strength;Decreased Safe judgement during ADL;Decreased cognition;Decreased endurance;Decreased self-care trans;Decreased high-level ADLs   Prognosis Good   Assessment Patient is a 80 y o  male admitted to [facility] on 6/28/2021 due to Falls  Comorbidities affecting pt's physical performance at time of assessment include HTN, hypothyroidism, multiple myeloma, VINICIUS, abrasions  Patient has active OT orders and activity orders for Up with assistance  PTA pt living at [facility], unclear if pt residing in assisted vs ILF, pt (I) with ADLs and requires (A) with IADLs, no use of AD at baseline, (+)falls, (-)drives  Personal factors affecting pt at time of IE include:difficulty performing ADLS, difficulty performing IADLS , limited insight into deficits and decreased initiation and engagement   At the time of evaluation patient currently requires (S) for UB ADLs, min A for LB ADLs, (S) for functional transfers, and min A for functional mobility   The following deficits affected patient's occupational performance weakness, decreased functional strength, decreased functional balance, decreased activity tolerance, decreased safety awareness, impaired memory, impaired sequencing, impaired problem solving, impaired interpersonal skills and decreased coping skills  Patient would benefit from skilled OT services while in the hospital to address above deficits  Occupational performance areas to be addressed include ADL retraining, bed mobility, functional transfer training, endurance training, cognitive reorientation, patient/family training, equipment evaluation/education, activity engagement and activity tolerance in order to maximize patient's level of function  The patient's raw score on the AM-PAC Daily Activity inpatient short form is 20, standardized score is 42 03, greater than 39 4  Patients at this level are likely to benefit from discharge to home  However, recommend d/c to PAR if pt currently residing in ILF as pt requires to be mod I with all ADLs, if pt resides in [facility] recommend return to [facility] with [facility] when medically cleared  Will continue to follow 2-3x/wk to address goals listed below  Goals   Patient Goals to go home today   LTG Time Frame 10-14   Long Term Goal see goals listed below   Plan   Treatment Interventions ADL retraining;Functional transfer training;UE strengthening/ROM; Endurance training;Cognitive reorientation;Patient/family training;Equipment evaluation/education; Compensatory technique education; Energy conservation; Activityengagement   Goal Expiration Date 07/13/21   OT Treatment Day 0   OT Frequency 3-5x/wk   Recommendation   OT Discharge Recommendation Post acute rehabilitation services  (if pt residing in ILF, if living in [facility] recommend HHOT)   AM-PAC Daily Activity Inpatient   Lower Body Dressing 3   Bathing 3   Toileting 3   Upper Body Dressing 3   Grooming 4   Eating 4   Daily Activity Raw Score 20   Daily Activity Standardized Score (Calc for Raw Score >=11) 42 03   AM-PAC Applied Cognition Inpatient   Following a Speech/Presentation 3   Understanding Ordinary Conversation 3   Taking Medications 2   Remembering Where Things Are Placed or Put Away 1   Remembering List of 4-5 Errands 1   Taking Care of Complicated Tasks 1   Applied Cognition Raw Score 11   Applied Cognition Standardized Score 27 03            Goals    -Patient will complete UB ADLs w/ mod I using AE and AD as needed    -Patient will complete LB ADLs w/ mod I using AE and AD as needed    -Patient will complete toileting w/ mod I w/ G hygiene/thoroughness    -Patient will complete bed mobility with Mod I without use of bed rails    -Patient will tolerate therapeutic activities for greater than 15 min, in order to increase tolerance for functional activities      -Patient will perform functional transfers with Mod I to/from all surfaces using DME as needed    -Patient will complete functional mobility during ADL/IADL/leisure tasks with Mod I     -Patient will follow multistep instructions with no cuing to increase cognitive function and independence with tasks     -Patient will be attentive 100% of the time during ongoing cognitive assessment w/ G participation to assist w/ safe d/c planning/recommendations                At the end of the session, all needs met and pt seated in bedside chair, chair alarm activated and call bell within reach    [facility], OTR/L

## 2021-06-29 NOTE — ASSESSMENT & PLAN NOTE
· Platelets of 44 POA, previous to that was 70  · Likely secondary to malignancy/bone marrow infiltration vs side effect of chemotherapy  · Monitor with CBC  · No active bleeding, stable at 36  Plan  · Cbc am  · Continue fall precautions   · Continue to hold aspirin  · Follow up gaudencio w/ hematology 7/6

## 2021-06-29 NOTE — ASSESSMENT & PLAN NOTE
· Had bone marrow biopsy on 05/11/2021 consistent with plasma cell myeloma  · Getting treatment with weekly Velcade and Decadron  · Has undergone 4 sessions so far, last one being 1 week ago  · Sees Dr Anton Beckford for oncology as outpatient  plan  · Continue to follow-up oncology outpatient

## 2021-06-30 PROBLEM — E44.1 MILD PROTEIN-CALORIE MALNUTRITION (HCC): Status: ACTIVE | Noted: 2021-06-30

## 2021-06-30 LAB
ANION GAP SERPL CALCULATED.3IONS-SCNC: 11 MMOL/L (ref 4–13)
ATRIAL RATE: 98 BPM
BASOPHILS # BLD AUTO: 0.01 THOUSANDS/ΜL (ref 0–0.1)
BASOPHILS NFR BLD AUTO: 0 % (ref 0–1)
BUN SERPL-MCNC: 49 MG/DL (ref 5–25)
CALCIUM SERPL-MCNC: 7.8 MG/DL (ref 8.3–10.1)
CHLORIDE SERPL-SCNC: 109 MMOL/L (ref 100–108)
CO2 SERPL-SCNC: 19 MMOL/L (ref 21–32)
CREAT SERPL-MCNC: 1.17 MG/DL (ref 0.6–1.3)
EOSINOPHIL # BLD AUTO: 0 THOUSAND/ΜL (ref 0–0.61)
EOSINOPHIL NFR BLD AUTO: 0 % (ref 0–6)
ERYTHROCYTE [DISTWIDTH] IN BLOOD BY AUTOMATED COUNT: 15.6 % (ref 11.6–15.1)
GFR SERPL CREATININE-BSD FRML MDRD: 53 ML/MIN/1.73SQ M
GLUCOSE SERPL-MCNC: 93 MG/DL (ref 65–140)
HCT VFR BLD AUTO: 29 % (ref 36.5–49.3)
HGB BLD-MCNC: 9.8 G/DL (ref 12–17)
IMM GRANULOCYTES # BLD AUTO: 0.03 THOUSAND/UL (ref 0–0.2)
IMM GRANULOCYTES NFR BLD AUTO: 0 % (ref 0–2)
LYMPHOCYTES # BLD AUTO: 0.99 THOUSANDS/ΜL (ref 0.6–4.47)
LYMPHOCYTES NFR BLD AUTO: 15 % (ref 14–44)
MCH RBC QN AUTO: 36.6 PG (ref 26.8–34.3)
MCHC RBC AUTO-ENTMCNC: 33.8 G/DL (ref 31.4–37.4)
MCV RBC AUTO: 108 FL (ref 82–98)
MONOCYTES # BLD AUTO: 0.67 THOUSAND/ΜL (ref 0.17–1.22)
MONOCYTES NFR BLD AUTO: 10 % (ref 4–12)
NEUTROPHILS # BLD AUTO: 5.13 THOUSANDS/ΜL (ref 1.85–7.62)
NEUTS SEG NFR BLD AUTO: 75 % (ref 43–75)
NRBC BLD AUTO-RTO: 0 /100 WBCS
P AXIS: 85 DEGREES
PLATELET # BLD AUTO: 36 THOUSANDS/UL (ref 149–390)
POTASSIUM SERPL-SCNC: 3.4 MMOL/L (ref 3.5–5.3)
PR INTERVAL: 204 MS
QRS AXIS: -14 DEGREES
QRSD INTERVAL: 88 MS
QT INTERVAL: 316 MS
QTC INTERVAL: 403 MS
RBC # BLD AUTO: 2.68 MILLION/UL (ref 3.88–5.62)
SODIUM SERPL-SCNC: 139 MMOL/L (ref 136–145)
T WAVE AXIS: 113 DEGREES
VENTRICULAR RATE: 98 BPM
WBC # BLD AUTO: 6.83 THOUSAND/UL (ref 4.31–10.16)

## 2021-06-30 PROCEDURE — 85025 COMPLETE CBC W/AUTO DIFF WBC: CPT | Performed by: INTERNAL MEDICINE

## 2021-06-30 PROCEDURE — 80048 BASIC METABOLIC PNL TOTAL CA: CPT | Performed by: INTERNAL MEDICINE

## 2021-06-30 PROCEDURE — 99232 SBSQ HOSP IP/OBS MODERATE 35: CPT | Performed by: HOSPITALIST

## 2021-06-30 PROCEDURE — 93010 ELECTROCARDIOGRAM REPORT: CPT | Performed by: INTERNAL MEDICINE

## 2021-06-30 RX ORDER — POTASSIUM CHLORIDE 20 MEQ/1
40 TABLET, EXTENDED RELEASE ORAL ONCE
Status: COMPLETED | OUTPATIENT
Start: 2021-06-30 | End: 2021-06-30

## 2021-06-30 RX ADMIN — SODIUM CHLORIDE 100 ML/HR: 0.9 INJECTION, SOLUTION INTRAVENOUS at 03:18

## 2021-06-30 RX ADMIN — SENNOSIDES 8.6 MG: 8.6 TABLET, FILM COATED ORAL at 20:27

## 2021-06-30 RX ADMIN — BACITRACIN ZINC 1 SMALL APPLICATION: 500 OINTMENT TOPICAL at 17:43

## 2021-06-30 RX ADMIN — SODIUM CHLORIDE 250 ML: 0.9 INJECTION, SOLUTION INTRAVENOUS at 13:26

## 2021-06-30 RX ADMIN — POTASSIUM CHLORIDE 40 MEQ: 1500 TABLET, EXTENDED RELEASE ORAL at 08:18

## 2021-06-30 RX ADMIN — DOCUSATE SODIUM 100 MG: 100 CAPSULE, LIQUID FILLED ORAL at 17:44

## 2021-06-30 RX ADMIN — BACITRACIN ZINC 1 SMALL APPLICATION: 500 OINTMENT TOPICAL at 08:20

## 2021-06-30 RX ADMIN — ALLOPURINOL 200 MG: 100 TABLET ORAL at 08:18

## 2021-06-30 RX ADMIN — DOCUSATE SODIUM 100 MG: 100 CAPSULE, LIQUID FILLED ORAL at 08:20

## 2021-06-30 RX ADMIN — LEVOTHYROXINE SODIUM 100 MCG: 100 TABLET ORAL at 06:20

## 2021-06-30 RX ADMIN — PANTOPRAZOLE SODIUM 20 MG: 20 TABLET, DELAYED RELEASE ORAL at 08:19

## 2021-06-30 NOTE — UTILIZATION REVIEW
Initial Inpatient Review  [email or web address] [email or web address] CONVERTED TO INPATIENT ADMISSION 6/30/21 @2043 DUE TO CONTINUED STAY REQUIRED TO EVALUATE AND TREAT PATIENT WITH ORTHOSTATIC HYPOTENSION AND INABILITY TO TOLERATE STAND WITH CONTINUED IVF AND  FALL MONITORING  Date: 6/30/21                          Admission Orders (From admission, onward)     Ordered        06/30/21 2043  Inpatient Admission  Once         06/28/21 1415  Place in Observation  Once                   Orders Placed This Encounter   Procedures    Inpatient Admission     Standing Status:   Standing     Number of Occurrences:   1     Order Specific Question:   Level of Care     Answer:   Med Surg [16]     Order Specific Question:   Estimated length of stay     Answer:   More than 2 Midnights     Order Specific Question:   Certification     Answer:   I certify that inpatient services are medically necessary for this patient for a duration of greater than two midnights  See H&P and MD Progress Notes for additional information about the patient's course of treatment  HPI:89 y o  male with hx HTN, hypothyroid multiple myeloma receiving chemotherapy initially admitted on 6/28/21 with fall likely 2/2 dehydration and VINICIUS, thrombocytopenia  Assessment/Plan:   6/29 afternoon-pt had symptomatic orthostatic BP changes into 60's with standing  Pt continues on IVF, will bolus , and will repeat orthostatics 6/30 after additional fluid  Compression stockings ordered  6/30  Potassium low , repleted  Creat normalized  BP low today , Orthostatics completed this am-still with orthostatic hypotension  Orthostatic hypotension also noted during previous hospital stay likely age related venous compliance,- bp soft side , pt unable to tolerates standing  Pt to continue on IVF, may benefit from Midodrine  Ensure added to diet plan as pt BMI=24 92, malnutrition  Platelets stable at 36  ASA on hold  Continue with fall monitoring    Vital Signs:   Date/Time  Temp  Pulse Resp  BP  MAP (mmHg)  SpO2   Patient Position - Orthostatic VS   06/30/21 0926  --  105  --  89/50Abnormal   --  --   Standing - Orthostatic VS   06/30/21 0923  --  100  --  99/60  --  --   Standing - Orthostatic VS   06/30/21 0922  --  107Abnormal   --  105/44Abnormal   --  --   Sitting - Orthostatic VS   06/30/21 0921  --  97  16  90/54  --  --   Lying - Orthostatic VS   06/30/21 07:22:54  97 6 °F (36 4 °C)  99  16  128/62  87  97 %   Lying   06/30/21 0300  97 5 °F (36 4 °C)  82  16  98/50  67  96 %   Lying   06/29/21 2300  98 °F (36 7 °C)  91  16  104/52  73  96 %   Lying   06/29/21 19:33:45  97 9 °F (36 6 °C)  94  16  112/63  78  97 %   Lying   06/29/21 1602  97 5 °F (36 4 °C)  91  16  101/62  77  97 %   --   06/29/21 1438  --  --  --  65/45Abnormal   41  --   Standing for 3 minutes - Orthostatic VS   06/29/21 1437  --  106Abnormal   --  63/39Abnormal   47  95 %   Standing - Orthostatic VS   06/29/21 1436  --  98  --  93/50  62  96 %   Sitting - Orthostatic VS   06/29/21 1433  --  69  --  123/65  90  98 %   Lying - Orthostatic VS   06/29/21 1428  --  62  17  111/57  77  96 %   Sitting         Pertinent Labs/Diagnostic Results:       Results from last 7 days   Lab Units 06/30/21  0544 06/29/21  0451 06/28/21  1157   WBC Thousand/uL 6 83 6 46 7 10   HEMOGLOBIN g/dL 9 8* 10 3* 11 6*   HEMATOCRIT % 29 0* 30 6* 34 1*   PLATELETS Thousands/uL 36* 36* 44*   NEUTROS ABS Thousands/µL 5 13  --  4 80         Results from last 7 days   Lab Units 06/30/21  0544 06/29/21  0451 06/28/21  1157   SODIUM mmol/L 139 138 133*   POTASSIUM mmol/L 3 4* 3 9 3 9   CHLORIDE mmol/L 109* 107 101   CO2 mmol/L 19* 21 19*   ANION GAP mmol/L 11 10 13   BUN mg/dL 49* 76* 97*   CREATININE mg/dL 1 17 1 59* 2 26*   EGFR ml/min/1 73sq m 53 37 24   CALCIUM mg/dL 7 8* 7 7* 8 0*     Results from last 7 days   Lab Units 06/29/21  0451 06/28/21  1157   AST U/L 34 30   ALT U/L 24 30   ALK PHOS U/L 130* 155*   TOTAL PROTEIN g/dL 5 5* 6 3*   ALBUMIN g/dL 2  5* 2 9*   TOTAL BILIRUBIN mg/dL 0 44 0 38     Results from last 7 days   Lab Units 06/29/21  1423   POC GLUCOSE mg/dl 150*     Results from last 7 days   Lab Units 06/30/21  0544 06/29/21  0451 06/28/21  1157   GLUCOSE RANDOM mg/dL 93 103 117           Results from last 7 days   Lab Units 06/28/21  1157   TROPONIN I ng/mL 0 04         Results from last 7 days   Lab Units 06/28/21  1157   PROTIME seconds 15 4*   INR  1 21*   PTT seconds 24           Medications:   Scheduled Medications:  allopurinol, 200 mg, Oral, Daily  bacitracin, 1 small application, Topical, BID  docusate sodium, 100 mg, Oral, BID  levothyroxine, 100 mcg, Oral, Early Morning  pantoprazole, 20 mg, Oral, Daily  senna, 1 tablet, Oral, HS  potassium chloride (K-DUR,KLOR-CON) CR tablet 40 mEq   Dose: 40 mEq  Freq: Once Route: PO x1 6/30    Continuous IV Infusions:  sodium chloride, 100 mL/hr, Intravenous, Continuous      PRN Meds:  acetaminophen, 650 mg, Oral, Q6H PRN  ondansetron, 4 mg, Oral, Q6H PRN  polyethylene glycol, 17 g, Oral, Daily PRN        Discharge Plan: TBD    Network Utilization Review Department  ATTENTION: Please call with any questions or concerns to [phone] and carefully listen to the prompts so that you are directed to the right person  All voicemails are confidential   Amandeep Sloop all requests for admission clinical reviews, approved or denied determinations and any other requests to dedicated fax number below belonging to the campus where the patient is receiving treatment   List of dedicated fax numbers for the Facilities:  [address] DENIALS (Administrative/Medical Necessity) [phone]   [address] [place] [address] (Maternity/NICU/Pediatrics) 261 [facility] [place] [facility] [address] [id number] [place] [address] [address]av[address] Luisana [address] [phone]

## 2021-06-30 NOTE — ASSESSMENT & PLAN NOTE
Malnutrition Findings:   Adult Malnutrition type: Acute illness (in the setting of chronic illness)  Adult Degree of Malnutrition: Malnutrition of mild degree (related to inadequate oral intake)    BMI Findings: Body mass index is 24 92 kg/m²     Will add ensure

## 2021-06-30 NOTE — PLAN OF CARE
Problem: MOBILITY - ADULT  Goal: Maintain or return to baseline ADL function  Description: INTERVENTIONS:  -  Assess patient's ability to carry out ADLs; assess patient's baseline for ADL function and identify physical deficits which impact ability to perform ADLs (bathing, care of mouth/teeth, toileting, grooming, dressing, etc )  - Assess/evaluate cause of self-care deficits   - Assess range of motion  - Assess patient's mobility; develop plan if impaired  - Assess patient's need for assistive devices and provide as appropriate  - Encourage maximum independence but intervene and supervise when necessary  - Involve family in performance of ADLs  - Assess for home care needs following discharge   - Consider OT consult to assist with ADL evaluation and planning for discharge  - Provide patient education as appropriate  Outcome: Progressing  Goal: Maintains/Returns to pre admission functional level  Description: INTERVENTIONS:  - Perform BMAT or MOVE assessment daily    - Set and communicate daily mobility goal to care team and patient/family/caregiver  - Collaborate with rehabilitation services on mobility goals if consulted  - Perform Range of Motion 3 times a day  - Reposition patient every2* hours    - Dangle patient 3 times a day  - Stand patient 3 times a day  - Ambulate patient 3 times a day  - Out of bed to chair 3 times a day   - Out of bed for meals 3 times a day  - Out of bed for toileting  - Record patient progress and toleration of activity level   Outcome: Progressing     Problem: Potential for Falls  Goal: Patient will remain free of falls  Description: INTERVENTIONS:  - Educate patient/family on patient safety including physical limitations  - Instruct patient to call for assistance with activity   - Consult OT/PT to assist with strengthening/mobility   - Keep Call bell within reach  - Keep bed low and locked with side rails adjusted as appropriate  - Keep care items and personal belongings within reach  - Initiate and maintain comfort rounds  - Make Fall Risk Sign visible to staff  - Offer Toileting every 3 Hours, in advance of need  - Initiate/Maintain alarm  - Obtain necessary fall risk management equipment: *  - Apply yellow socks and bracelet for high fall risk patients  - Consider moving patient to room near nurses station  Outcome: Progressing     Problem: Nutrition/Hydration-ADULT  Goal: Nutrient/Hydration intake appropriate for improving, restoring or maintaining nutritional needs  Description: Monitor and assess patient's nutrition/hydration status for malnutrition  Collaborate with interdisciplinary team and initiate plan and interventions as ordered  Monitor patient's weight and dietary intake as ordered or per policy  Utilize nutrition screening tool and intervene as necessary  Determine patient's food preferences and provide high-protein, high-caloric foods as appropriate       INTERVENTIONS:  - Monitor oral intake, urinary output, labs, and treatment plans  - Assess nutrition and hydration status and recommend course of action  - Evaluate amount of meals eaten  - Assist patient with eating if necessary   - Allow adequate time for meals  - Recommend/ encourage appropriate diets, oral nutritional supplements, and vitamin/mineral supplements  - Order, calculate, and assess calorie counts as needed  - Recommend, monitor, and adjust tube feedings and TPN/PPN based on assessed needs  - Assess need for intravenous fluids  - Provide specific nutrition/hydration education as appropriate  - Include patient/family/caregiver in decisions related to nutrition  Outcome: Progressing

## 2021-06-30 NOTE — PROGRESS NOTES
[facility]  Progress Note Ramy Arriaga 5/24/1927, 80 y o  male MRN: [id number]  Unit/Bed#: S -01 Encounter: [id number]  Primary Care Provider: Phyllis Macedo MD   Date and time admitted to hospital: 6/28/2021 11:39 AM    * Falls  Assessment & Plan  Patient from [facility]  Reports 2 falls with head strike, no LOC- once yesterday and once this morning  Not on AC  He reports that he gets dizzy with standing up and short of breath with ambulation, resulting in falls  Denies palpitations, chest pain, visual disturbance or weakness  Reports poor p o  Intake for the last few days  Recently started chemotherapy for multiple myeloma with Velcade, last session was 1 week ago  · Head CT with no acute intracranial abnormality  · Vital stable  · EKG:  NSR at 98 bpm  · Labs reveal VINICIUS  --Likely etiology Dehydration in the settting of poor po intake, triamterene-hctz also contributory,    -orthostatic hypotension also noted during hospital stay likely age related venous compliance,- bp soft side ,could not tolerate standing  Plan  Continue IV fluids  Patient may benefit of midodrine  Compression stockings  Continue fall precautions      Mild protein-calorie malnutrition (HCC)  Assessment & Plan  Malnutrition Findings:   Adult Malnutrition type: Acute illness (in the setting of chronic illness)  Adult Degree of Malnutrition: Malnutrition of mild degree (related to inadequate oral intake)    BMI Findings: Body mass index is 24 92 kg/m²     Will add ensure    VINICIUS (acute kidney injury) ([facility] [address] )  Assessment & Plan  · Creatinine of 2 26 POA  · Baseline creatinine 1-1 3,  · Admission : poor oral intake  · Volume status: euvolemic, no electrolytes imbalances  · Possible etiologies: prerenal in the setting of dehydration/ vol depletion, questionable if there is a post renal component  · Initial management : IVF--> UO +1L, no concerns for urinary retention at this time  Plan  Bmp am  Continue monitoring  Avoid hypotension  Avoid nephrotoxins    Thrombocytopenia (HCC)  Assessment & Plan  · Platelets of 44 POA, previous to that was 71  · Likely secondary to malignancy/bone marrow infiltration vs side effect of chemotherapy  · Monitor with CBC  · No active bleeding, stable at 36  Plan  · Cbc am  · Continue fall precautions   · Continue to hold aspirin  · Follow up gaudencio w/ hematology       Multiple myeloma not having achieved remission ([facility] [address] )  Assessment & Plan  · Had bone marrow biopsy on 2021 consistent with plasma cell myeloma  · Getting treatment with weekly Velcade and Decadron  · Has undergone 4 sessions so far, last one being 1 week ago  · Sees Dr Von King for oncology as outpatient  plan  · Continue to follow-up oncology outpatient    Acquired hypothyroidism  Assessment & Plan  Continue levothyroxine    Essential hypertension  Assessment & Plan  · BP stable  Plan  Continue to hold triamterene-hctz        VTE Pharmacologic Prophylaxis:   VTE Score: 5 Moderate Risk (Score 3-4) - Pharmacological DVT Prophylaxis Ordered: held in the setting of thrombocytopenia  Mechanical VTE Prophylaxis in Place: Yes    Patient Centered Rounds: I have performed bedside rounds with nursing staff today  Discussions with Specialists or Other Care Team Provider: attending physician    Education and Discussions with Family / Patient: Updated  (friend) via phone  Current Length of Stay: 0 day(s)    Current Patient Status: Observation     Discharge Plan / Estimated Discharge Date: Anticipate discharge tomorrow to home      Code Status: Level 1 - Full Code      Subjective:   Patient did not endorse any acute complaints however continues to exhibit orthostatic hypotension    Objective:     Vitals:   Temp (24hrs), Av 7 °F (36 5 °C), Min:97 3 °F (36 3 °C), Max:98 °F (36 7 °C)    Temp:  [97 3 °F (36 3 °C)-98 °F (36 7 °C)] 97 9 °F (36 6 °C)  HR:  [] 100  Resp:  [16-20] 20  BP: ()/(44-63) 80/46  SpO2:  [96 %-98 %] 98 %  Body mass index is 24 92 kg/m²  Input and Output Summary (last 24 hours): Intake/Output Summary (Last 24 hours) at 6/30/2021 1552  Last data filed at 6/30/2021 1326  Gross per 24 hour   Intake 3000 ml   Output --   Net 3000 ml       Physical Exam:     Physical Exam  Vitals and nursing note reviewed  Constitutional:       General: He is not in acute distress  Appearance: He is normal weight  He is not toxic-appearing  HENT:      Head: Normocephalic and atraumatic  Right Ear: Tympanic membrane normal  There is no impacted cerumen  Left Ear: Tympanic membrane normal  There is no impacted cerumen  Nose: Nose normal  No congestion or rhinorrhea  Mouth/Throat:      Mouth: Mucous membranes are moist       Pharynx: Oropharynx is clear  No oropharyngeal exudate or posterior oropharyngeal erythema  Eyes:      General:         Right eye: No discharge  Left eye: No discharge  Extraocular Movements: Extraocular movements intact  Conjunctiva/sclera: Conjunctivae normal       Pupils: Pupils are equal, round, and reactive to light  Cardiovascular:      Rate and Rhythm: Normal rate  Pulses: Normal pulses  Heart sounds: No murmur heard  No gallop  Pulmonary:      Effort: Pulmonary effort is normal  No respiratory distress  Breath sounds: No wheezing or rales  Chest:      Chest wall: No tenderness  Abdominal:      General: Bowel sounds are normal  There is no distension  Palpations: Abdomen is soft  Tenderness: There is no abdominal tenderness  Musculoskeletal:         General: No swelling or tenderness  Normal range of motion  Cervical back: Normal range of motion  No rigidity  Lymphadenopathy:      Cervical: No cervical adenopathy  Skin:     General: Skin is warm  Capillary Refill: Capillary refill takes 2 to 3 seconds     Neurological:      Mental Status: He is alert and oriented to person, place, and time  Psychiatric:         Mood and Affect: Mood normal          Behavior: Behavior normal          Thought Content:  Thought content normal          Judgment: Judgment normal           Additional Data:     Labs:  Results from last 7 days   Lab Units 06/30/21  0544   WBC Thousand/uL 6 83   HEMOGLOBIN g/dL 9 8*   HEMATOCRIT % 29 0*   PLATELETS Thousands/uL 36*   NEUTROS PCT % 75   LYMPHS PCT % 15   MONOS PCT % 10   EOS PCT % 0     Results from last 7 days   Lab Units 06/30/21  0544 06/29/21  0451   SODIUM mmol/L 139 138   POTASSIUM mmol/L 3 4* 3 9   CHLORIDE mmol/L 109* 107   CO2 mmol/L 19* 21   BUN mg/dL 49* 76*   CREATININE mg/dL 1 17 1 59*   ANION GAP mmol/L 11 10   CALCIUM mg/dL 7 8* 7 7*   ALBUMIN g/dL  --  2 5*   TOTAL BILIRUBIN mg/dL  --  0 44   ALK PHOS U/L  --  130*   ALT U/L  --  24   AST U/L  --  34   GLUCOSE RANDOM mg/dL 93 103     Results from last 7 days   Lab Units 06/28/21  1157   INR  1 21*     Results from last 7 days   Lab Units 06/29/21  1423   POC GLUCOSE mg/dl 150*               Imaging: none    Recent Cultures (last 7 days):           Lines/Drains:  Invasive Devices     Peripheral Intravenous Line            Peripheral IV 06/28/21 Left Antecubital 2 days                Telemetry:        Last 24 Hours Medication List:   Current Facility-Administered Medications   Medication Dose Route Frequency Provider Last Rate    acetaminophen  650 mg Oral Q6H PRN Jeane Mccullough MD      allopurinol  200 mg Oral Daily Jeane Mccullough MD      bacitracin  1 small application Topical BID Jeane Mccullough MD      docusate sodium  100 mg Oral BID Jeane Mccullough MD      levothyroxine  100 mcg Oral Early Morning Jeane Mccullough MD      ondansetron  4 mg Oral Q6H PRN Jeane Mccullough MD      pantoprazole  20 mg Oral Daily Jeane Mccullough MD      polyethylene glycol  17 g Oral Daily PRN Jeane Mccullough MD      senna  1 tablet Oral HS Tim Kent MD      sodium chloride  100 mL/hr Intravenous Continuous Tim Kent MD Stopped (06/30/21 1326)        Today, Patient Was Seen By: Kike Del Castillo MD    ** Please Note: This note has been constructed using a voice recognition system   **

## 2021-07-01 VITALS
HEIGHT: 65 IN | DIASTOLIC BLOOD PRESSURE: 58 MMHG | SYSTOLIC BLOOD PRESSURE: 102 MMHG | RESPIRATION RATE: 17 BRPM | HEART RATE: 84 BPM | TEMPERATURE: 97.7 F | WEIGHT: 148.59 LBS | BODY MASS INDEX: 24.76 KG/M2 | OXYGEN SATURATION: 97 %

## 2021-07-01 LAB — POTASSIUM SERPL-SCNC: 4.6 MMOL/L (ref 3.5–5.3)

## 2021-07-01 PROCEDURE — 84132 ASSAY OF SERUM POTASSIUM: CPT | Performed by: INTERNAL MEDICINE

## 2021-07-01 PROCEDURE — 99239 HOSP IP/OBS DSCHRG MGMT >30: CPT | Performed by: HOSPITALIST

## 2021-07-01 RX ORDER — POTASSIUM CHLORIDE 20 MEQ/1
40 TABLET, EXTENDED RELEASE ORAL ONCE
Status: DISCONTINUED | OUTPATIENT
Start: 2021-07-01 | End: 2021-07-01

## 2021-07-01 RX ADMIN — PANTOPRAZOLE SODIUM 20 MG: 20 TABLET, DELAYED RELEASE ORAL at 08:18

## 2021-07-01 RX ADMIN — ALLOPURINOL 200 MG: 100 TABLET ORAL at 08:18

## 2021-07-01 RX ADMIN — LEVOTHYROXINE SODIUM 100 MCG: 100 TABLET ORAL at 06:01

## 2021-07-01 RX ADMIN — BACITRACIN ZINC 1 SMALL APPLICATION: 500 OINTMENT TOPICAL at 08:18

## 2021-07-01 RX ADMIN — DOCUSATE SODIUM 100 MG: 100 CAPSULE, LIQUID FILLED ORAL at 08:19

## 2021-07-01 NOTE — UTILIZATION REVIEW
Continued Stay Review    Date: 7/1/21                     Current Patient Class: IP  Current Level of Care: OBS    HPI:89 y o  male initially admitted on 6/28/21 with 2  Falls with headstrike  likely 2/2 dehydration and VINICIUS, thrombocytopenia  Pt had symptomatic orthostatic vital sign changes and required continued IVF and orthostatic vital check  Assessment/Plan:7/1  Patient's orthostatics this morning after IV hydration were negative  Patient will be discharged with compression stockings  Ambulatory referral to physical therapy  VINICIUS resolved today with IVF d/c'ed   If patient were to have persistent orthostatic hypotension in the outpatient setting, would consider addition of midodrine      Vital Signs:   Date/Time  Temp  Pulse  Resp  BP  MAP (mmHg)  SpO2   Patient Position - Orthostatic VS   07/01/21 1010  --  --  --  102/58  --  --   Standing for 3 minutes - Orthostatic VS   07/01/21 1009  --  --  --  104/53  --  --   Sitting - Orthostatic VS   07/01/21 1008  --  --  --  96/52  71  --   Lying - Orthostatic VS   07/01/21 07:24:19  97 7 °F (36 5 °C)  84  17  100/55  --  97 %   --   06/30/21 2243  98 1 °F (36 7 °C)  92  18  106/56  77  97 %   Lying         Pertinent Labs/Diagnostic Results:       Results from last 7 days   Lab Units 06/30/21  0544 06/29/21  0451 06/28/21  1157   WBC Thousand/uL 6 83 6 46 7 10   HEMOGLOBIN g/dL 9 8* 10 3* 11 6*   HEMATOCRIT % 29 0* 30 6* 34 1*   PLATELETS Thousands/uL 36* 36* 44*   NEUTROS ABS Thousands/µL 5 13  --  4 80         Results from last 7 days   Lab Units 07/01/21  0959 06/30/21  0544 06/29/21  0451 06/28/21  1157   SODIUM mmol/L  --  139 138 133*   POTASSIUM mmol/L 4 6 3 4* 3 9 3 9   CHLORIDE mmol/L  --  109* 107 101   CO2 mmol/L  --  19* 21 19*   ANION GAP mmol/L  --  11 10 13   BUN mg/dL  --  49* 76* 97*   CREATININE mg/dL  --  1 17 1 59* 2 26*   EGFR ml/min/1 73sq m  --  53 37 24   CALCIUM mg/dL  --  7 8* 7 7* 8 0*     Results from last 7 days   Lab Units 06/29/21  0451 06/28/21  1157   AST U/L 34 30   ALT U/L 24 30   ALK PHOS U/L 130* 155*   TOTAL PROTEIN g/dL 5 5* 6 3*   ALBUMIN g/dL 2 5* 2 9*   TOTAL BILIRUBIN mg/dL 0 44 0 38     Results from last 7 days   Lab Units 06/29/21  1423   POC GLUCOSE mg/dl 150*     Results from last 7 days   Lab Units 06/30/21  0544 06/29/21  0451 06/28/21  1157   GLUCOSE RANDOM mg/dL 93 103 117           Results from last 7 days   Lab Units 06/28/21  1157   TROPONIN I ng/mL 0 04         Results from last 7 days   Lab Units 06/28/21  1157   PROTIME seconds 15 4*   INR  1 21*   PTT seconds 24         Medications:   Scheduled Medications:  allopurinol, 200 mg, Oral, Daily  bacitracin, 1 small application, Topical, BID  docusate sodium, 100 mg, Oral, BID  levothyroxine, 100 mcg, Oral, Early Morning  pantoprazole, 20 mg, Oral, Daily  senna, 1 tablet, Oral, HS  sodium chloride 0 9 % bolus 250 mL   Dose: 250 mL  Freq: Once Route: IV  Last Dose: 250 mL (06/30/21 1326)    Continuous IV Infusions:sodium chloride 0 9 % infusion   Rate: 100 mL/hr Dose: 100 mL/hr  Freq: Continuous Route: IV  Indications of Use: IV Hydration  Start: 06/28/21 1845 End: 07/01/21 1035     PRN Meds:  acetaminophen, 650 mg, Oral, Q6H PRN  ondansetron, 4 mg, Oral, Q6H PRN  polyethylene glycol, 17 g, Oral, Daily PRN        Discharge Plan: D  Network Utilization Review Department  ATTENTION: Please call with any questions or concerns to [phone] and carefully listen to the prompts so that you are directed to the right person  All voicemails are confidential   Marval Shaper all requests for admission clinical reviews, approved or denied determinations and any other requests to dedicated fax number below belonging to the campus where the patient is receiving treatment   List of dedicated fax numbers for the Facilities:  [address] DENIALS (Administrative/Medical Necessity) [phone]   [address] (Maternity/NICU/Pediatrics) [phone] [address] [place] [address] [address] [place] [address] [address] [place] [address] [address] [address] [place] [address] [address] [place] [address] [phone]

## 2021-07-01 NOTE — ASSESSMENT & PLAN NOTE
· Platelets of 44 POA, previous to that was 70  · Likely secondary to malignancy/bone marrow infiltration vs side effect of chemotherapy  · Monitor with CBC  · No active bleeding, stable at 36  Plan  · Follow-up outpatient with Hematology

## 2021-07-01 NOTE — DISCHARGE SUMMARY
[facility]  Discharge- [facility] 5/24/1927, 80 y o  male MRN: [id number]  Unit/Bed#: S -01 Encounter: [id number]  Primary Care Provider: Maribell Mckenzie MD   Date and time admitted to hospital: 6/28/2021 11:39 AM    * Falls  Assessment & Plan  Patient from [facility]  Reports 2 falls with head strike, no LOC- once yesterday and once this morning  Not on AC  He reports that he gets dizzy with standing up and short of breath with ambulation, resulting in falls  Denies palpitations, chest pain, visual disturbance or weakness  Reports poor p o  Intake for the last few days  Recently started chemotherapy for multiple myeloma with Velcade, last session was 1 week ago  · Head CT with no acute intracranial abnormality  · Vital stable  · EKG:  NSR at 98 bpm  · Labs reveal VINICIUS  --Likely etiology Dehydration in the settting of poor po intake, triamterene-hctz also contributory,    -orthostatic hypotension also noted during hospital stay likely age related venous compliance,- bp soft side ,could not tolerate standing    Plan  · Patient's orthostatics this morning after IV hydration were negative  · Patient will be discharged compression stockings  · Ambulatory referral to physical therapy      VINICIUS (acute kidney injury) ([facility] [address] )  Assessment & Plan  · Creatinine of 2 26 POA  · Baseline creatinine 1-1 3,  · Admission : poor oral intake  · Volume status: euvolemic, no electrolytes imbalances  · Possible etiologies: prerenal in the setting of dehydration/ vol depletion, questionable if there is a post renal component  · Initial management : IVF--> UO +1L, no concerns for urinary retention at this time  · Resolved as of today  · Encourage p o  Hydration, can be discharged home today      Thrombocytopenia (HCC)  Assessment & Plan  · Platelets of 44 POA, previous to that was 70  · Likely secondary to malignancy/bone marrow infiltration vs side effect of chemotherapy  · Monitor with CBC  · No active bleeding, stable at 36  Plan  · Follow-up outpatient with Hematology      Mild protein-calorie malnutrition [facility])  Assessment & Plan  Malnutrition Findings:   Adult Malnutrition type: Acute illness (in the setting of chronic illness)  Adult Degree of Malnutrition: Malnutrition of mild degree (related to inadequate oral intake)    BMI Findings: Body mass index is 24 92 kg/m²  Will add ensure    Multiple myeloma not having achieved remission ([facility] [address] )  Assessment & Plan  · Had bone marrow biopsy on 05/11/2021 consistent with plasma cell myeloma  · Getting treatment with weekly Velcade and Decadron  · Has undergone 4 sessions so far, last one being 1 week ago  · Sees Dr Juliette Flores for oncology as outpatient  plan  · Continue to follow-up oncology outpatient    Acquired hypothyroidism  Assessment & Plan  Continue levothyroxine    Essential hypertension  Assessment & Plan  · BP stable  Plan  Continue to hold triamterene-hctz        Discharging Resident Physician: Ayde Anguiano MD  Attending: Nadira Obando MD  PCP: Na Latif MD  Admission Date: 6/28/2021  Discharge Date: 07/01/21    Disposition:     Home    Reason for Admission: fall    Consultations During Hospital Stay:  · trauma    Procedures Performed:     · none    Significant Findings / Test Results:     · none    Incidental Findings:   · none    Test Results Pending at Discharge (will require follow up):   · none     Outpatient Tests Requested:  · Bmp in 3 days    Complications:  none    Hospital Course:     Lena Sutton is a 80 y o  male patient who originally presented to the hospital on 6/28/2021 due to fall   history of hypertension, hypothyroidism, and multiple myeloma currently receiving chemotherapy who presents with falls  Patient is from assisted living facility and reports 2 falls in the last 2 days  He reports that he fell yesterday after getting up from bed    Patient is poor historian but reports that he initially felt dizzy and with few steps, he felt short of breath, resulting in fall with head strike  He denies LOC, chest pain, palpitations, leg weakness, or visual disturbance  He reports same thing occurred this morning upon arising from bed- he felt dizzy with standing and short of breath with ambulation, resulting in a fall  He reports that both falls were unwitnessed  He sustained skin tears to right-sided forehead and bilateral forearms  He is not on anticoagulation, only takes baby aspirin daily  Denies history of seizures  He is currently receiving chemotherapy for multiple myeloma, last chemo session was 1 week ago  He reports some weakness associated with initiation of chemotherapy  Also reports poor p o  Intake for the last few days  In the ED, vitals were stable  Noted hyponatremia, VINIICUS, and worsening thrombocytopenia  Head CT revealed no acute intracranial abnormalities and chest x-ray was negative  EKG was normal sinus rhythm with no signs of ischemia and troponin was negative x1  He received 1 L normal saline bolus and seen in no acute distress  During his hospital stay, patient remained stable, did not endorse any acute complaints, vinicius and hyponatremia improving with iv fluid hydration , evaluated by physical therapy, patient can continue therapy at his facility  Stable from medicine standpoint for discharge with above recommendations  Condition at Discharge: good     Discharge Day Visit / Exam:     Subjective:  Patient mentioned that he feels good, did not endorsed any acute complaints    Vitals: Blood Pressure: 102/58 (07/01/21 1010)  Pulse: 84 (07/01/21 0724)  Temperature: 97 7 °F (36 5 °C) (07/01/21 0724)  Temp Source: Oral (07/01/21 0724)  Respirations: 17 (07/01/21 0724)  Height: 5' 4 75" (164 5 cm) (06/29/21 1702)  Weight - Scale: 67 4 kg (148 lb 9 4 oz) (06/29/21 1702)  SpO2: 97 % (07/01/21 0724)  Exam:   Physical Exam  Vitals and nursing note reviewed     Constitutional:       General: He is not in acute distress  Appearance: He is normal weight  He is not toxic-appearing  HENT:      Head: Normocephalic and atraumatic  Right Ear: Tympanic membrane normal  There is no impacted cerumen  Left Ear: Tympanic membrane normal  There is no impacted cerumen  Nose: Nose normal  No congestion or rhinorrhea  Mouth/Throat:      Mouth: Mucous membranes are moist       Pharynx: Oropharynx is clear  No oropharyngeal exudate or posterior oropharyngeal erythema  Eyes:      General:         Right eye: No discharge  Left eye: No discharge  Extraocular Movements: Extraocular movements intact  Conjunctiva/sclera: Conjunctivae normal       Pupils: Pupils are equal, round, and reactive to light  Cardiovascular:      Rate and Rhythm: Normal rate  Pulses: Normal pulses  Heart sounds: No murmur heard  No gallop  Pulmonary:      Effort: Pulmonary effort is normal  No respiratory distress  Breath sounds: No wheezing or rales  Chest:      Chest wall: No tenderness  Abdominal:      General: Bowel sounds are normal  There is no distension  Palpations: Abdomen is soft  Tenderness: There is no abdominal tenderness  Musculoskeletal:         General: No swelling or tenderness  Normal range of motion  Cervical back: Normal range of motion  No rigidity  Lymphadenopathy:      Cervical: No cervical adenopathy  Skin:     General: Skin is warm  Capillary Refill: Capillary refill takes 2 to 3 seconds  Neurological:      Mental Status: He is alert and oriented to person, place, and time  Psychiatric:         Mood and Affect: Mood normal          Behavior: Behavior normal          Thought Content: Thought content normal          Judgment: Judgment normal        Discussion with Family:  I spoke with Carlos Reynolds , primary contact of patient, all questions answered      Discharge instructions/Information to patient and family:   See after visit summary for information provided to patient and family  Provisions for Follow-Up Care:  See after visit summary for information related to follow-up care and any pertinent home health orders  Planned Readmission: no     Discharge Medications:  See after visit summary for reconciled discharge medications provided to patient and family        ** Please Note: This note has been constructed using a voice recognition system **

## 2021-07-01 NOTE — UTILIZATION REVIEW
Please note this member is now INPATIENT    Inpatient Admission Authorization Request   NOTIFICATION OF INPATIENT ADMISSION/INPATIENT AUTHORIZATION REQUEST   SERVICING FACILITY:   [address] [place] [address], [address] [place] [address]  Tax ID: [id number]  NPI: [id number]  Place of Service: Inpatient [facility]  [address]  Place of Service Code: 24     ATTENDING PROVIDER:  Attending Name and NPI#: Blake Bain Md [[id number]]  Address: Chris Smith  [place], [address] [place] [address]  Phone: [phone]     UTILIZATION REVIEW CONTACT:  [facility] Utilization   Network Utilization Review Department  Phone: [phone]  Fax: [phone]  Email: [email or web address]     PHYSICIAN ADVISORY SERVICES:  FOR [id number] REVIEW - MEDICAL NECESSITY DENIAL  Phone: [phone]  Fax: [phone]  Email: [email or web address]     TYPE OF REQUEST:  Inpatient Status     ADMISSION INFORMATION:  ADMISSION DATE/TIME: 6/30/21  8:43 PM  PATIENT DIAGNOSIS CODE/DESCRIPTION:  Wheezing [R06 2]  Cough [R05]  Thrombocytopenia ([facility] [address] ) [D69 6]  Bruising [T14  8XXA]  Abrasion [T14  8XXA]  VINICIUS (acute kidney injury) ([facility] [address] ) [N17 9]  Low O2 saturation [R79 81]  DISCHARGE DATE/TIME: No discharge date for patient encounter  DISCHARGE DISPOSITION (IF DISCHARGED): Home/Self Care     IMPORTANT INFORMATION:  Please contact the Maninder Jack directly with any questions or concerns regarding this request  Department voicemails are confidential     Send requests for admission clinical reviews, concurrent reviews, approvals, and administrative denials due to lack of clinical to fax [phone]             Initial Clinical Review    Admission: Date/Time/Statement:   Admission Orders (From admission, onward)     Ordered        06/30/21 2043  Inpatient Admission  Once         06/28/21 1415  Place in Observation  Once                   Orders Placed This Encounter   Procedures    Place in Observation     Standing Status:   Standing     Number of Occurrences:   1     Order Specific Question:   Level of Care     Answer:   Med Surg [16]    Inpatient Admission     Standing Status:   Standing     Number of Occurrences:   1     Order Specific Question:   Level of Care     Answer:   Med Surg [16]     Order Specific Question:   Estimated length of stay     Answer:   More than 2 Midnights     Order Specific Question:   Certification     Answer:   I certify that inpatient services are medically necessary for this patient for a duration of greater than two midnights  See H&P and MD Progress Notes for additional information about the patient's course of treatment  ED Arrival Information     Expected Arrival Acuity    - 6/28/2021 11:31 Emergent         Means of arrival Escorted by Service Admission type    [facility] Emergency         Arrival complaint    [address] (+) BABY ASPRIN        Chief Complaint   Patient presents with    Fall     pt fell yesterday hitting head on floor, +headstrike, takes asa daily  denies loc       Initial Presentation: 80 y o  male with history of hypertension, hypothyroidism, and multiple myeloma currently receiving chemotherapy with last session 1 week ago who presents to ED from assisted living facility with 2 falls in 2 days  Fall preceded by dizziness, SOB, + headstrike, no LOC when getting OOB  On exam, pt has skin tears bilat forearms, R temple and R side of mouth  No focal neuro deficit, AAOx3, has dry mucous membranes with poor po intake x several days  Labs show low sodium, elevated creat of 2 26 from baseline 1-1 3 and worsenibng thrombocytopenia  Head CT shows nothing acute, CXR neg, EKG was normal sinus rhythm with no signs of ischemia and troponin was negative x1  He received 1 L normal saline bolus in ED  Pt admitted as OBS with falls likely 2/2 dehydration from poor po intake, with VINICIUS and thrombocytopenia    Plan-continue IVF, orthostatic BP x1, hold home ASA, PT/OT evals, fall monitoring, hold triamterene-hydrochlorothiazide, daily BMP, monitor CBC and any active bleeding  Date:   Trauma-Creat continues to be above baseline, continuing IVF  HCTZ and triamterene on hold secondary to VINICIUS  Platelets decreased to 36 today  Pt tolerating diet, voiding  GCS=15,has R preauricular and corner of right mouth contusion, abrasions healing    ED Triage Vitals   Temperature Pulse Respirations Blood Pressure SpO2   06/28/21 1144 06/28/21 1143 06/28/21 1143 06/28/21 1143 06/28/21 1143   97 9 °F (36 6 °C) 104 18 118/71 98 %      Temp Source Heart Rate Source Patient Position - Orthostatic VS BP Location FiO2 (%)   06/28/21 1144 06/28/21 1143 06/28/21 1147 06/28/21 1524 --   Oral Monitor Sitting Left arm       Pain Score       06/29/21 0236       No Pain          Wt Readings from Last 1 Encounters:   06/29/21 67 4 kg (148 lb 9 4 oz)     Additional Vital Signs:   Date/Time  Temp  Pulse  Resp  BP  MAP (mmHg)  SpO2    06/29/21 07:15:50  97 7 °F (36 5 °C)  88  12  111/61  --  96 %    06/29/21 02:35:14  97 7 °F (36 5 °C)  95  --  122/62  --  97 %    06/29/21 0200  --  88  18  107/55  74  96 %    06/29/21 0100  --  80  18  112/58  79  97 %      Date and Time Eye Opening Best Verbal Response Best Motor Response [place] Coma Scale Score   06/28/21 1433 4 5 6 15   06/28/21 1400 4 5 6 15   06/28/21 1330 4 5 6 15   06/28/21 1300 4 5 6 15   06/28/21 1245 4 5 6 15   06/28/21 1230 4 5 6 15   06/28/21 1215 4 5 6 15   06/28/21 1200 4 5 6 15   06/28/21 1147 4 5 6 15       Pertinent Labs/Diagnostic Test Results:   6/28   ECG-ED-  ECG rate:  98     ECG rate assessment: normal     Rhythm:     Rhythm: sinus rhythm     Ectopy:     Ectopy: none     QRS:     QRS axis:  Normal     QRS intervals:  Normal   Conduction:     Conduction: normal     ST segments:     ST segments:  Non-specific   T waves:     T waves: flattening       Flattening:  V4 and V5 (present previously)       CT head-No acute intracranial abnormality    CXR-No acute cardiopulmonary disease    Findings are stable        Results from last 7 days   Lab Units 06/30/21  0544 06/29/21  0451 06/28/21  1157   WBC Thousand/uL 6 83 6 46 7 10   HEMOGLOBIN g/dL 9 8* 10 3* 11 6*   HEMATOCRIT % 29 0* 30 6* 34 1*   PLATELETS Thousands/uL 36* 36* 44*   NEUTROS ABS Thousands/µL 5 13  --  4 80         Results from last 7 days   Lab Units 07/01/21  0959 06/30/21  0544 06/29/21  0451 06/28/21  1157   SODIUM mmol/L  --  139 138 133*   POTASSIUM mmol/L 4 6 3 4* 3 9 3 9   CHLORIDE mmol/L  --  109* 107 101   CO2 mmol/L  --  19* 21 19*   ANION GAP mmol/L  --  11 10 13   BUN mg/dL  --  49* 76* 97*   CREATININE mg/dL  --  1 17 1 59* 2 26*   EGFR ml/min/1 73sq m  --  53 37 24   CALCIUM mg/dL  --  7 8* 7 7* 8 0*     Results from last 7 days   Lab Units 06/29/21  0451 06/28/21  1157   AST U/L 34 30   ALT U/L 24 30   ALK PHOS U/L 130* 155*   TOTAL PROTEIN g/dL 5 5* 6 3*   ALBUMIN g/dL 2 5* 2 9*   TOTAL BILIRUBIN mg/dL 0 44 0 38     Results from last 7 days   Lab Units 06/29/21  1423   POC GLUCOSE mg/dl 150*     Results from last 7 days   Lab Units 06/30/21  0544 06/29/21  0451 06/28/21  1157   GLUCOSE RANDOM mg/dL 93 103 117           Results from last 7 days   Lab Units 06/28/21  1157   TROPONIN I ng/mL 0 04         Results from last 7 days   Lab Units 06/28/21  1157   PROTIME seconds 15 4*   INR  1 21*   PTT seconds 24                 ED Treatment:   Medication Administration from 06/28/2021 1054 to 06/29/2021 0234       Date/Time Order Dose Route Action     06/28/2021 1308 bacitracin topical ointment 2 small application 2 small application Topical Given     06/28/2021 1353 sodium chloride 0 9 % bolus 1,000 mL 1,000 mL Intravenous New Bag     06/28/2021 1840 bacitracin topical ointment 1 small application 1 small application Topical Given     06/28/2021 2015 albuterol inhalation solution 2 5 mg 2 5 mg Nebulization Given     06/28/2021 2012 sodium chloride 0 9 % infusion 100 mL/hr Intravenous New Bag     06/28/2021 2014 docusate sodium (COLACE) capsule 100 mg 100 mg Oral Given        Past Medical History:   Diagnosis Date    Arthritis     Gout     Hypertension     Hyperthyroidism     Memory loss     Multiple myeloma ([place] [address] )     Myeloma ([place] [address] )     Sleep disturbance     Thyroid disease      Present on Admission:   Acquired hypothyroidism   Multiple myeloma not having achieved remission ([place] [address] )   Essential hypertension   Abrasions of multiple sites      Admitting Diagnosis: Wheezing [R06 2]  Cough [R05]  Thrombocytopenia ([place] [address] ) [D69 6]  Bruising [T14  8XXA]  Abrasion [T14  8XXA]  VINICIUS (acute kidney injury) ([place] [address] ) [N17 9]  Low O2 saturation [R79 81]  Age/Sex: 80 y o  male  Admission Orders:  Scheduled Medications:  allopurinol, 200 mg, Oral, Daily  bacitracin, 1 small application, Topical, BID  docusate sodium, 100 mg, Oral, BID  levothyroxine, 100 mcg, Oral, Early Morning  pantoprazole, 20 mg, Oral, Daily  senna, 1 tablet, Oral, HS      Continuous IV Infusions:     PRN Meds:  acetaminophen, 650 mg, Oral, Q6H PRN  ondansetron, 4 mg, Oral, Q6H PRN  polyethylene glycol, 17 g, Oral, Daily PRN      SCD's   monitor for urinary retention  Local wound care with sterile water or saline wash, bacitracin and dressing    Network Utilization Review Department  ATTENTION: Please call with any questions or concerns to [phone] and carefully listen to the prompts so that you are directed to the right person  All voicemails are confidential   Melida Shaper all requests for admission clinical reviews, approved or denied determinations and any other requests to dedicated fax number below belonging to the campus where the patient is receiving treatment   List of dedicated fax numbers for the Facilities:  [address] [place] [address] DENIALS (Administrative/Medical Necessity) [phone]   [address] (Maternity/NICU/Pediatrics) [phone]   [address] [phone] [id number] [place] [address] [address] [id number] [place] [address] [address] [place] [id number] [phone]

## 2021-07-01 NOTE — PLAN OF CARE
Problem: MOBILITY - ADULT  Goal: Maintain or return to baseline ADL function  Description: INTERVENTIONS:  -  Assess patient's ability to carry out ADLs; assess patient's baseline for ADL function and identify physical deficits which impact ability to perform ADLs (bathing, care of mouth/teeth, toileting, grooming, dressing, etc )  - Assess/evaluate cause of self-care deficits   - Assess range of motion  - Assess patient's mobility; develop plan if impaired  - Assess patient's need for assistive devices and provide as appropriate  - Encourage maximum independence but intervene and supervise when necessary  - Involve family in performance of ADLs  - Assess for home care needs following discharge   - Consider OT consult to assist with ADL evaluation and planning for discharge  - Provide patient education as appropriate  Outcome: Progressing  Goal: Maintains/Returns to pre admission functional level  Description: INTERVENTIONS:  - Perform BMAT or MOVE assessment daily    - Set and communicate daily mobility goal to care team and patient/family/caregiver  - Collaborate with rehabilitation services on mobility goals if consulted  - Perform Range of Motion 3 times a day  - Reposition patient every 2 hours    - Dangle patient 3 times a day  - Stand patient 3 times a day  - Ambulate patient 3 times a day  - Out of bed to chair 3 times a day   - Out of bed for meals 3 times a day  - Out of bed for toileting  - Record patient progress and toleration of activity level   Outcome: Progressing     Problem: Potential for Falls  Goal: Patient will remain free of falls  Description: INTERVENTIONS:  - Educate patient/family on patient safety including physical limitations  - Instruct patient to call for assistance with activity   - Consult OT/PT to assist with strengthening/mobility   - Keep Call bell within reach  - Keep bed low and locked with side rails adjusted as appropriate  - Keep care items and personal belongings within reach  - Initiate and maintain comfort rounds  - Make Fall Risk Sign visible to staff  - Offer Toileting every 2 Hours, in advance of need  - Initiate/Maintain alarm  - Obtain necessary fall risk management equipment:   - Apply yellow socks and bracelet for high fall risk patients  - Consider moving patient to room near nurses station  Outcome: Progressing     Problem: Nutrition/Hydration-ADULT  Goal: Nutrient/Hydration intake appropriate for improving, restoring or maintaining nutritional needs  Description: Monitor and assess patient's nutrition/hydration status for malnutrition  Collaborate with interdisciplinary team and initiate plan and interventions as ordered  Monitor patient's weight and dietary intake as ordered or per policy  Utilize nutrition screening tool and intervene as necessary  Determine patient's food preferences and provide high-protein, high-caloric foods as appropriate       INTERVENTIONS:  - Monitor oral intake, urinary output, labs, and treatment plans  - Assess nutrition and hydration status and recommend course of action  - Evaluate amount of meals eaten  - Assist patient with eating if necessary   - Allow adequate time for meals  - Recommend/ encourage appropriate diets, oral nutritional supplements, and vitamin/mineral supplements  - Order, calculate, and assess calorie counts as needed  - Recommend, monitor, and adjust tube feedings and TPN/PPN based on assessed needs  - Assess need for intravenous fluids  - Provide specific nutrition/hydration education as appropriate  - Include patient/family/caregiver in decisions related to nutrition  Outcome: Progressing

## 2021-07-01 NOTE — ASSESSMENT & PLAN NOTE
Patient from [facility]  Reports 2 falls with head strike, no LOC- once yesterday and once this morning  Not on AC  He reports that he gets dizzy with standing up and short of breath with ambulation, resulting in falls  Denies palpitations, chest pain, visual disturbance or weakness  Reports poor p o  Intake for the last few days  Recently started chemotherapy for multiple myeloma with Velcade, last session was 1 week ago     · Head CT with no acute intracranial abnormality  · Vital stable  · EKG:  NSR at 98 bpm  · Labs reveal VINICIUS  --Likely etiology Dehydration in the settting of poor po intake, triamterene-hctz also contributory,    -orthostatic hypotension also noted during hospital stay likely age related venous compliance,- bp soft side ,could not tolerate standing    Plan  · Patient's orthostatics this morning after IV hydration were negative  · Patient will be discharged compression stockings  · Ambulatory referral to physical therapy

## 2021-07-01 NOTE — ASSESSMENT & PLAN NOTE
· Had bone marrow biopsy on 05/11/2021 consistent with plasma cell myeloma  · Getting treatment with weekly Velcade and Decadron  · Has undergone 4 sessions so far, last one being 1 week ago  · Sees Dr Martinez Her for oncology as outpatient  plan  · Continue to follow-up oncology outpatient

## 2021-07-02 ENCOUNTER — TRANSITIONAL CARE MANAGEMENT (OUTPATIENT)
Dept: FAMILY MEDICINE CLINIC | Facility: MEDICAL CENTER | Age: 86
End: 2021-07-02

## 2021-07-02 ENCOUNTER — TELEPHONE (OUTPATIENT)
Dept: HEMATOLOGY ONCOLOGY | Facility: CLINIC | Age: 86
End: 2021-07-02

## 2021-07-02 NOTE — TELEPHONE ENCOUNTER
Appointment Confirmation     Appointment with  Court Tari   Appointment date & time  7-6-21 @ 11:30am    Location [facility]    Patient verbilized Understanding

## 2021-07-02 NOTE — PROGRESS NOTES
Hematology/Oncology Outpatient Follow- up Note  Shelia Nesbitt, 1927, [id number]  2021        Chief Complaint   Patient presents with    Follow-up       HPI:  Shelia Nesbitt is a 80year old male who was referred to hematology for evaluation of a M spike   SPEP with immunofixation demonstrated a monoclonal gammopathy identified as IgG kappa at 1 91   Urine immunofixation revealed  A monoclonal gammopathy identified as free kappa light chains at 2 84  He underwent a bone marrow biopsy on 21:  Final Diagnosis   A-C  Bone Marrow, Left Iliac Crest, Core, Clot and Aspirate:  - Hypercellular marrow (90% cellularity) with increased IgG Kappa restricted plasma cells (90% by  immunostain), consistent with plasma cell myeloma  - Mildly increased reticulin fibers secondary to plasma cell infiltrate     Due to his age, he is not a transplant candidate  He has been started on treatment with weekly Velcade and Decadron     Previous Hematologic/ Oncologic History:    Oncology History   Multiple myeloma not having achieved remission ([facility] [address] )   2021 Initial Diagnosis    Multiple myeloma not having achieved remission ([facility] [address] )     6/3/2021 -  Chemotherapy    bortezomib (VELCADE), 1 3 mg/m2 = 2 3 mg (81 3 % of original dose 1 6 mg/m2), Subcutaneous, Once, 1 of 6 cycles  Dose modification: 1 3 mg/m2 (original dose 1 6 mg/m2, Cycle 1, Reason: Other (Must fill in a comment), Comment: age)  Administration: 2 3 mg (6/3/2021), 2 3 mg (6/10/2021), 2 3 mg (2021), 2 3 mg (2021)     Decadron 20 mg and Velcade 1 3 mg/m2 weekly every 35 days     Current Hematologic/ Oncologic Treatment:    Will adjust regimen to Decadron 20 mg and Velcade 1 3 mg/m2 every 14 days    ECO - Symptomatic but completely ambulatory    Interval History:   The patient presents for hospital follow up today along with his cousin  He was hospitalized (-21) after experiencing falls at his correction   He was found to have VINICIUS secondary to dehydration and orthostatic hypotension  Head CT negative  CXR negative  His BP med was discontinued  He was discharged with recommendations to wear compression stockings and start outpatient PT  He was noted to have  platelet count, low of 36  Likely treatment related  Today, patient reports he feels fairly well  He does have exertional dyspnea  Denies shortness of breath at rest  Denies CP, lightheadedness or dizziness  He states he is eating and drinking  Denies any N/V  He has not had any recent falls  Patient cousin states he has become noticeably more weaker since starting treatment  She is questioning if his regimen can be adjusted  Cancer Staging:  Cancer Staging  No matching staging information was found for the patient  Molecular Testing:         Test Results:    Imaging: No results found  Labs:   Lab Results   Component Value Date    WBC 6 83 2021    HGB 9 8 (L) 2021    HCT 29 0 (L) 2021     (H) 2021    PLT 36 (LL) 2021     Lab Results   Component Value Date     2015    K 4 6 2021     (H) 2021    CO2 19 (L) 2021    ANIONGAP 4 2015    BUN 49 (H) 2021    CREATININE 1 17 2021    GLUCOSE 98 2015    GLUF 104 (H) 2021    CALCIUM 7 8 (L) 2021    CORRECTEDCA 8 9 2021    AST 34 2021    ALT 24 2021    ALKPHOS 130 (H) 2021    PROT 7 2 2015    BILITOT 0 58 2015    EGFR 53 2021           Review of Systems   Constitutional: Positive for fatigue  Respiratory: Positive for shortness of breath (exertional )  Neurological: Positive for weakness  All other systems reviewed and are negative          Active Problems:   Patient Active Problem List   Diagnosis    Idiopathic chronic gout of multiple sites without tophus    Pure hypercholesterolemia    Essential hypertension    Acquired hypothyroidism    Spondylosis of lumbar region without myelopathy or radiculopathy    Patellofemoral disorder of left knee    Spinal stenosis of lumbosacral region    Meningioma, cerebral ([facility] [address] [facility]    Homeless    General medical exam    Immunization due    Multiple myeloma not having achieved remission ([facility] [address] )    Falls    Thrombocytopenia ([facility] [address] )    VINICIUS (acute kidney injury) ([facility] [address] )    Abrasions of multiple sites    Mild protein-calorie malnutrition ([facility] [address] )       Past Medical History:   Past Medical History:   Diagnosis Date    Arthritis     Gout     Hypertension     Hyperthyroidism     Memory loss     Multiple myeloma ([facility] [address] [facility]     Myeloma ([facility] [address] [facility]     Sleep disturbance     Thyroid disease        Surgical History:   Past Surgical History:   Procedure Laterality Date    IR BIOPSY BONE MARROW  2021    KIDNEY SURGERY      description: 30 yrs ago    PROSTATE SURGERY      description: stone removal 30 yrs ago    REPLACEMENT TOTAL KNEE BILATERAL      description: 25 yrs ago       Family History:    Family History   Problem Relation Age of Onset    Other Mother         Brain tumor    Cancer Sister     Diabetes Brother     Arthritis Family     Diabetes Family     Other Family         knee replacement    Thyroid disease Family        Cancer-related family history includes Cancer in his sister      Social History:   Social History     Socioeconomic History    Marital status: Single     Spouse name: Not on file    Number of children: Not on file    Years of education: Not on file    Highest education level: Not on file   Occupational History    Occupation:    Tobacco Use    Smoking status: Former Smoker     Packs/day: 0 50     Years: 25 00     Pack years: 12 50     Quit date:      Years since quittin 5    Smokeless tobacco: Never Used    Tobacco comment: "I smoked years ago"   Vaping Use    Vaping Use: Never used   Substance and Sexual Activity    Alcohol use: Yes     Comment: social "burbon once in a while"    Drug use: No    Sexual activity: Not on file     Comment: per allscripts - sexually act and denied sexually activity    Other Topics Concern    Not on file   Social History Narrative    Not on file     Social Determinants of Health     Financial Resource Strain:     Difficulty of Paying Living Expenses:    Food Insecurity:     Worried About Running Out of Food in the Last Year:     [address] in the Last Year:    Transportation Needs:     Lack of Transportation (Medical):      Lack of Transportation (Non-Medical):    Physical Activity:     Days of Exercise per Week:     Minutes of Exercise per Session:    Stress:     Feeling of Stress :    Social Connections:     Frequency of Communication with Friends and Family:     Frequency of Social Gatherings with Friends and Family:     Attends [employer] Services:     Active Member of Clubs or Organizations:     Attends Club or Organization Meetings:     Marital Status:    Intimate Partner Violence:     Fear of Current or Ex-Partner:     Emotionally Abused:     Physically Abused:     Sexually Abused:        Current Medications:   Current Outpatient Medications   Medication Sig Dispense Refill    acyclovir (ZOVIRAX) 400 MG tablet Take 1 tablet (400 mg total) by mouth daily While on Velcade therapy 30 tablet 6    allopurinol (ZYLOPRIM) 100 mg tablet Take 2 tablets (200 mg total) by mouth daily 180 tablet 3    levothyroxine 100 mcg tablet Take 1 tablet (100 mcg total) by mouth daily 90 tablet 1    pantoprazole (PROTONIX) 20 mg tablet Take 1 tablet (20 mg total) by mouth daily 90 tablet 3    polyethylene glycol (GLYCOLAX) 17 GM/SCOOP powder Take 17 g by mouth daily 507 g 1    prochlorperazine (COMPAZINE) 10 mg tablet Take 1 tablet (10 mg total) by mouth every 6 (six) hours as needed for nausea or vomiting 45 tablet 3    clobetasol (TEMOVATE) 0 05 % ointment Apply topically 2 (two) times a day To itching areas of till resolved (Patient not taking: Reported on 6/10/2021) 30 g 1    mupirocin (BACTROBAN) 2 % ointment apply to affected area three times a day (Patient not taking: Reported on 6/10/2021) 22 g 0     Current Facility-Administered Medications   Medication Dose Route Frequency Provider Last Rate Last Admin    albuterol inhalation solution 2 5 mg  2 5 mg Nebulization Once Jacklyn Akins,            Allergies: Allergies   Allergen Reactions    Horse Protein        Physical Exam:  Pulse (!) 115   Temp 97 5 °F (36 4 °C)   Resp 17   Ht 5' 4" (1 626 m)   Wt 70 5 kg (155 lb 8 oz)   SpO2 99%   BMI 26 69 kg/m²   Body surface area is 1 76 meters squared  Wt Readings from Last 3 Encounters:   07/06/21 70 5 kg (155 lb 8 oz)   06/29/21 67 4 kg (148 lb 9 4 oz)   06/24/21 67 4 kg (148 lb 8 oz)           Physical Exam  Vitals reviewed  Constitutional:       General: He is not in acute distress  Appearance: He is well-developed  He is not diaphoretic  HENT:      Head: Normocephalic and atraumatic  Mouth/Throat:      Pharynx: No oropharyngeal exudate  Eyes:      General: No scleral icterus  Pupils: Pupils are equal, round, and reactive to light  Cardiovascular:      Rate and Rhythm: Normal rate and regular rhythm  Heart sounds: No murmur heard  Pulmonary:      Effort: Pulmonary effort is normal  No respiratory distress  Breath sounds: Normal breath sounds  Abdominal:      General: Bowel sounds are normal  There is no distension  Palpations: Abdomen is soft  Tenderness: There is no abdominal tenderness  Musculoskeletal:         General: Normal range of motion  Cervical back: Normal range of motion and neck supple  Lymphadenopathy:      Cervical: No cervical adenopathy  Skin:     General: Skin is warm and dry  Neurological:      General: No focal deficit present  Mental Status: He is alert and oriented to person, place, and time     Psychiatric:         Mood and Affect: Mood normal  Behavior: Behavior normal          Thought Content: Thought content normal          Judgment: Judgment normal          Assessment / Plan:    1  Multiple myeloma not having achieved remission [facility])      The patient is a 80year old male who was referred to hematology for evaluation of a M spike 1 91  SPEP with immunofixation demonstrated a monoclonal gammopathy identified as IgG kappa at 1 91   Urine immunofixation revealed  A monoclonal gammopathy identified as free kappa light chains at 2 84  He underwent a bone marrow biopsy on 5/11/21 which demonstrated myeloma  He has been started on treatment with weekly velcade and decadron  He completed one full cycle  He was recently hospitalized with dehydration, orthostatic hypotension  His symptoms have improved, however he continues to endorse some generalized weakness and exertional dyspnea  He is not wearing compression stockings as recommended  I did strongly encourage him to start wearing these as the should help with his symptoms  He is scheduled to begin physical therapy at his facility  Patient and his cousin have requested treatment regimen adjustment  I have discussed this with Dr Martinez Her, we will change his schedule from weekly velcade and decadron to every 2 weeks  I will defer start of this to next week to allow him a chance to fully recover from his recent hospital stay  Patient and his cousin are in agreement with this recommendation  He will have blood work completed prior to beginning his next cycle on 7/15    He will return for a follow-up visit in 2 weeks with repeat blood work to include CBC, CMP, full myeloma panel  He is instructed to call at any time with questions or concerns  Goals and Barriers:  Current Goal:  Prolong Survival from multiple myeloma  Barriers: None  Patient's Capacity to Self Care:  Patient able to self care  Portions of the record may have been created with voice recognition software    Occasional wrong word or "sound a like" substitutions may have occurred due to the inherent limitations of voice recognition software  Read the chart carefully and recognize, using context, where substitutions have occurred

## 2021-07-02 NOTE — UTILIZATION REVIEW
Notification of Discharge   This is a Notification of Discharge from our facility [address]  Please be advised that this patient has been discharge from our facility  Below you will find the admission and discharge date and time including the patients disposition  UTILIZATION REVIEW CONTACT:  Renzo Liu  Utilization   Network Utilization Review Department  Phone: [phone] x carefully listen to the prompts  All voicemails are confidential   Email: [email or web address]     PHYSICIAN ADVISORY SERVICES:  FOR WRIT-HE-RNUP REVIEW - MEDICAL NECESSITY DENIAL  Phone: [phone]  Fax: [phone]  Email: [email or web address]     PRESENTATION DATE: 6/28/2021 11:39 AM  OBERVATION ADMISSION DATE:   INPATIENT ADMISSION DATE: 6/30/21  8:43 PM   DISCHARGE DATE: 7/1/2021  2:43 PM  DISPOSITION: Home/Self Care Home/Self Care      IMPORTANT INFORMATION:  Send all requests for admission clinical reviews, approved or denied determinations and any other requests to dedicated fax number below belonging to the campus where the patient is receiving treatment   List of dedicated fax numbers:  [address] DENIALS (Administrative/Medical Necessity) [phone]   [address] (Maternity/NICU/Pediatrics) [phone]   Db Burleson [phone]   Danielle Smith [phone]   Yvonne An [phone]   Leandra [place] [facility] [address] [place] [address] [phone]   [facility]  [phone]   [facility]  [address] [facility] [address] [facility] [phone]

## 2021-07-06 ENCOUNTER — PATIENT OUTREACH (OUTPATIENT)
Dept: CASE MANAGEMENT | Facility: HOSPITAL | Age: 86
End: 2021-07-06

## 2021-07-06 ENCOUNTER — OFFICE VISIT (OUTPATIENT)
Dept: HEMATOLOGY ONCOLOGY | Facility: CLINIC | Age: 86
End: 2021-07-06
Payer: COMMERCIAL

## 2021-07-06 ENCOUNTER — RA CDI HCC (OUTPATIENT)
Dept: OTHER | Facility: HOSPITAL | Age: 86
End: 2021-07-06

## 2021-07-06 VITALS
OXYGEN SATURATION: 99 % | RESPIRATION RATE: 17 BRPM | HEIGHT: 64 IN | SYSTOLIC BLOOD PRESSURE: 136 MMHG | DIASTOLIC BLOOD PRESSURE: 70 MMHG | WEIGHT: 155.5 LBS | HEART RATE: 115 BPM | BODY MASS INDEX: 26.55 KG/M2 | TEMPERATURE: 97.5 F

## 2021-07-06 DIAGNOSIS — C90.00 MULTIPLE MYELOMA NOT HAVING ACHIEVED REMISSION (HCC): Primary | ICD-10-CM

## 2021-07-06 PROCEDURE — 99214 OFFICE O/P EST MOD 30 MIN: CPT | Performed by: NURSE PRACTITIONER

## 2021-07-06 PROCEDURE — 1111F DSCHRG MED/CURRENT MED MERGE: CPT | Performed by: NURSE PRACTITIONER

## 2021-07-06 PROCEDURE — 1160F RVW MEDS BY RX/DR IN RCRD: CPT | Performed by: NURSE PRACTITIONER

## 2021-07-06 PROCEDURE — 1036F TOBACCO NON-USER: CPT | Performed by: NURSE PRACTITIONER

## 2021-07-06 NOTE — PROGRESS NOTES
LSW received DT and problem list via referral process  LSW attempted to reach out to pt  Pt self scored 8/10 and noted concerns with food, fears, and breathing  LSW has been following this pt with short term counseling and will meet with the pt on his next treatment day

## 2021-07-07 LAB
DME PARACHUTE DELIVERY DATE ACTUAL: NORMAL
DME PARACHUTE DELIVERY DATE REQUESTED: NORMAL
DME PARACHUTE ITEM DESCRIPTION: NORMAL
DME PARACHUTE ORDER STATUS: NORMAL
DME PARACHUTE SUPPLIER NAME: NORMAL
DME PARACHUTE SUPPLIER PHONE: NORMAL

## 2021-07-07 NOTE — PROGRESS NOTES
[facility] [employer]  coding opportunities             Chart reviewed, (number of) suggestions sent to provider: 1            Number of suggestions actually used: 0         Patients insurance company: [employer]     Visit status: Patient arrived for their scheduled appointment     Provider never responded to [facility]  coding request     [facility] [employer]  coding opportunities        7/9     Chart reviewed, (number of) suggestions sent to provider: 1    [id number]  Chronic kidney disease stage 3a - patient's eGFR values have been in the CKD 3a range going back to at least 2/20                  Patients insurance company: [employer]

## 2021-07-08 ENCOUNTER — HOSPITAL ENCOUNTER (OUTPATIENT)
Dept: INFUSION CENTER | Facility: CLINIC | Age: 86
End: 2021-07-08

## 2021-07-09 ENCOUNTER — APPOINTMENT (OUTPATIENT)
Dept: LAB | Facility: MEDICAL CENTER | Age: 86
End: 2021-07-09
Payer: COMMERCIAL

## 2021-07-09 ENCOUNTER — OFFICE VISIT (OUTPATIENT)
Dept: FAMILY MEDICINE CLINIC | Facility: MEDICAL CENTER | Age: 86
End: 2021-07-09
Payer: COMMERCIAL

## 2021-07-09 ENCOUNTER — TELEPHONE (OUTPATIENT)
Dept: FAMILY MEDICINE CLINIC | Facility: MEDICAL CENTER | Age: 86
End: 2021-07-09

## 2021-07-09 VITALS
TEMPERATURE: 99.2 F | WEIGHT: 156 LBS | SYSTOLIC BLOOD PRESSURE: 130 MMHG | HEIGHT: 64 IN | HEART RATE: 64 BPM | RESPIRATION RATE: 16 BRPM | DIASTOLIC BLOOD PRESSURE: 62 MMHG | BODY MASS INDEX: 26.63 KG/M2

## 2021-07-09 DIAGNOSIS — C90.00 MULTIPLE MYELOMA NOT HAVING ACHIEVED REMISSION (HCC): ICD-10-CM

## 2021-07-09 DIAGNOSIS — E03.9 ACQUIRED HYPOTHYROIDISM: ICD-10-CM

## 2021-07-09 DIAGNOSIS — M1A.09X0 IDIOPATHIC CHRONIC GOUT OF MULTIPLE SITES WITHOUT TOPHUS: ICD-10-CM

## 2021-07-09 DIAGNOSIS — N17.9 AKI (ACUTE KIDNEY INJURY) (HCC): ICD-10-CM

## 2021-07-09 DIAGNOSIS — I10 ESSENTIAL HYPERTENSION: ICD-10-CM
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
## 2021-07-09 DIAGNOSIS — E03.9 ACQUIRED HYPOTHYROIDISM: Primary | ICD-10-CM

## 2021-07-09 LAB — TSH SERPL DL<=0.05 MIU/L-ACNC: 1.81 UIU/ML (ref 0.36–3.74)

## 2021-07-09 PROCEDURE — 99496 TRANSJ CARE MGMT HIGH F2F 7D: CPT | Performed by: FAMILY MEDICINE

## 2021-07-09 PROCEDURE — 1111F DSCHRG MED/CURRENT MED MERGE: CPT | Performed by: FAMILY MEDICINE

## 2021-07-09 PROCEDURE — 84443 ASSAY THYROID STIM HORMONE: CPT

## 2021-07-09 NOTE — PROGRESS NOTES
Assessment/Plan:    Falls   He was recently admitted to the hospital  After a fall because of low blood pressure, dehydration and head trauma  He was observed and discharged  he has a walker now  And he feels a lot more steady as he has stopped his  Blood pressure  continue routine follow-up  Acquired hypothyroidism    We would like to check his TSH and will send him to the lab for that  Continue levothyroxine at 100 micrograms  Essential hypertension    Dyazide he was taking and  His blood pressure was noted in the hospital as low  It was discontinued  Will continue to watch and if his blood pressure goes up we will consider restarting it but I do not think we will need to  Idiopathic chronic gout of multiple sites without tophus  Since we have had patient on allopurinol, he has had no gouty attacks  Will continue allopurinol, continue routine follow-up    VINICIUS (acute kidney injury) ([address] )   He was dehydrated  Of his GFR is has come  Up  We will check his CMP next week  Multiple myeloma not having achieved remission [facility])    He has just been started on therapy for this  He is on Velcade and Decadron  He sees Dr Kana Wharton         Problem List Items Addressed This Visit        Endocrine    Acquired hypothyroidism - Primary       We would like to check his TSH and will send him to the lab for that  Continue levothyroxine at 100 micrograms  Relevant Orders    TSH, 3rd generation with Free T4 reflex       Cardiovascular and Mediastinum    Essential hypertension       Dyazide he was taking and  His blood pressure was noted in the hospital as low  It was discontinued  Will continue to watch and if his blood pressure goes up we will consider restarting it but I do not think we will need to  Musculoskeletal and Integument    Idiopathic chronic gout of multiple sites without tophus     Since we have had patient on allopurinol, he has had no gouty attacks      Will continue allopurinol, continue routine follow-up            Genitourinary    VINICIUS (acute kidney injury) ([facility] [address] )      He was dehydrated  Of his GFR is has come  Up  We will check his CMP next week  Other    Multiple myeloma not having achieved remission [facility])       He has just been started on therapy for this  He is on Velcade and Decadron  He sees Dr Trina Rodriguez      He was recently admitted to the hospital  After a fall because of low blood pressure, dehydration and head trauma  He was observed and discharged  he has a walker now  And he feels a lot more steady as he has stopped his  Blood pressure  continue routine follow-up  Subjective:      Patient ID: Farhan Calle is a 80 y o  male  I reviewed the admission history and physical, discharge summary and after visit summary with the patient  Medication list was updated and reflect any changes that were made in the hospital     The patient understands all discharge instructions as listed on the after visit summary  Appointments have been made or will be made with the appropriate physicians for follow-up  The following portions of the patient's history were reviewed and updated as appropriate: allergies, current medications, past family history, past medical history, past social history, past surgical history and problem list     Review of Systems   Constitutional: Negative for activity change, fatigue and fever  HENT: Negative for congestion, ear discharge, ear pain, postnasal drip, rhinorrhea, sinus pain, sneezing and sore throat  Eyes: Negative for photophobia, pain, discharge and redness  Respiratory: Negative for apnea, cough, shortness of breath and wheezing  Cardiovascular: Negative for chest pain and palpitations  Gastrointestinal: Negative for abdominal pain, blood in stool, constipation, diarrhea, nausea and vomiting  Endocrine: Negative for polydipsia, polyphagia and polyuria  Genitourinary: Negative for decreased urine volume, difficulty urinating, discharge, dysuria, frequency, penile pain and urgency  Musculoskeletal: Negative for arthralgias, gait problem, joint swelling and neck pain  Skin: Negative for color change and rash  Neurological: Negative for dizziness, tremors, seizures, weakness and headaches  Psychiatric/Behavioral: Negative for agitation and sleep disturbance  The patient is not nervous/anxious  Objective:      /62 (BP Location: Left arm, Patient Position: Sitting, Cuff Size: Adult)   Pulse 64   Temp 99 2 °F (37 3 °C)   Resp 16   Ht 5' 4" (1 626 m)   Wt 70 8 kg (156 lb)   BMI 26 78 kg/m²          Physical Exam  Vitals and nursing note reviewed  Constitutional:       Appearance: Normal appearance  He is well-developed  HENT:      Head: Normocephalic  Right Ear: External ear normal       Left Ear: External ear normal       Nose: Nose normal    Eyes:      General: Lids are normal       Conjunctiva/sclera: Conjunctivae normal       Pupils: Pupils are equal, round, and reactive to light  Neck:      Thyroid: No thyromegaly  Vascular: No carotid bruit  Cardiovascular:      Rate and Rhythm: Normal rate and regular rhythm  Pulses: Normal pulses  Heart sounds: Normal heart sounds, S1 normal and S2 normal  No murmur heard  Pulmonary:      Effort: Pulmonary effort is normal  No respiratory distress  Breath sounds: Normal breath sounds  No wheezing or rales  Abdominal:      General: Bowel sounds are normal       Palpations: Abdomen is soft  There is no mass  Tenderness: There is no abdominal tenderness  Musculoskeletal:         General: Normal range of motion  Cervical back: Normal range of motion and neck supple  Lymphadenopathy:      Cervical: No cervical adenopathy  Skin:     General: Skin is warm and dry  Coloration: Skin is not pale  Findings: No rash     Neurological:      Mental Status: He is alert and oriented to person, place, and time  Cranial Nerves: No cranial nerve deficit  Sensory: No sensory deficit  Deep Tendon Reflexes: Reflexes are normal and symmetric  Psychiatric:         Behavior: Behavior normal  Behavior is cooperative  Thought Content:  Thought content normal          Judgment: Judgment normal

## 2021-07-09 NOTE — ASSESSMENT & PLAN NOTE
We would like to check his TSH and will send him to the lab for that  Continue levothyroxine at 100 micrograms

## 2021-07-09 NOTE — ASSESSMENT & PLAN NOTE
He has just been started on therapy for this  He is on Velcade and Decadron        He sees Dr Dani Coughlin

## 2021-07-09 NOTE — ASSESSMENT & PLAN NOTE
He was recently admitted to the hospital  After a fall because of low blood pressure, dehydration and head trauma  He was observed and discharged  he has a walker now  And he feels a lot more steady as he has stopped his  Blood pressure  continue routine follow-up

## 2021-07-09 NOTE — ASSESSMENT & PLAN NOTE
Dyazide he was taking and  His blood pressure was noted in the hospital as low  It was discontinued  Will continue to watch and if his blood pressure goes up we will consider restarting it but I do not think we will need to

## 2021-07-14 ENCOUNTER — TELEPHONE (OUTPATIENT)
Dept: FAMILY MEDICINE CLINIC | Facility: MEDICAL CENTER | Age: 86
End: 2021-07-14

## 2021-07-15 ENCOUNTER — HOSPITAL ENCOUNTER (OUTPATIENT)
Dept: INFUSION CENTER | Facility: CLINIC | Age: 86
Discharge: HOME | End: 2021-07-15
Payer: COMMERCIAL

## 2021-07-15 VITALS
HEIGHT: 64 IN | HEART RATE: 98 BPM | BODY MASS INDEX: 27.31 KG/M2 | RESPIRATION RATE: 18 BRPM | WEIGHT: 160 LBS | SYSTOLIC BLOOD PRESSURE: 132 MMHG | TEMPERATURE: 98.2 F | DIASTOLIC BLOOD PRESSURE: 70 MMHG

## 2021-07-15 DIAGNOSIS — C90.00 MULTIPLE MYELOMA NOT HAVING ACHIEVED REMISSION (HCC): Primary | ICD-10-CM

## 2021-07-15 PROCEDURE — 96401 CHEMO ANTI-NEOPL SQ/IM: CPT

## 2021-07-15 RX ORDER — DEXAMETHASONE 4 MG/1
20 TABLET ORAL ONCE
Status: COMPLETED | OUTPATIENT
Start: 2021-07-15 | End: 2021-07-15

## 2021-07-15 RX ADMIN — BORTEZOMIB 2.3 MG: 3.5 INJECTION, POWDER, LYOPHILIZED, FOR SOLUTION INTRAVENOUS; SUBCUTANEOUS at 13:43

## 2021-07-15 RX ADMIN — DEXAMETHASONE 20 MG: 4 TABLET ORAL at 13:36

## 2021-07-15 NOTE — TELEPHONE ENCOUNTER
Left detailed msg on ping Yisel's cell that forms are ready, and I scanned into chart and put original in  drawer

## 2021-07-15 NOTE — PROGRESS NOTES
Pt here for Velcade injection  Vital signs stable and labs reviewed from 7/9/2021 and with in parameters  Annalee Huntley aware of calcium level of 7 8  OK to proceed with treatment  Pt made aware to have repeat CMP in 1 week and the office will be in touch with him to let him know if he needs to start on a calcium supplement  Injection given in RLQ  Pt tolerated well  Aware of future appts   Declines AVS

## 2021-07-15 NOTE — TELEPHONE ENCOUNTER
Yisel called back and said we need to call Prasanth Blanca, so I confirmed her phone # and left her a vm

## 2021-07-19 ENCOUNTER — TELEPHONE (OUTPATIENT)
Dept: HEMATOLOGY ONCOLOGY | Facility: HOSPITAL | Age: 86
End: 2021-07-19

## 2021-07-19 NOTE — PROGRESS NOTES
Hematology/Oncology Outpatient Follow- up Note  Douglas Fontaine, 1927, [id number]  2021        Chief Complaint   Patient presents with    Follow-up       HPI:  Douglas Fontaine is a 80year old male who was referred to hematology for evaluation of a M spike   SPEP with immunofixation demonstrated a monoclonal gammopathy identified as IgG kappa at 1 91   Urine immunofixation revealed  A monoclonal gammopathy identified as free kappa light chains at 2 84  He underwent a bone marrow biopsy on 21:  Final Diagnosis   A-C  Bone Marrow, Left Iliac Crest, Core, Clot and Aspirate:  - Hypercellular marrow (90% cellularity) with increased IgG Kappa restricted plasma cells (90% by  immunostain), consistent with plasma cell myeloma  - Mildly increased reticulin fibers secondary to plasma cell infiltrate     Due to his age, he is not a transplant candidate  He was started on treatment with weekly Velcade and Decadron on 6/3/2021  At patient's request, his treatment plan was adjusted to every 2 weeks       Previous Hematologic/ Oncologic History:    Oncology History   Multiple myeloma not having achieved remission ([facility] [address] )   2021 Initial Diagnosis    Multiple myeloma not having achieved remission ([address] )     6/3/2021 -  Chemotherapy    bortezomib (VELCADE), 1 3 mg/m2 = 2 3 mg (81 3 % of original dose 1 6 mg/m2), Subcutaneous, Once, 2 of 6 cycles  Dose modification: 1 3 mg/m2 (original dose 1 6 mg/m2, Cycle 1, Reason: Other (Must fill in a comment), Comment: age)  Administration: 2 3 mg (6/3/2021), 2 3 mg (6/10/2021), 2 3 mg (7/15/2021), 2 3 mg (2021), 2 3 mg (2021)     Decadron 20 mg and Velcade 1 3 mg/m2 weekly every 35 days     Current Hematologic/ Oncologic Treatment:    Regimen has been adjusted to Decadron 20 mg and Velcade 1 3 mg/m2 day 1, day 15 every 35 days    ECO - Symptomatic but completely ambulatory    Interval History:   The patient presents for routine follow up  Along with his cousin  He was last seen on 7/6 and his treatment regimen was adjusted to every 2 weeks  This was at his request following hospitalization (6/28-7/1/21) for symptoms of orthostatic Hypotension, dehydration  Today, he reports he is feeling much better  He is wearing compression stockings as recommended and denies any symptoms of lightheadedness / dizziness  His appetite has improved  He has regained a few lb  He denies any chest pain, shortness of breath  Patient states he feels well  No complaints of neuropathy  Denies nausea/vomiting / diarrhea or constipation    He was last treated on 7/15  He will be due for treatment next week on 7/29  He will have blood work repeated prior to treatment      Cancer Staging:  Cancer Staging  No matching staging information was found for the patient  Molecular Testing:         Test Results:    Imaging: No results found  Labs:   Lab Results   Component Value Date    WBC 6 92 07/09/2021    HGB 9 7 (L) 07/09/2021    HCT 30 3 (L) 07/09/2021     (H) 07/09/2021     07/09/2021     Lab Results   Component Value Date     09/21/2015    K 4 0 07/09/2021     (H) 07/09/2021    CO2 25 07/09/2021    ANIONGAP 4 09/21/2015    BUN 16 07/09/2021    CREATININE 1 01 07/09/2021    GLUCOSE 98 09/21/2015    GLUF 104 (H) 03/16/2021    CALCIUM 7 8 (L) 07/09/2021    CORRECTEDCA 9 2 07/09/2021    AST 19 07/09/2021    ALT 16 07/09/2021    ALKPHOS 161 (H) 07/09/2021    PROT 7 2 09/21/2015    BILITOT 0 58 09/21/2015    EGFR 63 07/09/2021           Review of Systems   Musculoskeletal: Positive for gait problem (Uses walker to assist gait)  All other systems reviewed and are negative          Active Problems:   Patient Active Problem List   Diagnosis    Idiopathic chronic gout of multiple sites without tophus    Pure hypercholesterolemia    Essential hypertension    Acquired hypothyroidism    Spondylosis of lumbar region without myelopathy or radiculopathy    Patellofemoral disorder of left knee    Spinal stenosis of lumbosacral region    Meningioma, cerebral ([facility] [address] )    Homeless    General medical exam    Immunization due    Multiple myeloma not having achieved remission ([facility] [address] )    Falls    Thrombocytopenia ([facility] [address] )    VINICIUS (acute kidney injury) ([facility] [address] )    Abrasions of multiple sites    Mild protein-calorie malnutrition ([facility] [address] )       Past Medical History:   Past Medical History:   Diagnosis Date    Arthritis     Gout     Hypertension     Hyperthyroidism     Memory loss     Multiple myeloma ([facility] [address] )     Myeloma ([facility] [address] )     Sleep disturbance     Thyroid disease        Surgical History:   Past Surgical History:   Procedure Laterality Date    IR BIOPSY BONE MARROW  2021    KIDNEY SURGERY      description: 30 yrs ago    PROSTATE SURGERY      description: stone removal 30 yrs ago    REPLACEMENT TOTAL KNEE BILATERAL      description: 25 yrs ago       Family History:    Family History   Problem Relation Age of Onset    Other Mother         Brain tumor    Cancer Sister     Diabetes Brother     Arthritis Family     Diabetes Family     Other Family         knee replacement    Thyroid disease Family        Cancer-related family history includes Cancer in his sister      Social History:   Social History     Socioeconomic History    Marital status: Single     Spouse name: Not on file    Number of children: Not on file    Years of education: Not on file    Highest education level: Not on file   Occupational History    Occupation:    Tobacco Use    Smoking status: Former Smoker     Packs/day: 0 50     Years: 25 00     Pack years: 12 50     Quit date:      Years since quittin 5    Smokeless tobacco: Never Used    Tobacco comment: "I smoked years ago"   Vaping Use    Vaping Use: Never used   Substance and Sexual Activity    Alcohol use: Yes     Comment: social "burbon once in a while"    Drug use: No    Sexual activity: Not on file Comment: per allscripts - sexually act and denied sexually activity    Other Topics Concern    Not on file   Social History Narrative    Not on file     Social Determinants of Health     Financial Resource Strain:     Difficulty of Paying Living Expenses:    Food Insecurity:     Worried About Running Out of Food in the Last Year:     [address] in the Last Year:    Transportation Needs:     Lack of Transportation (Medical):  Lack of Transportation (Non-Medical):    Physical Activity:     Days of Exercise per Week:     Minutes of Exercise per Session:    Stress:     Feeling of Stress :    Social Connections:     Frequency of Communication with Friends and Family:     Frequency of Social Gatherings with Friends and Family:     Attends [employer] Services:     Active Member of Clubs or Organizations:     Attends Club or Organization Meetings:     Marital Status:    Intimate Partner Violence:     Fear of Current or Ex-Partner:     Emotionally Abused:     Physically Abused:     Sexually Abused:        Current Medications:   Current Outpatient Medications   Medication Sig Dispense Refill    acyclovir (ZOVIRAX) 400 MG tablet Take 1 tablet (400 mg total) by mouth daily While on Velcade therapy 30 tablet 6    allopurinol (ZYLOPRIM) 100 mg tablet Take 2 tablets (200 mg total) by mouth daily 180 tablet 3    levothyroxine 100 mcg tablet Take 1 tablet (100 mcg total) by mouth daily 90 tablet 1    polyethylene glycol (GLYCOLAX) 17 GM/SCOOP powder Take 17 g by mouth daily 507 g 1     Current Facility-Administered Medications   Medication Dose Route Frequency Provider Last Rate Last Admin    albuterol inhalation solution 2 5 mg  2 5 mg Nebulization Once Samie Leyden, DO           Allergies:    Allergies   Allergen Reactions    Horse Protein        Physical Exam:  /78 (BP Location: Left arm)   Pulse 97   Temp (!) 97 4 °F (36 3 °C) (Temporal)   Resp 16   Ht 5' 4" (1 626 m)   Wt 73 5 kg (162 lb)   SpO2 99%   BMI 27 81 kg/m²   Body surface area is 1 79 meters squared  Wt Readings from Last 3 Encounters:   07/20/21 73 5 kg (162 lb)   07/15/21 72 6 kg (160 lb)   07/09/21 70 8 kg (156 lb)           Physical Exam  Constitutional:       General: He is not in acute distress  Comments: Alert, pleasant, elderly male  He is in good spirits  No acute distress   HENT:      Head: Atraumatic  Eyes:      General: No scleral icterus  Right eye: No discharge  Left eye: No discharge  Cardiovascular:      Rate and Rhythm: Normal rate and regular rhythm  Pulmonary:      Effort: Pulmonary effort is normal       Breath sounds: Normal breath sounds  Abdominal:      General: Bowel sounds are normal       Palpations: Abdomen is soft  Musculoskeletal:         General: Normal range of motion  Cervical back: Normal range of motion  Comments: Wearing compression stockings  No evidence of lower extremity edema   Lymphadenopathy:      Cervical: No cervical adenopathy  Skin:     General: Skin is warm and dry  Neurological:      General: No focal deficit present  Mental Status: He is alert and oriented to person, place, and time  Psychiatric:         Mood and Affect: Mood normal          Behavior: Behavior normal          Assessment / Plan:    1  Multiple myeloma not having achieved remission [facility])      The patient is a 80year old male who was referred to hematology for evaluation of a M spike 1 91  SPEP with immunofixation demonstrated a monoclonal gammopathy identified as IgG kappa at 1 91   Urine immunofixation revealed  A monoclonal gammopathy identified as free kappa light chains at 2 84  He underwent a bone marrow biopsy on 5/11/21 which demonstrated myeloma  He was started on treatment with weekly velcade and decadron  He completed one full cycle  He was then hospitalized with dehydration, orthostatic hypotension   His symptoms improved, however Patient and his cousin have requested treatment regimen adjustment  As previously discussed with Dr Blanca Kirby, his schedule was adjusted  He now receives Velcade and Decadron on day 1 and day 15 of the 35 day cycle    Patient reports he is tolerating this treatment regimen significantly better and he has a much better quality of life  Patient's cousin is pleased with how patient is doing  His blood work is in acceptable range  He will be due for day 15 of cycle 2 next week on 7/29  He will have blood work completed prior to his next treatment cycle  Patient will be notified of any abnormalities on blood work  Otherwise, he will proceed with treatment as planned  without change  He will return for a follow-up visit with Dr Blanca Kirby in 6 weeks with repeat blood work to include CBC, CMP and a full myeloma panel  Patient and his cousin are in agreement with this recommendation    He will continue on his current regimen without change  He will continue to have CBC and CMP checked every 2 weeks prior to treatment  Patient and his cousin verbalized understanding  They are instructed to call at any time with questions or concerns prior to their next follow-up visit     Goals and Barriers:  Current Goal:  Prolong Survival from multiple myeloma  Barriers: None  Patient's Capacity to Self Care:  Patient able to self care  Portions of the record may have been created with voice recognition software  Occasional wrong word or "sound a like" substitutions may have occurred due to the inherent limitations of voice recognition software  Read the chart carefully and recognize, using context, where substitutions have occurred

## 2021-07-19 NOTE — TELEPHONE ENCOUNTER
The patient should have labs prior to seeing Cohen Mohamud tomorrow 7/20  He has a voicemail that has not been set up yet

## 2021-07-20 ENCOUNTER — TELEPHONE (OUTPATIENT)
Dept: HEMATOLOGY ONCOLOGY | Facility: CLINIC | Age: 86
End: 2021-07-20

## 2021-07-20 ENCOUNTER — OFFICE VISIT (OUTPATIENT)
Dept: HEMATOLOGY ONCOLOGY | Facility: CLINIC | Age: 86
End: 2021-07-20
Payer: COMMERCIAL

## 2021-07-20 ENCOUNTER — PATIENT OUTREACH (OUTPATIENT)
Dept: CASE MANAGEMENT | Facility: HOSPITAL | Age: 86
End: 2021-07-20

## 2021-07-20 VITALS
RESPIRATION RATE: 16 BRPM | HEART RATE: 97 BPM | TEMPERATURE: 97.4 F | OXYGEN SATURATION: 99 % | SYSTOLIC BLOOD PRESSURE: 132 MMHG | DIASTOLIC BLOOD PRESSURE: 78 MMHG | HEIGHT: 64 IN | BODY MASS INDEX: 27.66 KG/M2 | WEIGHT: 162 LBS

## 2021-07-20 DIAGNOSIS — C90.00 MULTIPLE MYELOMA NOT HAVING ACHIEVED REMISSION (HCC): Primary | ICD-10-CM

## 2021-07-20 PROCEDURE — 1111F DSCHRG MED/CURRENT MED MERGE: CPT | Performed by: NURSE PRACTITIONER

## 2021-07-20 PROCEDURE — 99214 OFFICE O/P EST MOD 30 MIN: CPT | Performed by: NURSE PRACTITIONER

## 2021-07-20 NOTE — TELEPHONE ENCOUNTER
I called Nathan Chandler (Relative) who came with Garth Mai (Patient) today to advise her the patient is no longer utilizing [employer]  Per [facility], the patient is no longer eligible for [facility] as today was his 3rd same day cancelation in a month   I advised her to ensure he has transportation for all upcoming appointments  -Rickie Fields

## 2021-07-20 NOTE — PROGRESS NOTES
LSW received email from [address], stating pt cancelled 3 times, he is not eligible for [facility] transportation at this point  LSW called pt to advise him of this and pt stated he has someone in the area that is transporting him to his appointments  LSW attempted to assess pt for any other needs and pt abruptly said "goodbye" and ended telephone call  LSW will continue to monitor pt for needs

## 2021-07-27 ENCOUNTER — OFFICE VISIT (OUTPATIENT)
Dept: FAMILY MEDICINE CLINIC | Facility: MEDICAL CENTER | Age: 86
End: 2021-07-27
Payer: COMMERCIAL

## 2021-07-27 ENCOUNTER — APPOINTMENT (OUTPATIENT)
Dept: LAB | Facility: MEDICAL CENTER | Age: 86
End: 2021-07-27
Payer: COMMERCIAL

## 2021-07-27 ENCOUNTER — PATIENT OUTREACH (OUTPATIENT)
Dept: CASE MANAGEMENT | Facility: HOSPITAL | Age: 86
End: 2021-07-27

## 2021-07-27 VITALS
TEMPERATURE: 97.2 F | BODY MASS INDEX: 27.49 KG/M2 | HEART RATE: 80 BPM | HEIGHT: 64 IN | WEIGHT: 161 LBS | SYSTOLIC BLOOD PRESSURE: 120 MMHG | DIASTOLIC BLOOD PRESSURE: 70 MMHG

## 2021-07-27 DIAGNOSIS — L97.322 VENOUS STASIS ULCER OF LEFT ANKLE WITH FAT LAYER EXPOSED WITHOUT VARICOSE VEINS (HCC): Primary | ICD-10-CM

## 2021-07-27 DIAGNOSIS — I87.2 VENOUS STASIS ULCER OF LEFT ANKLE WITH FAT LAYER EXPOSED WITHOUT VARICOSE VEINS (HCC): Primary | ICD-10-CM

## 2021-07-27 DIAGNOSIS — C90.00 MULTIPLE MYELOMA NOT HAVING ACHIEVED REMISSION (HCC): ICD-10-CM

## 2021-07-27 DIAGNOSIS — L03.90 CELLULITIS, UNSPECIFIED CELLULITIS SITE: ICD-10-CM

## 2021-07-27 LAB
IGA SERPL-MCNC: 28 MG/DL (ref 70–400)
IGG SERPL-MCNC: 877 MG/DL (ref 700–1600)
IGM SERPL-MCNC: 10 MG/DL (ref 40–230)

## 2021-07-27 PROCEDURE — 3725F SCREEN DEPRESSION PERFORMED: CPT | Performed by: FAMILY MEDICINE

## 2021-07-27 PROCEDURE — 99213 OFFICE O/P EST LOW 20 MIN: CPT | Performed by: FAMILY MEDICINE

## 2021-07-27 PROCEDURE — 82784 ASSAY IGA/IGD/IGG/IGM EACH: CPT

## 2021-07-27 PROCEDURE — 84165 PROTEIN E-PHORESIS SERUM: CPT

## 2021-07-27 PROCEDURE — 87070 CULTURE OTHR SPECIMN AEROBIC: CPT | Performed by: FAMILY MEDICINE

## 2021-07-27 PROCEDURE — 87205 SMEAR GRAM STAIN: CPT | Performed by: FAMILY MEDICINE

## 2021-07-27 PROCEDURE — 87186 SC STD MICRODIL/AGAR DIL: CPT | Performed by: FAMILY MEDICINE

## 2021-07-27 PROCEDURE — 82232 ASSAY OF BETA-2 PROTEIN: CPT

## 2021-07-27 PROCEDURE — 84165 PROTEIN E-PHORESIS SERUM: CPT | Performed by: PATHOLOGY

## 2021-07-27 PROCEDURE — 83883 ASSAY NEPHELOMETRY NOT SPEC: CPT

## 2021-07-27 RX ORDER — CEPHALEXIN 500 MG/1
500 CAPSULE ORAL EVERY 6 HOURS SCHEDULED
Qty: 40 CAPSULE | Refills: 0 | Status: SHIPPED | OUTPATIENT
Start: 2021-07-27 | End: 2021-08-06

## 2021-07-27 NOTE — PROGRESS NOTES
LSW attempted to contact pt for follow up, no answer  LSW was unable to leave message  LSW will attempt another outreach at a later date

## 2021-07-27 NOTE — PROGRESS NOTES
Patient has a ulcer on his right foot  It is on the dorsum of the foot near the ankle, midline  It is about 1 cm x 2 5 cm  There is redness around the ulcer and is warm  /70 (BP Location: Left arm, Patient Position: Sitting, Cuff Size: Adult)   Pulse 80   Temp (!) 97 2 °F (36 2 °C)   Ht 5' 4" (1 626 m)   Wt 73 kg (161 lb)   BMI 27 64 kg/m²       I put a wet to dry dressing on the ulcer  I instructed her niece on how to do it  He lives at [facility]  Change the  Dressing daily      I also sent a culture       Prescription for cephalexin

## 2021-07-28 LAB
B2 MICROGLOB SERPL-MCNC: 3.4 MG/L (ref 0.6–2.4)
KAPPA LC FREE SER-MCNC: 149.3 MG/L (ref 3.3–19.4)
KAPPA LC FREE/LAMBDA FREE SER: 20.74 {RATIO} (ref 0.26–1.65)
LAMBDA LC FREE SERPL-MCNC: 7.2 MG/L (ref 5.7–26.3)

## 2021-07-29 ENCOUNTER — HOSPITAL ENCOUNTER (OUTPATIENT)
Dept: INFUSION CENTER | Facility: CLINIC | Age: 86
Discharge: HOME | End: 2021-07-29
Payer: COMMERCIAL

## 2021-07-29 VITALS
RESPIRATION RATE: 16 BRPM | TEMPERATURE: 97.8 F | DIASTOLIC BLOOD PRESSURE: 78 MMHG | HEIGHT: 64 IN | SYSTOLIC BLOOD PRESSURE: 122 MMHG | WEIGHT: 158 LBS | HEART RATE: 80 BPM | BODY MASS INDEX: 26.98 KG/M2

## 2021-07-29 DIAGNOSIS — C90.00 MULTIPLE MYELOMA NOT HAVING ACHIEVED REMISSION (HCC): Primary | ICD-10-CM

## 2021-07-29 LAB
BACTERIA WND AEROBE CULT: ABNORMAL
BACTERIA WND AEROBE CULT: ABNORMAL
GRAM STN SPEC: ABNORMAL

## 2021-07-29 PROCEDURE — 96401 CHEMO ANTI-NEOPL SQ/IM: CPT

## 2021-07-29 RX ORDER — DEXAMETHASONE 4 MG/1
20 TABLET ORAL ONCE
Status: COMPLETED | OUTPATIENT
Start: 2021-07-29 | End: 2021-07-29

## 2021-07-29 RX ADMIN — BORTEZOMIB 2.3 MG: 3.5 INJECTION, POWDER, LYOPHILIZED, FOR SOLUTION INTRAVENOUS; SUBCUTANEOUS at 14:18

## 2021-07-29 RX ADMIN — DEXAMETHASONE 20 MG: 4 TABLET ORAL at 14:17

## 2021-07-29 NOTE — PROGRESS NOTES
Patient presents today for Velcade injection  Patient offers no complaints  VSS  Labs reviewed and within parameters to treat  Velcade injection administered into right abdomen, tolerated without incident  Patient's next appointment confirmed  [facility] offered and declined

## 2021-07-30 ENCOUNTER — TELEPHONE (OUTPATIENT)
Dept: FAMILY MEDICINE CLINIC | Facility: MEDICAL CENTER | Age: 86
End: 2021-07-30

## 2021-07-30 LAB
ALBUMIN SERPL ELPH-MCNC: 3.09 G/DL (ref 3.5–5)
ALBUMIN SERPL ELPH-MCNC: 59.4 % (ref 52–65)
ALPHA1 GLOB SERPL ELPH-MCNC: 0.34 G/DL (ref 0.1–0.4)
ALPHA1 GLOB SERPL ELPH-MCNC: 6.5 % (ref 2.5–5)
ALPHA2 GLOB SERPL ELPH-MCNC: 0.58 G/DL (ref 0.4–1.2)
ALPHA2 GLOB SERPL ELPH-MCNC: 11.1 % (ref 7–13)
BETA GLOB ABNORMAL SERPL ELPH-MCNC: 0.27 G/DL (ref 0.4–0.8)
BETA1 GLOB SERPL ELPH-MCNC: 5.2 % (ref 5–13)
BETA2 GLOB SERPL ELPH-MCNC: 3.4 % (ref 2–8)
BETA2+GAMMA GLOB SERPL ELPH-MCNC: 0.18 G/DL (ref 0.2–0.5)
GAMMA GLOB ABNORMAL SERPL ELPH-MCNC: 0.75 G/DL (ref 0.5–1.6)
GAMMA GLOB SERPL ELPH-MCNC: 14.4 % (ref 12–22)
IGG/ALB SER: 1.46 {RATIO} (ref 1.1–1.8)
M PROTEIN 1 MFR SERPL ELPH: 9.9 %
M PROTEIN 1 SERPL ELPH-MCNC: 0.51 G/DL
PROT PATTERN SERPL ELPH-IMP: ABNORMAL
PROT SERPL-MCNC: 5.2 G/DL (ref 6.4–8.2)

## 2021-07-30 NOTE — TELEPHONE ENCOUNTER
Anel Redman with [facility] is calling to say that they would like to start the pt on PT next week if that is okay with you  Pt is having a decrease of strength in lower extremities due to chemo  They will send over paperwork to fax back to authorize

## 2021-08-03 ENCOUNTER — OFFICE VISIT (OUTPATIENT)
Dept: FAMILY MEDICINE CLINIC | Facility: MEDICAL CENTER | Age: 86
End: 2021-08-03
Payer: COMMERCIAL

## 2021-08-03 VITALS
HEIGHT: 64 IN | TEMPERATURE: 97.8 F | DIASTOLIC BLOOD PRESSURE: 72 MMHG | SYSTOLIC BLOOD PRESSURE: 148 MMHG | OXYGEN SATURATION: 98 % | BODY MASS INDEX: 27.01 KG/M2 | WEIGHT: 158.2 LBS | HEART RATE: 87 BPM

## 2021-08-03 DIAGNOSIS — I87.2 VENOUS STASIS ULCER OF LEFT ANKLE WITH FAT LAYER EXPOSED WITHOUT VARICOSE VEINS (HCC): ICD-10-CM

## 2021-08-03 DIAGNOSIS — L97.322 VENOUS STASIS ULCER OF LEFT ANKLE WITH FAT LAYER EXPOSED WITHOUT VARICOSE VEINS (HCC): ICD-10-CM

## 2021-08-03 DIAGNOSIS — J30.1 CHRONIC SEASONAL ALLERGIC RHINITIS DUE TO POLLEN: Primary | ICD-10-CM

## 2021-08-03 PROCEDURE — 99213 OFFICE O/P EST LOW 20 MIN: CPT | Performed by: FAMILY MEDICINE

## 2021-08-03 RX ORDER — FLUTICASONE PROPIONATE 50 MCG
2 SPRAY, SUSPENSION (ML) NASAL DAILY
Qty: 16 G | Refills: 5 | Status: SHIPPED | OUTPATIENT
Start: 2021-08-03 | End: 2022-01-25

## 2021-08-03 NOTE — PROGRESS NOTES
He is here today for a wound check  The infection is much better  Is now not red around it  But the ulcer is still not getting down to granulation tissue  I am not sure, but they are not doing actual wet to dry  As I wanted to do  See picture in media  He also complains of postnasal drip and sore throat and cough  He says this is  Allergies  /72 (BP Location: Left arm, Patient Position: Sitting, Cuff Size: Adult)   Pulse 87   Temp 97 8 °F (36 6 °C)   Ht 5' 4" (1 626 m)   Wt 71 8 kg (158 lb 3 2 oz)   SpO2 98%   BMI 27 15 kg/m²     See picture    ENT was normal except for red nasal turbinates and postnasal drip  Some lymphoid hyperplasia in the pharynx  Mild cervical lymphadenopathy chest was completely clear to percussion auscultation cardiac exam was normal      Will prescribe fluticasone  Make sure they are doing wet to dry dressings    Will recheck in another  week

## 2021-08-05 RX ORDER — DEXAMETHASONE 4 MG/1
20 TABLET ORAL ONCE
Status: CANCELLED | OUTPATIENT
Start: 2021-08-27

## 2021-08-05 RX ORDER — DEXAMETHASONE 4 MG/1
20 TABLET ORAL ONCE
Status: CANCELLED | OUTPATIENT
Start: 2021-08-12

## 2021-08-09 ENCOUNTER — APPOINTMENT (OUTPATIENT)
Dept: LAB | Facility: MEDICAL CENTER | Age: 86
End: 2021-08-09
Payer: COMMERCIAL

## 2021-08-09 ENCOUNTER — OFFICE VISIT (OUTPATIENT)
Dept: FAMILY MEDICINE CLINIC | Facility: MEDICAL CENTER | Age: 86
End: 2021-08-09
Payer: COMMERCIAL

## 2021-08-09 VITALS
OXYGEN SATURATION: 98 % | DIASTOLIC BLOOD PRESSURE: 80 MMHG | HEART RATE: 78 BPM | TEMPERATURE: 98.2 F | WEIGHT: 151.4 LBS | BODY MASS INDEX: 25.85 KG/M2 | SYSTOLIC BLOOD PRESSURE: 142 MMHG | HEIGHT: 64 IN

## 2021-08-09 DIAGNOSIS — L97.322 VENOUS STASIS ULCER OF LEFT ANKLE WITH FAT LAYER EXPOSED WITHOUT VARICOSE VEINS (HCC): Primary | ICD-10-CM

## 2021-08-09 DIAGNOSIS — I87.2 VENOUS STASIS ULCER OF LEFT ANKLE WITH FAT LAYER EXPOSED WITHOUT VARICOSE VEINS (HCC): Primary | ICD-10-CM

## 2021-08-09 PROBLEM — Z59.00 HOMELESS: Status: RESOLVED | Noted: 2020-02-03 | Resolved: 2021-08-09

## 2021-08-09 PROCEDURE — 1160F RVW MEDS BY RX/DR IN RCRD: CPT | Performed by: FAMILY MEDICINE

## 2021-08-09 PROCEDURE — 1036F TOBACCO NON-USER: CPT | Performed by: FAMILY MEDICINE

## 2021-08-09 PROCEDURE — 99213 OFFICE O/P EST LOW 20 MIN: CPT | Performed by: FAMILY MEDICINE

## 2021-08-09 NOTE — PROGRESS NOTES
The venous  Stasis ulcer is improving  It is about a cm by half a cm  There is no evidence of infection  Is on his right foot  Dorsum of right foot  We put a new wet to dry on  He is recommended to wear his compression socks      Foll  Ow-up as needed her e       /80 (BP Location: Left arm, Patient Position: Sitting, Cuff Size: Adult)   Pulse 78   Temp 98 2 °F (36 8 °C)   Ht 5' 4" (1 626 m)   Wt 68 7 kg (151 lb 6 4 oz)   SpO2 98%   BMI 25 99 kg/m²

## 2021-08-12 ENCOUNTER — HOSPITAL ENCOUNTER (OUTPATIENT)
Dept: INFUSION CENTER | Facility: CLINIC | Age: 86
Discharge: HOME | End: 2021-08-12
Payer: COMMERCIAL

## 2021-08-12 VITALS
WEIGHT: 151.8 LBS | RESPIRATION RATE: 18 BRPM | SYSTOLIC BLOOD PRESSURE: 134 MMHG | HEART RATE: 87 BPM | OXYGEN SATURATION: 98 % | TEMPERATURE: 97.7 F | DIASTOLIC BLOOD PRESSURE: 72 MMHG | HEIGHT: 64 IN | BODY MASS INDEX: 25.92 KG/M2

## 2021-08-12 DIAGNOSIS — C90.00 MULTIPLE MYELOMA NOT HAVING ACHIEVED REMISSION (HCC): Primary | ICD-10-CM

## 2021-08-12 PROCEDURE — 96401 CHEMO ANTI-NEOPL SQ/IM: CPT

## 2021-08-12 RX ORDER — DEXAMETHASONE 4 MG/1
20 TABLET ORAL ONCE
Status: COMPLETED | OUTPATIENT
Start: 2021-08-12 | End: 2021-08-12

## 2021-08-12 RX ADMIN — DEXAMETHASONE 20 MG: 4 TABLET ORAL at 11:59

## 2021-08-12 RX ADMIN — BORTEZOMIB 2.3 MG: 3.5 INJECTION, POWDER, LYOPHILIZED, FOR SOLUTION INTRAVENOUS; SUBCUTANEOUS at 12:32

## 2021-08-12 NOTE — PROGRESS NOTES
Patient here for day 1 cycle 3 velcade  Patient resting with no complaints  Vitals stable  Labs within parameters for treatment  [facility] within reach of patient  Will continue to monitor

## 2021-08-24 ENCOUNTER — APPOINTMENT (OUTPATIENT)
Dept: LAB | Facility: MEDICAL CENTER | Age: 86
End: 2021-08-24
Payer: COMMERCIAL

## 2021-08-24 DIAGNOSIS — C90.00 MULTIPLE MYELOMA NOT HAVING ACHIEVED REMISSION (HCC): ICD-10-CM

## 2021-08-24 LAB
ALBUMIN SERPL BCP-MCNC: 3.2 G/DL (ref 3.5–5)
ALP SERPL-CCNC: 123 U/L (ref 46–116)
ALT SERPL W P-5'-P-CCNC: 14 U/L (ref 12–78)
ANION GAP SERPL CALCULATED.3IONS-SCNC: 3 MMOL/L (ref 4–13)
AST SERPL W P-5'-P-CCNC: 17 U/L (ref 5–45)
BASOPHILS # BLD AUTO: 0.01 THOUSANDS/ΜL (ref 0–0.1)
BASOPHILS NFR BLD AUTO: 0 % (ref 0–1)
BILIRUB SERPL-MCNC: 0.57 MG/DL (ref 0.2–1)
BUN SERPL-MCNC: 12 MG/DL (ref 5–25)
CALCIUM ALBUM COR SERPL-MCNC: 9.4 MG/DL (ref 8.3–10.1)
CALCIUM SERPL-MCNC: 8.8 MG/DL (ref 8.3–10.1)
CHLORIDE SERPL-SCNC: 109 MMOL/L (ref 100–108)
CO2 SERPL-SCNC: 28 MMOL/L (ref 21–32)
CREAT SERPL-MCNC: 0.86 MG/DL (ref 0.6–1.3)
EOSINOPHIL # BLD AUTO: 0.07 THOUSAND/ΜL (ref 0–0.61)
EOSINOPHIL NFR BLD AUTO: 1 % (ref 0–6)
ERYTHROCYTE [DISTWIDTH] IN BLOOD BY AUTOMATED COUNT: 12.5 % (ref 11.6–15.1)
GFR SERPL CREATININE-BSD FRML MDRD: 74 ML/MIN/1.73SQ M
GLUCOSE SERPL-MCNC: 107 MG/DL (ref 65–140)
HCT VFR BLD AUTO: 35.8 % (ref 36.5–49.3)
HGB BLD-MCNC: 11.4 G/DL (ref 12–17)
IGA SERPL-MCNC: 38 MG/DL (ref 70–400)
IGG SERPL-MCNC: 942 MG/DL (ref 700–1600)
IGM SERPL-MCNC: 20 MG/DL (ref 40–230)
IMM GRANULOCYTES # BLD AUTO: 0.02 THOUSAND/UL (ref 0–0.2)
IMM GRANULOCYTES NFR BLD AUTO: 0 % (ref 0–2)
LYMPHOCYTES # BLD AUTO: 1.86 THOUSANDS/ΜL (ref 0.6–4.47)
LYMPHOCYTES NFR BLD AUTO: 36 % (ref 14–44)
MCH RBC QN AUTO: 33.3 PG (ref 26.8–34.3)
MCHC RBC AUTO-ENTMCNC: 31.8 G/DL (ref 31.4–37.4)
MCV RBC AUTO: 105 FL (ref 82–98)
MONOCYTES # BLD AUTO: 0.71 THOUSAND/ΜL (ref 0.17–1.22)
MONOCYTES NFR BLD AUTO: 14 % (ref 4–12)
NEUTROPHILS # BLD AUTO: 2.57 THOUSANDS/ΜL (ref 1.85–7.62)
NEUTS SEG NFR BLD AUTO: 49 % (ref 43–75)
NRBC BLD AUTO-RTO: 0 /100 WBCS
PLATELET # BLD AUTO: 174 THOUSANDS/UL (ref 149–390)
PMV BLD AUTO: 11.1 FL (ref 8.9–12.7)
POTASSIUM SERPL-SCNC: 4.2 MMOL/L (ref 3.5–5.3)
PROT SERPL-MCNC: 6.4 G/DL (ref 6.4–8.2)
RBC # BLD AUTO: 3.42 MILLION/UL (ref 3.88–5.62)
SODIUM SERPL-SCNC: 140 MMOL/L (ref 136–145)
WBC # BLD AUTO: 5.24 THOUSAND/UL (ref 4.31–10.16)

## 2021-08-24 PROCEDURE — 84165 PROTEIN E-PHORESIS SERUM: CPT | Performed by: PATHOLOGY

## 2021-08-24 PROCEDURE — 36415 COLL VENOUS BLD VENIPUNCTURE: CPT

## 2021-08-24 PROCEDURE — 82232 ASSAY OF BETA-2 PROTEIN: CPT

## 2021-08-24 PROCEDURE — 85025 COMPLETE CBC W/AUTO DIFF WBC: CPT

## 2021-08-24 PROCEDURE — 83883 ASSAY NEPHELOMETRY NOT SPEC: CPT

## 2021-08-24 PROCEDURE — 84165 PROTEIN E-PHORESIS SERUM: CPT

## 2021-08-24 PROCEDURE — 82784 ASSAY IGA/IGD/IGG/IGM EACH: CPT

## 2021-08-24 PROCEDURE — 80053 COMPREHEN METABOLIC PANEL: CPT

## 2021-08-25 LAB
ALBUMIN SERPL ELPH-MCNC: 3.71 G/DL (ref 3.5–5)
ALBUMIN SERPL ELPH-MCNC: 61.9 % (ref 52–65)
ALPHA1 GLOB SERPL ELPH-MCNC: 0.34 G/DL (ref 0.1–0.4)
ALPHA1 GLOB SERPL ELPH-MCNC: 5.6 % (ref 2.5–5)
ALPHA2 GLOB SERPL ELPH-MCNC: 0.66 G/DL (ref 0.4–1.2)
ALPHA2 GLOB SERPL ELPH-MCNC: 11 % (ref 7–13)
BETA GLOB ABNORMAL SERPL ELPH-MCNC: 0.32 G/DL (ref 0.4–0.8)
BETA1 GLOB SERPL ELPH-MCNC: 5.3 % (ref 5–13)
BETA2 GLOB SERPL ELPH-MCNC: 3.3 % (ref 2–8)
BETA2+GAMMA GLOB SERPL ELPH-MCNC: 0.2 G/DL (ref 0.2–0.5)
GAMMA GLOB ABNORMAL SERPL ELPH-MCNC: 0.77 G/DL (ref 0.5–1.6)
GAMMA GLOB SERPL ELPH-MCNC: 12.9 % (ref 12–22)
IGG/ALB SER: 1.62 {RATIO} (ref 1.1–1.8)
KAPPA LC FREE SER-MCNC: 151.3 MG/L (ref 3.3–19.4)
KAPPA LC FREE/LAMBDA FREE SER: 29.1 {RATIO} (ref 0.26–1.65)
LAMBDA LC FREE SERPL-MCNC: 5.2 MG/L (ref 5.7–26.3)
M PROTEIN 1 MFR SERPL ELPH: 7.2 %
M PROTEIN 1 SERPL ELPH-MCNC: 0.43 G/DL
PROT PATTERN SERPL ELPH-IMP: ABNORMAL
PROT SERPL-MCNC: 6 G/DL (ref 6.4–8.2)

## 2021-08-26 ENCOUNTER — HOSPITAL ENCOUNTER (EMERGENCY)
Facility: HOSPITAL | Age: 86
Discharge: HOME | End: 2021-08-26
Attending: EMERGENCY MEDICINE
Payer: COMMERCIAL

## 2021-08-26 ENCOUNTER — HOSPITAL ENCOUNTER (OUTPATIENT)
Dept: INFUSION CENTER | Facility: CLINIC | Age: 86
Discharge: HOME | End: 2021-08-26

## 2021-08-26 ENCOUNTER — APPOINTMENT (EMERGENCY)
Dept: CT IMAGING | Facility: HOSPITAL | Age: 86
End: 2021-08-26
Payer: COMMERCIAL

## 2021-08-26 ENCOUNTER — TELEPHONE (OUTPATIENT)
Dept: HEMATOLOGY ONCOLOGY | Facility: CLINIC | Age: 86
End: 2021-08-26

## 2021-08-26 ENCOUNTER — TELEPHONE (OUTPATIENT)
Dept: INFUSION CENTER | Facility: CLINIC | Age: 86
End: 2021-08-26

## 2021-08-26 VITALS
DIASTOLIC BLOOD PRESSURE: 67 MMHG | TEMPERATURE: 97.9 F | SYSTOLIC BLOOD PRESSURE: 151 MMHG | RESPIRATION RATE: 16 BRPM | HEART RATE: 99 BPM | OXYGEN SATURATION: 95 %

## 2021-08-26 DIAGNOSIS — S01.21XA LACERATION OF NOSE, INITIAL ENCOUNTER: ICD-10-CM

## 2021-08-26 DIAGNOSIS — S02.2XXB OPEN FRACTURE OF NASAL BONE, INITIAL ENCOUNTER: ICD-10-CM

## 2021-08-26 DIAGNOSIS — S00.81XA ABRASION OF FACE, INITIAL ENCOUNTER: ICD-10-CM
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
## 2021-08-26 LAB — B2 MICROGLOB SERPL-MCNC: 3.6 MG/L (ref 0.6–2.4)

## 2021-08-26 PROCEDURE — G1004 CDSM NDSC: HCPCS

## 2021-08-26 PROCEDURE — 70450 CT HEAD/BRAIN W/O DYE: CPT

## 2021-08-26 PROCEDURE — 99284 EMERGENCY DEPT VISIT MOD MDM: CPT | Performed by: EMERGENCY MEDICINE

## 2021-08-26 PROCEDURE — 12011 RPR F/E/E/N/L/M 2.5 CM/<: CPT | Performed by: EMERGENCY MEDICINE

## 2021-08-26 PROCEDURE — 70486 CT MAXILLOFACIAL W/O DYE: CPT

## 2021-08-26 PROCEDURE — 99284 EMERGENCY DEPT VISIT MOD MDM: CPT

## 2021-08-26 RX ORDER — CEPHALEXIN 500 MG/1
500 CAPSULE ORAL 4 TIMES DAILY
Qty: 27 CAPSULE | Refills: 0 | Status: SHIPPED | OUTPATIENT
Start: 2021-08-26 | End: 2021-08-31

## 2021-08-26 NOTE — ED PROVIDER NOTES
History  Chief Complaint   Patient presents with    Fall     pt fell outside  landed face first      The patient is a 80year old male who presents to the the ED after a fall that occurred less than 1 hour ago, in front of the [facility] ED  He came here for his scheduled velcade infusion therapy for multiple myeloma and was accompanied by 2 family members  While getting out of the car, he tripped and fell forward hitting his face on the ground outside  The patient and his family members report that he was immediately able to stand up with their assistance  He has a small abrasion in the center of his forehead and small laceration over the rhinion of the nose which are not painful and not actively bleeding  He is not taking blood thinners  The patient and his family deny LOC, confusion or any mental status changes from baseline  The patient also denies headache, dizziness, nausea, vomiting or pain anywhere  Recent history of decreased strength in his legs d/t MM infusion treatment has been improving with physical therapy  No other concerns at this time  History provided by:  Patient and relative  Fall  Mechanism of injury: fall    Injury location:  Face  Incident location:  Outdoors  Arrived directly from scene: yes    Fall:     Fall occurred:  Standing    Impact surface:  [place]  Prior to arrival data:     Patient ambulatory at scene: yes      Blood loss:  Minimal    Responsiveness at scene:  Alert    Orientation at scene:  Person, place, situation and time    Loss of consciousness: no      Amnesic to event: no      Airway interventions:  None    Immobilization:  None  Associated symptoms: no abdominal pain, no back pain, no chest pain, no headaches, no loss of consciousness, no nausea, no neck pain and no vomiting        Prior to Admission Medications   Prescriptions Last Dose Informant Patient Reported?  Taking?   acyclovir (ZOVIRAX) 400 MG tablet  Self No No   Sig: Take 1 tablet (400 mg total) by mouth daily While on Velcade therapy   allopurinol (ZYLOPRIM) 100 mg tablet  Self No No   Sig: Take 2 tablets (200 mg total) by mouth daily   fluticasone (FLONASE) 50 mcg/act nasal spray   No No   Si sprays into each nostril daily   levothyroxine 100 mcg tablet  Self No No   Sig: Take 1 tablet (100 mcg total) by mouth daily   polyethylene glycol (GLYCOLAX) 17 GM/SCOOP powder   No No   Sig: Take 17 g by mouth daily      Facility-Administered Medications Last Administration Doses Remaining   albuterol inhalation solution 2 5 mg None recorded 1          Past Medical History:   Diagnosis Date    Arthritis     Gout     Hypertension     Hyperthyroidism     Memory loss     [facility] [address] [facility]     Myeloma [facility] [address] [facility]     Sleep disturbance     Thyroid disease        Past Surgical History:   Procedure Laterality Date    IR BIOPSY BONE MARROW  2021    KIDNEY SURGERY      description: 30 yrs ago    PROSTATE SURGERY      description: stone removal 30 yrs ago    REPLACEMENT TOTAL KNEE BILATERAL      description: 25 yrs ago       Family History   Problem Relation Age of Onset    Other Mother         Brain tumor    Cancer Sister     Diabetes Brother     Arthritis Family     Diabetes Family     Other Family         knee replacement    Thyroid disease Family      I have reviewed and agree with the history as documented  E-Cigarette/Vaping    E-Cigarette Use Never User      E-Cigarette/Vaping Substances     Social History     Tobacco Use    Smoking status: Former Smoker     Packs/day: 0 50     Years: 25 00     Pack years: 12 50     Quit date:      Years since quittin 6    Smokeless tobacco: Never Used    Tobacco comment: "I smoked years ago"   Vaping Use    Vaping Use: Never used   Substance Use Topics    Alcohol use: Yes     Comment: social "burbon once in a while"    Drug use: No        Review of Systems   Constitutional: Negative for chills and fever     HENT: Negative for ear pain, facial swelling, nosebleeds, rhinorrhea and sinus pain  Eyes: Negative for photophobia, pain and visual disturbance  Respiratory: Negative for cough, shortness of breath and wheezing  Cardiovascular: Negative for chest pain and palpitations  Gastrointestinal: Negative for abdominal distention, abdominal pain, nausea and vomiting  Genitourinary: Negative for flank pain and testicular pain  Musculoskeletal: Negative for arthralgias, back pain, joint swelling, myalgias, neck pain and neck stiffness  Skin: Positive for wound (face)  Neurological: Negative for loss of consciousness and headaches  Psychiatric/Behavioral: Negative for agitation, confusion and decreased concentration  Physical Exam  ED Triage Vitals [08/26/21 1157]   Temperature Pulse Respirations Blood Pressure SpO2   99 2 °F (37 3 °C) 90 18 167/79 99 %      Temp Source Heart Rate Source Patient Position - Orthostatic VS BP Location FiO2 (%)   Oral Monitor Sitting Left arm --      Pain Score       No Pain             Orthostatic Vital Signs  Vitals:    08/26/21 1157 08/26/21 1304   BP: 167/79 151/67   Pulse: 90 99   Patient Position - Orthostatic VS: Sitting Sitting       Physical Exam  Constitutional:       General: He is not in acute distress  Appearance: Normal appearance  He is not diaphoretic  HENT:      Head: Normocephalic  Abrasion present  Nose: Signs of injury present  No nasal deformity, septal deviation, congestion or rhinorrhea  Right Nostril: No epistaxis or septal hematoma  Left Nostril: No epistaxis or septal hematoma  Mouth/Throat:      Mouth: Mucous membranes are moist    Eyes:      Pupils: Pupils are equal, round, and reactive to light  Cardiovascular:      Rate and Rhythm: Normal rate and regular rhythm  Pulses: Normal pulses  Heart sounds: Normal heart sounds  Pulmonary:      Effort: Pulmonary effort is normal  No respiratory distress        Breath sounds: Normal breath sounds  Chest:      Chest wall: No tenderness  Abdominal:      General: Bowel sounds are normal       Tenderness: There is no abdominal tenderness  Musculoskeletal:         General: No tenderness, deformity or signs of injury  Normal range of motion  Cervical back: Normal range of motion and neck supple  No rigidity or tenderness  Right lower leg: No edema  Left lower leg: No edema  Skin:     General: Skin is warm  Findings: Abrasion present  Neurological:      Mental Status: He is alert and oriented to person, place, and time  Cranial Nerves: No cranial nerve deficit  Sensory: No sensory deficit  Motor: No weakness  Gait: Gait normal    Psychiatric:         Mood and Affect: Mood normal          Thought Content: Thought content normal          ED Medications  Medications - No data to display    Diagnostic Studies  Results Reviewed     None                 CT head without contrast   Final Result by Rashad Rae MD (08/26 3428)      No acute intracranial abnormality  Anterior nasal bone fracture with adjacent soft tissue swelling  Workstation performed: [id number]         CT facial bones without contrast   Final Result by Rashad Rae MD (08/26 3041)      Comminuted anterior nasal bone fracture with adjacent soft tissue swelling  Workstation performed: [id number]               Procedures  Procedures      ED Course                                       MDM  Number of Diagnoses or Management Options  Abrasion of face, initial encounter  Fall, initial encounter  Laceration of nose, initial encounter  Open fracture of nasal bone, initial encounter  Diagnosis management comments: 80year old male presents with an abrasion of the forehead and nose after he tripped and fell while getting out of his car  Small nonbleeding abrasion over forehead and nose  Otherwise no pain or injury anywhere else  Exam unremarkable   Repair laceration on nose with glue  Head CT unremarkable, CT facial bones revealed comminuted anterior nasal bone fracture with adjacent soft tissue swelling  Patient discharged with abx and instructed to follow up with ENT, as needed  Disposition  Final diagnoses:   Fall, initial encounter   Abrasion of face, initial encounter   Laceration of nose, initial encounter   Open fracture of nasal bone, initial encounter     Time reflects when diagnosis was documented in both MDM as applicable and the Disposition within this note     Time User Action Codes Description Comment    8/26/2021  3:49 PM Torie Gaudier Add [Y88  DPIZ] Fall, initial encounter     8/26/2021  3:49 PM Torie Gaudier Add [S00 81XA] Abrasion of face, initial encounter     8/26/2021  3:49 PM Torie Gaudier Add [H31 66DR] Laceration of nose, initial encounter     8/26/2021  3:49 PM Torie Gaudier Add [S02  2XXB] Open fracture of nasal bone, initial encounter       ED Disposition     ED Disposition Condition Date/Time Comment    Discharge Stable Thu Aug 26, 2021  3:49 PM Shelia Nesbitt discharge to home/self care              Follow-up Information     Follow up With Specialties Details Why Contact Info    Merline Pane, MD Otolaryngology Call  As needed [address] [facility] [address] [facility]   [phone]            Discharge Medication List as of 8/26/2021  4:21 PM      START taking these medications    Details   cephalexin (KEFLEX) 500 mg capsule Take 1 capsule (500 mg total) by mouth 4 (four) times a day for 20 doses, Starting Thu 8/26/2021, Until Tue 8/31/2021, Print         CONTINUE these medications which have NOT CHANGED    Details   acyclovir (ZOVIRAX) 400 MG tablet Take 1 tablet (400 mg total) by mouth daily While on Velcade therapy, Starting Wed 5/26/2021, Until Sun 11/28/2021, Normal      allopurinol (ZYLOPRIM) 100 mg tablet Take 2 tablets (200 mg total) by mouth daily, Starting Fri 3/12/2021, Normal      fluticasone (FLONASE) 50 mcg/act nasal spray 2 sprays into each nostril daily, Starting Tue 8/3/2021, Normal      levothyroxine 100 mcg tablet Take 1 tablet (100 mcg total) by mouth daily, Starting Fri 3/12/2021, Normal      polyethylene glycol (GLYCOLAX) 17 GM/SCOOP powder Take 17 g by mouth daily, Starting Tue 6/22/2021, Normal           No discharge procedures on file  PDMP Review     None           ED Provider  Attending physically available and evaluated Yun Yogesh MARCUS managed the patient along with the ED Attending      Electronically Signed by         Gurmeet Longo MD  08/26/21 2046

## 2021-08-26 NOTE — DISCHARGE INSTRUCTIONS
Diagnosis; fall/ head injury /  facial abrasio n / nasal laceration with nasal fracture     - activity as tolerated     - please keep nose clean and dry for next 24 hrs- afterwards- can get gently wet-  blot dry     - for any pain -- over the counter tylenol 500 mg every 4 hrs     - expect do to gravity for swelling / bruising to develop under eyes  over next several daYS AND TO LAST FOR SEVERE WEEKS    - IF AFTER THE SWEELING GOES DOWN  3-7 DAYS- YOU WANT YOUR NOSE LOOKED AT- PLEASE CALL  THE ENT DOCTOR TO SCHEDULE AN APPOINTMENT    - THE GLUE ON YOUR NOSE MICHAEL L FLAKE OFF IN 7=-14 DAYS    - PLEASE RETURN TO  THE ER FOR ANY SIGNS OF SKIN INFECTION OF NOSE-- INCREASED REDNESS/SWELLING/ WARMTH/SWELLING PUS COMING FROM NOSE- ANY FEVERS- TEMP > 100 4     - KEFLEX- ANTIBIOTIC- 1 TABLET 4 TIMES FOR 5 DAYS

## 2021-08-26 NOTE — TELEPHONE ENCOUNTER
I received a call from Uziel Atkins stating that Cipriano Opitz fell on his way into his appointment today  He is currently in the ED with a possible broken nose  She would like to r/s todays missed appointment  I will follow up with the office to see how to proceed with his schedule as he was supposed to have a f/u with the doctor today

## 2021-08-26 NOTE — TELEPHONE ENCOUNTER
Received a call from Xi Martini who stated that [facility] fell on his way into the cancer center for his appnt  They are currently in the ER  I messaged Ramiro Burkitt RN from Dr El Kendall office who suggested that [facility] be rescheduled for tomorrow 8/27   Xi Martini agreed this would be best and will be able to bring him in for 9:30am

## 2021-08-26 NOTE — ED PROCEDURE NOTE
PROCEDURE  Laceration repair    Date/Time: 8/26/2021 3:05 PM  Performed by: Arthur Jones MD  Authorized by: Arthur Jones MD   Consent: Verbal consent obtained  Risks and benefits: risks, benefits and alternatives were discussed  Consent given by: patient  Patient understanding: patient states understanding of the procedure being performed  Patient identity confirmed: verbally with patient  Time out: Immediately prior to procedure a "time out" was called to verify the correct patient, procedure, equipment, support staff and site/side marked as required  Body area: head/neck  Location details: nose  Wound length (cm): 1  Contaminated: NONE  Tendon involvement: none  Nerve involvement: none  Vascular damage: no  Anesthesia method: NONE  Sedation:  Patient sedated: no      Wound Dehiscence:  Superficial Wound Dehiscence: simple closure      Procedure Details:  Irrigation solution: tap water  Amount of cleaning: standard  Debridement: none  Degree of undermining: none  Skin closure: glue  Approximation: close  Approximation difficulty: simple  Patient tolerance: patient tolerated the procedure well with no immediate complications  Comments: 1 CM VERTICAL STELLATE SUPERFICial to anterior nose--- with glue closure- band aid applied by er md   Foreign body: NONE           Arthur Jones MD  08/26/21 7533

## 2021-08-27 ENCOUNTER — HOSPITAL ENCOUNTER (OUTPATIENT)
Dept: INFUSION CENTER | Facility: CLINIC | Age: 86
Discharge: HOME | End: 2021-08-27
Payer: COMMERCIAL

## 2021-08-27 VITALS
SYSTOLIC BLOOD PRESSURE: 152 MMHG | TEMPERATURE: 96.9 F | RESPIRATION RATE: 18 BRPM | HEART RATE: 86 BPM | WEIGHT: 148.5 LBS | HEIGHT: 64 IN | OXYGEN SATURATION: 99 % | BODY MASS INDEX: 25.35 KG/M2 | DIASTOLIC BLOOD PRESSURE: 80 MMHG

## 2021-08-27 DIAGNOSIS — C90.00 MULTIPLE MYELOMA NOT HAVING ACHIEVED REMISSION (HCC): Primary | ICD-10-CM

## 2021-08-27 PROCEDURE — 96401 CHEMO ANTI-NEOPL SQ/IM: CPT

## 2021-08-27 RX ORDER — DEXAMETHASONE 4 MG/1
20 TABLET ORAL ONCE
Status: COMPLETED | OUTPATIENT
Start: 2021-08-27 | End: 2021-08-27

## 2021-08-27 RX ADMIN — BORTEZOMIB 2.3 MG: 3.5 INJECTION, POWDER, LYOPHILIZED, FOR SOLUTION INTRAVENOUS; SUBCUTANEOUS at 10:26

## 2021-08-27 RX ADMIN — DEXAMETHASONE 20 MG: 4 TABLET ORAL at 10:10

## 2021-08-27 NOTE — PROGRESS NOTES
Patient presents today for Velcade injection  Patient offers no complaints  VSS  Labs reviewed and within parameters to treat  Velcade injection administered into LLq of abdomen Tolerated without incident  Patient's next appointment confirmed  AVS printed and given to patient  Patient escorted with walker to [facility]ela

## 2021-08-29 NOTE — ED ATTENDING ATTESTATION
8/26/2021  I, John Thornton MD, saw and evaluated the patient  I have discussed the patient with the resident/non-physician practitioner and agree with the resident's/non-physician practitioner's findings, Plan of Care, and MDM as documented in the resident's/non-physician practitioner's note, except where noted  All available labs and Radiology studies were reviewed  I was present for key portions of any procedure(s) performed by the resident/non-physician practitioner and I was immediately available to provide assistance  At this point I agree with the current assessment done in the Emergency Department    I have conducted an independent evaluation of this patient a history and physical is as follows:see h and p above- agree with  Resident tx/ care/ dispo    ED Course  ED Course as of Aug 29 0717   Thu Aug 26, 2021   1401 - ER MD NOTE- PT OFFERED PAIN MEDICATION REFUSES AT THIS POINT- STATES HAS HAD TD WITHIN 10 YRS             Critical Care Time  Procedures

## 2021-09-01 ENCOUNTER — OFFICE VISIT (OUTPATIENT)
Dept: HEMATOLOGY ONCOLOGY | Facility: CLINIC | Age: 86
End: 2021-09-01
Payer: COMMERCIAL

## 2021-09-01 VITALS
BODY MASS INDEX: 26.12 KG/M2 | OXYGEN SATURATION: 98 % | SYSTOLIC BLOOD PRESSURE: 152 MMHG | DIASTOLIC BLOOD PRESSURE: 78 MMHG | HEIGHT: 64 IN | TEMPERATURE: 97.4 F | HEART RATE: 106 BPM | WEIGHT: 153 LBS | RESPIRATION RATE: 16 BRPM

## 2021-09-01 DIAGNOSIS — C90.00 MULTIPLE MYELOMA NOT HAVING ACHIEVED REMISSION (HCC): Primary | ICD-10-CM

## 2021-09-01 PROCEDURE — 1036F TOBACCO NON-USER: CPT | Performed by: INTERNAL MEDICINE

## 2021-09-01 PROCEDURE — 99214 OFFICE O/P EST MOD 30 MIN: CPT | Performed by: INTERNAL MEDICINE

## 2021-09-01 PROCEDURE — 1160F RVW MEDS BY RX/DR IN RCRD: CPT | Performed by: INTERNAL MEDICINE

## 2021-09-01 NOTE — PROGRESS NOTES
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   Patient has an M spike of 1 91  Biopsy proven to be myeloma  HPI:    Gracie Jacome seen for initial consultation 4/15/2021 regarding  A newly diagnosed M spike of 1 91   Looking at the SPEP with immunofixation it appears  That the immunofixation showed a monoclonal gammopathy identified as IgG kappa at 1 91   Urine immunofixation revealed  A monoclonal gammopathy identified as free kappa light chains at 2 84   The patient was referred to see us for this   The patient does have mild renal insufficiency  With a creatinine of 1 35   Hemoglobin was normal   Calcium was normal   The patient himself has no bony pain    Previous Hematologic/ Oncologic History:    Oncology History   [facility] )   5/25/2021 Initial Diagnosis    Multiple myeloma not having achieved remission ([facility] )     6/3/2021 -  Chemotherapy    bortezomib (VELCADE), 1 3 mg/m2 = 2 3 mg (81 3 % of original dose 1 6 mg/m2), Subcutaneous, Once, 3 of 6 cycles  Dose modification: 1 3 mg/m2 (original dose 1 6 mg/m2, Cycle 1, Reason: Other (Must fill in a comment), Comment: age)  Administration: 2 3 mg (6/3/2021), 2 3 mg (6/10/2021), 2 3 mg (7/15/2021), 2 3 mg (6/17/2021), 2 3 mg (6/24/2021), 2 3 mg (7/29/2021), 2 3 mg (8/12/2021), 2 3 mg (8/27/2021)       Workup    Current Hematologic/ Oncologic Treatment:       Velcade plus Decadron every 2 weeks    Interval History:      The patient returns for follow-up visit  He had a fall  He broke his nose  He also hurt his right lower extremity so I will do x-rays of the hip along with an x-ray of his femur  This is on the right side  This is to make sure he does not have a fracture  He was in the emergency room but this was not checked    Imaging of the face had shown the nasal fracture  His M spike has come down to 0 4 from 1 9  He does seem to be having a response in the fact that his creatinine is improved as has his hemoglobin  Apart from the discomfort from his fall he denies any other complaints  Denies any nausea denies any vomiting denies any diarrhea  The rest of his 14 point review of systems today was negative  Test Results:    Imaging: CT head without contrast    Result Date: 8/26/2021  Narrative: CT BRAIN - WITHOUT CONTRAST INDICATION:   Head trauma, minor (Age => 65y) fall, head injury  COMPARISON:  6/20/2021 TECHNIQUE:  CT examination of the brain was performed  In addition to axial images, sagittal and coronal 2D reformatted images were created and submitted for interpretation  Radiation dose length product (DLP) for this visit:  887 mGy-cm   This examination, like all CT scans performed in the [facility], was performed utilizing techniques to minimize radiation dose exposure, including the use of iterative reconstruction and automated exposure control  IMAGE QUALITY:  Diagnostic  FINDINGS: PARENCHYMA: Decreased attenuation is noted in periventricular and subcortical white matter demonstrating an appearance that is statistically most likely to represent mild microangiopathic change; this appearance is similar when compared to most recent prior examination  No CT signs of acute infarction  No intracranial mass, mass effect or midline shift  No acute parenchymal hemorrhage  VENTRICLES AND EXTRA-AXIAL SPACES:  Normal for the patient's age  VISUALIZED ORBITS AND PARANASAL SINUSES:  Unremarkable  CALVARIUM AND EXTRACRANIAL SOFT TISSUES:  Anterior nasal bone fracture is present with adjacent soft tissue swelling  Impression: No acute intracranial abnormality  Anterior nasal bone fracture with adjacent soft tissue swelling   Workstation performed: [id number]     CT facial bones without contrast    Result Date: 8/26/2021  Narrative: CT FACIAL BONES WITHOUT INTRAVENOUS CONTRAST INDICATION:   Facial trauma fall, head injury  COMPARISON: None  TECHNIQUE:  Axial CT images were obtained through the facial bones with additional sagittal and coronal reconstructions  Radiation dose length product (DLP) for this visit:  481 mGy-cm   This examination, like all CT scans performed in the [place], was performed utilizing techniques to minimize radiation dose exposure, including the use of iterative reconstruction and automated exposure control  IMAGE QUALITY:  Diagnostic  FINDINGS: FACIAL BONES:  Comminuted anterior nasal bone fracture is present  Normal alignment of the temporomandibular joints  No lytic or blastic lesion  ORBITS:  Orbital globes, optic nerves, and extraocular muscles appear symmetric and normal  There is no evidence of retrobulbar mass, abscess, or hematoma  SINUSES:  Normal  SOFT TISSUES:  Nasal soft tissue swelling is present  Impression: Comminuted anterior nasal bone fracture with adjacent soft tissue swelling   Workstation performed: [id number]       Labs:   Lab Results   Component Value Date    WBC 5 24 08/24/2021    HGB 11 4 (L) 08/24/2021    HCT 35 8 (L) 08/24/2021     (H) 08/24/2021     08/24/2021     Lab Results   Component Value Date     09/21/2015    K 4 2 08/24/2021     (H) 08/24/2021    CO2 28 08/24/2021    ANIONGAP 4 09/21/2015    BUN 12 08/24/2021    CREATININE 0 86 08/24/2021    GLUCOSE 98 09/21/2015    GLUF 104 (H) 03/16/2021    CALCIUM 8 8 08/24/2021    CORRECTEDCA 9 4 08/24/2021    AST 17 08/24/2021    ALT 14 08/24/2021    ALKPHOS 123 (H) 08/24/2021    PROT 7 2 09/21/2015    BILITOT 0 58 09/21/2015    EGFR 74 08/24/2021         Lab Results   Component Value Date    SPEP See Comment 08/24/2021    UPEP See Comment 03/16/2021       Lab Results   Component Value Date    PSA 2 4 03/05/2021    PSA 2 0 09/21/2015       Lab Results   Component Value Date    LIGLVYIV15 312 03/16/2021 ROS: As stated in the history of present illness otherwise his 14 point review of systems today was negative  Active Problems:   Patient Active Problem List   Diagnosis    Idiopathic chronic gout of multiple sites without tophus    Pure hypercholesterolemia    Essential hypertension    Acquired hypothyroidism    Spondylosis of lumbar region without myelopathy or radiculopathy    Patellofemoral disorder of left knee    Spinal stenosis of lumbosacral region    [facility]oma[facility]    General medical exam    Immunization due    Multiple myeloma not having achieved remission ([facility]    Falls    Thrombocytopenia ([facility]    VINICIUS (acute kidney injury) ([facility]    Abrasions of multiple sites    Mild protein-calorie malnutrition ([facility] )    Venous stasis ulcer of left ankle with fat layer exposed without varicose veins (HCC)    Chronic seasonal allergic rhinitis due to pollen       Past Medical History:   Past Medical History:   Diagnosis Date    Arthritis     Gout     Hypertension     Hyperthyroidism     Memory loss     Multiple myeloma [facility]     Myeloma ([facility]     Sleep disturbance     Thyroid disease        Surgical History:   Past Surgical History:   Procedure Laterality Date    IR BIOPSY BONE MARROW  5/11/2021    KIDNEY SURGERY      description: 30 yrs ago    PROSTATE SURGERY      description: stone removal 30 yrs ago    REPLACEMENT TOTAL KNEE BILATERAL      description: 18 yrs ago       Family History:    Family History   Problem Relation Age of Onset    Other Mother         Brain tumor    Cancer Sister     Diabetes Brother     Arthritis Family     Diabetes Family     Other Family         knee replacement    Thyroid disease Family        Cancer-related family history includes Cancer in his sister      Social History:   Social History     Socioeconomic History    Marital status: Single     Spouse name: Not on file    Number of children: Not on file    Years of education: Not on file    Highest education level: Not on file   Occupational History    Occupation:    Tobacco Use    Smoking status: Former Smoker     Packs/day: 0 50     Years: 25 00     Pack years: 12 50     Quit date:      Years since quittin 6    Smokeless tobacco: Never Used    Tobacco comment: "I smoked years ago"   Vaping Use    Vaping Use: Never used   Substance and Sexual Activity    Alcohol use: Yes     Comment: social "burbon once in a while"    Drug use: No    Sexual activity: Not on file     Comment: per allscripts - sexually act and denied sexually activity    Other Topics Concern    Not on file   Social History Narrative    Not on file     Social Determinants of Health     Financial Resource Strain:     Difficulty of Paying Living Expenses:    Food Insecurity:     Worried About Running Out of Food in the Last Year:     [address] in the Last Year:    Transportation Needs:     Lack of Transportation (Medical):      Lack of Transportation (Non-Medical):    Physical Activity:     Days of Exercise per Week:     Minutes of Exercise per Session:    Stress:     Feeling of Stress :    Social Connections:     Frequency of Communication with Friends and Family:     Frequency of Social Gatherings with Friends and Family:     Attends Scientologist Services:     Active Member of Clubs or Organizations:     Attends Club or Organization Meetings:     Marital Status:    Intimate Partner Violence:     Fear of Current or Ex-Partner:     Emotionally Abused:     Physically Abused:     Sexually Abused:        Current Medications:   Current Outpatient Medications   Medication Sig Dispense Refill    acyclovir (ZOVIRAX) 400 MG tablet Take 1 tablet (400 mg total) by mouth daily While on Velcade therapy 30 tablet 6    allopurinol (ZYLOPRIM) 100 mg tablet Take 2 tablets (200 mg total) by mouth daily 180 tablet 3    fluticasone (FLONASE) 50 mcg/act nasal spray 2 sprays into each nostril daily 16 g 5    levothyroxine 100 mcg tablet Take 1 tablet (100 mcg total) by mouth daily 90 tablet 1    polyethylene glycol (GLYCOLAX) 17 GM/SCOOP powder Take 17 g by mouth daily 507 g 1     Current Facility-Administered Medications   Medication Dose Route Frequency Provider Last Rate Last Admin    albuterol inhalation solution 2 5 mg  2 5 mg Nebulization Once Margoth Rome, DO           Allergies: Allergies   Allergen Reactions    Horse Protein        Physical Exam:    Body surface area is 1 75 meters squared  Wt Readings from Last 3 Encounters:   09/01/21 69 4 kg (153 lb)   08/27/21 67 4 kg (148 lb 8 oz)   08/12/21 68 9 kg (151 lb 12 8 oz)        Temp Readings from Last 3 Encounters:   09/01/21 (!) 97 4 °F (36 3 °C) (Temporal)   08/27/21 (!) 96 9 °F (36 1 °C) (Temporal)   08/26/21 97 9 °F (36 6 °C) (Oral)        BP Readings from Last 3 Encounters:   09/01/21 152/78   08/27/21 152/80   08/26/21 151/67         Pulse Readings from Last 3 Encounters:   09/01/21 (!) 106   08/27/21 86   08/26/21 99         Physical Exam     Constitutional   General appearance: No acute distress, well appearing and well nourished  Eyes   Conjunctiva and lids: No swelling, erythema or discharge  Pupils and irises: Equal, round and reactive to light  Ears, Nose, Mouth, and Throat   External inspection of ears and nose:  Bruise over the nose with obvious fracture in the nasal bone based on shape compared to previously  Nasal mucosa, septum, and turbinates: Normal without edema or erythema  Oropharynx: Normal with no erythema, edema, exudate or lesions  Pulmonary   Respiratory effort: No increased work of breathing or signs of respiratory distress  Auscultation of lungs: Clear to auscultation  Cardiovascular   Palpation of heart: Normal PMI, no thrills  Auscultation of heart: Normal rate and rhythm, normal S1 and S2, without murmurs      Examination of extremities for edema and/or varicosities: Normal  Carotid pulses: Normal     Abdomen   Abdomen: Non-tender, no masses  Liver and spleen: No hepatomegaly or splenomegaly  Lymphatic   Palpation of lymph nodes in neck: No lymphadenopathy  Musculoskeletal   Gait and station:   Walking with a walker  Digits and nails: Normal without clubbing or cyanosis  Inspection/palpation of joints, bones, and muscles: Normal     Skin   Skin and subcutaneous tissue: Normal without rashes or lesions  Neurologic   Cranial nerves: Cranial nerves 2-12 intact  Sensation: No sensory loss  Psychiatric   Orientation to person, place, and time: Normal     Mood and affect: Normal         Assessment / Plan: This is a pleasant 72-year-old male who recently celebrated his birthday yesterday  He had a bone marrow biopsy for what was thought to be MGUS but it turns out he has multiple myeloma  A 80years of age he is not a candidate for transplant but his [employer] performance status is 0-1 so   We put him on Velcade and Decadron initially weekly but then this was changed every 2 weeks  He is currently on this  His M spike is now 1/3 of what it was  Hemoglobin has improved significantly and creatinine is now normal   We will continue him on his current regimen every 2 weeks  I will see him back in 2 months with repeat myeloma blood work  Until then if he has any questions he will call our office  I will order x-rays of his pelvis and his right lower extremity to make sure he has no fractures or any other fractures from his fall  Goals and Barriers:  Current Goal:  Prolong Survival from   Myeloma   Barriers: None  Patient's Capacity to Self Care:  Patient able to self care      Portions of the record may have been created with voice recognition software   Occasional wrong word or "sound a like" substitutions may have occurred due to the inherent limitations of voice recognition software   Read the chart carefully and recognize, using context, where substitutions have occurred

## 2021-09-02 RX ORDER — DEXAMETHASONE 4 MG/1
20 TABLET ORAL ONCE
Status: CANCELLED | OUTPATIENT
Start: 2021-09-09

## 2021-09-02 RX ORDER — DEXAMETHASONE 4 MG/1
20 TABLET ORAL ONCE
Status: CANCELLED | OUTPATIENT
Start: 2021-09-23

## 2021-09-07 ENCOUNTER — APPOINTMENT (OUTPATIENT)
Dept: LAB | Facility: MEDICAL CENTER | Age: 86
End: 2021-09-07
Payer: COMMERCIAL

## 2021-09-07 ENCOUNTER — APPOINTMENT (OUTPATIENT)
Dept: RADIOLOGY | Facility: MEDICAL CENTER | Age: 86
End: 2021-09-07
Payer: COMMERCIAL

## 2021-09-07 DIAGNOSIS — C90.00 MULTIPLE MYELOMA NOT HAVING ACHIEVED REMISSION (HCC): ICD-10-CM

## 2021-09-07 LAB
IGA SERPL-MCNC: 26 MG/DL (ref 70–400)
IGG SERPL-MCNC: 893 MG/DL (ref 700–1600)
IGM SERPL-MCNC: 10 MG/DL (ref 40–230)

## 2021-09-07 PROCEDURE — 73523 X-RAY EXAM HIPS BI 5/> VIEWS: CPT

## 2021-09-07 PROCEDURE — 83883 ASSAY NEPHELOMETRY NOT SPEC: CPT

## 2021-09-07 PROCEDURE — 84165 PROTEIN E-PHORESIS SERUM: CPT | Performed by: PATHOLOGY

## 2021-09-07 PROCEDURE — 82784 ASSAY IGA/IGD/IGG/IGM EACH: CPT

## 2021-09-07 PROCEDURE — 84165 PROTEIN E-PHORESIS SERUM: CPT

## 2021-09-07 PROCEDURE — 82232 ASSAY OF BETA-2 PROTEIN: CPT

## 2021-09-07 PROCEDURE — 73552 X-RAY EXAM OF FEMUR 2/>: CPT

## 2021-09-08 LAB
ALBUMIN SERPL ELPH-MCNC: 3.64 G/DL (ref 3.5–5)
ALBUMIN SERPL ELPH-MCNC: 60.7 % (ref 52–65)
ALPHA1 GLOB SERPL ELPH-MCNC: 0.4 G/DL (ref 0.1–0.4)
ALPHA1 GLOB SERPL ELPH-MCNC: 6.7 % (ref 2.5–5)
ALPHA2 GLOB SERPL ELPH-MCNC: 0.7 G/DL (ref 0.4–1.2)
ALPHA2 GLOB SERPL ELPH-MCNC: 11.6 % (ref 7–13)
BETA GLOB ABNORMAL SERPL ELPH-MCNC: 0.32 G/DL (ref 0.4–0.8)
BETA1 GLOB SERPL ELPH-MCNC: 5.3 % (ref 5–13)
BETA2 GLOB SERPL ELPH-MCNC: 3.7 % (ref 2–8)
BETA2+GAMMA GLOB SERPL ELPH-MCNC: 0.22 G/DL (ref 0.2–0.5)
GAMMA GLOB ABNORMAL SERPL ELPH-MCNC: 0.72 G/DL (ref 0.5–1.6)
GAMMA GLOB SERPL ELPH-MCNC: 12 % (ref 12–22)
IGG/ALB SER: 1.54 {RATIO} (ref 1.1–1.8)
KAPPA LC FREE SER-MCNC: 156.8 MG/L (ref 3.3–19.4)
KAPPA LC FREE/LAMBDA FREE SER: 32.67 {RATIO} (ref 0.26–1.65)
LAMBDA LC FREE SERPL-MCNC: 4.8 MG/L (ref 5.7–26.3)
M PROTEIN 1 MFR SERPL ELPH: 6.3 %
M PROTEIN 1 SERPL ELPH-MCNC: 0.38 G/DL
PROT PATTERN SERPL ELPH-IMP: ABNORMAL
PROT SERPL-MCNC: 6 G/DL (ref 6.4–8.2)

## 2021-09-09 ENCOUNTER — HOSPITAL ENCOUNTER (OUTPATIENT)
Dept: INFUSION CENTER | Facility: CLINIC | Age: 86
Discharge: HOME | End: 2021-09-09
Payer: COMMERCIAL

## 2021-09-09 VITALS
HEIGHT: 64 IN | RESPIRATION RATE: 16 BRPM | WEIGHT: 149.2 LBS | HEART RATE: 95 BPM | TEMPERATURE: 97.6 F | DIASTOLIC BLOOD PRESSURE: 78 MMHG | OXYGEN SATURATION: 98 % | SYSTOLIC BLOOD PRESSURE: 146 MMHG | BODY MASS INDEX: 25.47 KG/M2

## 2021-09-09 DIAGNOSIS — C90.00 MULTIPLE MYELOMA NOT HAVING ACHIEVED REMISSION (HCC): Primary | ICD-10-CM

## 2021-09-09 LAB — B2 MICROGLOB SERPL-MCNC: 3.8 MG/L (ref 0.6–2.4)

## 2021-09-09 PROCEDURE — 96401 CHEMO ANTI-NEOPL SQ/IM: CPT

## 2021-09-09 RX ORDER — DEXAMETHASONE 4 MG/1
20 TABLET ORAL ONCE
Status: COMPLETED | OUTPATIENT
Start: 2021-09-09 | End: 2021-09-09

## 2021-09-09 RX ADMIN — BORTEZOMIB 2.3 MG: 3.5 INJECTION, POWDER, LYOPHILIZED, FOR SOLUTION INTRAVENOUS; SUBCUTANEOUS at 12:17

## 2021-09-09 RX ADMIN — DEXAMETHASONE 20 MG: 4 TABLET ORAL at 12:13

## 2021-09-09 NOTE — PROGRESS NOTES
Patient presents today for Velacade injection  Patient offers no complaints  VSS  Labs reviewed and within parameters to treat  Velcade injection administered into RLQ without incident  Patient's next appointment confirmed  AVS printed and given to patient  Patient escorted to lobby

## 2021-09-17 ENCOUNTER — APPOINTMENT (OUTPATIENT)
Dept: LAB | Facility: MEDICAL CENTER | Age: 86
End: 2021-09-17
Payer: COMMERCIAL

## 2021-09-17 ENCOUNTER — OFFICE VISIT (OUTPATIENT)
Dept: FAMILY MEDICINE CLINIC | Facility: MEDICAL CENTER | Age: 86
End: 2021-09-17
Payer: COMMERCIAL

## 2021-09-17 VITALS
OXYGEN SATURATION: 100 % | TEMPERATURE: 98.6 F | BODY MASS INDEX: 25.27 KG/M2 | HEART RATE: 86 BPM | SYSTOLIC BLOOD PRESSURE: 140 MMHG | HEIGHT: 64 IN | DIASTOLIC BLOOD PRESSURE: 78 MMHG | WEIGHT: 148 LBS

## 2021-09-17 DIAGNOSIS — C90.00 MULTIPLE MYELOMA NOT HAVING ACHIEVED REMISSION (HCC): ICD-10-CM

## 2021-09-17 DIAGNOSIS — S02.2XXK: Primary | ICD-10-CM

## 2021-09-17 DIAGNOSIS — E03.9 ACQUIRED HYPOTHYROIDISM: ICD-10-CM

## 2021-09-17 DIAGNOSIS — N17.9 AKI (ACUTE KIDNEY INJURY) (HCC): ICD-10-CM

## 2021-09-17 DIAGNOSIS — I10 ESSENTIAL HYPERTENSION: ICD-10-CM

## 2021-09-17 DIAGNOSIS — M1A.09X0 IDIOPATHIC CHRONIC GOUT OF MULTIPLE SITES WITHOUT TOPHUS: ICD-10-CM

## 2021-09-17 PROCEDURE — 99214 OFFICE O/P EST MOD 30 MIN: CPT | Performed by: FAMILY MEDICINE

## 2021-09-17 PROCEDURE — 1036F TOBACCO NON-USER: CPT | Performed by: FAMILY MEDICINE

## 2021-09-17 PROCEDURE — 1160F RVW MEDS BY RX/DR IN RCRD: CPT | Performed by: FAMILY MEDICINE

## 2021-09-17 RX ORDER — LEVOTHYROXINE SODIUM 0.1 MG/1
100 TABLET ORAL DAILY
Qty: 90 TABLET | Refills: 1 | Status: SHIPPED | OUTPATIENT
Start: 2021-09-17 | End: 2022-02-25 | Stop reason: SDUPTHER

## 2021-09-17 RX ORDER — ALLOPURINOL 100 MG/1
200 TABLET ORAL DAILY
Qty: 180 TABLET | Refills: 3 | Status: SHIPPED | OUTPATIENT
Start: 2021-09-17 | End: 2022-02-25 | Stop reason: SDUPTHER

## 2021-09-17 NOTE — PROGRESS NOTES
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  He fell down on August 24th  He had on his face  He now has a obvious deformity of his nose  He feels the cartilage inside of his nose  I am going to refer him to ENT    Idiopathic chronic gout of multiple sites without tophus  Since we have had patient on allopurinol, he has had no gouty attacks  Will continue allopurinol, continue routine follow-up    VINICIUS (acute kidney injury) ([facility] )  His last GFR is 62  I have reinforced the need to stay well hydrated    Multiple myeloma not having achieved remission [facility])    He has just been started on therapy for this  He is on Velcade and Decadron  He sees Dr Milton Fuel    He really has no symptoms  He also takes acyclovir when he is on the Velcade and Decadron         Problem List Items Addressed This Visit        Endocrine    Acquired hypothyroidism     Patient has hypothyroidism and it is well controlled with levothyroxine 100 mcg  Continue levothyroxine  TSH in July was 1 8         Relevant Medications    levothyroxine 100 mcg tablet       Cardiovascular and Mediastinum    Essential hypertension     Blood pressure was 140/78  He is not taking any medication for it  Recommended low-salt diet            Musculoskeletal and Integument    Idiopathic chronic gout of multiple sites without tophus     Since we have had patient on allopurinol, he has had no gouty attacks  Will continue allopurinol, continue routine follow-up         Relevant Medications    allopurinol (ZYLOPRIM) 100 mg tablet    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     He fell down on August 24th  He had on his face    He now has a obvious deformity of his nose  He feels the cartilage inside of his nose  I am going to refer him to ENT         Relevant Orders    Ambulatory Referral to Otolaryngology       Genitourinary    VINICIUS (acute kidney injury) ([facility] )     His last GFR is 62  I have reinforced the need to stay well hydrated            Other    Multiple myeloma not having achieved remission [facility])       He has just been started on therapy for this  He is on Velcade and Decadron  He sees Dr Esvin Shen    He really has no symptoms  He also takes acyclovir when he is on the Velcade and Decadron                 Subjective:      Patient ID: Rockwell Aase is a 80 y o  male  He is a vigorous 80year-old  He is retired  from [employer]  He is from [place] and has many family members in the area  Although he was never  and no children  He lives at the [facility] living  He likes it there  His niece who was close to him  She  from [facility] last winter  The following portions of the patient's history were reviewed and updated as appropriate: allergies, current medications, past family history, past medical history, past social history, past surgical history and problem list     Review of Systems   Constitutional: Negative  HENT: Positive for rhinorrhea  Respiratory: Negative  Cardiovascular: Negative  Gastrointestinal: Negative  Genitourinary: Negative  Neurological: Negative  Psychiatric/Behavioral: Negative  Objective:      /78 (BP Location: Left arm, Patient Position: Sitting, Cuff Size: Adult)   Pulse 86   Temp 98 6 °F (37 °C)   Ht 5' 4" (1 626 m)   Wt 67 1 kg (148 lb)   SpO2 100%   BMI 25 40 kg/m²          Physical Exam  Vitals and nursing note reviewed  Constitutional:       Appearance: Normal appearance  He is well-developed  HENT:      Head: Normocephalic        Right Ear: External ear normal       Left Ear: External ear normal       Nose: Comments: Obvious deformity from his fall  There is a marked nasal deviation  Eyes:      General: Lids are normal       Conjunctiva/sclera: Conjunctivae normal       Pupils: Pupils are equal, round, and reactive to light  Neck:      Thyroid: No thyromegaly  Vascular: No carotid bruit  Cardiovascular:      Rate and Rhythm: Normal rate and regular rhythm  Pulses: Normal pulses  Heart sounds: Normal heart sounds, S1 normal and S2 normal  No murmur heard  Pulmonary:      Effort: Pulmonary effort is normal  No respiratory distress  Breath sounds: Normal breath sounds  No wheezing or rales  Abdominal:      General: Bowel sounds are normal       Palpations: Abdomen is soft  There is no mass  Tenderness: There is no abdominal tenderness  Musculoskeletal:         General: Normal range of motion  Cervical back: Normal range of motion and neck supple  Lymphadenopathy:      Cervical: No cervical adenopathy  Skin:     General: Skin is warm and dry  Coloration: Skin is not pale  Findings: No rash  Neurological:      Mental Status: He is alert and oriented to person, place, and time  Cranial Nerves: No cranial nerve deficit  Sensory: No sensory deficit  Deep Tendon Reflexes: Reflexes are normal and symmetric  Psychiatric:         Behavior: Behavior normal  Behavior is cooperative  Thought Content:  Thought content normal          Judgment: Judgment normal

## 2021-09-17 NOTE — ASSESSMENT & PLAN NOTE
Patient has hypothyroidism and it is well controlled with levothyroxine 100 mcg  Continue levothyroxine    TSH in July was 1 8

## 2021-09-17 NOTE — ASSESSMENT & PLAN NOTE
He has just been started on therapy for this  He is on Velcade and Decadron  He sees Dr Anton Beckford    He really has no symptoms    He also takes acyclovir when he is on the Velcade and Decadron

## 2021-09-17 NOTE — ASSESSMENT & PLAN NOTE
He fell down on August 24th  He had on his face  He now has a obvious deformity of his nose  He feels the cartilage inside of his nose      I am going to refer him to ENT

## 2021-09-23 ENCOUNTER — HOSPITAL ENCOUNTER (OUTPATIENT)
Dept: INFUSION CENTER | Facility: CLINIC | Age: 86
Discharge: HOME | End: 2021-09-23
Payer: COMMERCIAL

## 2021-09-23 VITALS
HEART RATE: 80 BPM | BODY MASS INDEX: 25.44 KG/M2 | WEIGHT: 149 LBS | OXYGEN SATURATION: 98 % | RESPIRATION RATE: 16 BRPM | DIASTOLIC BLOOD PRESSURE: 64 MMHG | TEMPERATURE: 96.7 F | HEIGHT: 64 IN | SYSTOLIC BLOOD PRESSURE: 136 MMHG

## 2021-09-23 DIAGNOSIS — C90.00 MULTIPLE MYELOMA NOT HAVING ACHIEVED REMISSION (HCC): Primary | ICD-10-CM

## 2021-09-23 PROCEDURE — 96401 CHEMO ANTI-NEOPL SQ/IM: CPT

## 2021-09-23 RX ORDER — DEXAMETHASONE 4 MG/1
20 TABLET ORAL ONCE
Status: COMPLETED | OUTPATIENT
Start: 2021-09-23 | End: 2021-09-23

## 2021-09-23 RX ADMIN — BORTEZOMIB 2.3 MG: 3.5 INJECTION, POWDER, LYOPHILIZED, FOR SOLUTION INTRAVENOUS; SUBCUTANEOUS at 10:38

## 2021-09-23 RX ADMIN — DEXAMETHASONE 20 MG: 4 TABLET ORAL at 10:38

## 2021-09-23 NOTE — PROGRESS NOTES
Patient presents today for treatment with Velcade and dexamethasone  Patient offers no complaints  VSS  Labs reviewed and within parameters to treat

## 2021-09-23 NOTE — PROGRESS NOTES
Patient tolerated Velcade injection to LLQ without incident  Patient's next appointment confirmed  AVS printed and given to patient  Patient escorted to lobby to meet ride

## 2021-09-30 RX ORDER — DEXAMETHASONE 4 MG/1
20 TABLET ORAL ONCE
Status: CANCELLED | OUTPATIENT
Start: 2021-10-21

## 2021-09-30 RX ORDER — DEXAMETHASONE 4 MG/1
20 TABLET ORAL ONCE
Status: CANCELLED | OUTPATIENT
Start: 2021-10-07

## 2021-10-05 ENCOUNTER — APPOINTMENT (OUTPATIENT)
Dept: LAB | Facility: MEDICAL CENTER | Age: 86
End: 2021-10-05
Payer: COMMERCIAL

## 2021-10-05 DIAGNOSIS — C90.00 MULTIPLE MYELOMA NOT HAVING ACHIEVED REMISSION (HCC): ICD-10-CM

## 2021-10-05 LAB
ALBUMIN SERPL BCP-MCNC: 3.3 G/DL (ref 3.5–5)
ALP SERPL-CCNC: 111 U/L (ref 46–116)
ALT SERPL W P-5'-P-CCNC: 12 U/L (ref 12–78)
ANION GAP SERPL CALCULATED.3IONS-SCNC: 2 MMOL/L (ref 4–13)
AST SERPL W P-5'-P-CCNC: 19 U/L (ref 5–45)
BASOPHILS # BLD AUTO: 0.02 THOUSANDS/ΜL (ref 0–0.1)
BASOPHILS NFR BLD AUTO: 0 % (ref 0–1)
BILIRUB SERPL-MCNC: 0.4 MG/DL (ref 0.2–1)
BUN SERPL-MCNC: 13 MG/DL (ref 5–25)
CALCIUM ALBUM COR SERPL-MCNC: 9.7 MG/DL (ref 8.3–10.1)
CALCIUM SERPL-MCNC: 9.1 MG/DL (ref 8.3–10.1)
CHLORIDE SERPL-SCNC: 110 MMOL/L (ref 100–108)
CO2 SERPL-SCNC: 27 MMOL/L (ref 21–32)
CREAT SERPL-MCNC: 0.84 MG/DL (ref 0.6–1.3)
EOSINOPHIL # BLD AUTO: 0.04 THOUSAND/ΜL (ref 0–0.61)
EOSINOPHIL NFR BLD AUTO: 1 % (ref 0–6)
ERYTHROCYTE [DISTWIDTH] IN BLOOD BY AUTOMATED COUNT: 12.6 % (ref 11.6–15.1)
GFR SERPL CREATININE-BSD FRML MDRD: 75 ML/MIN/1.73SQ M
GLUCOSE SERPL-MCNC: 105 MG/DL (ref 65–140)
HCT VFR BLD AUTO: 40 % (ref 36.5–49.3)
HGB BLD-MCNC: 12.9 G/DL (ref 12–17)
IMM GRANULOCYTES # BLD AUTO: 0.02 THOUSAND/UL (ref 0–0.2)
IMM GRANULOCYTES NFR BLD AUTO: 0 % (ref 0–2)
LYMPHOCYTES # BLD AUTO: 2.01 THOUSANDS/ΜL (ref 0.6–4.47)
LYMPHOCYTES NFR BLD AUTO: 36 % (ref 14–44)
MCH RBC QN AUTO: 31.1 PG (ref 26.8–34.3)
MCHC RBC AUTO-ENTMCNC: 32.3 G/DL (ref 31.4–37.4)
MCV RBC AUTO: 96 FL (ref 82–98)
MONOCYTES # BLD AUTO: 0.82 THOUSAND/ΜL (ref 0.17–1.22)
MONOCYTES NFR BLD AUTO: 15 % (ref 4–12)
NEUTROPHILS # BLD AUTO: 2.68 THOUSANDS/ΜL (ref 1.85–7.62)
NEUTS SEG NFR BLD AUTO: 48 % (ref 43–75)
NRBC BLD AUTO-RTO: 0 /100 WBCS
PLATELET # BLD AUTO: 172 THOUSANDS/UL (ref 149–390)
PMV BLD AUTO: 11.7 FL (ref 8.9–12.7)
POTASSIUM SERPL-SCNC: 3.9 MMOL/L (ref 3.5–5.3)
PROT SERPL-MCNC: 6.5 G/DL (ref 6.4–8.2)
RBC # BLD AUTO: 4.15 MILLION/UL (ref 3.88–5.62)
SODIUM SERPL-SCNC: 139 MMOL/L (ref 136–145)
WBC # BLD AUTO: 5.59 THOUSAND/UL (ref 4.31–10.16)

## 2021-10-05 PROCEDURE — 36415 COLL VENOUS BLD VENIPUNCTURE: CPT

## 2021-10-05 PROCEDURE — 85025 COMPLETE CBC W/AUTO DIFF WBC: CPT

## 2021-10-05 PROCEDURE — 80053 COMPREHEN METABOLIC PANEL: CPT

## 2021-10-07 ENCOUNTER — HOSPITAL ENCOUNTER (OUTPATIENT)
Dept: INFUSION CENTER | Facility: CLINIC | Age: 86
Discharge: HOME | End: 2021-10-07
Payer: COMMERCIAL

## 2021-10-07 VITALS
OXYGEN SATURATION: 99 % | BODY MASS INDEX: 25.44 KG/M2 | RESPIRATION RATE: 16 BRPM | DIASTOLIC BLOOD PRESSURE: 78 MMHG | WEIGHT: 149 LBS | SYSTOLIC BLOOD PRESSURE: 148 MMHG | TEMPERATURE: 97.9 F | HEART RATE: 84 BPM | HEIGHT: 64 IN

## 2021-10-07 DIAGNOSIS — C90.00 MULTIPLE MYELOMA NOT HAVING ACHIEVED REMISSION (HCC): Primary | ICD-10-CM

## 2021-10-07 PROCEDURE — 96401 CHEMO ANTI-NEOPL SQ/IM: CPT

## 2021-10-07 RX ORDER — DEXAMETHASONE 4 MG/1
20 TABLET ORAL ONCE
Status: COMPLETED | OUTPATIENT
Start: 2021-10-07 | End: 2021-10-07

## 2021-10-07 RX ADMIN — DEXAMETHASONE 20 MG: 4 TABLET ORAL at 14:40

## 2021-10-07 RX ADMIN — BORTEZOMIB 2.3 MG: 3.5 INJECTION, POWDER, LYOPHILIZED, FOR SOLUTION INTRAVENOUS; SUBCUTANEOUS at 14:55

## 2021-10-18 ENCOUNTER — APPOINTMENT (OUTPATIENT)
Dept: LAB | Facility: MEDICAL CENTER | Age: 86
End: 2021-10-18
Payer: COMMERCIAL

## 2021-10-18 DIAGNOSIS — C90.00 MULTIPLE MYELOMA NOT HAVING ACHIEVED REMISSION (HCC): ICD-10-CM

## 2021-10-21 ENCOUNTER — HOSPITAL ENCOUNTER (OUTPATIENT)
Dept: INFUSION CENTER | Facility: CLINIC | Age: 86
Discharge: HOME | End: 2021-10-21
Payer: COMMERCIAL

## 2021-10-21 VITALS
DIASTOLIC BLOOD PRESSURE: 78 MMHG | HEART RATE: 86 BPM | TEMPERATURE: 97.9 F | RESPIRATION RATE: 18 BRPM | WEIGHT: 147.27 LBS | HEIGHT: 64 IN | BODY MASS INDEX: 25.14 KG/M2 | SYSTOLIC BLOOD PRESSURE: 144 MMHG | OXYGEN SATURATION: 98 %

## 2021-10-21 DIAGNOSIS — C90.00 MULTIPLE MYELOMA NOT HAVING ACHIEVED REMISSION (HCC): Primary | ICD-10-CM

## 2021-10-21 PROCEDURE — 96401 CHEMO ANTI-NEOPL SQ/IM: CPT

## 2021-10-21 RX ORDER — DEXAMETHASONE 4 MG/1
20 TABLET ORAL ONCE
Status: COMPLETED | OUTPATIENT
Start: 2021-10-21 | End: 2021-10-21

## 2021-10-21 RX ADMIN — DEXAMETHASONE 20 MG: 4 TABLET ORAL at 12:05

## 2021-10-21 RX ADMIN — BORTEZOMIB 2.3 MG: 3.5 INJECTION, POWDER, LYOPHILIZED, FOR SOLUTION INTRAVENOUS; SUBCUTANEOUS at 12:27

## 2021-10-28 RX ORDER — DEXAMETHASONE 4 MG/1
20 TABLET ORAL ONCE
Status: CANCELLED | OUTPATIENT
Start: 2021-11-04

## 2021-10-28 RX ORDER — DEXAMETHASONE 4 MG/1
20 TABLET ORAL ONCE
Status: CANCELLED | OUTPATIENT
Start: 2021-11-18

## 2021-11-01 ENCOUNTER — APPOINTMENT (OUTPATIENT)
Dept: LAB | Facility: MEDICAL CENTER | Age: 86
End: 2021-11-01
Payer: COMMERCIAL

## 2021-11-01 DIAGNOSIS — C90.00 MULTIPLE MYELOMA NOT HAVING ACHIEVED REMISSION (HCC): ICD-10-CM

## 2021-11-01 LAB
ALBUMIN SERPL BCP-MCNC: 3.1 G/DL (ref 3.5–5)
ALP SERPL-CCNC: 92 U/L (ref 46–116)
ALT SERPL W P-5'-P-CCNC: 11 U/L (ref 12–78)
ANION GAP SERPL CALCULATED.3IONS-SCNC: 3 MMOL/L (ref 4–13)
AST SERPL W P-5'-P-CCNC: 16 U/L (ref 5–45)
BASOPHILS # BLD AUTO: 0.02 THOUSANDS/ΜL (ref 0–0.1)
BASOPHILS NFR BLD AUTO: 0 % (ref 0–1)
BILIRUB SERPL-MCNC: 0.86 MG/DL (ref 0.2–1)
BUN SERPL-MCNC: 12 MG/DL (ref 5–25)
CALCIUM ALBUM COR SERPL-MCNC: 9.7 MG/DL (ref 8.3–10.1)
CALCIUM SERPL-MCNC: 9 MG/DL (ref 8.3–10.1)
CHLORIDE SERPL-SCNC: 109 MMOL/L (ref 100–108)
CO2 SERPL-SCNC: 30 MMOL/L (ref 21–32)
CREAT SERPL-MCNC: 0.94 MG/DL (ref 0.6–1.3)
EOSINOPHIL # BLD AUTO: 0.05 THOUSAND/ΜL (ref 0–0.61)
EOSINOPHIL NFR BLD AUTO: 1 % (ref 0–6)
ERYTHROCYTE [DISTWIDTH] IN BLOOD BY AUTOMATED COUNT: 13.4 % (ref 11.6–15.1)
GFR SERPL CREATININE-BSD FRML MDRD: 69 ML/MIN/1.73SQ M
GLUCOSE P FAST SERPL-MCNC: 183 MG/DL (ref 65–99)
HCT VFR BLD AUTO: 39.7 % (ref 36.5–49.3)
HGB BLD-MCNC: 12.8 G/DL (ref 12–17)
IMM GRANULOCYTES # BLD AUTO: 0.02 THOUSAND/UL (ref 0–0.2)
IMM GRANULOCYTES NFR BLD AUTO: 0 % (ref 0–2)
LYMPHOCYTES # BLD AUTO: 1.89 THOUSANDS/ΜL (ref 0.6–4.47)
LYMPHOCYTES NFR BLD AUTO: 37 % (ref 14–44)
MCH RBC QN AUTO: 30.8 PG (ref 26.8–34.3)
MCHC RBC AUTO-ENTMCNC: 32.2 G/DL (ref 31.4–37.4)
MCV RBC AUTO: 95 FL (ref 82–98)
MONOCYTES # BLD AUTO: 0.62 THOUSAND/ΜL (ref 0.17–1.22)
MONOCYTES NFR BLD AUTO: 12 % (ref 4–12)
NEUTROPHILS # BLD AUTO: 2.49 THOUSANDS/ΜL (ref 1.85–7.62)
NEUTS SEG NFR BLD AUTO: 50 % (ref 43–75)
NRBC BLD AUTO-RTO: 0 /100 WBCS
PLATELET # BLD AUTO: 162 THOUSANDS/UL (ref 149–390)
PMV BLD AUTO: 11.8 FL (ref 8.9–12.7)
POTASSIUM SERPL-SCNC: 4.1 MMOL/L (ref 3.5–5.3)
PROT SERPL-MCNC: 6.4 G/DL (ref 6.4–8.2)
RBC # BLD AUTO: 4.16 MILLION/UL (ref 3.88–5.62)
SODIUM SERPL-SCNC: 142 MMOL/L (ref 136–145)
WBC # BLD AUTO: 5.09 THOUSAND/UL (ref 4.31–10.16)

## 2021-11-01 PROCEDURE — 85025 COMPLETE CBC W/AUTO DIFF WBC: CPT

## 2021-11-01 PROCEDURE — 36415 COLL VENOUS BLD VENIPUNCTURE: CPT

## 2021-11-01 PROCEDURE — 80053 COMPREHEN METABOLIC PANEL: CPT

## 2021-11-04 ENCOUNTER — OFFICE VISIT (OUTPATIENT)
Dept: HEMATOLOGY ONCOLOGY | Facility: CLINIC | Age: 86
End: 2021-11-04
Payer: COMMERCIAL

## 2021-11-04 ENCOUNTER — HOSPITAL ENCOUNTER (OUTPATIENT)
Dept: INFUSION CENTER | Facility: CLINIC | Age: 86
Discharge: HOME | End: 2021-11-04
Payer: COMMERCIAL

## 2021-11-04 VITALS
RESPIRATION RATE: 14 BRPM | TEMPERATURE: 98 F | OXYGEN SATURATION: 97 % | SYSTOLIC BLOOD PRESSURE: 170 MMHG | WEIGHT: 147 LBS | DIASTOLIC BLOOD PRESSURE: 90 MMHG | HEIGHT: 64 IN | HEART RATE: 82 BPM | BODY MASS INDEX: 25.1 KG/M2

## 2021-11-04 VITALS
WEIGHT: 146.16 LBS | TEMPERATURE: 97.3 F | SYSTOLIC BLOOD PRESSURE: 156 MMHG | OXYGEN SATURATION: 98 % | BODY MASS INDEX: 24.95 KG/M2 | DIASTOLIC BLOOD PRESSURE: 76 MMHG | RESPIRATION RATE: 18 BRPM | HEART RATE: 87 BPM | HEIGHT: 64 IN

## 2021-11-04 DIAGNOSIS — C90.00 MULTIPLE MYELOMA NOT HAVING ACHIEVED REMISSION (HCC): Primary | ICD-10-CM

## 2021-11-04 PROCEDURE — 99214 OFFICE O/P EST MOD 30 MIN: CPT | Performed by: INTERNAL MEDICINE

## 2021-11-04 PROCEDURE — 96401 CHEMO ANTI-NEOPL SQ/IM: CPT

## 2021-11-04 PROCEDURE — 1160F RVW MEDS BY RX/DR IN RCRD: CPT | Performed by: INTERNAL MEDICINE

## 2021-11-04 PROCEDURE — 1036F TOBACCO NON-USER: CPT | Performed by: INTERNAL MEDICINE

## 2021-11-04 RX ORDER — DEXAMETHASONE 4 MG/1
20 TABLET ORAL ONCE
Status: COMPLETED | OUTPATIENT
Start: 2021-11-04 | End: 2021-11-04

## 2021-11-04 RX ADMIN — DEXAMETHASONE 20 MG: 4 TABLET ORAL at 10:13

## 2021-11-04 RX ADMIN — BORTEZOMIB 2.3 MG: 3.5 INJECTION, POWDER, LYOPHILIZED, FOR SOLUTION INTRAVENOUS; SUBCUTANEOUS at 10:29

## 2021-11-12 NOTE — ASSESSMENT & PLAN NOTE
· Patient recently lost his home due to reported financial is management per family  · There is concern for possible underlying dementia  · Patient denies any suicidal or homicidal ideation  · Neuropsych consult placed for capacity evaluation  ·  consult for placement    He has being followed by the [facility] for aging Spontaneous, unlabored and symmetrical

## 2021-11-16 ENCOUNTER — APPOINTMENT (OUTPATIENT)
Dept: LAB | Facility: MEDICAL CENTER | Age: 86
End: 2021-11-16
Payer: COMMERCIAL

## 2021-11-16 DIAGNOSIS — C90.00 MULTIPLE MYELOMA NOT HAVING ACHIEVED REMISSION (HCC): ICD-10-CM

## 2021-11-16 LAB
ALBUMIN SERPL BCP-MCNC: 3.6 G/DL (ref 3.5–5)
ALP SERPL-CCNC: 86 U/L (ref 46–116)
ALT SERPL W P-5'-P-CCNC: 14 U/L (ref 12–78)
ANION GAP SERPL CALCULATED.3IONS-SCNC: 7 MMOL/L (ref 4–13)
AST SERPL W P-5'-P-CCNC: 22 U/L (ref 5–45)
BASOPHILS # BLD AUTO: 0.01 THOUSANDS/ΜL (ref 0–0.1)
BASOPHILS NFR BLD AUTO: 0 % (ref 0–1)
BILIRUB SERPL-MCNC: 0.69 MG/DL (ref 0.2–1)
BUN SERPL-MCNC: 12 MG/DL (ref 5–25)
CALCIUM SERPL-MCNC: 9.6 MG/DL (ref 8.3–10.1)
CHLORIDE SERPL-SCNC: 108 MMOL/L (ref 100–108)
CO2 SERPL-SCNC: 26 MMOL/L (ref 21–32)
CREAT SERPL-MCNC: 0.95 MG/DL (ref 0.6–1.3)
EOSINOPHIL # BLD AUTO: 0.07 THOUSAND/ΜL (ref 0–0.61)
EOSINOPHIL NFR BLD AUTO: 1 % (ref 0–6)
ERYTHROCYTE [DISTWIDTH] IN BLOOD BY AUTOMATED COUNT: 13.8 % (ref 11.6–15.1)
GFR SERPL CREATININE-BSD FRML MDRD: 68 ML/MIN/1.73SQ M
GLUCOSE P FAST SERPL-MCNC: 116 MG/DL (ref 65–99)
HCT VFR BLD AUTO: 40.8 % (ref 36.5–49.3)
HGB BLD-MCNC: 13 G/DL (ref 12–17)
IGA SERPL-MCNC: 18 MG/DL (ref 70–400)
IGG SERPL-MCNC: 813 MG/DL (ref 700–1600)
IGM SERPL-MCNC: 19 MG/DL (ref 40–230)
IMM GRANULOCYTES # BLD AUTO: 0.01 THOUSAND/UL (ref 0–0.2)
IMM GRANULOCYTES NFR BLD AUTO: 0 % (ref 0–2)
LYMPHOCYTES # BLD AUTO: 1.5 THOUSANDS/ΜL (ref 0.6–4.47)
LYMPHOCYTES NFR BLD AUTO: 31 % (ref 14–44)
MCH RBC QN AUTO: 30.7 PG (ref 26.8–34.3)
MCHC RBC AUTO-ENTMCNC: 31.9 G/DL (ref 31.4–37.4)
MCV RBC AUTO: 96 FL (ref 82–98)
MONOCYTES # BLD AUTO: 0.78 THOUSAND/ΜL (ref 0.17–1.22)
MONOCYTES NFR BLD AUTO: 16 % (ref 4–12)
NEUTROPHILS # BLD AUTO: 2.49 THOUSANDS/ΜL (ref 1.85–7.62)
NEUTS SEG NFR BLD AUTO: 51 % (ref 43–75)
PLATELET # BLD AUTO: 172 THOUSANDS/UL (ref 149–390)
PMV BLD AUTO: 11.9 FL (ref 8.9–12.7)
POTASSIUM SERPL-SCNC: 3.7 MMOL/L (ref 3.5–5.3)
PROT SERPL-MCNC: 6.9 G/DL (ref 6.4–8.2)
RBC # BLD AUTO: 4.24 MILLION/UL (ref 3.88–5.62)
SODIUM SERPL-SCNC: 141 MMOL/L (ref 136–145)
WBC # BLD AUTO: 4.86 THOUSAND/UL (ref 4.31–10.16)

## 2021-11-16 PROCEDURE — 84165 PROTEIN E-PHORESIS SERUM: CPT | Performed by: PATHOLOGY

## 2021-11-16 PROCEDURE — 82232 ASSAY OF BETA-2 PROTEIN: CPT | Performed by: INTERNAL MEDICINE

## 2021-11-16 PROCEDURE — 85025 COMPLETE CBC W/AUTO DIFF WBC: CPT

## 2021-11-16 PROCEDURE — 83883 ASSAY NEPHELOMETRY NOT SPEC: CPT | Performed by: INTERNAL MEDICINE

## 2021-11-16 PROCEDURE — 80053 COMPREHEN METABOLIC PANEL: CPT

## 2021-11-16 PROCEDURE — 36415 COLL VENOUS BLD VENIPUNCTURE: CPT | Performed by: INTERNAL MEDICINE

## 2021-11-16 PROCEDURE — 84165 PROTEIN E-PHORESIS SERUM: CPT | Performed by: INTERNAL MEDICINE

## 2021-11-16 PROCEDURE — 82784 ASSAY IGA/IGD/IGG/IGM EACH: CPT | Performed by: INTERNAL MEDICINE

## 2021-11-17 LAB
ALBUMIN SERPL ELPH-MCNC: 4.02 G/DL (ref 3.5–5)
ALBUMIN SERPL ELPH-MCNC: 64.8 % (ref 52–65)
ALPHA1 GLOB SERPL ELPH-MCNC: 0.31 G/DL (ref 0.1–0.4)
ALPHA1 GLOB SERPL ELPH-MCNC: 5 % (ref 2.5–5)
ALPHA2 GLOB SERPL ELPH-MCNC: 0.64 G/DL (ref 0.4–1.2)
ALPHA2 GLOB SERPL ELPH-MCNC: 10.4 % (ref 7–13)
BETA GLOB ABNORMAL SERPL ELPH-MCNC: 0.35 G/DL (ref 0.4–0.8)
BETA1 GLOB SERPL ELPH-MCNC: 5.7 % (ref 5–13)
BETA2 GLOB SERPL ELPH-MCNC: 3.2 % (ref 2–8)
BETA2+GAMMA GLOB SERPL ELPH-MCNC: 0.2 G/DL (ref 0.2–0.5)
GAMMA GLOB ABNORMAL SERPL ELPH-MCNC: 0.68 G/DL (ref 0.5–1.6)
GAMMA GLOB SERPL ELPH-MCNC: 10.9 % (ref 12–22)
IGG/ALB SER: 1.84 {RATIO} (ref 1.1–1.8)
KAPPA LC FREE SER-MCNC: 129.8 MG/L (ref 3.3–19.4)
KAPPA LC FREE/LAMBDA FREE SER: 40.56 {RATIO} (ref 0.26–1.65)
LAMBDA LC FREE SERPL-MCNC: 3.2 MG/L (ref 5.7–26.3)
M PROTEIN 1 MFR SERPL ELPH: 6.9 %
M PROTEIN 1 SERPL ELPH-MCNC: 0.43 G/DL
PROT PATTERN SERPL ELPH-IMP: ABNORMAL
PROT SERPL-MCNC: 6.2 G/DL (ref 6.4–8.2)

## 2021-11-18 ENCOUNTER — HOSPITAL ENCOUNTER (OUTPATIENT)
Dept: INFUSION CENTER | Facility: CLINIC | Age: 86
Discharge: HOME | End: 2021-11-18
Payer: COMMERCIAL

## 2021-11-18 VITALS — HEIGHT: 64 IN | WEIGHT: 146.5 LBS | BODY MASS INDEX: 25.01 KG/M2

## 2021-11-18 DIAGNOSIS — C90.00 MULTIPLE MYELOMA NOT HAVING ACHIEVED REMISSION (HCC): Primary | ICD-10-CM

## 2021-11-18 DIAGNOSIS — C90.00 MULTIPLE MYELOMA, REMISSION STATUS UNSPECIFIED (HCC): ICD-10-CM

## 2021-11-18 LAB — B2 MICROGLOB SERPL-MCNC: 2.8 MG/L (ref 0.6–2.4)

## 2021-11-18 PROCEDURE — 96401 CHEMO ANTI-NEOPL SQ/IM: CPT

## 2021-11-18 RX ORDER — ACYCLOVIR 400 MG/1
400 TABLET ORAL DAILY
Qty: 30 TABLET | Refills: 6 | Status: SHIPPED | OUTPATIENT
Start: 2021-11-18 | End: 2022-03-08 | Stop reason: SDUPTHER

## 2021-11-18 RX ORDER — DEXAMETHASONE 4 MG/1
20 TABLET ORAL ONCE
Status: COMPLETED | OUTPATIENT
Start: 2021-11-18 | End: 2021-11-18

## 2021-11-18 RX ADMIN — DEXAMETHASONE 20 MG: 4 TABLET ORAL at 11:12

## 2021-11-18 RX ADMIN — BORTEZOMIB 2.3 MG: 3.5 INJECTION, POWDER, LYOPHILIZED, FOR SOLUTION INTRAVENOUS; SUBCUTANEOUS at 11:39

## 2021-11-26 DIAGNOSIS — C90.00 MULTIPLE MYELOMA NOT HAVING ACHIEVED REMISSION (HCC): Primary | ICD-10-CM

## 2021-11-26 RX ORDER — DEXAMETHASONE 4 MG/1
20 TABLET ORAL ONCE
Status: CANCELLED | OUTPATIENT
Start: 2021-12-16

## 2021-11-26 RX ORDER — DEXAMETHASONE 4 MG/1
20 TABLET ORAL ONCE
Status: CANCELLED | OUTPATIENT
Start: 2021-12-02

## 2021-11-30 ENCOUNTER — APPOINTMENT (OUTPATIENT)
Dept: LAB | Facility: MEDICAL CENTER | Age: 86
End: 2021-11-30
Payer: COMMERCIAL

## 2021-11-30 DIAGNOSIS — C90.00 MULTIPLE MYELOMA NOT HAVING ACHIEVED REMISSION (HCC): ICD-10-CM

## 2021-11-30 LAB
ALBUMIN SERPL BCP-MCNC: 3.1 G/DL (ref 3.5–5)
ALP SERPL-CCNC: 82 U/L (ref 46–116)
ALT SERPL W P-5'-P-CCNC: 19 U/L (ref 12–78)
ANION GAP SERPL CALCULATED.3IONS-SCNC: 8 MMOL/L (ref 4–13)
AST SERPL W P-5'-P-CCNC: 23 U/L (ref 5–45)
BASOPHILS # BLD AUTO: 0.02 THOUSANDS/ΜL (ref 0–0.1)
BASOPHILS NFR BLD AUTO: 0 % (ref 0–1)
BILIRUB SERPL-MCNC: 0.69 MG/DL (ref 0.2–1)
BUN SERPL-MCNC: 14 MG/DL (ref 5–25)
CALCIUM ALBUM COR SERPL-MCNC: 9.5 MG/DL (ref 8.3–10.1)
CALCIUM SERPL-MCNC: 8.8 MG/DL (ref 8.3–10.1)
CHLORIDE SERPL-SCNC: 111 MMOL/L (ref 100–108)
CO2 SERPL-SCNC: 24 MMOL/L (ref 21–32)
CREAT SERPL-MCNC: 0.81 MG/DL (ref 0.6–1.3)
EOSINOPHIL # BLD AUTO: 0.08 THOUSAND/ΜL (ref 0–0.61)
EOSINOPHIL NFR BLD AUTO: 1 % (ref 0–6)
ERYTHROCYTE [DISTWIDTH] IN BLOOD BY AUTOMATED COUNT: 14.5 % (ref 11.6–15.1)
GFR SERPL CREATININE-BSD FRML MDRD: 76 ML/MIN/1.73SQ M
GLUCOSE SERPL-MCNC: 94 MG/DL (ref 65–140)
HCT VFR BLD AUTO: 37.6 % (ref 36.5–49.3)
HGB BLD-MCNC: 12.2 G/DL (ref 12–17)
IMM GRANULOCYTES # BLD AUTO: 0.02 THOUSAND/UL (ref 0–0.2)
IMM GRANULOCYTES NFR BLD AUTO: 0 % (ref 0–2)
LYMPHOCYTES # BLD AUTO: 1.72 THOUSANDS/ΜL (ref 0.6–4.47)
LYMPHOCYTES NFR BLD AUTO: 29 % (ref 14–44)
MCH RBC QN AUTO: 31.2 PG (ref 26.8–34.3)
MCHC RBC AUTO-ENTMCNC: 32.4 G/DL (ref 31.4–37.4)
MCV RBC AUTO: 96 FL (ref 82–98)
MONOCYTES # BLD AUTO: 0.78 THOUSAND/ΜL (ref 0.17–1.22)
MONOCYTES NFR BLD AUTO: 13 % (ref 4–12)
NEUTROPHILS # BLD AUTO: 3.35 THOUSANDS/ΜL (ref 1.85–7.62)
NEUTS SEG NFR BLD AUTO: 57 % (ref 43–75)
NRBC BLD AUTO-RTO: 0 /100 WBCS
PLATELET # BLD AUTO: 162 THOUSANDS/UL (ref 149–390)
PMV BLD AUTO: 11.6 FL (ref 8.9–12.7)
POTASSIUM SERPL-SCNC: 3.6 MMOL/L (ref 3.5–5.3)
PROT SERPL-MCNC: 5.9 G/DL (ref 6.4–8.2)
RBC # BLD AUTO: 3.91 MILLION/UL (ref 3.88–5.62)
SODIUM SERPL-SCNC: 143 MMOL/L (ref 136–145)
WBC # BLD AUTO: 5.97 THOUSAND/UL (ref 4.31–10.16)

## 2021-11-30 PROCEDURE — 80053 COMPREHEN METABOLIC PANEL: CPT

## 2021-11-30 PROCEDURE — 85025 COMPLETE CBC W/AUTO DIFF WBC: CPT

## 2021-11-30 PROCEDURE — 36415 COLL VENOUS BLD VENIPUNCTURE: CPT

## 2021-12-02 ENCOUNTER — HOSPITAL ENCOUNTER (OUTPATIENT)
Dept: INFUSION CENTER | Facility: CLINIC | Age: 86
Discharge: HOME | End: 2021-12-02
Payer: COMMERCIAL

## 2021-12-02 VITALS
BODY MASS INDEX: 25.27 KG/M2 | WEIGHT: 148 LBS | DIASTOLIC BLOOD PRESSURE: 78 MMHG | HEIGHT: 64 IN | OXYGEN SATURATION: 98 % | TEMPERATURE: 97.9 F | SYSTOLIC BLOOD PRESSURE: 132 MMHG | RESPIRATION RATE: 18 BRPM | HEART RATE: 80 BPM

## 2021-12-02 DIAGNOSIS — C90.00 MULTIPLE MYELOMA NOT HAVING ACHIEVED REMISSION (HCC): Primary | ICD-10-CM

## 2021-12-02 PROCEDURE — 96401 CHEMO ANTI-NEOPL SQ/IM: CPT

## 2021-12-02 RX ORDER — DEXAMETHASONE 4 MG/1
20 TABLET ORAL ONCE
Status: COMPLETED | OUTPATIENT
Start: 2021-12-02 | End: 2021-12-02

## 2021-12-02 RX ADMIN — DEXAMETHASONE 20 MG: 4 TABLET ORAL at 13:00

## 2021-12-02 RX ADMIN — BORTEZOMIB 2.3 MG: 3.5 INJECTION, POWDER, LYOPHILIZED, FOR SOLUTION INTRAVENOUS; SUBCUTANEOUS at 13:01

## 2021-12-14 ENCOUNTER — APPOINTMENT (OUTPATIENT)
Dept: LAB | Facility: MEDICAL CENTER | Age: 86
End: 2021-12-14
Payer: COMMERCIAL

## 2021-12-14 DIAGNOSIS — C90.00 MULTIPLE MYELOMA NOT HAVING ACHIEVED REMISSION (HCC): ICD-10-CM

## 2021-12-14 LAB
ALBUMIN SERPL BCP-MCNC: 3.4 G/DL (ref 3.5–5)
ALP SERPL-CCNC: 73 U/L (ref 46–116)
ALT SERPL W P-5'-P-CCNC: 11 U/L (ref 12–78)
ANION GAP SERPL CALCULATED.3IONS-SCNC: 7 MMOL/L (ref 4–13)
AST SERPL W P-5'-P-CCNC: 19 U/L (ref 5–45)
BASOPHILS # BLD AUTO: 0.02 THOUSANDS/ΜL (ref 0–0.1)
BASOPHILS NFR BLD AUTO: 0 % (ref 0–1)
BILIRUB SERPL-MCNC: 0.64 MG/DL (ref 0.2–1)
BUN SERPL-MCNC: 14 MG/DL (ref 5–25)
CALCIUM ALBUM COR SERPL-MCNC: 9.6 MG/DL (ref 8.3–10.1)
CALCIUM SERPL-MCNC: 9.1 MG/DL (ref 8.3–10.1)
CHLORIDE SERPL-SCNC: 109 MMOL/L (ref 100–108)
CO2 SERPL-SCNC: 25 MMOL/L (ref 21–32)
CREAT SERPL-MCNC: 0.79 MG/DL (ref 0.6–1.3)
EOSINOPHIL # BLD AUTO: 0.07 THOUSAND/ΜL (ref 0–0.61)
EOSINOPHIL NFR BLD AUTO: 1 % (ref 0–6)
ERYTHROCYTE [DISTWIDTH] IN BLOOD BY AUTOMATED COUNT: 14.1 % (ref 11.6–15.1)
GFR SERPL CREATININE-BSD FRML MDRD: 76 ML/MIN/1.73SQ M
GLUCOSE P FAST SERPL-MCNC: 102 MG/DL (ref 65–99)
HCT VFR BLD AUTO: 39 % (ref 36.5–49.3)
HGB BLD-MCNC: 12.6 G/DL (ref 12–17)
IMM GRANULOCYTES # BLD AUTO: 0.02 THOUSAND/UL (ref 0–0.2)
IMM GRANULOCYTES NFR BLD AUTO: 0 % (ref 0–2)
LYMPHOCYTES # BLD AUTO: 1.67 THOUSANDS/ΜL (ref 0.6–4.47)
LYMPHOCYTES NFR BLD AUTO: 26 % (ref 14–44)
MCH RBC QN AUTO: 31.1 PG (ref 26.8–34.3)
MCHC RBC AUTO-ENTMCNC: 32.3 G/DL (ref 31.4–37.4)
MCV RBC AUTO: 96 FL (ref 82–98)
MONOCYTES # BLD AUTO: 0.79 THOUSAND/ΜL (ref 0.17–1.22)
MONOCYTES NFR BLD AUTO: 12 % (ref 4–12)
NEUTROPHILS # BLD AUTO: 3.91 THOUSANDS/ΜL (ref 1.85–7.62)
NEUTS SEG NFR BLD AUTO: 61 % (ref 43–75)
NRBC BLD AUTO-RTO: 0 /100 WBCS
PLATELET # BLD AUTO: 165 THOUSANDS/UL (ref 149–390)
PMV BLD AUTO: 11.8 FL (ref 8.9–12.7)
POTASSIUM SERPL-SCNC: 3.9 MMOL/L (ref 3.5–5.3)
PROT SERPL-MCNC: 6.1 G/DL (ref 6.4–8.2)
RBC # BLD AUTO: 4.05 MILLION/UL (ref 3.88–5.62)
SODIUM SERPL-SCNC: 141 MMOL/L (ref 136–145)
WBC # BLD AUTO: 6.48 THOUSAND/UL (ref 4.31–10.16)

## 2021-12-14 PROCEDURE — 80053 COMPREHEN METABOLIC PANEL: CPT

## 2021-12-14 PROCEDURE — 85025 COMPLETE CBC W/AUTO DIFF WBC: CPT

## 2021-12-14 PROCEDURE — 36415 COLL VENOUS BLD VENIPUNCTURE: CPT

## 2021-12-16 ENCOUNTER — HOSPITAL ENCOUNTER (OUTPATIENT)
Dept: INFUSION CENTER | Facility: CLINIC | Age: 86
Discharge: HOME | End: 2021-12-16
Payer: COMMERCIAL

## 2021-12-16 VITALS
RESPIRATION RATE: 18 BRPM | SYSTOLIC BLOOD PRESSURE: 153 MMHG | TEMPERATURE: 97.6 F | HEIGHT: 64 IN | BODY MASS INDEX: 24.59 KG/M2 | DIASTOLIC BLOOD PRESSURE: 71 MMHG | OXYGEN SATURATION: 97 % | WEIGHT: 144 LBS | HEART RATE: 89 BPM

## 2021-12-16 DIAGNOSIS — R19.7 DIARRHEA, UNSPECIFIED TYPE: Primary | ICD-10-CM

## 2021-12-16 DIAGNOSIS — C90.00 MULTIPLE MYELOMA NOT HAVING ACHIEVED REMISSION (HCC): Primary | ICD-10-CM

## 2021-12-16 PROCEDURE — 96401 CHEMO ANTI-NEOPL SQ/IM: CPT

## 2021-12-16 RX ORDER — DEXAMETHASONE 4 MG/1
20 TABLET ORAL ONCE
Status: COMPLETED | OUTPATIENT
Start: 2021-12-16 | End: 2021-12-16

## 2021-12-16 RX ADMIN — BORTEZOMIB 2.3 MG: 3.5 INJECTION, POWDER, LYOPHILIZED, FOR SOLUTION INTRAVENOUS; SUBCUTANEOUS at 13:02

## 2021-12-16 RX ADMIN — DEXAMETHASONE 20 MG: 4 TABLET ORAL at 12:43

## 2021-12-17 ENCOUNTER — TELEPHONE (OUTPATIENT)
Dept: FAMILY MEDICINE CLINIC | Facility: MEDICAL CENTER | Age: 86
End: 2021-12-17

## 2021-12-27 RX ORDER — DEXAMETHASONE 4 MG/1
20 TABLET ORAL ONCE
Status: CANCELLED | OUTPATIENT
Start: 2021-12-30

## 2021-12-27 RX ORDER — DEXAMETHASONE 4 MG/1
20 TABLET ORAL ONCE
Status: CANCELLED | OUTPATIENT
Start: 2022-01-13

## 2021-12-28 ENCOUNTER — APPOINTMENT (OUTPATIENT)
Dept: LAB | Facility: MEDICAL CENTER | Age: 86
End: 2021-12-28
Payer: COMMERCIAL

## 2021-12-28 ENCOUNTER — TELEPHONE (OUTPATIENT)
Dept: HEMATOLOGY ONCOLOGY | Facility: CLINIC | Age: 86
End: 2021-12-28

## 2021-12-28 DIAGNOSIS — C90.00 MULTIPLE MYELOMA NOT HAVING ACHIEVED REMISSION (HCC): ICD-10-CM

## 2021-12-28 PROCEDURE — 84165 PROTEIN E-PHORESIS SERUM: CPT | Performed by: PATHOLOGY

## 2021-12-30 ENCOUNTER — APPOINTMENT (OUTPATIENT)
Dept: LAB | Facility: MEDICAL CENTER | Age: 86
End: 2021-12-30
Payer: COMMERCIAL

## 2021-12-30 ENCOUNTER — HOSPITAL ENCOUNTER (OUTPATIENT)
Dept: INFUSION CENTER | Facility: CLINIC | Age: 86
Discharge: HOME | End: 2021-12-30
Payer: COMMERCIAL

## 2021-12-30 VITALS
DIASTOLIC BLOOD PRESSURE: 70 MMHG | OXYGEN SATURATION: 98 % | HEIGHT: 64 IN | TEMPERATURE: 97.1 F | BODY MASS INDEX: 24.5 KG/M2 | RESPIRATION RATE: 14 BRPM | SYSTOLIC BLOOD PRESSURE: 132 MMHG | HEART RATE: 80 BPM | WEIGHT: 143.5 LBS

## 2021-12-30 DIAGNOSIS — C90.00 MULTIPLE MYELOMA NOT HAVING ACHIEVED REMISSION (HCC): Primary | ICD-10-CM

## 2021-12-30 DIAGNOSIS — R19.7 DIARRHEA, UNSPECIFIED TYPE: ICD-10-CM

## 2021-12-30 PROCEDURE — 96401 CHEMO ANTI-NEOPL SQ/IM: CPT

## 2021-12-30 PROCEDURE — 87493 C DIFF AMPLIFIED PROBE: CPT

## 2021-12-30 RX ORDER — DEXAMETHASONE 4 MG/1
20 TABLET ORAL ONCE
Status: COMPLETED | OUTPATIENT
Start: 2021-12-30 | End: 2021-12-30

## 2021-12-30 RX ADMIN — BORTEZOMIB 2.2 MG: 3.5 INJECTION, POWDER, LYOPHILIZED, FOR SOLUTION INTRAVENOUS; SUBCUTANEOUS at 13:07

## 2021-12-30 RX ADMIN — DEXAMETHASONE 20 MG: 4 TABLET ORAL at 12:49

## 2021-12-30 NOTE — PROGRESS NOTES
Pt tolerated injection in LEFT LOWER ABDOMEN without complaints  Spoke with pt's relative Celia Vallejo about scheduling an apt with pt's PCP sooner than 1/21/22  Celia Vallejo stated she will call today to schedule apt  Pt given AVS, reviewed with pt  Pt aware of next apt

## 2021-12-30 NOTE — PROGRESS NOTES
Pt to clinic for Velcade injection  Pt stated he "has diarrhea after every meal for the past 6 weeks but today his stool was formed and soft"  Pt described the diarrhea as "watery sand"  C  Diff sample sent today by pt  Pt has apt with PCP on 1/21/22  Pt stated "I want to know what the hell is going on with me and if this stuff is working"  Pt has apt with Dr Rich Vidales on 1/27/22  Pt offers no other complaints at this time, resting comfortably in chair, will continue to monitor

## 2022-01-01 ENCOUNTER — TELEPHONE (OUTPATIENT)
Dept: FAMILY MEDICINE CLINIC | Facility: MEDICAL CENTER | Age: 87
End: 2022-01-01

## 2022-01-01 ENCOUNTER — OFFICE VISIT (OUTPATIENT)
Dept: FAMILY MEDICINE CLINIC | Facility: MEDICAL CENTER | Age: 87
End: 2022-01-01

## 2022-01-01 ENCOUNTER — TELEPHONE (OUTPATIENT)
Dept: FAMILY MEDICINE CLINIC | Facility: CLINIC | Age: 87
End: 2022-01-01

## 2022-01-01 DIAGNOSIS — C90.00 MULTIPLE MYELOMA NOT HAVING ACHIEVED REMISSION (HCC): ICD-10-CM

## 2022-01-01 DIAGNOSIS — C90.00 MULTIPLE MYELOMA NOT HAVING ACHIEVED REMISSION (HCC): Primary | ICD-10-CM

## 2022-01-01 DIAGNOSIS — K63.89 COLONIC MASS: Primary | ICD-10-CM

## 2022-01-01 LAB — C DIFF TOX B TCDB STL QL NAA+PROBE: NEGATIVE

## 2022-01-01 RX ORDER — DEXAMETHASONE 4 MG/1
4 TABLET ORAL
Qty: 28 TABLET | Refills: 5 | Status: SHIPPED | OUTPATIENT
Start: 2022-01-01

## 2022-01-11 ENCOUNTER — APPOINTMENT (OUTPATIENT)
Dept: LAB | Facility: MEDICAL CENTER | Age: 87
End: 2022-01-11
Payer: COMMERCIAL

## 2022-01-11 DIAGNOSIS — C90.00 MULTIPLE MYELOMA NOT HAVING ACHIEVED REMISSION (HCC): ICD-10-CM

## 2022-01-11 LAB
ALBUMIN SERPL BCP-MCNC: 3.5 G/DL (ref 3.5–5)
ALP SERPL-CCNC: 78 U/L (ref 46–116)
ALT SERPL W P-5'-P-CCNC: 15 U/L (ref 12–78)
ANION GAP SERPL CALCULATED.3IONS-SCNC: 5 MMOL/L (ref 4–13)
AST SERPL W P-5'-P-CCNC: 19 U/L (ref 5–45)
BASOPHILS # BLD AUTO: 0.01 THOUSANDS/ΜL (ref 0–0.1)
BASOPHILS NFR BLD AUTO: 0 % (ref 0–1)
BILIRUB SERPL-MCNC: 1.53 MG/DL (ref 0.2–1)
BUN SERPL-MCNC: 13 MG/DL (ref 5–25)
CALCIUM SERPL-MCNC: 9.3 MG/DL (ref 8.3–10.1)
CHLORIDE SERPL-SCNC: 112 MMOL/L (ref 100–108)
CO2 SERPL-SCNC: 26 MMOL/L (ref 21–32)
CREAT SERPL-MCNC: 0.92 MG/DL (ref 0.6–1.3)
EOSINOPHIL # BLD AUTO: 0.08 THOUSAND/ΜL (ref 0–0.61)
EOSINOPHIL NFR BLD AUTO: 2 % (ref 0–6)
ERYTHROCYTE [DISTWIDTH] IN BLOOD BY AUTOMATED COUNT: 13.8 % (ref 11.6–15.1)
GFR SERPL CREATININE-BSD FRML MDRD: 70 ML/MIN/1.73SQ M
GLUCOSE SERPL-MCNC: 93 MG/DL (ref 65–140)
HCT VFR BLD AUTO: 37.5 % (ref 36.5–49.3)
HGB BLD-MCNC: 12.3 G/DL (ref 12–17)
IMM GRANULOCYTES # BLD AUTO: 0.03 THOUSAND/UL (ref 0–0.2)
IMM GRANULOCYTES NFR BLD AUTO: 1 % (ref 0–2)
LYMPHOCYTES # BLD AUTO: 1.66 THOUSANDS/ΜL (ref 0.6–4.47)
LYMPHOCYTES NFR BLD AUTO: 30 % (ref 14–44)
MCH RBC QN AUTO: 31.5 PG (ref 26.8–34.3)
MCHC RBC AUTO-ENTMCNC: 32.8 G/DL (ref 31.4–37.4)
MCV RBC AUTO: 96 FL (ref 82–98)
MONOCYTES # BLD AUTO: 0.73 THOUSAND/ΜL (ref 0.17–1.22)
MONOCYTES NFR BLD AUTO: 13 % (ref 4–12)
NEUTROPHILS # BLD AUTO: 2.97 THOUSANDS/ΜL (ref 1.85–7.62)
NEUTS SEG NFR BLD AUTO: 54 % (ref 43–75)
NRBC BLD AUTO-RTO: 0 /100 WBCS
PLATELET # BLD AUTO: 184 THOUSANDS/UL (ref 149–390)
PMV BLD AUTO: 11.3 FL (ref 8.9–12.7)
POTASSIUM SERPL-SCNC: 3.7 MMOL/L (ref 3.5–5.3)
PROT SERPL-MCNC: 6.3 G/DL (ref 6.4–8.2)
RBC # BLD AUTO: 3.9 MILLION/UL (ref 3.88–5.62)
SODIUM SERPL-SCNC: 143 MMOL/L (ref 136–145)
WBC # BLD AUTO: 5.48 THOUSAND/UL (ref 4.31–10.16)

## 2022-01-11 PROCEDURE — 80053 COMPREHEN METABOLIC PANEL: CPT

## 2022-01-11 PROCEDURE — 36415 COLL VENOUS BLD VENIPUNCTURE: CPT

## 2022-01-11 PROCEDURE — 85025 COMPLETE CBC W/AUTO DIFF WBC: CPT

## 2022-01-13 ENCOUNTER — HOSPITAL ENCOUNTER (OUTPATIENT)
Dept: INFUSION CENTER | Facility: CLINIC | Age: 87
Discharge: HOME | End: 2022-01-13
Payer: COMMERCIAL

## 2022-01-13 ENCOUNTER — APPOINTMENT (OUTPATIENT)
Dept: LAB | Facility: CLINIC | Age: 87
End: 2022-01-13
Payer: COMMERCIAL

## 2022-01-13 VITALS
TEMPERATURE: 97.3 F | HEIGHT: 64 IN | WEIGHT: 140.21 LBS | BODY MASS INDEX: 23.94 KG/M2 | OXYGEN SATURATION: 97 % | RESPIRATION RATE: 18 BRPM | SYSTOLIC BLOOD PRESSURE: 140 MMHG | DIASTOLIC BLOOD PRESSURE: 64 MMHG | HEART RATE: 92 BPM

## 2022-01-13 DIAGNOSIS — C90.00 MULTIPLE MYELOMA NOT HAVING ACHIEVED REMISSION (HCC): ICD-10-CM

## 2022-01-13 DIAGNOSIS — R17 ELEVATED BILIRUBIN: ICD-10-CM

## 2022-01-13 DIAGNOSIS — C90.00 MULTIPLE MYELOMA NOT HAVING ACHIEVED REMISSION (HCC): Primary | ICD-10-CM

## 2022-01-13 LAB
ALBUMIN SERPL BCP-MCNC: 3.4 G/DL (ref 3.5–5)
ALP SERPL-CCNC: 75 U/L (ref 46–116)
ALT SERPL W P-5'-P-CCNC: 17 U/L (ref 12–78)
ANION GAP SERPL CALCULATED.3IONS-SCNC: 9 MMOL/L (ref 4–13)
AST SERPL W P-5'-P-CCNC: 18 U/L (ref 5–45)
BASOPHILS # BLD AUTO: 0.02 THOUSANDS/ΜL (ref 0–0.1)
BASOPHILS NFR BLD AUTO: 0 % (ref 0–1)
BILIRUB DIRECT SERPL-MCNC: 0.13 MG/DL (ref 0–0.2)
BILIRUB SERPL-MCNC: 0.49 MG/DL (ref 0.2–1)
BUN SERPL-MCNC: 11 MG/DL (ref 5–25)
CALCIUM ALBUM COR SERPL-MCNC: 8.9 MG/DL (ref 8.3–10.1)
CALCIUM SERPL-MCNC: 8.4 MG/DL (ref 8.3–10.1)
CHLORIDE SERPL-SCNC: 110 MMOL/L (ref 100–108)
CO2 SERPL-SCNC: 27 MMOL/L (ref 21–32)
CREAT SERPL-MCNC: 0.88 MG/DL (ref 0.6–1.3)
EOSINOPHIL # BLD AUTO: 0.09 THOUSAND/ΜL (ref 0–0.61)
EOSINOPHIL NFR BLD AUTO: 2 % (ref 0–6)
ERYTHROCYTE [DISTWIDTH] IN BLOOD BY AUTOMATED COUNT: 13.6 % (ref 11.6–15.1)
GFR SERPL CREATININE-BSD FRML MDRD: 73 ML/MIN/1.73SQ M
GLUCOSE SERPL-MCNC: 96 MG/DL (ref 65–140)
HCT VFR BLD AUTO: 37.8 % (ref 36.5–49.3)
HGB BLD-MCNC: 12.3 G/DL (ref 12–17)
IMM GRANULOCYTES # BLD AUTO: 0.02 THOUSAND/UL (ref 0–0.2)
IMM GRANULOCYTES NFR BLD AUTO: 0 % (ref 0–2)
LYMPHOCYTES # BLD AUTO: 1.69 THOUSANDS/ΜL (ref 0.6–4.47)
LYMPHOCYTES NFR BLD AUTO: 32 % (ref 14–44)
MCH RBC QN AUTO: 31.4 PG (ref 26.8–34.3)
MCHC RBC AUTO-ENTMCNC: 32.5 G/DL (ref 31.4–37.4)
MCV RBC AUTO: 96 FL (ref 82–98)
MONOCYTES # BLD AUTO: 0.72 THOUSAND/ΜL (ref 0.17–1.22)
MONOCYTES NFR BLD AUTO: 14 % (ref 4–12)
NEUTROPHILS # BLD AUTO: 2.71 THOUSANDS/ΜL (ref 1.85–7.62)
NEUTS SEG NFR BLD AUTO: 52 % (ref 43–75)
NRBC BLD AUTO-RTO: 0 /100 WBCS
PLATELET # BLD AUTO: 196 THOUSANDS/UL (ref 149–390)
PMV BLD AUTO: 10.5 FL (ref 8.9–12.7)
POTASSIUM SERPL-SCNC: 3.7 MMOL/L (ref 3.5–5.3)
PROT SERPL-MCNC: 6.3 G/DL (ref 6.4–8.2)
RBC # BLD AUTO: 3.92 MILLION/UL (ref 3.88–5.62)
SODIUM SERPL-SCNC: 146 MMOL/L (ref 136–145)
WBC # BLD AUTO: 5.25 THOUSAND/UL (ref 4.31–10.16)

## 2022-01-13 PROCEDURE — 36415 COLL VENOUS BLD VENIPUNCTURE: CPT

## 2022-01-13 PROCEDURE — 85025 COMPLETE CBC W/AUTO DIFF WBC: CPT

## 2022-01-13 PROCEDURE — 82248 BILIRUBIN DIRECT: CPT

## 2022-01-13 PROCEDURE — 96401 CHEMO ANTI-NEOPL SQ/IM: CPT

## 2022-01-13 PROCEDURE — 80053 COMPREHEN METABOLIC PANEL: CPT

## 2022-01-13 RX ORDER — DEXAMETHASONE 4 MG/1
20 TABLET ORAL ONCE
Status: COMPLETED | OUTPATIENT
Start: 2022-01-13 | End: 2022-01-13

## 2022-01-13 RX ADMIN — BORTEZOMIB 2.2 MG: 3.5 INJECTION, POWDER, LYOPHILIZED, FOR SOLUTION INTRAVENOUS; SUBCUTANEOUS at 13:09

## 2022-01-13 RX ADMIN — DEXAMETHASONE 20 MG: 4 TABLET ORAL at 12:56

## 2022-01-13 NOTE — PROGRESS NOTES
Patient tolerated velcade injection to right abdomen, bandaid applied and CDI  Patient discharged post and will RTO for same 1/27/22, AVS given

## 2022-01-13 NOTE — PATIENT INSTRUCTIONS
January 2022 Sunday Monday Tuesday Wednesday Thursday Friday Saturday                                 1                2     3     4     5     6     7     8                9     10     11    LAB WALK IN  11:55 AM   (5 min )   AN WINDGP CHAIR 1   [facility] [place] 12     13    LAB WALK IN  12:25 PM   (5 min )   AN MOB LAB PSC CHAIR 1   [facility] [address] MOB    INF ONCOLOGY TX-TREATMENT PLAN  12:30 PM   (90 min )   AN INF CHAIR Octavio Garrison [address] [facility] 14     15           Cycle 8, Day 15     16     17     18     19     20     21    OFFICE VISIT LONG PG   1:15 PM   (30 min )   Bismark Mccann MD   [facility] 22                23     24     25     26     27    FOLLOW UP PG  10:25 AM   (20 min )   Taqueria Ritchie MD   [facility] [place]    INF ONCOLOGY TX-TREATMENT PLAN  12:00 PM   (90 min )   AN INF CHAIR 12   [address] [place] [address] 28     29           Cycle 9, Day 1     30     31                                              Treatment Details       1/13/2022 - Cycle 8, Day 15      Supportive Care: [facility]      Chemotherapy: bortezomib (VELCADE)    1/27/2022 - Cycle 9, Day 1      Supportive Care: [facility] PROVIDER COMMUNICATION7      Chemotherapy: bortezomib (VELCADE)        February 2022 Sunday Monday Tuesday Wednesday Thursday Friday Saturday             1     2     3     4     5                6     7     8     9     10    INF ONCOLOGY TX-TREATMENT PLAN  11:30 AM   (90 min )   AN INF BED 1   St  [address] [place] [address] 11     12           Cycle 9, Day 15     13     14     15     16     17     18     19                20     21     22     23     24    INF ONCOLOGY TX-TREATMENT PLAN  11:30 AM   (90 min )   AN INF CHAIR [facility] 25     26           Cycle 10, Day 1     27     28 Treatment Details       2/10/2022 - Cycle 9, Day 15      Supportive Care: [facility] PROVIDER COMMUNICATION7      Chemotherapy: bortezomib (VELCADE)    2/24/2022 - Cycle 10, Day 1      Supportive Care: [facility] PROVIDER COMMUNICATION7      Chemotherapy: bortezomib (VELCADE)

## 2022-01-13 NOTE — PROGRESS NOTES
Patient here for velcade and is well, no c/o offered, no changes reported  Repeat CMP drawn prior to his arrival to reassess bilirubin  Ok to proceed per Dr Marleen Marquez without results

## 2022-01-14 ENCOUNTER — TELEPHONE (OUTPATIENT)
Dept: GYNECOLOGIC ONCOLOGY | Facility: CLINIC | Age: 87
End: 2022-01-14

## 2022-01-14 NOTE — TELEPHONE ENCOUNTER
Patient has a death in the family and the  was changed from this Friday to next Friday because most of the family has COVID, patient is asking if he should still go to the  since its a close cousin  He doesn't have a KN95 mask, he has a surgical mask like we have in the office    He is asking for a call back at [phone]

## 2022-01-18 ENCOUNTER — RA CDI HCC (OUTPATIENT)
Dept: OTHER | Facility: HOSPITAL | Age: 87
End: 2022-01-18

## 2022-01-19 ENCOUNTER — TELEPHONE (OUTPATIENT)
Dept: GYNECOLOGIC ONCOLOGY | Facility: CLINIC | Age: 87
End: 2022-01-19

## 2022-01-19 NOTE — TELEPHONE ENCOUNTER
Patient called because he is out of the acyclovir and would like a refill and to know how many days he is allowed to go without it since it isn't sent to him directly and he has to get someone to take him to pick it up

## 2022-01-19 NOTE — PROGRESS NOTES
[facility]  coding opportunities       Chart reviewed, no opportunity found: CHART REVIEWED, NO OPPORTUNITY FOUND                        Patients insurance company: [employer]

## 2022-01-20 ENCOUNTER — TELEPHONE (OUTPATIENT)
Dept: INFUSION CENTER | Facility: CLINIC | Age: 87
End: 2022-01-20

## 2022-01-20 RX ORDER — DEXAMETHASONE 4 MG/1
20 TABLET ORAL ONCE
Status: CANCELLED | OUTPATIENT
Start: 2022-02-10

## 2022-01-20 RX ORDER — DEXAMETHASONE 4 MG/1
20 TABLET ORAL ONCE
Status: CANCELLED | OUTPATIENT
Start: 2022-01-28

## 2022-01-20 NOTE — TELEPHONE ENCOUNTER
Received call from Raudel Childs stating they need to move patients appointment on 1/27 to either 1/26 or 1/28 due to schedule conflict  I told Jamey Iqbal we do have an opening on 1/28 but due to patients plan having no tolerance I will have to get approval from the office to change the date  I told Raudel Childs I will give her a call back after I hear from the office  Raudel Childs is in agreement with this

## 2022-01-25 ENCOUNTER — APPOINTMENT (OUTPATIENT)
Dept: LAB | Facility: MEDICAL CENTER | Age: 87
End: 2022-01-25
Payer: COMMERCIAL

## 2022-01-25 ENCOUNTER — OFFICE VISIT (OUTPATIENT)
Dept: FAMILY MEDICINE CLINIC | Facility: MEDICAL CENTER | Age: 87
End: 2022-01-25
Payer: COMMERCIAL

## 2022-01-25 VITALS
HEART RATE: 80 BPM | TEMPERATURE: 97.2 F | WEIGHT: 144 LBS | DIASTOLIC BLOOD PRESSURE: 80 MMHG | HEIGHT: 64 IN | BODY MASS INDEX: 24.59 KG/M2 | SYSTOLIC BLOOD PRESSURE: 140 MMHG

## 2022-01-25 DIAGNOSIS — E03.9 ACQUIRED HYPOTHYROIDISM: ICD-10-CM

## 2022-01-25 DIAGNOSIS — C90.00 MULTIPLE MYELOMA NOT HAVING ACHIEVED REMISSION (HCC): ICD-10-CM

## 2022-01-25 DIAGNOSIS — K58.0 IRRITABLE BOWEL SYNDROME WITH DIARRHEA: Primary | ICD-10-CM

## 2022-01-25 DIAGNOSIS — I10 ESSENTIAL HYPERTENSION: ICD-10-CM

## 2022-01-25 LAB
ALBUMIN SERPL BCP-MCNC: 3.5 G/DL (ref 3.5–5)
ALP SERPL-CCNC: 76 U/L (ref 46–116)
ALT SERPL W P-5'-P-CCNC: 13 U/L (ref 12–78)
ANION GAP SERPL CALCULATED.3IONS-SCNC: 6 MMOL/L (ref 4–13)
AST SERPL W P-5'-P-CCNC: 19 U/L (ref 5–45)
BASOPHILS # BLD AUTO: 0.03 THOUSANDS/ΜL (ref 0–0.1)
BASOPHILS NFR BLD AUTO: 1 % (ref 0–1)
BILIRUB SERPL-MCNC: 0.58 MG/DL (ref 0.2–1)
BUN SERPL-MCNC: 11 MG/DL (ref 5–25)
CALCIUM SERPL-MCNC: 9 MG/DL (ref 8.3–10.1)
CHLORIDE SERPL-SCNC: 112 MMOL/L (ref 100–108)
CO2 SERPL-SCNC: 26 MMOL/L (ref 21–32)
CREAT SERPL-MCNC: 0.88 MG/DL (ref 0.6–1.3)
EOSINOPHIL # BLD AUTO: 0.08 THOUSAND/ΜL (ref 0–0.61)
EOSINOPHIL NFR BLD AUTO: 1 % (ref 0–6)
ERYTHROCYTE [DISTWIDTH] IN BLOOD BY AUTOMATED COUNT: 13.7 % (ref 11.6–15.1)
GFR SERPL CREATININE-BSD FRML MDRD: 73 ML/MIN/1.73SQ M
GLUCOSE P FAST SERPL-MCNC: 103 MG/DL (ref 65–99)
HCT VFR BLD AUTO: 37.4 % (ref 36.5–49.3)
HGB BLD-MCNC: 12.5 G/DL (ref 12–17)
IGA SERPL-MCNC: 12 MG/DL (ref 70–400)
IGG SERPL-MCNC: 702 MG/DL (ref 700–1600)
IGM SERPL-MCNC: 10 MG/DL (ref 40–230)
IMM GRANULOCYTES # BLD AUTO: 0.02 THOUSAND/UL (ref 0–0.2)
IMM GRANULOCYTES NFR BLD AUTO: 0 % (ref 0–2)
LYMPHOCYTES # BLD AUTO: 1.24 THOUSANDS/ΜL (ref 0.6–4.47)
LYMPHOCYTES NFR BLD AUTO: 20 % (ref 14–44)
MCH RBC QN AUTO: 31.9 PG (ref 26.8–34.3)
MCHC RBC AUTO-ENTMCNC: 33.4 G/DL (ref 31.4–37.4)
MCV RBC AUTO: 95 FL (ref 82–98)
MONOCYTES # BLD AUTO: 0.73 THOUSAND/ΜL (ref 0.17–1.22)
MONOCYTES NFR BLD AUTO: 12 % (ref 4–12)
NEUTROPHILS # BLD AUTO: 4.07 THOUSANDS/ΜL (ref 1.85–7.62)
NEUTS SEG NFR BLD AUTO: 66 % (ref 43–75)
NRBC BLD AUTO-RTO: 0 /100 WBCS
PLATELET # BLD AUTO: 167 THOUSANDS/UL (ref 149–390)
PMV BLD AUTO: 11.5 FL (ref 8.9–12.7)
POTASSIUM SERPL-SCNC: 3.5 MMOL/L (ref 3.5–5.3)
PROT SERPL-MCNC: 6.4 G/DL (ref 6.4–8.2)
RBC # BLD AUTO: 3.92 MILLION/UL (ref 3.88–5.62)
SODIUM SERPL-SCNC: 144 MMOL/L (ref 136–145)
WBC # BLD AUTO: 6.17 THOUSAND/UL (ref 4.31–10.16)

## 2022-01-25 PROCEDURE — 82784 ASSAY IGA/IGD/IGG/IGM EACH: CPT

## 2022-01-25 PROCEDURE — 84165 PROTEIN E-PHORESIS SERUM: CPT

## 2022-01-25 PROCEDURE — 84165 PROTEIN E-PHORESIS SERUM: CPT | Performed by: PATHOLOGY

## 2022-01-25 PROCEDURE — 85025 COMPLETE CBC W/AUTO DIFF WBC: CPT

## 2022-01-25 PROCEDURE — 1036F TOBACCO NON-USER: CPT | Performed by: FAMILY MEDICINE

## 2022-01-25 PROCEDURE — 36415 COLL VENOUS BLD VENIPUNCTURE: CPT

## 2022-01-25 PROCEDURE — 83521 IG LIGHT CHAINS FREE EACH: CPT

## 2022-01-25 PROCEDURE — 99214 OFFICE O/P EST MOD 30 MIN: CPT | Performed by: FAMILY MEDICINE

## 2022-01-25 PROCEDURE — 80053 COMPREHEN METABOLIC PANEL: CPT

## 2022-01-25 PROCEDURE — 82232 ASSAY OF BETA-2 PROTEIN: CPT

## 2022-01-25 RX ORDER — ASPIRIN 81 MG/1
81 TABLET ORAL DAILY
COMMUNITY
End: 2022-02-25 | Stop reason: SDUPTHER

## 2022-01-25 RX ORDER — TRIAMTERENE AND HYDROCHLOROTHIAZIDE 37.5; 25 MG/1; MG/1
1 CAPSULE ORAL EVERY MORNING
COMMUNITY
End: 2022-02-25 | Stop reason: SDUPTHER

## 2022-01-25 RX ORDER — LOPERAMIDE HYDROCHLORIDE 2 MG/1
2 TABLET ORAL 4 TIMES DAILY PRN
Qty: 90 TABLET | Refills: 2 | Status: SHIPPED | OUTPATIENT
Start: 2022-01-25 | End: 2022-08-09

## 2022-01-26 LAB
ALBUMIN SERPL ELPH-MCNC: 4.05 G/DL (ref 3.5–5)
ALBUMIN SERPL ELPH-MCNC: 66.4 % (ref 52–65)
ALPHA1 GLOB SERPL ELPH-MCNC: 0.31 G/DL (ref 0.1–0.4)
ALPHA1 GLOB SERPL ELPH-MCNC: 5.1 % (ref 2.5–5)
ALPHA2 GLOB SERPL ELPH-MCNC: 0.6 G/DL (ref 0.4–1.2)
ALPHA2 GLOB SERPL ELPH-MCNC: 9.9 % (ref 7–13)
B2 MICROGLOB SERPL-MCNC: 2.3 MG/L (ref 0.6–2.4)
BETA GLOB ABNORMAL SERPL ELPH-MCNC: 0.34 G/DL (ref 0.4–0.8)
BETA1 GLOB SERPL ELPH-MCNC: 5.6 % (ref 5–13)
BETA2 GLOB SERPL ELPH-MCNC: 3.2 % (ref 2–8)
BETA2+GAMMA GLOB SERPL ELPH-MCNC: 0.2 G/DL (ref 0.2–0.5)
GAMMA GLOB ABNORMAL SERPL ELPH-MCNC: 0.6 G/DL (ref 0.5–1.6)
GAMMA GLOB SERPL ELPH-MCNC: 9.8 % (ref 12–22)
IGG/ALB SER: 1.98 {RATIO} (ref 1.1–1.8)
KAPPA LC FREE SER-MCNC: 88.6 MG/L (ref 3.3–19.4)
KAPPA LC FREE/LAMBDA FREE SER: 34.08 {RATIO} (ref 0.26–1.65)
LAMBDA LC FREE SERPL-MCNC: 2.6 MG/L (ref 5.7–26.3)
M PROTEIN 1 MFR SERPL ELPH: 5.7 %
M PROTEIN 1 SERPL ELPH-MCNC: 0.35 G/DL
PROT PATTERN SERPL ELPH-IMP: ABNORMAL
PROT SERPL-MCNC: 6.1 G/DL (ref 6.4–8.2)

## 2022-01-26 NOTE — ASSESSMENT & PLAN NOTE
He has been having diarrhea and fecal urgency after he eats  It started after treatment for his multiple myeloma  I started him on some Imodium before meals      I also recommend that he started some Metamucil

## 2022-01-26 NOTE — PROGRESS NOTES
Assessment/Plan:    Acquired hypothyroidism  Patient has hypothyroidism and it is well controlled with levothyroxine 100 mcg  Continue levothyroxine  TSH in July was 1 8    Essential hypertension  Blood pressure was 140/80  He is taking Dyazide  Recommended low-salt diet and continue Dyazide    Irritable bowel syndrome with diarrhea  He has been having diarrhea and fecal urgency after he eats  It started after treatment for his multiple myeloma  I started him on some Imodium before meals  I also recommend that he started some Metamucil       Diagnoses and all orders for this visit:    Irritable bowel syndrome with diarrhea  -     loperamide (IMODIUM A-D) 2 MG tablet; Take 1 tablet (2 mg total) by mouth 4 (four) times a day as needed for diarrhea (Take before meals)    Acquired hypothyroidism    Essential hypertension    [facility] not having achieved [facility] ([facility] )    Other orders  -     aspirin (ECOTRIN LOW STRENGTH) 81 mg EC tablet; Take 81 mg by mouth daily  -     triamterene-hydrochlorothiazide (DYAZIDE) 37 5-25 mg per capsule; Take 1 capsule by mouth every morning  -     Cholecalciferol (VITAMIN D3 PO); Take by mouth          Subjective:      Patient ID: Jenny Dowling is a 80 y o  male  The following portions of the patient's history were reviewed and updated as appropriate: allergies, current medications, past family history, past medical history, past social history, past surgical history and problem list     Review of Systems   Constitutional: Negative  HENT: Negative  Eyes: Negative  Respiratory: Negative  Cardiovascular: Negative  Gastrointestinal: Positive for diarrhea (After meals  )  Genitourinary: Negative  Musculoskeletal: Negative  Neurological: Negative  Psychiatric/Behavioral: Negative            Objective:      /80 (BP Location: Left arm, Patient Position: Sitting, Cuff Size: Adult)   Pulse 80   Temp (!) 97 2 °F (36 2 °C)   Ht 5' 4" (1 626 m)   Wt 65 3 kg (144 lb)   BMI 24 72 kg/m²          Physical Exam  Vitals and nursing note reviewed  Constitutional:       Appearance: Normal appearance  He is well-developed  HENT:      Head: Normocephalic  Right Ear: External ear normal       Left Ear: External ear normal       Nose: Nose normal    Eyes:      General: Lids are normal       Conjunctiva/sclera: Conjunctivae normal       Pupils: Pupils are equal, round, and reactive to light  Neck:      Thyroid: No thyromegaly  Vascular: No carotid bruit  Cardiovascular:      Rate and Rhythm: Normal rate and regular rhythm  Pulses: Normal pulses  Heart sounds: Normal heart sounds, S1 normal and S2 normal  No murmur heard  Pulmonary:      Effort: Pulmonary effort is normal  No respiratory distress  Breath sounds: Normal breath sounds  No wheezing or rales  Abdominal:      General: Bowel sounds are normal       Palpations: Abdomen is soft  There is no mass  Tenderness: There is no abdominal tenderness  Musculoskeletal:         General: Normal range of motion  Cervical back: Normal range of motion and neck supple  Lymphadenopathy:      Cervical: No cervical adenopathy  Skin:     General: Skin is warm and dry  Coloration: Skin is not pale  Findings: No rash  Neurological:      Mental Status: He is alert and oriented to person, place, and time  Cranial Nerves: No cranial nerve deficit  Sensory: No sensory deficit  Deep Tendon Reflexes: Reflexes are normal and symmetric  Psychiatric:         Behavior: Behavior normal  Behavior is cooperative  Thought Content:  Thought content normal          Judgment: Judgment normal

## 2022-01-28 ENCOUNTER — HOSPITAL ENCOUNTER (OUTPATIENT)
Dept: INFUSION CENTER | Facility: CLINIC | Age: 87
Discharge: HOME | End: 2022-01-28
Payer: COMMERCIAL

## 2022-01-28 VITALS
HEIGHT: 64 IN | SYSTOLIC BLOOD PRESSURE: 126 MMHG | WEIGHT: 141 LBS | DIASTOLIC BLOOD PRESSURE: 60 MMHG | HEART RATE: 98 BPM | OXYGEN SATURATION: 98 % | TEMPERATURE: 98 F | RESPIRATION RATE: 18 BRPM | BODY MASS INDEX: 24.07 KG/M2

## 2022-01-28 DIAGNOSIS — C90.00 MULTIPLE MYELOMA NOT HAVING ACHIEVED REMISSION (HCC): Primary | ICD-10-CM

## 2022-01-28 PROCEDURE — 96401 CHEMO ANTI-NEOPL SQ/IM: CPT

## 2022-01-28 RX ORDER — DEXAMETHASONE 4 MG/1
20 TABLET ORAL ONCE
Status: COMPLETED | OUTPATIENT
Start: 2022-01-28 | End: 2022-01-28

## 2022-01-28 RX ADMIN — BORTEZOMIB 2.2 MG: 3.5 INJECTION, POWDER, LYOPHILIZED, FOR SOLUTION INTRAVENOUS; SUBCUTANEOUS at 15:33

## 2022-01-28 RX ADMIN — DEXAMETHASONE 20 MG: 4 TABLET ORAL at 15:32

## 2022-01-28 NOTE — PROGRESS NOTES
Patient tolerated Velcade injection to Left abdomen without incident  Patient's next appointment confirmed  AVS printed and given to patient

## 2022-01-28 NOTE — PROGRESS NOTES
Patient presents today for Velcade injection  Patient offers no complaints  VSS  Labs from 11/25/2022 reviewed and within parameters to treat

## 2022-02-02 ENCOUNTER — OFFICE VISIT (OUTPATIENT)
Dept: HEMATOLOGY ONCOLOGY | Facility: CLINIC | Age: 87
End: 2022-02-02
Payer: COMMERCIAL

## 2022-02-02 VITALS
BODY MASS INDEX: 23.13 KG/M2 | DIASTOLIC BLOOD PRESSURE: 68 MMHG | RESPIRATION RATE: 18 BRPM | OXYGEN SATURATION: 98 % | HEIGHT: 64 IN | WEIGHT: 135.5 LBS | SYSTOLIC BLOOD PRESSURE: 122 MMHG | TEMPERATURE: 96.2 F | HEART RATE: 98 BPM

## 2022-02-02 DIAGNOSIS — C90.00 MULTIPLE MYELOMA NOT HAVING ACHIEVED REMISSION (HCC): Primary | ICD-10-CM

## 2022-02-02 PROCEDURE — 99214 OFFICE O/P EST MOD 30 MIN: CPT | Performed by: INTERNAL MEDICINE

## 2022-02-02 NOTE — PROGRESS NOTES
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   Patient has an M spike of 1 9  Goldie Goldstein proven to be myeloma  HPI:    Reginaldo Muse seen for initial consultation 4/15/2021 regarding  A newly diagnosed M spike of 1 91   Looking at the SPEP with immunofixation it appears  That the immunofixation showed a monoclonal gammopathy identified as IgG kappa at 1 91   Urine immunofixation revealed  A monoclonal gammopathy identified as free kappa light chains at 2 84   The patient was referred to see us for this   The patient does have mild renal insufficiency  With a creatinine of 1 35   Hemoglobin was normal   Calcium was normal   The patient himself has no bony pain  Previous Hematologic/ Oncologic History:    Oncology History   [facility] )   5/25/2021 Initial Diagnosis    Multiple myeloma not having achieved remission ([facility] )     6/3/2021 -  Chemotherapy    bortezomib (VELCADE), 1 3 mg/m2 = 2 3 mg (81 3 % of original dose 1 6 mg/m2), Subcutaneous, Once, 9 of 14 cycles  Dose modification: 1 3 mg/m2 (original dose 1 6 mg/m2, Cycle 1, Reason: Other (Must fill in a comment), Comment: age)  Administration: 2 3 mg (6/3/2021), 2 3 mg (6/10/2021), 2 3 mg (7/15/2021), 2 3 mg (6/17/2021), 2 3 mg (6/24/2021), 2 3 mg (7/29/2021), 2 3 mg (8/12/2021), 2 3 mg (8/27/2021), 2 3 mg (9/9/2021), 2 3 mg (9/23/2021), 2 3 mg (10/7/2021), 2 3 mg (10/21/2021), 2 3 mg (11/4/2021), 2 3 mg (11/18/2021), 2 3 mg (12/2/2021), 2 3 mg (12/16/2021), 2 2 mg (12/30/2021), 2 2 mg (1/13/2022), 2 2 mg (1/28/2022)       Workup    Current Hematologic/ Oncologic Treatment:      Velcade plus Decadron every 2 weeks    Interval History:     the patient returns for follow-up visit  His  Myeloma blood work is quite stable  M spike is also stable    Hemoglobin and creatinine are normal   He is doing reasonably well  Denies any nausea denies any vomiting  Occasionally has diarrhea for which he is on Imodium  I suspect this is related to malabsorption as it occurs every time he has a heavy dinner according to him  He is working with his primary care physician on this  I recommended a GI consult but he wishes to hold off on this for now  The rest of his 14 point review of systems today was negative  Test Results:    Imaging: No results found  Labs:   Lab Results   Component Value Date    WBC 6 17 01/25/2022    HGB 12 5 01/25/2022    HCT 37 4 01/25/2022    MCV 95 01/25/2022     01/25/2022     Lab Results   Component Value Date     09/21/2015    K 3 5 01/25/2022     (H) 01/25/2022    CO2 26 01/25/2022    ANIONGAP 4 09/21/2015    BUN 11 01/25/2022    CREATININE 0 88 01/25/2022    GLUCOSE 98 09/21/2015    GLUF 103 (H) 01/25/2022    CALCIUM 9 0 01/25/2022    CORRECTEDCA 8 9 01/13/2022    AST 19 01/25/2022    ALT 13 01/25/2022    ALKPHOS 76 01/25/2022    PROT 7 2 09/21/2015    BILITOT 0 58 09/21/2015    EGFR 73 01/25/2022         Lab Results   Component Value Date    SPEP See Comment 01/25/2022    UPEP See Comment 03/16/2021       Lab Results   Component Value Date    PSA 2 4 03/05/2021    PSA 2 0 09/21/2015       Lab Results   Component Value Date    YHTNPMRB36 312 03/16/2021         ROS: As stated in the history of present illness otherwise his 14 point review of systems today was negative        Active Problems:   Patient Active Problem List   Diagnosis    Idiopathic chronic gout of multiple sites without tophus    Pure hypercholesterolemia    Essential hypertension    Acquired hypothyroidism    Spondylosis of lumbar region without myelopathy or radiculopathy    Patellofemoral disorder of left knee    Spinal stenosis of lumbosacral region    Meningioma, cerebral ([address] )    General medical exam    Immunization due    Multiple myeloma not having achieved remission ([facility] )    Falls    Thrombocytopenia ([facility] )    VINICIUS (acute kidney injury) ([facility] )    Abrasions of multiple sites    Mild protein-calorie malnutrition ([facility] )    Venous stasis ulcer of left ankle with fat layer exposed without varicose veins (HCC)    Chronic seasonal allergic rhinitis due to pollen    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    Irritable bowel syndrome with diarrhea       Past Medical History:   Past Medical History:   Diagnosis Date    Arthritis     Gout     Hypertension     Hyperthyroidism     Memory loss     Multiple myeloma ([facility] )     Myeloma ([place] [address] )     Sleep disturbance     Thyroid disease        Surgical History:   Past Surgical History:   Procedure Laterality Date    IR BIOPSY BONE MARROW  2021    KIDNEY SURGERY      description: 30 yrs ago    PROSTATE SURGERY      description: stone removal 30 yrs ago    REPLACEMENT TOTAL KNEE BILATERAL      description: 18 yrs ago       Family History:    Family History   Problem Relation Age of Onset    Other Mother         Brain tumor    Cancer Sister     Diabetes Brother     Arthritis Family     Diabetes Family     Other Family         knee replacement    Thyroid disease Family        Cancer-related family history includes Cancer in his sister      Social History:   Social History     Socioeconomic History    Marital status: Single     Spouse name: Not on file    Number of children: Not on file    Years of education: Not on file    Highest education level: Not on file   Occupational History    Occupation:    Tobacco Use    Smoking status: Former Smoker     Packs/day: 0 50     Years: 25 00     Pack years: 12 50     Quit date:      Years since quittin 1    Smokeless tobacco: Never Used    Tobacco comment: "I smoked years ago"   Vaping Use    Vaping Use: Never used   Substance and Sexual Activity    Alcohol use: Yes     Comment: social "burbon once in a while"    Drug use: No    Sexual activity: Not on file     Comment: per allscripts - sexually act and denied sexually activity    Other Topics Concern    Not on file   Social History Narrative    Not on file     Social Determinants of Health     Financial Resource Strain: Not on file   Food Insecurity: Not on file   Transportation Needs: Not on file   Physical Activity: Not on file   Stress: Not on file   Social Connections: Not on file   Intimate Partner Violence: Not on file   Housing Stability: Not on file       Current Medications:   Current Outpatient Medications   Medication Sig Dispense Refill    acyclovir (ZOVIRAX) 400 MG tablet Take 1 tablet (400 mg total) by mouth daily While on Velcade therapy 30 tablet 6    allopurinol (ZYLOPRIM) 100 mg tablet Take 2 tablets (200 mg total) by mouth daily 180 tablet 3    aspirin (ECOTRIN LOW STRENGTH) 81 mg EC tablet Take 81 mg by mouth daily      Cholecalciferol (VITAMIN D3 PO) Take by mouth      levothyroxine 100 mcg tablet Take 1 tablet (100 mcg total) by mouth daily 90 tablet 1    loperamide (IMODIUM A-D) 2 MG tablet Take 1 tablet (2 mg total) by mouth 4 (four) times a day as needed for diarrhea (Take before meals) 90 tablet 2    triamterene-hydrochlorothiazide (DYAZIDE) 37 5-25 mg per capsule Take 1 capsule by mouth every morning       Current Facility-Administered Medications   Medication Dose Route Frequency Provider Last Rate Last Admin    albuterol inhalation solution 2 5 mg  2 5 mg Nebulization Once Sushma Batista, DO           Allergies: Allergies   Allergen Reactions    Horse Protein        Physical Exam:    Body surface area is 1 66 meters squared      Wt Readings from Last 3 Encounters:   02/02/22 61 5 kg (135 lb 8 oz)   01/28/22 64 kg (141 lb)   01/25/22 65 3 kg (144 lb)        Temp Readings from Last 3 Encounters:   02/02/22 (!) 96 2 °F (35 7 °C)   01/28/22 98 °F (36 7 °C) (Temporal)   01/25/22 (!) 97 2 °F (36 2 °C)        BP Readings from Last 3 Encounters:   02/02/22 122/68 01/28/22 126/60   01/25/22 140/80         Pulse Readings from Last 3 Encounters:   02/02/22 98   01/28/22 98   01/25/22 80        Physical Exam     Constitutional   General appearance: No acute distress,   Eyes   Conjunctiva and lids: No swelling, erythema or discharge  Pupils and irises: Equal, round and reactive to light  Ears, Nose, Mouth, and Throat   External inspection of ears and nose: Normal     Nasal mucosa, septum, and turbinates: Normal without edema or erythema  Oropharynx: Normal with no erythema, edema, exudate or lesions  Pulmonary   Respiratory effort: No increased work of breathing or signs of respiratory distress  Auscultation of lungs: Clear to auscultation  Cardiovascular   Palpation of heart: Normal PMI, no thrills  Auscultation of heart: Normal rate and rhythm, normal S1 and S2, without murmurs  Examination of extremities for edema and/or varicosities: Normal     Carotid pulses: Normal     Abdomen   Abdomen: Non-tender, no masses  Liver and spleen: No hepatomegaly or splenomegaly  Lymphatic   Palpation of lymph nodes in neck: No lymphadenopathy  Musculoskeletal   Gait and station:  Walks with a walker  Digits and nails: Normal without clubbing or cyanosis  Inspection/palpation of joints, bones, and muscles: Normal     Skin   Skin and subcutaneous tissue: Normal without rashes or lesions  Neurologic   Cranial nerves: Cranial nerves 2-12 intact  Sensation: No sensory loss  Psychiatric   Orientation to person, place, and time: Normal     Mood and affect: Normal         Assessment / Plan:       This is a pleasant 51-year-old male who recently celebrated his birthday yesterday  William Calhoun had a bone marrow biopsy for what was thought to be MGUS but it turns out he has multiple myeloma   A 80years of age he is not a candidate for transplant but his ECOG performance status is 0-1 so   We put him on Velcade and Decadron initially weekly but then this was changed every 2 weeks  Tian Cormier is currently on this   His M spike  Continues to go down and is now 0 35  Free light chain ratio is   Elevated and fluctuating but hemoglobin is now normal and the rest of the blood work is also within acceptable limits  Beta 2 microglobulin has come down to normal at 2 3  At 80years of age I think every 2 weeks is reasonable  I will see him back in 2 months  He will stay on his current regimen with no changes  I will repeat his blood work at his next visit  Goals and Barriers:  Current Goal:  Prolong Survival from myeloma  Barriers: None  Patient's Capacity to Self Care:  Patient able to self care  Portions of the record may have been created with voice recognition software  Occasional wrong word or "sound a like" substitutions may have occurred due to the inherent limitations of voice recognition software  Read the chart carefully and recognize, using context, where substitutions have occurred

## 2022-02-07 ENCOUNTER — APPOINTMENT (OUTPATIENT)
Dept: LAB | Facility: MEDICAL CENTER | Age: 87
End: 2022-02-07
Payer: COMMERCIAL

## 2022-02-07 DIAGNOSIS — C90.00 MULTIPLE MYELOMA NOT HAVING ACHIEVED REMISSION (HCC): ICD-10-CM

## 2022-02-07 LAB
ALBUMIN SERPL BCP-MCNC: 4.2 G/DL (ref 3.5–5)
ALP SERPL-CCNC: 70 U/L (ref 46–116)
ALT SERPL W P-5'-P-CCNC: 15 U/L (ref 12–78)
ANION GAP SERPL CALCULATED.3IONS-SCNC: 9 MMOL/L (ref 4–13)
AST SERPL W P-5'-P-CCNC: 16 U/L (ref 5–45)
BASOPHILS # BLD AUTO: 0.02 THOUSANDS/ΜL (ref 0–0.1)
BASOPHILS NFR BLD AUTO: 0 % (ref 0–1)
BILIRUB SERPL-MCNC: 1.44 MG/DL (ref 0.2–1)
BUN SERPL-MCNC: 28 MG/DL (ref 5–25)
CALCIUM SERPL-MCNC: 9.6 MG/DL (ref 8.3–10.1)
CHLORIDE SERPL-SCNC: 105 MMOL/L (ref 100–108)
CO2 SERPL-SCNC: 26 MMOL/L (ref 21–32)
CREAT SERPL-MCNC: 1.16 MG/DL (ref 0.6–1.3)
EOSINOPHIL # BLD AUTO: 0.05 THOUSAND/ΜL (ref 0–0.61)
EOSINOPHIL NFR BLD AUTO: 1 % (ref 0–6)
ERYTHROCYTE [DISTWIDTH] IN BLOOD BY AUTOMATED COUNT: 13.3 % (ref 11.6–15.1)
GFR SERPL CREATININE-BSD FRML MDRD: 53 ML/MIN/1.73SQ M
GLUCOSE P FAST SERPL-MCNC: 90 MG/DL (ref 65–99)
HCT VFR BLD AUTO: 41.6 % (ref 36.5–49.3)
HGB BLD-MCNC: 13.7 G/DL (ref 12–17)
IGA SERPL-MCNC: 15 MG/DL (ref 70–400)
IGG SERPL-MCNC: 695 MG/DL (ref 700–1600)
IGM SERPL-MCNC: 22 MG/DL (ref 40–230)
IMM GRANULOCYTES # BLD AUTO: 0.02 THOUSAND/UL (ref 0–0.2)
IMM GRANULOCYTES NFR BLD AUTO: 0 % (ref 0–2)
LYMPHOCYTES # BLD AUTO: 2.03 THOUSANDS/ΜL (ref 0.6–4.47)
LYMPHOCYTES NFR BLD AUTO: 34 % (ref 14–44)
MCH RBC QN AUTO: 32 PG (ref 26.8–34.3)
MCHC RBC AUTO-ENTMCNC: 32.9 G/DL (ref 31.4–37.4)
MCV RBC AUTO: 97 FL (ref 82–98)
MONOCYTES # BLD AUTO: 0.87 THOUSAND/ΜL (ref 0.17–1.22)
MONOCYTES NFR BLD AUTO: 15 % (ref 4–12)
NEUTROPHILS # BLD AUTO: 3 THOUSANDS/ΜL (ref 1.85–7.62)
NEUTS SEG NFR BLD AUTO: 50 % (ref 43–75)
NRBC BLD AUTO-RTO: 0 /100 WBCS
PLATELET # BLD AUTO: 185 THOUSANDS/UL (ref 149–390)
PMV BLD AUTO: 12.2 FL (ref 8.9–12.7)
POTASSIUM SERPL-SCNC: 3.7 MMOL/L (ref 3.5–5.3)
PROT SERPL-MCNC: 6.8 G/DL (ref 6.4–8.2)
RBC # BLD AUTO: 4.28 MILLION/UL (ref 3.88–5.62)
SODIUM SERPL-SCNC: 140 MMOL/L (ref 136–145)
WBC # BLD AUTO: 5.99 THOUSAND/UL (ref 4.31–10.16)

## 2022-02-07 PROCEDURE — 83521 IG LIGHT CHAINS FREE EACH: CPT

## 2022-02-07 PROCEDURE — 80053 COMPREHEN METABOLIC PANEL: CPT | Performed by: INTERNAL MEDICINE

## 2022-02-07 PROCEDURE — 84165 PROTEIN E-PHORESIS SERUM: CPT

## 2022-02-07 PROCEDURE — 84165 PROTEIN E-PHORESIS SERUM: CPT | Performed by: SPECIALIST

## 2022-02-07 PROCEDURE — 36415 COLL VENOUS BLD VENIPUNCTURE: CPT | Performed by: INTERNAL MEDICINE

## 2022-02-07 PROCEDURE — 82784 ASSAY IGA/IGD/IGG/IGM EACH: CPT

## 2022-02-07 PROCEDURE — 82232 ASSAY OF BETA-2 PROTEIN: CPT

## 2022-02-07 PROCEDURE — 85025 COMPLETE CBC W/AUTO DIFF WBC: CPT | Performed by: INTERNAL MEDICINE

## 2022-02-08 LAB
ALBUMIN SERPL ELPH-MCNC: 4.35 G/DL (ref 3.5–5)
ALBUMIN SERPL ELPH-MCNC: 66.9 % (ref 52–65)
ALPHA1 GLOB SERPL ELPH-MCNC: 0.3 G/DL (ref 0.1–0.4)
ALPHA1 GLOB SERPL ELPH-MCNC: 4.6 % (ref 2.5–5)
ALPHA2 GLOB SERPL ELPH-MCNC: 0.65 G/DL (ref 0.4–1.2)
ALPHA2 GLOB SERPL ELPH-MCNC: 10 % (ref 7–13)
BETA GLOB ABNORMAL SERPL ELPH-MCNC: 0.38 G/DL (ref 0.4–0.8)
BETA1 GLOB SERPL ELPH-MCNC: 5.8 % (ref 5–13)
BETA2 GLOB SERPL ELPH-MCNC: 3.1 % (ref 2–8)
BETA2+GAMMA GLOB SERPL ELPH-MCNC: 0.2 G/DL (ref 0.2–0.5)
GAMMA GLOB ABNORMAL SERPL ELPH-MCNC: 0.62 G/DL (ref 0.5–1.6)
GAMMA GLOB SERPL ELPH-MCNC: 9.6 % (ref 12–22)
IGG/ALB SER: 2.02 {RATIO} (ref 1.1–1.8)
KAPPA LC FREE SER-MCNC: 116.6 MG/L (ref 3.3–19.4)
KAPPA LC FREE/LAMBDA FREE SER: 40.21 {RATIO} (ref 0.26–1.65)
LAMBDA LC FREE SERPL-MCNC: 2.9 MG/L (ref 5.7–26.3)
M PROTEIN 1 MFR SERPL ELPH: 5.8 %
M PROTEIN 1 SERPL ELPH-MCNC: 0.38 G/DL
PROT PATTERN SERPL ELPH-IMP: ABNORMAL
PROT SERPL-MCNC: 6.5 G/DL (ref 6.4–8.2)

## 2022-02-09 LAB — B2 MICROGLOB SERPL-MCNC: 3.3 MG/L (ref 0.6–2.4)

## 2022-02-10 ENCOUNTER — HOSPITAL ENCOUNTER (OUTPATIENT)
Dept: INFUSION CENTER | Facility: CLINIC | Age: 87
Discharge: HOME | End: 2022-02-10
Payer: COMMERCIAL

## 2022-02-10 ENCOUNTER — APPOINTMENT (OUTPATIENT)
Dept: LAB | Facility: CLINIC | Age: 87
End: 2022-02-10
Payer: COMMERCIAL

## 2022-02-10 ENCOUNTER — TELEPHONE (OUTPATIENT)
Dept: INFUSION CENTER | Facility: CLINIC | Age: 87
End: 2022-02-10

## 2022-02-10 VITALS
WEIGHT: 134.48 LBS | DIASTOLIC BLOOD PRESSURE: 72 MMHG | TEMPERATURE: 97.2 F | HEART RATE: 81 BPM | RESPIRATION RATE: 18 BRPM | BODY MASS INDEX: 22.96 KG/M2 | SYSTOLIC BLOOD PRESSURE: 122 MMHG | HEIGHT: 64 IN | OXYGEN SATURATION: 99 %

## 2022-02-10 DIAGNOSIS — C90.00 MULTIPLE MYELOMA NOT HAVING ACHIEVED REMISSION (HCC): ICD-10-CM

## 2022-02-10 DIAGNOSIS — C90.00 MULTIPLE MYELOMA NOT HAVING ACHIEVED REMISSION (HCC): Primary | ICD-10-CM

## 2022-02-10 DIAGNOSIS — R17 ELEVATED BILIRUBIN: ICD-10-CM

## 2022-02-10 LAB
ALBUMIN SERPL BCP-MCNC: 3.7 G/DL (ref 3.5–5)
ALP SERPL-CCNC: 68 U/L (ref 46–116)
ALT SERPL W P-5'-P-CCNC: 15 U/L (ref 12–78)
ANION GAP SERPL CALCULATED.3IONS-SCNC: 10 MMOL/L (ref 4–13)
AST SERPL W P-5'-P-CCNC: 19 U/L (ref 5–45)
BASOPHILS # BLD AUTO: 0.01 THOUSANDS/ΜL (ref 0–0.1)
BASOPHILS NFR BLD AUTO: 0 % (ref 0–1)
BILIRUB DIRECT SERPL-MCNC: 0.12 MG/DL (ref 0–0.2)
BILIRUB SERPL-MCNC: 0.55 MG/DL (ref 0.2–1)
BUN SERPL-MCNC: 22 MG/DL (ref 5–25)
CALCIUM SERPL-MCNC: 8.7 MG/DL (ref 8.3–10.1)
CHLORIDE SERPL-SCNC: 104 MMOL/L (ref 100–108)
CO2 SERPL-SCNC: 26 MMOL/L (ref 21–32)
CREAT SERPL-MCNC: 1.25 MG/DL (ref 0.6–1.3)
EOSINOPHIL # BLD AUTO: 0.09 THOUSAND/ΜL (ref 0–0.61)
EOSINOPHIL NFR BLD AUTO: 1 % (ref 0–6)
ERYTHROCYTE [DISTWIDTH] IN BLOOD BY AUTOMATED COUNT: 13.4 % (ref 11.6–15.1)
GFR SERPL CREATININE-BSD FRML MDRD: 48 ML/MIN/1.73SQ M
GLUCOSE SERPL-MCNC: 90 MG/DL (ref 65–140)
HCT VFR BLD AUTO: 39.1 % (ref 36.5–49.3)
HGB BLD-MCNC: 13.2 G/DL (ref 12–17)
IMM GRANULOCYTES # BLD AUTO: 0.03 THOUSAND/UL (ref 0–0.2)
IMM GRANULOCYTES NFR BLD AUTO: 0 % (ref 0–2)
LYMPHOCYTES # BLD AUTO: 1.84 THOUSANDS/ΜL (ref 0.6–4.47)
LYMPHOCYTES NFR BLD AUTO: 24 % (ref 14–44)
MCH RBC QN AUTO: 32.4 PG (ref 26.8–34.3)
MCHC RBC AUTO-ENTMCNC: 33.8 G/DL (ref 31.4–37.4)
MCV RBC AUTO: 96 FL (ref 82–98)
MONOCYTES # BLD AUTO: 0.87 THOUSAND/ΜL (ref 0.17–1.22)
MONOCYTES NFR BLD AUTO: 11 % (ref 4–12)
NEUTROPHILS # BLD AUTO: 4.81 THOUSANDS/ΜL (ref 1.85–7.62)
NEUTS SEG NFR BLD AUTO: 64 % (ref 43–75)
NRBC BLD AUTO-RTO: 0 /100 WBCS
PLATELET # BLD AUTO: 210 THOUSANDS/UL (ref 149–390)
PMV BLD AUTO: 11 FL (ref 8.9–12.7)
POTASSIUM SERPL-SCNC: 3.3 MMOL/L (ref 3.5–5.3)
PROT SERPL-MCNC: 6.2 G/DL (ref 6.4–8.2)
RBC # BLD AUTO: 4.08 MILLION/UL (ref 3.88–5.62)
SODIUM SERPL-SCNC: 140 MMOL/L (ref 136–145)
WBC # BLD AUTO: 7.65 THOUSAND/UL (ref 4.31–10.16)

## 2022-02-10 PROCEDURE — 96401 CHEMO ANTI-NEOPL SQ/IM: CPT

## 2022-02-10 PROCEDURE — 85025 COMPLETE CBC W/AUTO DIFF WBC: CPT

## 2022-02-10 PROCEDURE — 82248 BILIRUBIN DIRECT: CPT

## 2022-02-10 PROCEDURE — 80053 COMPREHEN METABOLIC PANEL: CPT

## 2022-02-10 PROCEDURE — 36415 COLL VENOUS BLD VENIPUNCTURE: CPT

## 2022-02-10 RX ORDER — DEXAMETHASONE 4 MG/1
20 TABLET ORAL ONCE
Status: COMPLETED | OUTPATIENT
Start: 2022-02-10 | End: 2022-02-10

## 2022-02-10 RX ADMIN — BORTEZOMIB 2.2 MG: 3.5 INJECTION, POWDER, LYOPHILIZED, FOR SOLUTION INTRAVENOUS; SUBCUTANEOUS at 13:14

## 2022-02-10 RX ADMIN — DEXAMETHASONE 20 MG: 4 TABLET ORAL at 12:52

## 2022-02-10 NOTE — TELEPHONE ENCOUNTER
Called patient at home to remind him of his appt here at the [facility] at 1130    No answer and unable to leave message secondary to mailbox not being set up

## 2022-02-14 ENCOUNTER — TELEPHONE (OUTPATIENT)
Dept: HEMATOLOGY ONCOLOGY | Facility: HOSPITAL | Age: 87
End: 2022-02-14

## 2022-02-14 ENCOUNTER — OFFICE VISIT (OUTPATIENT)
Dept: FAMILY MEDICINE CLINIC | Facility: MEDICAL CENTER | Age: 87
End: 2022-02-14
Payer: COMMERCIAL

## 2022-02-14 VITALS
BODY MASS INDEX: 22.4 KG/M2 | HEIGHT: 64 IN | RESPIRATION RATE: 16 BRPM | SYSTOLIC BLOOD PRESSURE: 132 MMHG | TEMPERATURE: 96 F | HEART RATE: 112 BPM | WEIGHT: 131.2 LBS | DIASTOLIC BLOOD PRESSURE: 70 MMHG

## 2022-02-14 DIAGNOSIS — C90.00 MULTIPLE MYELOMA NOT HAVING ACHIEVED REMISSION (HCC): Primary | ICD-10-CM

## 2022-02-14 DIAGNOSIS — R42 DIZZINESS: ICD-10-CM

## 2022-02-14 DIAGNOSIS — K58.9 IRRITABLE BOWEL SYNDROME WITHOUT DIARRHEA: ICD-10-CM

## 2022-02-14 DIAGNOSIS — R47.9 DIFFICULTY WITH SPEECH: ICD-10-CM

## 2022-02-14 PROCEDURE — 1036F TOBACCO NON-USER: CPT | Performed by: FAMILY MEDICINE

## 2022-02-14 PROCEDURE — 1160F RVW MEDS BY RX/DR IN RCRD: CPT | Performed by: FAMILY MEDICINE

## 2022-02-14 PROCEDURE — 99214 OFFICE O/P EST MOD 30 MIN: CPT | Performed by: FAMILY MEDICINE

## 2022-02-14 PROCEDURE — 3725F SCREEN DEPRESSION PERFORMED: CPT | Performed by: FAMILY MEDICINE

## 2022-02-14 NOTE — PROGRESS NOTES
Assessment/Plan:    Multiple myeloma not having achieved remission ([facility] )  He is in the schedule today for complaints of weight loss, diarrhea, balance problems, falls  It has been going on for couple weeks  There is no neurologic complaints such as weakness, severe headaches, vision changes  His speech is affected, with some slurred speech but not an aphasia  All this has started since he has been Velcade infusions  His last several CBCs are all normal     There was no focal deficits in the sensory, motor or cerebellar neurological     I spoke with his oncologist, and they have decided to put the Velcade on hold and we will see how he is doing  Diagnoses and all orders for this visit:    Multiple myeloma not having achieved remission ([facility] )          Subjective:      Patient ID: Miles Johnson is a 80 y o  male  The following portions of the patient's history were reviewed and updated as appropriate: allergies, current medications, past family history, past medical history, past social history, past surgical history and problem list     Review of Systems   Constitutional: Positive for activity change, appetite change and fatigue  HENT: Negative  Eyes: Negative  Respiratory: Negative  Cardiovascular: Negative  Gastrointestinal: Positive for diarrhea and nausea  Negative for blood in stool and constipation  Genitourinary: Negative  Musculoskeletal: Negative  Neurological: Positive for dizziness, speech difficulty and light-headedness  Psychiatric/Behavioral: Negative  Objective:      /70 (Cuff Size: Standard)   Pulse (!) 112   Temp (!) 96 °F (35 6 °C)   Resp 16   Ht 5' 4" (1 626 m)   Wt 59 5 kg (131 lb 3 2 oz)   BMI 22 52 kg/m²          Physical Exam  Constitutional:       General: He is not in acute distress  Appearance: He is ill-appearing  He is not toxic-appearing  HENT:      Head: Normocephalic        Nose: Nose normal       Mouth/Throat: Mouth: Mucous membranes are moist    Eyes:      Extraocular Movements: Extraocular movements intact  Pupils: Pupils are equal, round, and reactive to light  Cardiovascular:      Rate and Rhythm: Normal rate  Pulses: Normal pulses  Pulmonary:      Effort: Pulmonary effort is normal       Breath sounds: Normal breath sounds  Abdominal:      Tenderness: There is no abdominal tenderness  Musculoskeletal:         General: Normal range of motion  Cervical back: Tenderness present  No rigidity  Skin:     General: Skin is warm and dry  Neurological:      Mental Status: He is oriented to person, place, and time  Cranial Nerves: No cranial nerve deficit  Motor: Weakness present        Gait: Gait abnormal    Psychiatric:         Mood and Affect: Mood normal

## 2022-02-14 NOTE — TELEPHONE ENCOUNTER
Called patient and spoke to patient's cousin who takes care of the patient  Let her know that there is a f/u appt before his 3/10 treatment and the 2/24 treatment is on hold  Patient's cousin confirmed knowing this and his new appt

## 2022-02-14 NOTE — ASSESSMENT & PLAN NOTE
He is in the schedule today for complaints of weight loss, diarrhea, balance problems, falls  It has been going on for couple weeks  There is no neurologic complaints such as weakness, severe headaches, vision changes  His speech is affected, with some slurred speech but not an aphasia  All this has started since he has been Velcade infusions  His last several CBCs are all normal     There was no focal deficits in the sensory, motor or cerebellar neurological     I spoke with his oncologist, and they have decided to put the Velcade on hold and we will see how he is doing

## 2022-02-23 RX ORDER — DEXAMETHASONE 4 MG/1
20 TABLET ORAL ONCE
Status: CANCELLED | OUTPATIENT
Start: 2022-03-10

## 2022-02-23 RX ORDER — DEXAMETHASONE 4 MG/1
20 TABLET ORAL ONCE
Status: CANCELLED | OUTPATIENT
Start: 2022-03-24

## 2022-02-23 NOTE — PROGRESS NOTES
The patient's Velcade was decreased to 1 0 milligrams/meter2 secondary to side effects including diarrhea

## 2022-02-25 ENCOUNTER — OFFICE VISIT (OUTPATIENT)
Dept: FAMILY MEDICINE CLINIC | Facility: MEDICAL CENTER | Age: 87
End: 2022-02-25

## 2022-02-25 VITALS
TEMPERATURE: 97.9 F | WEIGHT: 134.2 LBS | DIASTOLIC BLOOD PRESSURE: 78 MMHG | BODY MASS INDEX: 22.91 KG/M2 | OXYGEN SATURATION: 98 % | SYSTOLIC BLOOD PRESSURE: 140 MMHG | HEART RATE: 88 BPM | HEIGHT: 64 IN

## 2022-02-25 DIAGNOSIS — I10 ESSENTIAL HYPERTENSION: ICD-10-CM

## 2022-02-25 DIAGNOSIS — M1A.09X0 IDIOPATHIC CHRONIC GOUT OF MULTIPLE SITES WITHOUT TOPHUS: ICD-10-CM

## 2022-02-25 DIAGNOSIS — E03.9 ACQUIRED HYPOTHYROIDISM: ICD-10-CM

## 2022-02-25 DIAGNOSIS — C90.00 MULTIPLE MYELOMA NOT HAVING ACHIEVED REMISSION (HCC): Primary | ICD-10-CM

## 2022-02-25 PROCEDURE — 99214 OFFICE O/P EST MOD 30 MIN: CPT

## 2022-02-25 RX ORDER — ASPIRIN 81 MG/1
81 TABLET ORAL DAILY
Qty: 90 TABLET | Refills: 3 | Status: SHIPPED | OUTPATIENT
Start: 2022-02-25 | End: 2022-05-03 | Stop reason: SDUPTHER

## 2022-02-25 RX ORDER — TRIAMTERENE AND HYDROCHLOROTHIAZIDE 37.5; 25 MG/1; MG/1
1 CAPSULE ORAL EVERY MORNING
Qty: 90 CAPSULE | Refills: 3 | Status: SHIPPED | OUTPATIENT
Start: 2022-02-25 | End: 2022-05-03 | Stop reason: SDUPTHER

## 2022-02-25 RX ORDER — LEVOTHYROXINE SODIUM 0.1 MG/1
100 TABLET ORAL DAILY
Qty: 90 TABLET | Refills: 3 | Status: SHIPPED | OUTPATIENT
Start: 2022-02-25 | End: 2022-05-03 | Stop reason: SDUPTHER

## 2022-02-25 RX ORDER — ALLOPURINOL 100 MG/1
200 TABLET ORAL DAILY
Qty: 180 TABLET | Refills: 3 | Status: SHIPPED | OUTPATIENT
Start: 2022-02-25 | End: 2022-05-03 | Stop reason: SDUPTHER

## 2022-02-25 NOTE — PROGRESS NOTES
Assessment/Plan:    No problem-specific Assessment & Plan notes found for this encounter  Continue current medication regimen, refills sent in  Follow-up with Hem/Onc as scheduled for 3/8 visit  Keep follow-up visit, call sooner if needed  1  Multiple myeloma not having achieved remission ([address] )  Overall feeling better since 02/14 visit  Symptoms of weakness, diarrhea, fatigue, difficulty with speech, and falls have improved since last visit  Last infusion of Velcade held  Follow-up with hem/onc scheduled for 03/08/2022 to discuss further Velcade infusions  2  Idiopathic chronic gout of multiple sites without tophus  - allopurinol (ZYLOPRIM) 100 mg tablet; Take 2 tablets (200 mg total) by mouth daily  Dispense: 180 tablet; Refill: 3    3  Acquired hypothyroidism  - levothyroxine 100 mcg tablet; Take 1 tablet (100 mcg total) by mouth daily  Dispense: 90 tablet; Refill: 3    4  Essential hypertension  - aspirin (ECOTRIN LOW STRENGTH) 81 mg EC tablet; Take 1 tablet (81 mg total) by mouth daily  Dispense: 90 tablet; Refill: 3  - triamterene-hydrochlorothiazide (DYAZIDE) 37 5-25 mg per capsule; Take 1 capsule by mouth every morning  Dispense: 90 capsule; Refill: 3      Subjective:      Patient ID: John Gil is a 80 y o  male  26-year-old male presents to the office accompanied by his cousin Giovanni Gonzalez for 2 week follow  Overall he is feeling much better, symptoms of weakness, diarrhea, fatigue, difficulty with speech, and falls have improved  He gained 3 lb back since his last office visit on 02/14/2022  He is in good spirits today and reports feeling much better  His last infusion of Velcade was held, and he has a follow-up appointment with [facility] office on 03/08/2022 to discuss Velcade infusions and whether not he will continue them  He is ambulatory without use of walker at today's visit and he offers no complaints or concerns today        The following portions of the patient's history were reviewed and updated as appropriate: allergies, past family history, past medical history, past social history, past surgical history and problem list     Review of Systems   Constitutional: Positive for appetite change (has been eating more since last office visit)  Negative for chills, fatigue and fever  HENT: Negative  Negative for ear pain, sore throat and trouble swallowing  Eyes: Negative for pain and visual disturbance  Respiratory: Negative for cough, chest tightness and shortness of breath  Cardiovascular: Negative for chest pain, palpitations and leg swelling  Gastrointestinal: Negative for abdominal pain, diarrhea, nausea and vomiting  Endocrine: Negative  Genitourinary: Negative for dysuria and hematuria  Musculoskeletal: Negative for arthralgias and back pain  Skin: Negative for color change and rash  Neurological: Negative for dizziness, seizures, syncope, facial asymmetry, speech difficulty, weakness, light-headedness, numbness and headaches  Psychiatric/Behavioral: Negative for agitation, behavioral problems and confusion  All other systems reviewed and are negative  Objective:      /78 (BP Location: Left arm, Patient Position: Sitting, Cuff Size: Adult)   Pulse 88   Temp 97 9 °F (36 6 °C)   Ht 5' 4" (1 626 m)   Wt 60 9 kg (134 lb 3 2 oz)   SpO2 98%   BMI 23 04 kg/m²          Physical Exam  Constitutional:       General: He is not in acute distress  Appearance: Normal appearance  He is not ill-appearing  HENT:      Head: Normocephalic and atraumatic  Nose: Nose normal       Mouth/Throat:      Mouth: Mucous membranes are moist    Cardiovascular:      Rate and Rhythm: Normal rate and regular rhythm  Pulses: Normal pulses  Heart sounds: Normal heart sounds  No murmur heard  Pulmonary:      Effort: Pulmonary effort is normal  No respiratory distress  Breath sounds: Normal breath sounds  No stridor   No wheezing or rhonchi  Abdominal:      General: Abdomen is flat  Bowel sounds are normal       Palpations: Abdomen is soft  Tenderness: There is no abdominal tenderness  Musculoskeletal:         General: No swelling  Normal range of motion  Cervical back: Normal range of motion  Skin:     General: Skin is warm and dry  Neurological:      General: No focal deficit present  Mental Status: He is alert  Mental status is at baseline        Gait: Gait normal    Psychiatric:         Mood and Affect: Mood normal          Behavior: Behavior normal                     SABRINA Amezquita

## 2022-03-02 ENCOUNTER — PATIENT OUTREACH (OUTPATIENT)
Dept: CASE MANAGEMENT | Facility: HOSPITAL | Age: 87
End: 2022-03-02

## 2022-03-06 NOTE — PROGRESS NOTES
Hematology/Oncology Outpatient Follow- up Note  Jan Painting, 5/24/1927, [id number]  3/8/2022        Chief Complaint   Patient presents with    Follow-up     Multiple myeloma not having achieved remission        HPI:  Jan Painting is a 80year old male who was referred to hematology for evaluation of a M spike 1 91  SPEP with immunofixation demonstrated a monoclonal gammopathy identified as IgG kappa at 1 91   Urine immunofixation revealed  A monoclonal gammopathy identified as free kappa light chains at 2 84  He underwent a bone marrow biopsy on 5/11/21:  Final Diagnosis   A-C  Bone Marrow, Left Iliac Crest, Core, Clot and Aspirate:  - Hypercellular marrow (90% cellularity) with increased IgG Kappa restricted plasma cells (90% by  immunostain), consistent with plasma cell myeloma  - Mildly increased reticulin fibers secondary to plasma cell infiltrate     Due to his age, he is not a transplant candidate  He was started on treatment with weekly Velcade and Decadron on 6/3/2021  At patient's request, his treatment plan was adjusted to every 2 weeks       Previous Hematologic/ Oncologic History:    Oncology History   Multiple myeloma not having achieved remission ([facility] )   5/25/2021 Initial Diagnosis    Multiple myeloma not having achieved remission ([facility] [address] )     6/3/2021 -  Chemotherapy    bortezomib (VELCADE), 1 3 mg/m2 = 2 3 mg (81 3 % of original dose 1 6 mg/m2), Subcutaneous, Once, 10 of 18 cycles  Dose modification: 1 3 mg/m2 (original dose 1 6 mg/m2, Cycle 1, Reason: Other (Must fill in a comment), Comment: age), 1 mg/m2 (original dose 1 6 mg/m2, Cycle 10, Reason: Performance Status, Comment: Patient has diarrhea)  Administration: 2 3 mg (6/3/2021), 2 3 mg (6/10/2021), 2 3 mg (7/15/2021), 2 3 mg (6/17/2021), 2 3 mg (6/24/2021), 2 3 mg (7/29/2021), 2 3 mg (8/12/2021), 2 3 mg (8/27/2021), 2 3 mg (9/9/2021), 2 3 mg (9/23/2021), 2 3 mg (10/7/2021), 2 3 mg (10/21/2021), 2 3 mg (11/4/2021), 2 3 mg (2021), 2 3 mg (2021), 2 3 mg (2021), 2 2 mg (2021), 2 2 mg (2022), 2 2 mg (2022), 2 2 mg (2/10/2022)     Decadron 20 mg and Velcade 1 3 mg/m2 weekly   Decadron 20 mg and Velcade 1 3 mg/m2  Every 2 weeks    Current Hematologic/ Oncologic Treatment:    Regimen has been adjusted to Decadron 20 mg and Velcade 1 mg/m2  every 28 days    ECO - Symptomatic but completely ambulatory    Interval History:   The patient presents for routine follow up  He was last seen by Dr William Rangel on   His last infusion of Velcade was held d/t symptoms of weakness, diarrhea and fatigue  Patient reports his symptoms were "terrible" and resulting in balance problems and falls  He states he is going to be 80year old and is not sure he wishes to continue with treatment  He is scheduled to restart on   I have discussed dose reduction of his Velcade to 1 mg/m2  from 1 3 mg/m2   This should help with his symptoms hopefully if it is related  Patient is willing to try this but requests to have this once a month instead of every 2 weeks  Time spent reviewing rationale for treatment and he was informed that his disease could likely progress and shorten his life span  I explained without treatment excess amounts of myeloma protein can build up and lead to kidney damage/renal failure, active myeloma can damage his bone marrow and replace healthy cells leading him to increase risk for infection, anemia  Patient and his cousin verbalized understanding of this information  Patient states he is not ready to die however he would like to have good quality of life  We discussed adjusting treatment to once a month at a reduced dose instead of stopping treatment altogether  Patient is in agreement with this  At present, patient states he feels pretty good  Previous symptoms of diarrhea, severe weakness, fatigue have resolved  He has not had any recent falls    His CBC has remained in normal limits  Cancer Staging:  Cancer Staging  No matching staging information was found for the patient  Molecular Testing:         Test Results:    Imaging: No results found  Labs:   Lab Results   Component Value Date    WBC 7 65 02/10/2022    HGB 13 2 02/10/2022    HCT 39 1 02/10/2022    MCV 96 02/10/2022     02/10/2022     Lab Results   Component Value Date     09/21/2015    K 3 3 (L) 02/10/2022     02/10/2022    CO2 26 02/10/2022    ANIONGAP 4 09/21/2015    BUN 22 02/10/2022    CREATININE 1 25 02/10/2022    GLUCOSE 98 09/21/2015    GLUF 90 02/07/2022    CALCIUM 8 7 02/10/2022    CORRECTEDCA 8 9 01/13/2022    AST 19 02/10/2022    ALT 15 02/10/2022    ALKPHOS 68 02/10/2022    PROT 7 2 09/21/2015    BILITOT 0 58 09/21/2015    EGFR 48 02/10/2022           Review of Systems   All other systems reviewed and are negative          Active Problems:   Patient Active Problem List   Diagnosis    Idiopathic chronic gout of multiple sites without tophus    Pure hypercholesterolemia    Essential hypertension    Acquired hypothyroidism    Spondylosis of lumbar region without myelopathy or radiculopathy    Patellofemoral disorder of left knee    Spinal stenosis of lumbosacral region    Meningioma, cerebral ([address] )    General medical exam    Immunization due    Multiple myeloma not having achieved remission ([address] )    Falls    Thrombocytopenia ([address] )    VINICIUS (acute kidney injury) ([address] )    Abrasions of multiple sites    Mild protein-calorie malnutrition ([address] )    Venous stasis ulcer of left ankle with fat layer exposed without varicose veins (HCC)    Chronic seasonal allergic rhinitis due to pollen    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    Irritable bowel syndrome with diarrhea    Irritable bowel syndrome without diarrhea       Past Medical History:   Past Medical History:   Diagnosis Date    Arthritis     Gout     Hypertension     Hyperthyroidism     Memory loss     Multiple myeloma (HCC)     Myeloma [facility] [address] )     Sleep disturbance     Thyroid disease        Surgical History:   Past Surgical History:   Procedure Laterality Date    IR BIOPSY BONE MARROW  2021    KIDNEY SURGERY      description: 30 yrs ago    PROSTATE SURGERY      description: stone removal 30 yrs ago    REPLACEMENT TOTAL KNEE BILATERAL      description: 25 yrs ago       Family History:    Family History   Problem Relation Age of Onset    Other Mother         Brain tumor    Cancer Sister     Diabetes Brother     Arthritis Family     Diabetes Family     Other Family         knee replacement    Thyroid disease Family        Cancer-related family history includes Cancer in his sister      Social History:   Social History     Socioeconomic History    Marital status: Single     Spouse name: Not on file    Number of children: Not on file    Years of education: Not on file    Highest education level: Not on file   Occupational History    Occupation:    Tobacco Use    Smoking status: Former Smoker     Packs/day: 0 50     Years: 25 00     Pack years: 12 50     Quit date:      Years since quittin 2    Smokeless tobacco: Never Used    Tobacco comment: "I smoked years ago"   Vaping Use    Vaping Use: Never used   Substance and Sexual Activity    Alcohol use: Yes     Comment: social "burbon once in a while"    Drug use: No    Sexual activity: Not on file     Comment: per allscripts - sexually act and denied sexually activity    Other Topics Concern    Not on file   Social History Narrative    Not on file     Social Determinants of Health     Financial Resource Strain: Not on file   Food Insecurity: Not on file   Transportation Needs: Not on file   Physical Activity: Not on file   Stress: Not on file   Social Connections: Not on file   Intimate Partner Violence: Not on file   Housing Stability: Not on file       Current Medications:   Current Outpatient Medications   Medication Sig Dispense Refill    acyclovir (ZOVIRAX) 400 MG tablet Take 1 tablet (400 mg total) by mouth daily While on Velcade therapy 30 tablet 6    allopurinol (ZYLOPRIM) 100 mg tablet Take 2 tablets (200 mg total) by mouth daily 180 tablet 3    aspirin (ECOTRIN LOW STRENGTH) 81 mg EC tablet Take 1 tablet (81 mg total) by mouth daily 90 tablet 3    Cholecalciferol (VITAMIN D3 PO) Take by mouth      levothyroxine 100 mcg tablet Take 1 tablet (100 mcg total) by mouth daily 90 tablet 3    loperamide (IMODIUM A-D) 2 MG tablet Take 1 tablet (2 mg total) by mouth 4 (four) times a day as needed for diarrhea (Take before meals) 90 tablet 2    triamterene-hydrochlorothiazide (DYAZIDE) 37 5-25 mg per capsule Take 1 capsule by mouth every morning 90 capsule 3     Current Facility-Administered Medications   Medication Dose Route Frequency Provider Last Rate Last Admin    albuterol inhalation solution 2 5 mg  2 5 mg Nebulization Once Benalicia Majors, DO           Allergies: Allergies   Allergen Reactions    Horse Protein        Physical Exam:  /62 (BP Location: Right arm, Cuff Size: Standard)   Pulse 81   Temp 98 3 °F (36 8 °C)   Resp 18   Ht 5' 4 02" (1 626 m)   Wt 60 1 kg (132 lb 8 oz)   SpO2 99%   BMI 22 73 kg/m²   Body surface area is 1 64 meters squared  Wt Readings from Last 3 Encounters:   03/08/22 60 1 kg (132 lb 8 oz)   02/25/22 60 9 kg (134 lb 3 2 oz)   02/14/22 59 5 kg (131 lb 3 2 oz)           Physical Exam  Constitutional:       General: He is not in acute distress  Appearance: He is well-developed  He is not diaphoretic  Comments: Alert, pleasant, elderly male  No acute distress   HENT:      Head: Normocephalic and atraumatic  Mouth/Throat:      Pharynx: No oropharyngeal exudate  Eyes:      General: No scleral icterus  Pupils: Pupils are equal, round, and reactive to light  Cardiovascular:      Rate and Rhythm: Normal rate and regular rhythm        Heart sounds: No murmur heard       Pulmonary:      Effort: Pulmonary effort is normal  No respiratory distress  Breath sounds: Normal breath sounds  Abdominal:      General: Bowel sounds are normal  There is no distension  Palpations: Abdomen is soft  There is no mass  Tenderness: There is no abdominal tenderness  Musculoskeletal:         General: Normal range of motion  Cervical back: Normal range of motion and neck supple  Lymphadenopathy:      Cervical: No cervical adenopathy  Skin:     General: Skin is warm and dry  Neurological:      General: No focal deficit present  Mental Status: He is alert and oriented to person, place, and time  Psychiatric:         Mood and Affect: Mood normal          Behavior: Behavior normal          Thought Content: Thought content normal          Judgment: Judgment normal          Assessment / Plan:    1  Multiple myeloma, remission status unspecified ([facility] )      The patient is a 80year old male who was referred to hematology for evaluation of a M spike 1 91  SPEP with immunofixation demonstrated a monoclonal gammopathy identified as IgG kappa at 1 91   Urine immunofixation revealed  A monoclonal gammopathy identified as free kappa light chains at 2 84  He underwent a bone marrow biopsy on 5/11/21 which demonstrated myeloma  He was started on treatment with weekly velcade and decadron  He completed one full cycle  He was then hospitalized with dehydration, orthostatic hypotension  His symptoms improved, however Patient and his cousin have requested treatment regimen adjustment  As previously discussed with Dr Tam Flores, his schedule was adjusted  He has been receiving Velcade and Decadron every 2 weeks  Patient was last treated on 2/10/2022  He develops symptoms of diarrhea, fatigue, weakness, balance problems and falls after his last treatment  He was seen by his PCP who did not find any focal defect    Patient's last scheduled treatment was held and his symptoms improved  Patient presents today with his cousin Celeste Douglas to discuss continuing with treatment  Patient states at 80years of age he is not sure he wishes to continue therapy as quality of life is very important to him  We had a long discussion about this today I discussed dose reduction in his Velcade as this may help improve his symptoms  I explained detriment of stopping treatment and how this could potentially shorten his life span  We reviewed how myeloma effects the body and bone marrow  I have recommended he can sit her continuing with treatment at a reduced dose  Patient is agreeable however requests to receive treatment less frequently  Although not ideal, treatment once a month is better than no treatment at all  Patient understands risks of receiving less than adequate treatment and is okay with this  His treatment regimen will be adjusted to Decadron with Velcade 1 mg per metered subcutaneously every 28 days  Will repeat myeloma labs and if he has evidence of worsening disease we will reconsider going back to an every 2 week regimen at a reduced dose  Patient was in agreement with this plan of care  He is well supported by his cousin Celeste Douglas  He is due for blood work  He will have blood work completed today  He will proceed with next treatment cycle scheduled for later this week with changes made to his treatment regimen  I will see him back in 1 month with full myeloma workup to see how he is feeling  Patient and his cousin verbalized understanding  They are instructed to call at any time with questions or concerns prior to their next follow-up visit     Goals and Barriers:  Current Goal:  Prolong Survival from multiple myeloma  Barriers: None  Patient's Capacity to Self Care:  Patient able to self care  Portions of the record may have been created with voice recognition software    Occasional wrong word or "sound a like" substitutions may have occurred due to the inherent limitations of voice recognition software  Read the chart carefully and recognize, using context, where substitutions have occurred  Principal Discharge DX:	Lower GI bleed

## 2022-03-08 ENCOUNTER — OFFICE VISIT (OUTPATIENT)
Dept: HEMATOLOGY ONCOLOGY | Facility: CLINIC | Age: 87
End: 2022-03-08
Payer: COMMERCIAL

## 2022-03-08 ENCOUNTER — APPOINTMENT (OUTPATIENT)
Dept: LAB | Facility: CLINIC | Age: 87
End: 2022-03-08
Payer: COMMERCIAL

## 2022-03-08 VITALS
HEART RATE: 81 BPM | SYSTOLIC BLOOD PRESSURE: 112 MMHG | HEIGHT: 64 IN | RESPIRATION RATE: 18 BRPM | BODY MASS INDEX: 22.62 KG/M2 | WEIGHT: 132.5 LBS | OXYGEN SATURATION: 99 % | TEMPERATURE: 98.3 F | DIASTOLIC BLOOD PRESSURE: 62 MMHG

## 2022-03-08 DIAGNOSIS — C90.00 MULTIPLE MYELOMA, REMISSION STATUS UNSPECIFIED (HCC): ICD-10-CM

## 2022-03-08 DIAGNOSIS — C90.00 MULTIPLE MYELOMA, REMISSION STATUS UNSPECIFIED (HCC): Primary | ICD-10-CM

## 2022-03-08 DIAGNOSIS — C90.00 MULTIPLE MYELOMA NOT HAVING ACHIEVED REMISSION (HCC): ICD-10-CM

## 2022-03-08 LAB
IGA SERPL-MCNC: 15 MG/DL (ref 70–400)
IGG SERPL-MCNC: 716 MG/DL (ref 700–1600)
IGM SERPL-MCNC: 15 MG/DL (ref 40–230)

## 2022-03-08 PROCEDURE — 1036F TOBACCO NON-USER: CPT | Performed by: NURSE PRACTITIONER

## 2022-03-08 PROCEDURE — 82232 ASSAY OF BETA-2 PROTEIN: CPT

## 2022-03-08 PROCEDURE — 84165 PROTEIN E-PHORESIS SERUM: CPT | Performed by: PATHOLOGY

## 2022-03-08 PROCEDURE — 1160F RVW MEDS BY RX/DR IN RCRD: CPT | Performed by: NURSE PRACTITIONER

## 2022-03-08 PROCEDURE — 83521 IG LIGHT CHAINS FREE EACH: CPT

## 2022-03-08 PROCEDURE — 82784 ASSAY IGA/IGD/IGG/IGM EACH: CPT

## 2022-03-08 PROCEDURE — 99215 OFFICE O/P EST HI 40 MIN: CPT | Performed by: NURSE PRACTITIONER

## 2022-03-08 PROCEDURE — 84165 PROTEIN E-PHORESIS SERUM: CPT

## 2022-03-08 RX ORDER — ACYCLOVIR 400 MG/1
400 TABLET ORAL DAILY
Qty: 30 TABLET | Refills: 6 | Status: SHIPPED | OUTPATIENT
Start: 2022-03-08 | End: 2022-04-12 | Stop reason: SDUPTHER

## 2022-03-09 LAB
ALBUMIN SERPL ELPH-MCNC: 4.4 G/DL (ref 3.5–5)
ALBUMIN SERPL ELPH-MCNC: 65.6 % (ref 52–65)
ALPHA1 GLOB SERPL ELPH-MCNC: 0.34 G/DL (ref 0.1–0.4)
ALPHA1 GLOB SERPL ELPH-MCNC: 5.1 % (ref 2.5–5)
ALPHA2 GLOB SERPL ELPH-MCNC: 0.72 G/DL (ref 0.4–1.2)
ALPHA2 GLOB SERPL ELPH-MCNC: 10.7 % (ref 7–13)
BETA GLOB ABNORMAL SERPL ELPH-MCNC: 0.37 G/DL (ref 0.4–0.8)
BETA1 GLOB SERPL ELPH-MCNC: 5.5 % (ref 5–13)
BETA2 GLOB SERPL ELPH-MCNC: 3.3 % (ref 2–8)
BETA2+GAMMA GLOB SERPL ELPH-MCNC: 0.22 G/DL (ref 0.2–0.5)
GAMMA GLOB ABNORMAL SERPL ELPH-MCNC: 0.66 G/DL (ref 0.5–1.6)
GAMMA GLOB SERPL ELPH-MCNC: 9.8 % (ref 12–22)
IGG/ALB SER: 1.91 {RATIO} (ref 1.1–1.8)
M PROTEIN 1 MFR SERPL ELPH: 6.6 %
M PROTEIN 1 SERPL ELPH-MCNC: 0.44 G/DL
PROT PATTERN SERPL ELPH-IMP: ABNORMAL
PROT SERPL-MCNC: 6.7 G/DL (ref 6.4–8.2)

## 2022-03-10 ENCOUNTER — HOSPITAL ENCOUNTER (OUTPATIENT)
Dept: INFUSION CENTER | Facility: CLINIC | Age: 87
Discharge: HOME | End: 2022-03-10
Payer: COMMERCIAL

## 2022-03-10 VITALS
RESPIRATION RATE: 18 BRPM | WEIGHT: 129 LBS | OXYGEN SATURATION: 97 % | HEIGHT: 64 IN | BODY MASS INDEX: 22.02 KG/M2 | SYSTOLIC BLOOD PRESSURE: 126 MMHG | HEART RATE: 97 BPM | DIASTOLIC BLOOD PRESSURE: 70 MMHG

## 2022-03-10 DIAGNOSIS — C90.00 MULTIPLE MYELOMA NOT HAVING ACHIEVED REMISSION (HCC): Primary | ICD-10-CM

## 2022-03-10 LAB
KAPPA LC FREE SER-MCNC: 186 MG/L (ref 3.3–19.4)
KAPPA LC FREE/LAMBDA FREE SER: 56.36 {RATIO} (ref 0.26–1.65)
LAMBDA LC FREE SERPL-MCNC: 3.3 MG/L (ref 5.7–26.3)

## 2022-03-10 PROCEDURE — 96401 CHEMO ANTI-NEOPL SQ/IM: CPT

## 2022-03-10 RX ORDER — DEXAMETHASONE 4 MG/1
20 TABLET ORAL ONCE
Status: COMPLETED | OUTPATIENT
Start: 2022-03-10 | End: 2022-03-10

## 2022-03-10 RX ADMIN — BORTEZOMIB 1.7 MG: 3.5 INJECTION, POWDER, LYOPHILIZED, FOR SOLUTION INTRAVENOUS; SUBCUTANEOUS at 12:40

## 2022-03-10 RX ADMIN — DEXAMETHASONE 20 MG: 4 TABLET ORAL at 12:23

## 2022-03-10 NOTE — PROGRESS NOTES
Patient arrives to infusion center for D1 C10 Velcade injection  Patient reports he is happy that he is dose reduced and that he is now only monthly injections instead of q2w  Labs reviewed from 3/8/22 and are within parameters for treatment today  VSS, patient tolerated injection to LEFT abdomen without issue  TEAMS message sent to [phone] RN to inquire if labs still need to be q2w or if they can also be monthly - pending response  AVS printed and provided  Patient escorted to 1st floor WR to meet family for discharge

## 2022-03-10 NOTE — PROGRESS NOTES
Per Janice MADRIGAL, Mert Lee for patient to do labs monthly to match up with monthly Velcade  Spoke with Giovanni Gonzalez at [phone] who is aware patient to do labs monthly prior to treatment days

## 2022-03-11 LAB — B2 MICROGLOB SERPL-MCNC: 5 MG/L (ref 0.6–2.4)

## 2022-03-31 RX ORDER — DEXAMETHASONE 4 MG/1
20 TABLET ORAL ONCE
Status: CANCELLED | OUTPATIENT
Start: 2022-04-07

## 2022-04-05 ENCOUNTER — APPOINTMENT (OUTPATIENT)
Dept: LAB | Facility: MEDICAL CENTER | Age: 87
End: 2022-04-05
Payer: COMMERCIAL

## 2022-04-05 DIAGNOSIS — C90.00 MULTIPLE MYELOMA NOT HAVING ACHIEVED REMISSION (HCC): ICD-10-CM

## 2022-04-05 LAB
ALBUMIN SERPL BCP-MCNC: 3.9 G/DL (ref 3.5–5)
ALP SERPL-CCNC: 58 U/L (ref 46–116)
ALT SERPL W P-5'-P-CCNC: 13 U/L (ref 12–78)
ANION GAP SERPL CALCULATED.3IONS-SCNC: 6 MMOL/L (ref 4–13)
AST SERPL W P-5'-P-CCNC: 19 U/L (ref 5–45)
BASOPHILS # BLD AUTO: 0.02 THOUSANDS/ΜL (ref 0–0.1)
BASOPHILS NFR BLD AUTO: 0 % (ref 0–1)
BILIRUB SERPL-MCNC: 0.67 MG/DL (ref 0.2–1)
BUN SERPL-MCNC: 24 MG/DL (ref 5–25)
CALCIUM SERPL-MCNC: 9.7 MG/DL (ref 8.3–10.1)
CHLORIDE SERPL-SCNC: 103 MMOL/L (ref 100–108)
CO2 SERPL-SCNC: 28 MMOL/L (ref 21–32)
CREAT SERPL-MCNC: 1.15 MG/DL (ref 0.6–1.3)
EOSINOPHIL # BLD AUTO: 0.06 THOUSAND/ΜL (ref 0–0.61)
EOSINOPHIL NFR BLD AUTO: 1 % (ref 0–6)
ERYTHROCYTE [DISTWIDTH] IN BLOOD BY AUTOMATED COUNT: 13.2 % (ref 11.6–15.1)
GFR SERPL CREATININE-BSD FRML MDRD: 54 ML/MIN/1.73SQ M
GLUCOSE P FAST SERPL-MCNC: 92 MG/DL (ref 65–99)
HCT VFR BLD AUTO: 41.2 % (ref 36.5–49.3)
HGB BLD-MCNC: 13.8 G/DL (ref 12–17)
IGA SERPL-MCNC: 13 MG/DL (ref 70–400)
IGG SERPL-MCNC: 800 MG/DL (ref 700–1600)
IGM SERPL-MCNC: 13 MG/DL (ref 40–230)
IMM GRANULOCYTES # BLD AUTO: 0.01 THOUSAND/UL (ref 0–0.2)
IMM GRANULOCYTES NFR BLD AUTO: 0 % (ref 0–2)
LYMPHOCYTES # BLD AUTO: 1.83 THOUSANDS/ΜL (ref 0.6–4.47)
LYMPHOCYTES NFR BLD AUTO: 35 % (ref 14–44)
MCH RBC QN AUTO: 32.1 PG (ref 26.8–34.3)
MCHC RBC AUTO-ENTMCNC: 33.5 G/DL (ref 31.4–37.4)
MCV RBC AUTO: 96 FL (ref 82–98)
MONOCYTES # BLD AUTO: 0.54 THOUSAND/ΜL (ref 0.17–1.22)
MONOCYTES NFR BLD AUTO: 10 % (ref 4–12)
NEUTROPHILS # BLD AUTO: 2.73 THOUSANDS/ΜL (ref 1.85–7.62)
NEUTS SEG NFR BLD AUTO: 54 % (ref 43–75)
NRBC BLD AUTO-RTO: 0 /100 WBCS
PLATELET # BLD AUTO: 170 THOUSANDS/UL (ref 149–390)
PMV BLD AUTO: 10.4 FL (ref 8.9–12.7)
POTASSIUM SERPL-SCNC: 3.6 MMOL/L (ref 3.5–5.3)
PROT SERPL-MCNC: 6.7 G/DL (ref 6.4–8.2)
RBC # BLD AUTO: 4.3 MILLION/UL (ref 3.88–5.62)
SODIUM SERPL-SCNC: 137 MMOL/L (ref 136–145)
WBC # BLD AUTO: 5.19 THOUSAND/UL (ref 4.31–10.16)

## 2022-04-05 PROCEDURE — 84165 PROTEIN E-PHORESIS SERUM: CPT

## 2022-04-05 PROCEDURE — 83521 IG LIGHT CHAINS FREE EACH: CPT

## 2022-04-05 PROCEDURE — 86334 IMMUNOFIX E-PHORESIS SERUM: CPT | Performed by: PATHOLOGY

## 2022-04-05 PROCEDURE — 86334 IMMUNOFIX E-PHORESIS SERUM: CPT

## 2022-04-05 PROCEDURE — 80053 COMPREHEN METABOLIC PANEL: CPT

## 2022-04-05 PROCEDURE — 85025 COMPLETE CBC W/AUTO DIFF WBC: CPT

## 2022-04-05 PROCEDURE — 82784 ASSAY IGA/IGD/IGG/IGM EACH: CPT

## 2022-04-05 PROCEDURE — 82232 ASSAY OF BETA-2 PROTEIN: CPT

## 2022-04-05 PROCEDURE — 36415 COLL VENOUS BLD VENIPUNCTURE: CPT

## 2022-04-05 PROCEDURE — 84165 PROTEIN E-PHORESIS SERUM: CPT | Performed by: PATHOLOGY

## 2022-04-06 LAB
KAPPA LC FREE SER-MCNC: 204.4 MG/L (ref 3.3–19.4)
KAPPA LC FREE/LAMBDA FREE SER: 70.48 {RATIO} (ref 0.26–1.65)
LAMBDA LC FREE SERPL-MCNC: 2.9 MG/L (ref 5.7–26.3)

## 2022-04-06 NOTE — PROGRESS NOTES
Hematology/Oncology Outpatient Follow- up Note  Hollis Henry, 5/24/1927, [id number]  4/7/2022        Chief Complaint   Patient presents with    Follow-up       HPI:  Hollis Henry is a 80year old male who was referred to hematology for evaluation of a M spike 1 91  SPEP with immunofixation demonstrated a monoclonal gammopathy identified as IgG kappa at 1 91   Urine immunofixation revealed  A monoclonal gammopathy identified as free kappa light chains at 2 84  He underwent a bone marrow biopsy on 5/11/21:  Final Diagnosis   A-C  Bone Marrow, Left Iliac Crest, Core, Clot and Aspirate:  - Hypercellular marrow (90% cellularity) with increased IgG Kappa restricted plasma cells (90% by  immunostain), consistent with plasma cell myeloma  - Mildly increased reticulin fibers secondary to plasma cell infiltrate     Due to his age, he is not a transplant candidate  He was started on treatment with weekly Velcade and Decadron on 6/3/2021  At patient's request, his treatment plan was adjusted to every 2 weeks  Due to symptoms of diarrhea, fatigue, weakness, balance problems resulting in falls patient requested an adjustment in his treatment regimen   His treatment regimen will be adjusted to Decadron with Velcade 1 mg per metered subcutaneously every 28 days      Previous Hematologic/ Oncologic History:    Oncology History   Multiple myeloma not having achieved remission ([facility] [address] )   5/25/2021 Initial Diagnosis    Multiple myeloma not having achieved remission ([facility] [address] )     6/3/2021 -  Chemotherapy    bortezomib (VELCADE), 1 3 mg/m2 = 2 3 mg (81 3 % of original dose 1 6 mg/m2), Subcutaneous, Once, 11 of 18 cycles  Dose modification: 1 3 mg/m2 (original dose 1 6 mg/m2, Cycle 1, Reason: Other (Must fill in a comment), Comment: age), 1 mg/m2 (original dose 1 6 mg/m2, Cycle 10, Reason: Performance Status, Comment: Patient has diarrhea)  Administration: 2 3 mg (6/3/2021), 2 3 mg (6/10/2021), 2 3 mg (7/15/2021), 2 3 mg (2021), 2 3 mg (2021), 2 3 mg (2021), 2 3 mg (2021), 2 3 mg (2021), 2 3 mg (2021), 2 3 mg (2021), 2 3 mg (10/7/2021), 2 3 mg (10/21/2021), 2 3 mg (2021), 2 3 mg (2021), 2 3 mg (2021), 2 3 mg (2021), 2 2 mg (2021), 2 2 mg (2022), 2 2 mg (2022), 2 2 mg (2/10/2022), 1 7 mg (3/10/2022), 1 7 mg (2022)     Decadron 20 mg and Velcade 1 3 mg/m2 weekly   Decadron 20 mg and Velcade 1 3 mg/m2  Every 2 weeks    Current Hematologic/ Oncologic Treatment:    Decadron 20 mg and Velcade 1 mg/m2  every 28 day  Will start Revlimid 5 mg daily  Baby Aspirin     ECO - Symptomatic but completely ambulatory    Interval History:   The patient presents for routine follow up along with his cousin Stan Osborne  Patient reports he is tolerating treatment much better now that he is receiving it every 28 days instead of every 14 weeks  However, his free light chain ratio is increasing  I reviewed most recent blood work with patient and his cousins today  His free light chain ratio has increased from 40 in February to 56 in March to now 70 48  I explained that this is an indication his myeloma is active and not well controlled with treatment every 28 days  Patient is not interested in increasing frequency of Velcade as he did not feel well and did not like side effects he experienced  Recommendation was to add on Revlimid at a low dose daily in attempt to better control his disease  I explained potential adverse effects of Revlimid to include rash, pruritus, fatigue, muscle cramps, arthralgias, dizziness, diarrhea, nausea and thromboembolism  He is taking a baby aspirin and is advised to continue   Patient his cousins are in agreement with recommendation  We will fill out paperwork necessary to get him started on Revlimid  Otherwise, patient states he is feeling well    His CBC and CMP are normal   He is due for Velcade today        Cancer Staging:  Cancer Staging  No matching staging information was found for the patient  Molecular Testing:         Test Results:    Imaging: No results found  Labs:   Lab Results   Component Value Date    WBC 5 19 04/05/2022    HGB 13 8 04/05/2022    HCT 41 2 04/05/2022    MCV 96 04/05/2022     04/05/2022     Lab Results   Component Value Date     09/21/2015    K 3 6 04/05/2022     04/05/2022    CO2 28 04/05/2022    ANIONGAP 4 09/21/2015    BUN 24 04/05/2022    CREATININE 1 15 04/05/2022    GLUCOSE 98 09/21/2015    GLUF 92 04/05/2022    CALCIUM 9 7 04/05/2022    CORRECTEDCA 8 9 01/13/2022    AST 19 04/05/2022    ALT 13 04/05/2022    ALKPHOS 58 04/05/2022    PROT 7 2 09/21/2015    BILITOT 0 58 09/21/2015    EGFR 54 04/05/2022           Review of Systems   All other systems reviewed and are negative          Active Problems:   Patient Active Problem List   Diagnosis    Idiopathic chronic gout of multiple sites without tophus    Pure hypercholesterolemia    Essential hypertension    Acquired hypothyroidism    Spondylosis of lumbar region without myelopathy or radiculopathy    Patellofemoral disorder of left knee    Spinal stenosis of lumbosacral region    Meningioma, cerebral ([address]    General medical exam    Immunization due    Multiple myeloma not having achieved remission ([address]    Falls    Thrombocytopenia ([address]    VINICIUS (acute kidney injury) ([address]    Abrasions of multiple sites    Mild protein-calorie malnutrition ([address]    Venous stasis ulcer of left ankle with fat layer exposed without varicose veins (HCC)    Chronic seasonal allergic rhinitis due to pollen    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    Irritable bowel syndrome with diarrhea    Irritable bowel syndrome without diarrhea       Past Medical History:   Past Medical History:   Diagnosis Date    Arthritis     Gout     Hypertension     Hyperthyroidism     Memory loss     Multiple myeloma ([address]     Myeloma ([address]     Sleep disturbance  Thyroid disease        Surgical History:   Past Surgical History:   Procedure Laterality Date    IR BIOPSY BONE MARROW  2021    KIDNEY SURGERY      description: 27 yrs ago    PROSTATE SURGERY      description: stone removal 30 yrs ago    REPLACEMENT TOTAL KNEE BILATERAL      description: 25 yrs ago       Family History:    Family History   Problem Relation Age of Onset    Other Mother         Brain tumor    Cancer Sister     Diabetes Brother     Arthritis Family     Diabetes Family     Other Family         knee replacement    Thyroid disease Family        Cancer-related family history includes Cancer in his sister      Social History:   Social History     Socioeconomic History    Marital status: Single     Spouse name: Not on file    Number of children: Not on file    Years of education: Not on file    Highest education level: Not on file   Occupational History    Occupation:    Tobacco Use    Smoking status: Former Smoker     Packs/day: 0 50     Years: 25 00     Pack years: 12 50     Quit date:      Years since quittin 2    Smokeless tobacco: Never Used    Tobacco comment: "I smoked years ago"   Vaping Use    Vaping Use: Never used   Substance and Sexual Activity    Alcohol use: Yes     Comment: social "burbon once in a while"    Drug use: No    Sexual activity: Not on file     Comment: per allscripts - sexually act and denied sexually activity    Other Topics Concern    Not on file   Social History Narrative    Not on file     Social Determinants of Health     Financial Resource Strain: Not on file   Food Insecurity: Not on file   Transportation Needs: Not on file   Physical Activity: Not on file   Stress: Not on file   Social Connections: Not on file   Intimate Partner Violence: Not on file   Housing Stability: Not on file       Current Medications:   Current Outpatient Medications   Medication Sig Dispense Refill    acyclovir (ZOVIRAX) 400 MG tablet Take 1 tablet (400 mg total) by mouth daily While on Velcade therapy 30 tablet 6    allopurinol (ZYLOPRIM) 100 mg tablet Take 2 tablets (200 mg total) by mouth daily 180 tablet 3    aspirin (ECOTRIN LOW STRENGTH) 81 mg EC tablet Take 1 tablet (81 mg total) by mouth daily 90 tablet 3    Cholecalciferol (VITAMIN D3 PO) Take by mouth      levothyroxine 100 mcg tablet Take 1 tablet (100 mcg total) by mouth daily 90 tablet 3    loperamide (IMODIUM A-D) 2 MG tablet Take 1 tablet (2 mg total) by mouth 4 (four) times a day as needed for diarrhea (Take before meals) (Patient not taking: Reported on 4/7/2022 ) 90 tablet 2    triamterene-hydrochlorothiazide (DYAZIDE) 37 5-25 mg per capsule Take 1 capsule by mouth every morning 90 capsule 3     Current Facility-Administered Medications   Medication Dose Route Frequency Provider Last Rate Last Admin    albuterol inhalation solution 2 5 mg  2 5 mg Nebulization Once Delmis Dux, DO           Allergies: Allergies   Allergen Reactions    Horse Protein        Physical Exam:  /80 (BP Location: Left arm, Patient Position: Sitting, Cuff Size: Adult)   Pulse 98   Temp (!) 97 °F (36 1 °C) (Temporal)   Resp 14   Ht 5' 4" (1 626 m)   Wt 58 1 kg (128 lb)   SpO2 98%   BMI 21 97 kg/m²   Body surface area is 1 62 meters squared  Wt Readings from Last 3 Encounters:   04/07/22 57 6 kg (127 lb)   04/07/22 58 1 kg (128 lb)   03/10/22 58 5 kg (129 lb)           Physical Exam  Constitutional:       General: He is not in acute distress  Appearance: He is well-developed  He is not diaphoretic  HENT:      Head: Normocephalic and atraumatic  Mouth/Throat:      Pharynx: No oropharyngeal exudate  Eyes:      General: No scleral icterus  Pupils: Pupils are equal, round, and reactive to light  Cardiovascular:      Rate and Rhythm: Normal rate and regular rhythm  Heart sounds: No murmur heard        Pulmonary:      Effort: Pulmonary effort is normal  No respiratory distress  Breath sounds: Normal breath sounds  Abdominal:      General: Bowel sounds are normal  There is no distension  Palpations: Abdomen is soft  There is no mass  Tenderness: There is no abdominal tenderness  Musculoskeletal:         General: Normal range of motion  Cervical back: Normal range of motion and neck supple  Lymphadenopathy:      Cervical: No cervical adenopathy  Skin:     General: Skin is warm and dry  Neurological:      General: No focal deficit present  Mental Status: He is alert and oriented to person, place, and time  Psychiatric:         Mood and Affect: Mood normal          Behavior: Behavior normal          Assessment / Plan:    1  Multiple myeloma not having achieved remission [facility])      The patient is a 80year old male who was referred to hematology for evaluation of a M spike 1 91  SPEP with immunofixation demonstrated a monoclonal gammopathy identified as IgG kappa at 1 91   Urine immunofixation revealed  A monoclonal gammopathy identified as free kappa light chains at 2 84  He underwent a bone marrow biopsy on 5/11/21 which demonstrated myeloma  He was started on treatment with weekly velcade and decadron  He completed one full cycle  He was then hospitalized with dehydration, orthostatic hypotension  His symptoms improved, however Patient and his cousin have requested treatment regimen adjustment  As previously discussed with Dr Vincent Amaya, his schedule was adjusted  He had been receiving Velcade and Decadron every 2 weeks  However, he developed symptoms of diarrhea, fatigue, weakness, balance problems and falls that he attributed to Velcade every 14 days  His symptoms did improve when treatment was held  At patient's request his treatment regimen was further adjusted to Decadron with Velcade 1 mg per metered subcutaneously every 28 days    Patient is tolerating Velcade on the schedule    However his myeloma labs have worsened his free light chain ratio has increased  He is not interested in increasing frequency of Velcade  We discussed today adding on Revlimid 5 mg daily  I explained potential adverse effects of Revlimid to include rash, pruritus, fatigue, muscle cramps, arthralgias, dizziness, diarrhea, nausea and thromboembolism  He is taking a baby aspirin and is advised to continue   Patient his cousins are in agreement with recommendation  Informed consent was obtained  We will fill out paperwork necessary to get him started on Revlimid  He will proceed with treatment today  We will get him started on Revlimid once approved  He will return for a follow-up visit in 6 weeks so we can assess how he is tolerating treatment with repeat blood work    Patient was in agreement with this plan of care  He is well supported by his cousin Pk Montero  They are instructed to call at any time with questions or concerns prior to their next follow-up visit     Goals and Barriers:  Current Goal:  Prolong Survival from multiple myeloma  Barriers: None  Patient's Capacity to Self Care:  Patient able to self care  Portions of the record may have been created with voice recognition software  Occasional wrong word or "sound a like" substitutions may have occurred due to the inherent limitations of voice recognition software  Read the chart carefully and recognize, using context, where substitutions have occurred

## 2022-04-07 ENCOUNTER — DOCUMENTATION (OUTPATIENT)
Dept: HEMATOLOGY ONCOLOGY | Facility: CLINIC | Age: 87
End: 2022-04-07

## 2022-04-07 ENCOUNTER — HOSPITAL ENCOUNTER (OUTPATIENT)
Dept: INFUSION CENTER | Facility: CLINIC | Age: 87
Discharge: HOME | End: 2022-04-07
Payer: COMMERCIAL

## 2022-04-07 ENCOUNTER — OFFICE VISIT (OUTPATIENT)
Dept: HEMATOLOGY ONCOLOGY | Facility: CLINIC | Age: 87
End: 2022-04-07
Payer: COMMERCIAL

## 2022-04-07 VITALS
SYSTOLIC BLOOD PRESSURE: 140 MMHG | DIASTOLIC BLOOD PRESSURE: 70 MMHG | BODY MASS INDEX: 21.68 KG/M2 | TEMPERATURE: 97.1 F | HEIGHT: 64 IN | OXYGEN SATURATION: 98 % | WEIGHT: 127 LBS | RESPIRATION RATE: 18 BRPM | HEART RATE: 98 BPM

## 2022-04-07 VITALS
OXYGEN SATURATION: 98 % | HEART RATE: 98 BPM | BODY MASS INDEX: 21.85 KG/M2 | HEIGHT: 64 IN | RESPIRATION RATE: 14 BRPM | DIASTOLIC BLOOD PRESSURE: 80 MMHG | WEIGHT: 128 LBS | TEMPERATURE: 97 F | SYSTOLIC BLOOD PRESSURE: 140 MMHG

## 2022-04-07 DIAGNOSIS — C90.00 MULTIPLE MYELOMA NOT HAVING ACHIEVED REMISSION (HCC): Primary | ICD-10-CM

## 2022-04-07 LAB
ALBUMIN SERPL ELPH-MCNC: 4.12 G/DL (ref 3.5–5)
ALBUMIN SERPL ELPH-MCNC: 64.3 % (ref 52–65)
ALPHA1 GLOB SERPL ELPH-MCNC: 0.35 G/DL (ref 0.1–0.4)
ALPHA1 GLOB SERPL ELPH-MCNC: 5.5 % (ref 2.5–5)
ALPHA2 GLOB SERPL ELPH-MCNC: 0.7 G/DL (ref 0.4–1.2)
ALPHA2 GLOB SERPL ELPH-MCNC: 11 % (ref 7–13)
B2 MICROGLOB SERPL-MCNC: 4.2 MG/L (ref 0.6–2.4)
BETA GLOB ABNORMAL SERPL ELPH-MCNC: 0.35 G/DL (ref 0.4–0.8)
BETA1 GLOB SERPL ELPH-MCNC: 5.4 % (ref 5–13)
BETA2 GLOB SERPL ELPH-MCNC: 3.2 % (ref 2–8)
BETA2+GAMMA GLOB SERPL ELPH-MCNC: 0.2 G/DL (ref 0.2–0.5)
GAMMA GLOB ABNORMAL SERPL ELPH-MCNC: 0.68 G/DL (ref 0.5–1.6)
GAMMA GLOB SERPL ELPH-MCNC: 10.6 % (ref 12–22)
IGG/ALB SER: 1.8 {RATIO} (ref 1.1–1.8)
INTERPRETATION UR IFE-IMP: NORMAL
M PROTEIN 1 MFR SERPL ELPH: 7.2 %
M PROTEIN 1 SERPL ELPH-MCNC: 0.46 G/DL
PROT PATTERN SERPL ELPH-IMP: ABNORMAL
PROT SERPL-MCNC: 6.4 G/DL (ref 6.4–8.2)

## 2022-04-07 PROCEDURE — 1036F TOBACCO NON-USER: CPT | Performed by: NURSE PRACTITIONER

## 2022-04-07 PROCEDURE — 96401 CHEMO ANTI-NEOPL SQ/IM: CPT

## 2022-04-07 PROCEDURE — 99215 OFFICE O/P EST HI 40 MIN: CPT | Performed by: NURSE PRACTITIONER

## 2022-04-07 PROCEDURE — 1160F RVW MEDS BY RX/DR IN RCRD: CPT | Performed by: NURSE PRACTITIONER

## 2022-04-07 RX ORDER — DEXAMETHASONE 4 MG/1
20 TABLET ORAL ONCE
Status: COMPLETED | OUTPATIENT
Start: 2022-04-07 | End: 2022-04-07

## 2022-04-07 RX ADMIN — BORTEZOMIB 1.7 MG: 3.5 INJECTION, POWDER, LYOPHILIZED, FOR SOLUTION INTRAVENOUS; SUBCUTANEOUS at 12:07

## 2022-04-07 RX ADMIN — DEXAMETHASONE 20 MG: 4 TABLET ORAL at 11:41

## 2022-04-07 NOTE — PROGRESS NOTES
Rcvd new oral chemo start-REVLIMID // Lucho Loose is required    For Bartlett Oven [birth year]  Patient has been enrolled with [facility]  ID# [id number]  CARD#  [id number]  PCN#  [id number]  GRP#  [id number]  BIN#  [id number]  AMT$  36036  EFF 3-8-22  THRU 3-7-23

## 2022-04-07 NOTE — PROGRESS NOTES
Patient tolerated velcade to right abdomen as ordered, bandaid applied and CDI  AVS given, patient walked to elevator, next dosing 5/5/22

## 2022-04-07 NOTE — PATIENT INSTRUCTIONS
April 2022 Sunday Monday Tuesday Wednesday Thursday Friday Saturday                            1     2                3     4     5    LAB WALK IN  11:30 AM   (5 min )   AN [facility] CHAIR 1   [facility] [place] 6     7    FOLLOW UP PG  10:45 AM   (30 min )   Chris Myers, Aishwarya De Dios [facility] [place]    INF ONCOLOGY TX-TREATMENT PLAN  11:30 AM   (90 min )   AN INF CHAIR 12   [address] [place] [address] 8     9           Cycle 11, Day 1     10     11     12     13     14     15     16                17     18     19     20     21     22     23                24     25     26     27     28     29     30                     Treatment Details       4/7/2022 - Cycle 11, Day 1      Supportive Care: [facility] PROVIDER COMMUNICATION7      Chemotherapy: bortezomib (VELCADE)

## 2022-04-07 NOTE — PROGRESS NOTES
Received request from clinical for patient to start on Revlimid 5mg daily  Auth has been submitted and this is pending via cover my meds (Key: [address])  [facility] Formulary Exception / Prior Authorization Form    Prior Authorization / Formulary Exception request for [facility] Members      [phone]  phone      [phone]  fax      Help Desk- [phone]    ID- [id number]  BIN: [id number] / PCN: [id number]      UPDATE:  This has been approved  This will be filled through [facility]- Clinical and pharmacy aware  I wanted to make sure [address] could fill for this drug before clinical sent over there  I spoke with representative who stated they DO NOT fill for Revlimid  Then I asked her to provide me a pharmacy this patient could fill at since restrictions are tighter with this RX plan  She stated this can be filled through [facility] which I know carries Revlimid  Yudi/Brenden- Please forward RX over to [facility]  [place],  [facility] [address] [phone]    [facility]-Please see information for patient below and funding that has been provided for patient as well  Patient: Warren Wang 5/24/1927    Medication: Revlimid 5 MG 30 for 30 days    Insurance:   ID- [id number]  BIN: [id number] / PCN: [id number]    Copay info:   For Warren Wang [birth year]  Patient has been enrolled with [facility]  ID# [id number]  CARD#  [id number]  PCN#  [id number]  [facility]#  [id number]  BIN#  [id number]  AMT$  [id number]  EFF 3-8-22  THRU 3-7-23

## 2022-04-08 DIAGNOSIS — R17 ELEVATED BILIRUBIN: ICD-10-CM

## 2022-04-08 DIAGNOSIS — C90.00 MULTIPLE MYELOMA NOT HAVING ACHIEVED REMISSION (HCC): Primary | ICD-10-CM

## 2022-04-08 DIAGNOSIS — D47.2 MONOCLONAL GAMMOPATHY: ICD-10-CM

## 2022-04-08 DIAGNOSIS — C90.00 MULTIPLE MYELOMA, REMISSION STATUS UNSPECIFIED (HCC): ICD-10-CM

## 2022-04-08 DIAGNOSIS — K59.00 CONSTIPATION, UNSPECIFIED CONSTIPATION TYPE: ICD-10-CM

## 2022-04-08 DIAGNOSIS — R19.7 DIARRHEA, UNSPECIFIED TYPE: ICD-10-CM

## 2022-04-08 DIAGNOSIS — D69.6 THROMBOCYTOPENIA (HCC): ICD-10-CM

## 2022-04-08 RX ORDER — LENALIDOMIDE 5 MG/1
5 CAPSULE ORAL DAILY
Qty: 28 CAPSULE | Refills: 0 | Status: SHIPPED | OUTPATIENT
Start: 2022-04-08 | End: 2022-04-12 | Stop reason: SDUPTHER

## 2022-04-12 DIAGNOSIS — D47.2 MONOCLONAL GAMMOPATHY: ICD-10-CM

## 2022-04-12 DIAGNOSIS — D69.6 THROMBOCYTOPENIA (HCC): ICD-10-CM

## 2022-04-12 DIAGNOSIS — R17 ELEVATED BILIRUBIN: ICD-10-CM

## 2022-04-12 DIAGNOSIS — K59.00 CONSTIPATION, UNSPECIFIED CONSTIPATION TYPE: ICD-10-CM

## 2022-04-12 DIAGNOSIS — C90.00 MULTIPLE MYELOMA, REMISSION STATUS UNSPECIFIED (HCC): ICD-10-CM

## 2022-04-12 DIAGNOSIS — C90.00 MULTIPLE MYELOMA NOT HAVING ACHIEVED REMISSION (HCC): ICD-10-CM

## 2022-04-12 DIAGNOSIS — R19.7 DIARRHEA, UNSPECIFIED TYPE: ICD-10-CM

## 2022-04-12 DIAGNOSIS — K58.0 IRRITABLE BOWEL SYNDROME WITH DIARRHEA: ICD-10-CM

## 2022-04-12 RX ORDER — LENALIDOMIDE 5 MG/1
5 CAPSULE ORAL DAILY
Qty: 28 CAPSULE | Refills: 0 | Status: SHIPPED | OUTPATIENT
Start: 2022-04-12 | End: 2022-05-05

## 2022-04-12 RX ORDER — ACYCLOVIR 400 MG/1
400 TABLET ORAL DAILY
Qty: 30 TABLET | Refills: 6 | Status: SHIPPED | OUTPATIENT
Start: 2022-04-12 | End: 2022-07-11

## 2022-04-28 RX ORDER — DEXAMETHASONE 4 MG/1
20 TABLET ORAL ONCE
Status: CANCELLED | OUTPATIENT
Start: 2022-05-05

## 2022-05-02 ENCOUNTER — APPOINTMENT (OUTPATIENT)
Dept: LAB | Facility: MEDICAL CENTER | Age: 87
End: 2022-05-02
Payer: COMMERCIAL

## 2022-05-02 DIAGNOSIS — C90.00 MULTIPLE MYELOMA NOT HAVING ACHIEVED REMISSION (HCC): ICD-10-CM

## 2022-05-02 LAB
ALBUMIN SERPL BCP-MCNC: 3.2 G/DL (ref 3.5–5)
ALP SERPL-CCNC: 66 U/L (ref 46–116)
ALT SERPL W P-5'-P-CCNC: 13 U/L (ref 12–78)
ANION GAP SERPL CALCULATED.3IONS-SCNC: 2 MMOL/L (ref 4–13)
AST SERPL W P-5'-P-CCNC: 18 U/L (ref 5–45)
BASOPHILS # BLD AUTO: 0.02 THOUSANDS/ΜL (ref 0–0.1)
BASOPHILS NFR BLD AUTO: 0 % (ref 0–1)
BILIRUB SERPL-MCNC: 0.61 MG/DL (ref 0.2–1)
BUN SERPL-MCNC: 21 MG/DL (ref 5–25)
CALCIUM ALBUM COR SERPL-MCNC: 9.1 MG/DL (ref 8.3–10.1)
CALCIUM SERPL-MCNC: 8.5 MG/DL (ref 8.3–10.1)
CHLORIDE SERPL-SCNC: 109 MMOL/L (ref 100–108)
CO2 SERPL-SCNC: 29 MMOL/L (ref 21–32)
CREAT SERPL-MCNC: 1.11 MG/DL (ref 0.6–1.3)
EOSINOPHIL # BLD AUTO: 0.47 THOUSAND/ΜL (ref 0–0.61)
EOSINOPHIL NFR BLD AUTO: 9 % (ref 0–6)
ERYTHROCYTE [DISTWIDTH] IN BLOOD BY AUTOMATED COUNT: 12.7 % (ref 11.6–15.1)
GFR SERPL CREATININE-BSD FRML MDRD: 56 ML/MIN/1.73SQ M
GLUCOSE SERPL-MCNC: 94 MG/DL (ref 65–140)
HCT VFR BLD AUTO: 35.9 % (ref 36.5–49.3)
HGB BLD-MCNC: 11.7 G/DL (ref 12–17)
IGA SERPL-MCNC: 36 MG/DL (ref 70–400)
IGG SERPL-MCNC: 625 MG/DL (ref 700–1600)
IGM SERPL-MCNC: 16 MG/DL (ref 40–230)
IMM GRANULOCYTES # BLD AUTO: 0.01 THOUSAND/UL (ref 0–0.2)
IMM GRANULOCYTES NFR BLD AUTO: 0 % (ref 0–2)
LYMPHOCYTES # BLD AUTO: 1.73 THOUSANDS/ΜL (ref 0.6–4.47)
LYMPHOCYTES NFR BLD AUTO: 34 % (ref 14–44)
MCH RBC QN AUTO: 31.8 PG (ref 26.8–34.3)
MCHC RBC AUTO-ENTMCNC: 32.6 G/DL (ref 31.4–37.4)
MCV RBC AUTO: 98 FL (ref 82–98)
MONOCYTES # BLD AUTO: 0.54 THOUSAND/ΜL (ref 0.17–1.22)
MONOCYTES NFR BLD AUTO: 11 % (ref 4–12)
NEUTROPHILS # BLD AUTO: 2.35 THOUSANDS/ΜL (ref 1.85–7.62)
NEUTS SEG NFR BLD AUTO: 46 % (ref 43–75)
NRBC BLD AUTO-RTO: 0 /100 WBCS
PLATELET # BLD AUTO: 121 THOUSANDS/UL (ref 149–390)
PMV BLD AUTO: 12.8 FL (ref 8.9–12.7)
POTASSIUM SERPL-SCNC: 3.1 MMOL/L (ref 3.5–5.3)
PROT SERPL-MCNC: 5.8 G/DL (ref 6.4–8.2)
RBC # BLD AUTO: 3.68 MILLION/UL (ref 3.88–5.62)
SODIUM SERPL-SCNC: 140 MMOL/L (ref 136–145)
WBC # BLD AUTO: 5.12 THOUSAND/UL (ref 4.31–10.16)

## 2022-05-02 PROCEDURE — 82784 ASSAY IGA/IGD/IGG/IGM EACH: CPT

## 2022-05-02 PROCEDURE — 85025 COMPLETE CBC W/AUTO DIFF WBC: CPT

## 2022-05-02 PROCEDURE — 36415 COLL VENOUS BLD VENIPUNCTURE: CPT

## 2022-05-02 PROCEDURE — 84165 PROTEIN E-PHORESIS SERUM: CPT | Performed by: PATHOLOGY

## 2022-05-02 PROCEDURE — 82232 ASSAY OF BETA-2 PROTEIN: CPT

## 2022-05-02 PROCEDURE — 80053 COMPREHEN METABOLIC PANEL: CPT

## 2022-05-02 PROCEDURE — 84165 PROTEIN E-PHORESIS SERUM: CPT

## 2022-05-02 PROCEDURE — 83521 IG LIGHT CHAINS FREE EACH: CPT

## 2022-05-03 LAB
ALBUMIN SERPL ELPH-MCNC: 0 G/DL (ref 3.5–5)
ALBUMIN SERPL ELPH-MCNC: 65.7 % (ref 52–65)
ALPHA1 GLOB SERPL ELPH-MCNC: 0 G/DL (ref 0.1–0.4)
ALPHA1 GLOB SERPL ELPH-MCNC: 5.8 % (ref 2.5–5)
ALPHA2 GLOB SERPL ELPH-MCNC: 0 G/DL (ref 0.4–1.2)
ALPHA2 GLOB SERPL ELPH-MCNC: 11.1 % (ref 7–13)
BETA GLOB ABNORMAL SERPL ELPH-MCNC: 0 G/DL (ref 0.4–0.8)
BETA1 GLOB SERPL ELPH-MCNC: 5.1 % (ref 5–13)
BETA2 GLOB SERPL ELPH-MCNC: 3.1 % (ref 2–8)
BETA2+GAMMA GLOB SERPL ELPH-MCNC: 0 G/DL (ref 0.2–0.5)
GAMMA GLOB ABNORMAL SERPL ELPH-MCNC: 0 G/DL (ref 0.5–1.6)
GAMMA GLOB SERPL ELPH-MCNC: 9.2 % (ref 12–22)
IGG/ALB SER: 1.92 {RATIO} (ref 1.1–1.8)
M PROTEIN 1 MFR SERPL ELPH: 4.3 %
M PROTEIN 1 SERPL ELPH-MCNC: 0 G/DL
PROT PATTERN SERPL ELPH-IMP: ABNORMAL
PROT SERPL-MCNC: 5.4 G/DL (ref 6.4–8.2)

## 2022-05-04 LAB
B2 MICROGLOB SERPL-MCNC: 4.1 MG/L (ref 0.6–2.4)
KAPPA LC FREE SER-MCNC: 156.5 MG/L (ref 3.3–19.4)
KAPPA LC FREE/LAMBDA FREE SER: 10.65 {RATIO} (ref 0.26–1.65)
LAMBDA LC FREE SERPL-MCNC: 14.7 MG/L (ref 5.7–26.3)

## 2022-05-05 ENCOUNTER — HOSPITAL ENCOUNTER (OUTPATIENT)
Dept: INFUSION CENTER | Facility: CLINIC | Age: 87
Discharge: HOME | End: 2022-05-05
Payer: COMMERCIAL

## 2022-05-05 VITALS
OXYGEN SATURATION: 99 % | BODY MASS INDEX: 21.51 KG/M2 | TEMPERATURE: 97.1 F | HEART RATE: 99 BPM | HEIGHT: 64 IN | SYSTOLIC BLOOD PRESSURE: 151 MMHG | DIASTOLIC BLOOD PRESSURE: 73 MMHG | WEIGHT: 126 LBS | RESPIRATION RATE: 16 BRPM

## 2022-05-05 DIAGNOSIS — D47.2 MONOCLONAL GAMMOPATHY: ICD-10-CM

## 2022-05-05 DIAGNOSIS — C90.00 MULTIPLE MYELOMA NOT HAVING ACHIEVED REMISSION (HCC): ICD-10-CM

## 2022-05-05 DIAGNOSIS — K59.00 CONSTIPATION, UNSPECIFIED CONSTIPATION TYPE: ICD-10-CM

## 2022-05-05 DIAGNOSIS — R19.7 DIARRHEA, UNSPECIFIED TYPE: ICD-10-CM

## 2022-05-05 DIAGNOSIS — R17 ELEVATED BILIRUBIN: ICD-10-CM

## 2022-05-05 DIAGNOSIS — C90.00 MULTIPLE MYELOMA, REMISSION STATUS UNSPECIFIED (HCC): ICD-10-CM

## 2022-05-05 DIAGNOSIS — D69.6 THROMBOCYTOPENIA (HCC): ICD-10-CM

## 2022-05-05 DIAGNOSIS — C90.00 MULTIPLE MYELOMA NOT HAVING ACHIEVED REMISSION (HCC): Primary | ICD-10-CM

## 2022-05-05 PROCEDURE — 96401 CHEMO ANTI-NEOPL SQ/IM: CPT

## 2022-05-05 RX ORDER — DEXAMETHASONE 4 MG/1
20 TABLET ORAL ONCE
Status: COMPLETED | OUTPATIENT
Start: 2022-05-05 | End: 2022-05-05

## 2022-05-05 RX ORDER — LENALIDOMIDE 5 MG/1
CAPSULE ORAL
Qty: 28 CAPSULE | Refills: 0 | Status: SHIPPED | OUTPATIENT
Start: 2022-05-05 | End: 2022-06-01 | Stop reason: SDUPTHER

## 2022-05-05 RX ADMIN — DEXAMETHASONE 20 MG: 4 TABLET ORAL at 14:51

## 2022-05-05 RX ADMIN — BORTEZOMIB 1.7 MG: 3.5 INJECTION, POWDER, LYOPHILIZED, FOR SOLUTION INTRAVENOUS; SUBCUTANEOUS at 15:05

## 2022-05-05 NOTE — PROGRESS NOTES
Patient tolerated Velcade injection into left lower abdomen today without issues  Patient verified upcoming appointment and AVS provided

## 2022-05-18 NOTE — PROGRESS NOTES
Hematology/Oncology Outpatient Follow- up Note  Ledy Isaac, 5/24/1927, [id number]  5/19/2022        Chief Complaint   Patient presents with    Follow-up       HPI:  Ledy Isaac is a 80year old male who was referred to hematology for evaluation of a M spike 1 91  SPEP with immunofixation demonstrated a monoclonal gammopathy identified as IgG kappa at 1 91   Urine immunofixation revealed  A monoclonal gammopathy identified as free kappa light chains at 2 84  He underwent a bone marrow biopsy on 5/11/21:  Final Diagnosis   A-C  Bone Marrow, Left Iliac Crest, Core, Clot and Aspirate:  - Hypercellular marrow (90% cellularity) with increased IgG Kappa restricted plasma cells (90% by  immunostain), consistent with plasma cell myeloma  - Mildly increased reticulin fibers secondary to plasma cell infiltrate     Due to his age, he is not a transplant candidate  He was started on treatment with weekly Velcade and Decadron on 6/3/2021  At patient's request, his treatment plan was adjusted to every 2 weeks  Due to symptoms of diarrhea, fatigue, weakness, balance problems resulting in falls patient requested an adjustment in his treatment regimen   His treatment regimen will be adjusted to Decadron with Velcade 1 mg per metered subcutaneously every 28 days      Previous Hematologic/ Oncologic History:    Oncology History   Multiple myeloma not having achieved remission ([facility] [address] )   5/25/2021 Initial Diagnosis    Multiple myeloma not having achieved remission ([facility] [address] )     6/3/2021 -  Chemotherapy    bortezomib (VELCADE), 1 3 mg/m2 = 2 3 mg (81 3 % of original dose 1 6 mg/m2), Subcutaneous, Once, 12 of 21 cycles  Dose modification: 1 3 mg/m2 (original dose 1 6 mg/m2, Cycle 1, Reason: Other (Must fill in a comment), Comment: age), 1 mg/m2 (original dose 1 6 mg/m2, Cycle 10, Reason: Performance Status, Comment: Patient has diarrhea)  Administration: 2 3 mg (6/3/2021), 2 3 mg (6/10/2021), 2 3 mg (7/15/2021), 2 3 mg (2021), 2 3 mg (2021), 2 3 mg (2021), 2 3 mg (2021), 2 3 mg (2021), 2 3 mg (2021), 2 3 mg (2021), 2 3 mg (10/7/2021), 2 3 mg (10/21/2021), 2 3 mg (2021), 2 3 mg (2021), 2 3 mg (2021), 2 3 mg (2021), 2 2 mg (2021), 2 2 mg (2022), 2 2 mg (2022), 2 2 mg (2/10/2022), 1 7 mg (3/10/2022), 1 7 mg (2022), 1 7 mg (2022)     Decadron 20 mg and Velcade 1 3 mg/m2 weekly   Decadron 20 mg and Velcade 1 3 mg/m2  Every 2 weeks    Current Hematologic/ Oncologic Treatment:    Decadron 20 mg and Velcade 1 mg/m2  every 28 day  Revlimid 5 mg daily  Baby Aspirin     ECO - Symptomatic but completely ambulatory    Interval History:   The patient presents for routine follow up along with his cousin Rhiannon Sharma  At his last visit we added on Revlimid  Repeat myeloma labs done on  have greatly improved  His free light chain ratio has come down from 70 48 to 10 65  Immunoglobulins have improved  His Hgb is 11 7  Platelets 066  Creatinine 1 1, Calcium 8 5    Patient is doing well and tolerating Revlimid without any reportable side effects  Denies any diarrhea  No N/V  Appetite is good  Denies any lightheadedness or dizziness  He will be celebrating his 80 th birthday next week  Cancer Staging:  Cancer Staging  No matching staging information was found for the patient  Molecular Testing:         Test Results:    Imaging: No results found      Labs:   Lab Results   Component Value Date    WBC 5 12 2022    HGB 11 7 (L) 2022    HCT 35 9 (L) 2022    MCV 98 2022     (L) 2022     Lab Results   Component Value Date     2015    K 3 1 (L) 2022     (H) 2022    CO2 29 2022    ANIONGAP 4 2015    BUN 21 2022    CREATININE 1 11 2022    GLUCOSE 98 2015    GLUF 92 2022    CALCIUM 8 5 2022    CORRECTEDCA 9 1 2022    AST 18 2022    ALT 13 2022 ALKPHOS 66 05/02/2022    PROT 7 2 09/21/2015    BILITOT 0 58 09/21/2015    EGFR 56 05/02/2022           Review of Systems   Constitutional: Positive for fatigue (at baseline  )  Musculoskeletal: Positive for gait problem (uses walker )  All other systems reviewed and are negative          Active Problems:   Patient Active Problem List   Diagnosis    Idiopathic chronic gout of multiple sites without tophus    Pure hypercholesterolemia    Essential hypertension    Acquired hypothyroidism    Spondylosis of lumbar region without myelopathy or radiculopathy    Patellofemoral disorder of left knee    Spinal stenosis of lumbosacral region    Meningioma, cerebral ([address] )    General medical exam    Immunization due    Multiple myeloma not having achieved remission ([address]    Falls    Thrombocytopenia ([address]    VINICIUS (acute kidney injury) ([address]    Abrasions of multiple sites    Mild protein-calorie malnutrition ([address]    Venous stasis ulcer of left ankle with fat layer exposed without varicose veins (HCC)    Chronic seasonal allergic rhinitis due to pollen    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    Irritable bowel syndrome with diarrhea    Irritable bowel syndrome without diarrhea       Past Medical History:   Past Medical History:   Diagnosis Date    Arthritis     Gout     Hypertension     Hyperthyroidism     Memory loss     Multiple myeloma ([address]     Myeloma ([address]     Sleep disturbance     Thyroid disease        Surgical History:   Past Surgical History:   Procedure Laterality Date    IR BIOPSY BONE MARROW  5/11/2021    KIDNEY SURGERY      description: 30 yrs ago    PROSTATE SURGERY      description: stone removal 30 yrs ago    REPLACEMENT TOTAL KNEE BILATERAL      description: 18 yrs ago       Family History:    Family History   Problem Relation Age of Onset    Other Mother         Brain tumor    Cancer Sister     Diabetes Brother     Arthritis Family     Diabetes Family     Other Family         knee replacement    Thyroid disease Family        Cancer-related family history includes Cancer in his sister      Social History:   Social History     Socioeconomic History    Marital status: Single     Spouse name: Not on file    Number of children: Not on file    Years of education: Not on file    Highest education level: Not on file   Occupational History    Occupation:    Tobacco Use    Smoking status: Former Smoker     Packs/day: 0 50     Years: 25 00     Pack years: 12 50     Quit date:      Years since quittin 4    Smokeless tobacco: Never Used    Tobacco comment: "I smoked years ago"   Vaping Use    Vaping Use: Never used   Substance and Sexual Activity    Alcohol use: Yes     Comment: social "burbon once in a while"    Drug use: No    Sexual activity: Not on file     Comment: per allscripts - sexually act and denied sexually activity    Other Topics Concern    Not on file   Social History Narrative    Not on file     Social Determinants of Health     Financial Resource Strain: Not on file   Food Insecurity: Not on file   Transportation Needs: Not on file   Physical Activity: Not on file   Stress: Not on file   Social Connections: Not on file   Intimate Partner Violence: Not on file   Housing Stability: Not on file       Current Medications:   Current Outpatient Medications   Medication Sig Dispense Refill    acyclovir (ZOVIRAX) 400 MG tablet Take 1 tablet (400 mg total) by mouth daily While on Velcade therapy 30 tablet 6    allopurinol (ZYLOPRIM) 100 mg tablet Take 2 tablets (200 mg total) by mouth daily 180 tablet 3    aspirin (ECOTRIN LOW STRENGTH) 81 mg EC tablet Take 1 tablet (81 mg total) by mouth daily 90 tablet 3    Cholecalciferol (VITAMIN D3 PO) Take by mouth      lenalidomide (Revlimid) 5 MG CAPS TAKE 1 CAPSULE (5 MG TOTAL) BY MOUTH DAILY IN THE MORNING 28 capsule 0    levothyroxine 100 mcg tablet Take 1 tablet (100 mcg total) by mouth daily 90 tablet 3    triamterene-hydrochlorothiazide (DYAZIDE) 37 5-25 mg per capsule Take 1 capsule by mouth every morning 90 capsule 3    loperamide (IMODIUM A-D) 2 MG tablet Take 1 tablet (2 mg total) by mouth 4 (four) times a day as needed for diarrhea (Take before meals) (Patient not taking: No sig reported) 90 tablet 2     Current Facility-Administered Medications   Medication Dose Route Frequency Provider Last Rate Last Admin    albuterol inhalation solution 2 5 mg  2 5 mg Nebulization Once Shelvia Riverdale, DO           Allergies: Allergies   Allergen Reactions    Horse Protein        Physical Exam:  /70 (BP Location: Left arm, Patient Position: Sitting, Cuff Size: Adult)   Pulse 82   Temp 97 6 °F (36 4 °C) (Temporal)   Resp 14   Ht 5' 4" (1 626 m)   Wt 59 4 kg (131 lb)   SpO2 98%   BMI 22 49 kg/m²   Body surface area is 1 63 meters squared  Wt Readings from Last 3 Encounters:   05/19/22 59 4 kg (131 lb)   05/05/22 57 2 kg (126 lb)   04/07/22 57 6 kg (127 lb)           Physical Exam  Constitutional:       General: He is not in acute distress  Appearance: He is well-developed  He is not diaphoretic  HENT:      Head: Normocephalic and atraumatic  Mouth/Throat:      Pharynx: No oropharyngeal exudate  Eyes:      General: No scleral icterus  Pupils: Pupils are equal, round, and reactive to light  Cardiovascular:      Rate and Rhythm: Normal rate and regular rhythm  Heart sounds: No murmur heard  Pulmonary:      Effort: Pulmonary effort is normal  No respiratory distress  Breath sounds: Normal breath sounds  Abdominal:      General: Bowel sounds are normal  There is no distension  Palpations: Abdomen is soft  There is no mass  Tenderness: There is no abdominal tenderness  Musculoskeletal:         General: Normal range of motion  Cervical back: Normal range of motion and neck supple  Lymphadenopathy:      Cervical: No cervical adenopathy  Skin:     General: Skin is warm and dry  Neurological:      General: No focal deficit present  Mental Status: He is alert and oriented to person, place, and time  Psychiatric:         Mood and Affect: Mood normal          Behavior: Behavior normal          Assessment / Plan:    1  Multiple myeloma not having achieved remission [facility])      The patient is a 80year old male who was referred to hematology for evaluation of a M spike 1 91  SPEP with immunofixation demonstrated a monoclonal gammopathy identified as IgG kappa at 1 91   Urine immunofixation revealed  A monoclonal gammopathy identified as free kappa light chains at 2 84  He underwent a bone marrow biopsy on 5/11/21 which demonstrated myeloma  He was started on treatment with weekly velcade and decadron  He completed one full cycle  He was then hospitalized with dehydration, orthostatic hypotension  His symptoms improved, however Patient and his cousin have requested treatment regimen adjustment  As previously discussed with Dr Karlie Nicolas, his schedule was adjusted  He had been receiving Velcade and Decadron every 2 weeks  However, he developed symptoms of diarrhea, fatigue, weakness, balance problems and falls that he attributed to Velcade every 14 days  His symptoms did improve when treatment was held  At patient's request his treatment regimen was further adjusted to Decadron with Velcade 1 mg per metered subcutaneously every 28 days  Patient was tolerating Velcade on this schedule  However, his myeloma labs worsened and his free light chain ratio increased  He was not interested in increasing frequency of Velcade so we added on Revlimid 5 mg daily  He is tolerating his current regimen  His myeloma labs are improved  His free light chain ratio has come down from 70 48 to 10 65 indicating a nice response to addition of Revlimid  Remainder of blood work is within acceptable range    Overall patient is doing well    He will continue on his current regimen without change  He will have blood work done monthly prior to scheduled infusion  He will stay on daily Revlimid along with a baby aspirin  He will return for a follow-up visit in 2 months with repeat myeloma labs  Patient and his family are in agreement with this plan of care  They know to call at any time with questions or concerns       Goals and Barriers:  Current Goal:  Prolong Survival from multiple myeloma  Barriers: None  Patient's Capacity to Self Care:  Patient able to self care  Portions of the record may have been created with voice recognition software  Occasional wrong word or "sound a like" substitutions may have occurred due to the inherent limitations of voice recognition software  Read the chart carefully and recognize, using context, where substitutions have occurred

## 2022-05-19 ENCOUNTER — OFFICE VISIT (OUTPATIENT)
Dept: HEMATOLOGY ONCOLOGY | Facility: CLINIC | Age: 87
End: 2022-05-19
Payer: COMMERCIAL

## 2022-05-19 VITALS
TEMPERATURE: 97.6 F | RESPIRATION RATE: 14 BRPM | OXYGEN SATURATION: 98 % | DIASTOLIC BLOOD PRESSURE: 70 MMHG | HEART RATE: 82 BPM | HEIGHT: 64 IN | SYSTOLIC BLOOD PRESSURE: 136 MMHG | WEIGHT: 131 LBS | BODY MASS INDEX: 22.36 KG/M2

## 2022-05-19 DIAGNOSIS — C90.00 MULTIPLE MYELOMA NOT HAVING ACHIEVED REMISSION (HCC): Primary | ICD-10-CM

## 2022-05-19 PROCEDURE — 99214 OFFICE O/P EST MOD 30 MIN: CPT | Performed by: NURSE PRACTITIONER

## 2022-05-19 PROCEDURE — 1160F RVW MEDS BY RX/DR IN RCRD: CPT | Performed by: NURSE PRACTITIONER

## 2022-05-19 PROCEDURE — 1036F TOBACCO NON-USER: CPT | Performed by: NURSE PRACTITIONER

## 2022-05-26 RX ORDER — DEXAMETHASONE 4 MG/1
20 TABLET ORAL ONCE
Status: CANCELLED | OUTPATIENT
Start: 2022-06-02

## 2022-06-01 ENCOUNTER — APPOINTMENT (OUTPATIENT)
Dept: LAB | Facility: MEDICAL CENTER | Age: 87
End: 2022-06-01
Payer: COMMERCIAL

## 2022-06-01 DIAGNOSIS — D69.6 THROMBOCYTOPENIA (HCC): ICD-10-CM

## 2022-06-01 DIAGNOSIS — C90.00 MULTIPLE MYELOMA, REMISSION STATUS UNSPECIFIED (HCC): ICD-10-CM

## 2022-06-01 DIAGNOSIS — C90.00 MULTIPLE MYELOMA NOT HAVING ACHIEVED REMISSION (HCC): ICD-10-CM

## 2022-06-01 DIAGNOSIS — K59.00 CONSTIPATION, UNSPECIFIED CONSTIPATION TYPE: ICD-10-CM

## 2022-06-01 DIAGNOSIS — R19.7 DIARRHEA, UNSPECIFIED TYPE: ICD-10-CM

## 2022-06-01 DIAGNOSIS — R17 ELEVATED BILIRUBIN: ICD-10-CM

## 2022-06-01 DIAGNOSIS — D47.2 MONOCLONAL GAMMOPATHY: ICD-10-CM

## 2022-06-01 LAB
IGA SERPL-MCNC: 91 MG/DL (ref 70–400)
IGG SERPL-MCNC: 808 MG/DL (ref 700–1600)
IGM SERPL-MCNC: 19 MG/DL (ref 40–230)

## 2022-06-01 PROCEDURE — 82784 ASSAY IGA/IGD/IGG/IGM EACH: CPT

## 2022-06-01 PROCEDURE — 83521 IG LIGHT CHAINS FREE EACH: CPT

## 2022-06-01 PROCEDURE — 84165 PROTEIN E-PHORESIS SERUM: CPT

## 2022-06-01 PROCEDURE — 84165 PROTEIN E-PHORESIS SERUM: CPT | Performed by: PATHOLOGY

## 2022-06-01 PROCEDURE — 82232 ASSAY OF BETA-2 PROTEIN: CPT

## 2022-06-01 RX ORDER — LENALIDOMIDE 5 MG/1
CAPSULE ORAL
Qty: 28 CAPSULE | Refills: 1 | Status: SHIPPED | OUTPATIENT
Start: 2022-06-01 | End: 2022-06-29 | Stop reason: SDUPTHER

## 2022-06-01 RX ORDER — LENALIDOMIDE 5 MG/1
5 CAPSULE ORAL DAILY
Qty: 28 CAPSULE | Refills: 0 | Status: SHIPPED | OUTPATIENT
Start: 2022-06-01 | End: 2022-07-05

## 2022-06-02 ENCOUNTER — HOSPITAL ENCOUNTER (OUTPATIENT)
Dept: INFUSION CENTER | Facility: CLINIC | Age: 87
Discharge: HOME | End: 2022-06-02
Payer: COMMERCIAL

## 2022-06-02 ENCOUNTER — DOCUMENTATION (OUTPATIENT)
Dept: HEMATOLOGY ONCOLOGY | Facility: CLINIC | Age: 87
End: 2022-06-02

## 2022-06-02 VITALS
WEIGHT: 124.34 LBS | SYSTOLIC BLOOD PRESSURE: 145 MMHG | HEART RATE: 80 BPM | BODY MASS INDEX: 21.23 KG/M2 | OXYGEN SATURATION: 98 % | TEMPERATURE: 97.6 F | HEIGHT: 64 IN | RESPIRATION RATE: 14 BRPM | DIASTOLIC BLOOD PRESSURE: 71 MMHG

## 2022-06-02 DIAGNOSIS — C90.00 MULTIPLE MYELOMA NOT HAVING ACHIEVED REMISSION (HCC): Primary | ICD-10-CM

## 2022-06-02 LAB
ALBUMIN SERPL ELPH-MCNC: 3.34 G/DL (ref 3.5–5)
ALBUMIN SERPL ELPH-MCNC: 59.6 % (ref 52–65)
ALPHA1 GLOB SERPL ELPH-MCNC: 0.35 G/DL (ref 0.1–0.4)
ALPHA1 GLOB SERPL ELPH-MCNC: 6.2 % (ref 2.5–5)
ALPHA2 GLOB SERPL ELPH-MCNC: 0.63 G/DL (ref 0.4–1.2)
ALPHA2 GLOB SERPL ELPH-MCNC: 11.3 % (ref 7–13)
BETA GLOB ABNORMAL SERPL ELPH-MCNC: 0.31 G/DL (ref 0.4–0.8)
BETA1 GLOB SERPL ELPH-MCNC: 5.5 % (ref 5–13)
BETA2 GLOB SERPL ELPH-MCNC: 3.9 % (ref 2–8)
BETA2+GAMMA GLOB SERPL ELPH-MCNC: 0.22 G/DL (ref 0.2–0.5)
GAMMA GLOB ABNORMAL SERPL ELPH-MCNC: 0.76 G/DL (ref 0.5–1.6)
GAMMA GLOB SERPL ELPH-MCNC: 13.5 % (ref 12–22)
IGG/ALB SER: 1.48 {RATIO} (ref 1.1–1.8)
KAPPA LC FREE SER-MCNC: 143.6 MG/L (ref 3.3–19.4)
KAPPA LC FREE/LAMBDA FREE SER: 7.14 {RATIO} (ref 0.26–1.65)
LAMBDA LC FREE SERPL-MCNC: 20.1 MG/L (ref 5.7–26.3)
M PROTEIN 1 MFR SERPL ELPH: 3.5 %
M PROTEIN 1 SERPL ELPH-MCNC: 0.2 G/DL
PROT PATTERN SERPL ELPH-IMP: ABNORMAL
PROT SERPL-MCNC: 5.6 G/DL (ref 6.4–8.2)

## 2022-06-02 PROCEDURE — 96401 CHEMO ANTI-NEOPL SQ/IM: CPT

## 2022-06-02 RX ORDER — DEXAMETHASONE 4 MG/1
20 TABLET ORAL ONCE
Status: COMPLETED | OUTPATIENT
Start: 2022-06-02 | End: 2022-06-02

## 2022-06-02 RX ADMIN — DEXAMETHASONE 20 MG: 4 TABLET ORAL at 14:22

## 2022-06-02 RX ADMIN — BORTEZOMIB 1.7 MG: 3.5 INJECTION, POWDER, LYOPHILIZED, FOR SOLUTION INTRAVENOUS; SUBCUTANEOUS at 14:31

## 2022-06-02 NOTE — PROGRESS NOTES
Pt presents to infusion with a B/L leg and abdominal rash  Pt reported to [facility] RN that he has noticed this over the last 4 moths, it comes and goes  Denies it itching, any changes to soap bedding etc      Reviewed information and images with Vero [employer] CNRP  She stated it could be from patients Revlimid  She stated pt should try Zyrtec if no improvement or it worsen pt needs to call our office  Gaspar Nyhan RN made aware to relay this information to the pt

## 2022-06-02 NOTE — PROGRESS NOTES
Patient arrives to infusion center for D1 C13 Velcade  VSS, labs reviewed from 6/1/22 and are within parameters for treatment today  Patient resting on recliner chair, call bell within reach  Patient tolerated injection to RLQ without issue, bandaid in place  Noted that patient has diffuse abdominal rash and bilateral leg rash, non-itchy, no open areas or oozing  Patient reports 4 mo duration intermittently  TEAMs message and picture sent to Kyrie Rios RN in Dr Lidia Ram office  Per Toya Marinelli could be from the Revlimid and patient should try taking Zyrtec  If the rash does not improve or gets worse patient needs to let the office know  AVS printed and this information written on AVS  This information was also discussed with his family member Alfonso Greenfield, she expressed understanding  Patient ambulatory with walker upon DC, DC with family

## 2022-06-03 LAB — B2 MICROGLOB SERPL-MCNC: 4.5 MG/L (ref 0.6–2.4)

## 2022-06-09 ENCOUNTER — TELEPHONE (OUTPATIENT)
Dept: FAMILY MEDICINE CLINIC | Facility: MEDICAL CENTER | Age: 87
End: 2022-06-09

## 2022-06-09 NOTE — TELEPHONE ENCOUNTER
Do you want to triage? Joceline Kwon called wanting to speak with Dr King Persons re: Tamara Cabrales  All she would tell me is that she was looking for a psych referral for him  [facility] cell phone [phone]

## 2022-06-16 ENCOUNTER — TELEPHONE (OUTPATIENT)
Dept: HEMATOLOGY ONCOLOGY | Facility: CLINIC | Age: 87
End: 2022-06-16

## 2022-06-16 NOTE — TELEPHONE ENCOUNTER
----- Message from Aleksandra Orozco sent at 6/16/2022  7:47 AM EDT -----  Regarding: Ezra Madsen  Please review,  Thank you     ----- Message -----  From: Ezra Madsen  Sent: 6/15/2022   9:01 PM EDT  To: Hematology Oncology [facility]  Subject: Ezra Madsen                                   Good evening Ms Laura Luz  When you have a chance can we chat about Pamela Barr? In the  last week or two he has been falling again  He says he gets light-headed and then he's on the floor  He's also losing his appetite and gets disoriented  Irl Pizza His bowels are ok  I'm concerned the pills are starting to bother him  I don't think it's the infusion since he only gets it once a month  I'd like to talk about the pills and can he take them less frequently? Thank you      Theoplis Stage    Left voicemail message for Zeb Adorno to call my TEAMs number to discuss

## 2022-06-20 ENCOUNTER — TELEPHONE (OUTPATIENT)
Dept: HEMATOLOGY ONCOLOGY | Facility: HOSPITAL | Age: 87
End: 2022-06-20

## 2022-06-20 NOTE — TELEPHONE ENCOUNTER
----- Message from Renzo Allan RN sent at 6/20/2022 12:50 PM EDT -----  Please call and see how patient is feeling    ----- Message -----  From: Renzo Allan RN  Sent: 6/20/2022   9:00 AM EDT  To: Renzo Allan RN    Pt symptoms improve?  Delroy Youngblood spoke with Daughter

## 2022-06-20 NOTE — TELEPHONE ENCOUNTER
Called and spoke to pt daughter Curtis Lawton who states that pt has not been falling as much or getting as dizzy  She was wondering what next steps should be? I advised for pt to continue to hold revlimid 5mg daily until advised to continue  Pt has f/u on 7/20  Daughter was wondering what can be adjusted to keep numbers in check but also not too symptomatic

## 2022-06-20 NOTE — TELEPHONE ENCOUNTER
Patient was unable to tolerate weekly Decadron Velcade  His regimen was changed to every 2 weeks  Due to continued poor tolerance Decadron/Velcade was changed to once a month with addition of Revlimid 5 mg daily    He needs to be seen prior to 7/20 to discuss Revlimid dosing  I do not think it is wise for him to stop this for an entire month    If we can not get him in to be seen sooner, Dr Cleary Socks what do you think about dosing Revlimid on a Monday Wednesday Friday schedule instead of daily

## 2022-06-20 NOTE — TELEPHONE ENCOUNTER
Left detailed message to pt relative Anel Stormlennox and advised to [facility] with any additional questions   Mychart message sent as well

## 2022-06-22 ENCOUNTER — TELEPHONE (OUTPATIENT)
Dept: HEMATOLOGY ONCOLOGY | Facility: CLINIC | Age: 87
End: 2022-06-22

## 2022-06-22 NOTE — TELEPHONE ENCOUNTER
Call placed to pt daughter Wally Bejarano left detailed vm working on getting pt rescheduled sooner and will call with apt day/time   Advised of revlimid to be taken Monday, Wed, Friday per Dr Jessica Gandhi

## 2022-06-23 RX ORDER — DEXAMETHASONE 4 MG/1
20 TABLET ORAL ONCE
Status: CANCELLED | OUTPATIENT
Start: 2022-06-30

## 2022-06-24 NOTE — TELEPHONE ENCOUNTER
06/24/22    I called back to pt and eventually spoke with Cosme Gaytan today  She stated she tried to get back to us multiple times but no answer  She received Brenden's msg and pt is now taking RVD on Mon, Wed and Fri  Per Cosme Gaytan, the S/E (rash, fatigue, weakness) pt experienced went away and pt is doing perfectly good now  Pt's next appt with us will be on 7/20  I explained to Cosme Gaytan that I will still try to get pt in next Thu to discuss RVD dosage with [facility]  I also provided our Nurse's TEAMS number for CB if she has any questions/ concerns

## 2022-06-28 ENCOUNTER — APPOINTMENT (OUTPATIENT)
Dept: LAB | Facility: MEDICAL CENTER | Age: 87
End: 2022-06-28
Payer: COMMERCIAL

## 2022-06-28 DIAGNOSIS — C90.00 MULTIPLE MYELOMA NOT HAVING ACHIEVED REMISSION (HCC): ICD-10-CM

## 2022-06-28 LAB
ALBUMIN SERPL BCP-MCNC: 2.8 G/DL (ref 3.5–5)
ALP SERPL-CCNC: 77 U/L (ref 46–116)
ALT SERPL W P-5'-P-CCNC: 17 U/L (ref 12–78)
ANION GAP SERPL CALCULATED.3IONS-SCNC: 10 MMOL/L (ref 4–13)
AST SERPL W P-5'-P-CCNC: 19 U/L (ref 5–45)
BASOPHILS # BLD AUTO: 0.13 THOUSANDS/ΜL (ref 0–0.1)
BASOPHILS NFR BLD AUTO: 2 % (ref 0–1)
BILIRUB SERPL-MCNC: 0.73 MG/DL (ref 0.2–1)
BUN SERPL-MCNC: 21 MG/DL (ref 5–25)
CALCIUM ALBUM COR SERPL-MCNC: 9.3 MG/DL (ref 8.3–10.1)
CALCIUM SERPL-MCNC: 8.3 MG/DL (ref 8.3–10.1)
CHLORIDE SERPL-SCNC: 110 MMOL/L (ref 100–108)
CO2 SERPL-SCNC: 23 MMOL/L (ref 21–32)
CREAT SERPL-MCNC: 1.47 MG/DL (ref 0.6–1.3)
EOSINOPHIL # BLD AUTO: 0.77 THOUSAND/ΜL (ref 0–0.61)
EOSINOPHIL NFR BLD AUTO: 14 % (ref 0–6)
ERYTHROCYTE [DISTWIDTH] IN BLOOD BY AUTOMATED COUNT: 16.3 % (ref 11.6–15.1)
GFR SERPL CREATININE-BSD FRML MDRD: 39 ML/MIN/1.73SQ M
GLUCOSE P FAST SERPL-MCNC: 118 MG/DL (ref 65–99)
HCT VFR BLD AUTO: 38.2 % (ref 36.5–49.3)
HGB BLD-MCNC: 12.5 G/DL (ref 12–17)
IGA SERPL-MCNC: 100 MG/DL (ref 70–400)
IGG SERPL-MCNC: 797 MG/DL (ref 700–1600)
IGM SERPL-MCNC: 23 MG/DL (ref 40–230)
IMM GRANULOCYTES # BLD AUTO: 0.01 THOUSAND/UL (ref 0–0.2)
IMM GRANULOCYTES NFR BLD AUTO: 0 % (ref 0–2)
LYMPHOCYTES # BLD AUTO: 2.14 THOUSANDS/ΜL (ref 0.6–4.47)
LYMPHOCYTES NFR BLD AUTO: 40 % (ref 14–44)
MCH RBC QN AUTO: 31.9 PG (ref 26.8–34.3)
MCHC RBC AUTO-ENTMCNC: 32.7 G/DL (ref 31.4–37.4)
MCV RBC AUTO: 97 FL (ref 82–98)
MONOCYTES # BLD AUTO: 0.65 THOUSAND/ΜL (ref 0.17–1.22)
MONOCYTES NFR BLD AUTO: 12 % (ref 4–12)
NEUTROPHILS # BLD AUTO: 1.73 THOUSANDS/ΜL (ref 1.85–7.62)
NEUTS SEG NFR BLD AUTO: 32 % (ref 43–75)
NRBC BLD AUTO-RTO: 0 /100 WBCS
PLATELET # BLD AUTO: 159 THOUSANDS/UL (ref 149–390)
PMV BLD AUTO: 11.5 FL (ref 8.9–12.7)
POTASSIUM SERPL-SCNC: 3.8 MMOL/L (ref 3.5–5.3)
PROT SERPL-MCNC: 5.5 G/DL (ref 6.4–8.2)
RBC # BLD AUTO: 3.92 MILLION/UL (ref 3.88–5.62)
SODIUM SERPL-SCNC: 143 MMOL/L (ref 136–145)
WBC # BLD AUTO: 5.43 THOUSAND/UL (ref 4.31–10.16)

## 2022-06-28 PROCEDURE — 84165 PROTEIN E-PHORESIS SERUM: CPT

## 2022-06-28 PROCEDURE — 83521 IG LIGHT CHAINS FREE EACH: CPT

## 2022-06-28 PROCEDURE — 82232 ASSAY OF BETA-2 PROTEIN: CPT

## 2022-06-28 PROCEDURE — 82784 ASSAY IGA/IGD/IGG/IGM EACH: CPT

## 2022-06-28 PROCEDURE — 36415 COLL VENOUS BLD VENIPUNCTURE: CPT

## 2022-06-28 PROCEDURE — 80053 COMPREHEN METABOLIC PANEL: CPT

## 2022-06-28 PROCEDURE — 84165 PROTEIN E-PHORESIS SERUM: CPT | Performed by: PATHOLOGY

## 2022-06-28 PROCEDURE — 85025 COMPLETE CBC W/AUTO DIFF WBC: CPT

## 2022-06-29 DIAGNOSIS — C90.00 MULTIPLE MYELOMA, REMISSION STATUS UNSPECIFIED (HCC): ICD-10-CM

## 2022-06-29 DIAGNOSIS — R17 ELEVATED BILIRUBIN: ICD-10-CM

## 2022-06-29 DIAGNOSIS — C90.00 MULTIPLE MYELOMA NOT HAVING ACHIEVED REMISSION (HCC): ICD-10-CM

## 2022-06-29 DIAGNOSIS — R19.7 DIARRHEA, UNSPECIFIED TYPE: ICD-10-CM

## 2022-06-29 DIAGNOSIS — K59.00 CONSTIPATION, UNSPECIFIED CONSTIPATION TYPE: ICD-10-CM

## 2022-06-29 DIAGNOSIS — D69.6 THROMBOCYTOPENIA (HCC): ICD-10-CM

## 2022-06-29 DIAGNOSIS — D47.2 MONOCLONAL GAMMOPATHY: ICD-10-CM

## 2022-06-29 LAB
ALBUMIN SERPL ELPH-MCNC: 3.2 G/DL (ref 3.5–5)
ALBUMIN SERPL ELPH-MCNC: 59.2 % (ref 52–65)
ALPHA1 GLOB SERPL ELPH-MCNC: 0.33 G/DL (ref 0.1–0.4)
ALPHA1 GLOB SERPL ELPH-MCNC: 6.1 % (ref 2.5–5)
ALPHA2 GLOB SERPL ELPH-MCNC: 0.5 G/DL (ref 0.4–1.2)
ALPHA2 GLOB SERPL ELPH-MCNC: 9.3 % (ref 7–13)
BETA GLOB ABNORMAL SERPL ELPH-MCNC: 0.36 G/DL (ref 0.4–0.8)
BETA1 GLOB SERPL ELPH-MCNC: 6.6 % (ref 5–13)
BETA2 GLOB SERPL ELPH-MCNC: 4 % (ref 2–8)
BETA2+GAMMA GLOB SERPL ELPH-MCNC: 0.22 G/DL (ref 0.2–0.5)
GAMMA GLOB ABNORMAL SERPL ELPH-MCNC: 0.8 G/DL (ref 0.5–1.6)
GAMMA GLOB SERPL ELPH-MCNC: 14.8 % (ref 12–22)
IGG/ALB SER: 1.45 {RATIO} (ref 1.1–1.8)
KAPPA LC FREE SER-MCNC: 134.7 MG/L (ref 3.3–19.4)
KAPPA LC FREE/LAMBDA FREE SER: 8.92 {RATIO} (ref 0.26–1.65)
LAMBDA LC FREE SERPL-MCNC: 15.1 MG/L (ref 5.7–26.3)
M PROTEIN 1 MFR SERPL ELPH: 3.3 %
M PROTEIN 1 SERPL ELPH-MCNC: 0.18 G/DL
PROT PATTERN SERPL ELPH-IMP: ABNORMAL
PROT SERPL-MCNC: 5.4 G/DL (ref 6.4–8.2)

## 2022-06-29 RX ORDER — LENALIDOMIDE 5 MG/1
5 CAPSULE ORAL DAILY
Qty: 12 CAPSULE | Refills: 0 | Status: SHIPPED | OUTPATIENT
Start: 2022-06-29 | End: 2022-07-05

## 2022-06-29 NOTE — TELEPHONE ENCOUNTER
SHAUNAI-    I spoke with Curtis Lawton today again and was told pt is actually doing much better now on the currently dosage  I also discussed with Merry Hammer about pt's performance yesterday and she agreed to keep pt's appt as scheduled since there is actually limited thing we can do but dose reduction which pt is currently doing now  Curtis Lawton knows she can contact us with any questions or concerns

## 2022-06-30 ENCOUNTER — OFFICE VISIT (OUTPATIENT)
Dept: URGENT CARE | Facility: MEDICAL CENTER | Age: 87
End: 2022-06-30
Payer: COMMERCIAL

## 2022-06-30 ENCOUNTER — TELEPHONE (OUTPATIENT)
Dept: INFUSION CENTER | Facility: CLINIC | Age: 87
End: 2022-06-30

## 2022-06-30 ENCOUNTER — HOSPITAL ENCOUNTER (OUTPATIENT)
Dept: INFUSION CENTER | Facility: CLINIC | Age: 87
Discharge: HOME | End: 2022-06-30

## 2022-06-30 VITALS
OXYGEN SATURATION: 96 % | RESPIRATION RATE: 18 BRPM | TEMPERATURE: 98 F | BODY MASS INDEX: 19.77 KG/M2 | HEIGHT: 66 IN | DIASTOLIC BLOOD PRESSURE: 59 MMHG | HEART RATE: 96 BPM | WEIGHT: 123 LBS | SYSTOLIC BLOOD PRESSURE: 113 MMHG
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
## 2022-06-30 LAB — B2 MICROGLOB SERPL-MCNC: 6.3 MG/L (ref 0.6–2.4)

## 2022-06-30 PROCEDURE — 99213 OFFICE O/P EST LOW 20 MIN: CPT | Performed by: PHYSICIAN ASSISTANT

## 2022-06-30 PROCEDURE — S9088 SERVICES PROVIDED IN URGENT: HCPCS | Performed by: PHYSICIAN ASSISTANT

## 2022-06-30 NOTE — PROGRESS NOTES
[facility]        NAME: Rafael Zhang is a 80 y o  male  : 1927    MRN: [id number]  DATE: 2022  TIME: 5:35 PM    Assessment and Plan   Fall, initial encounter [W19  XXXA]  1  Fall, initial encounter       Proceed to ER for headaches, visual changes, speech changes, confusion, weakness, numbness     Patient Instructions       Follow up with PCP in 3-5 days  Proceed to  ER if symptoms worsen  Chief Complaint     Chief Complaint   Patient presents with    Fall     Pt  States that he is on a medication that makes his head feel fuzzy and then he falls  States he has fallen three times in the pasty 2 weeks  This fall, he hit his head  He has abrasions to his left forehead, scalp, left eye, right elbow, and right hand  He has ecchymosis to his left eye  Denies LOC         History of Present Illness       Patient is a 81 yo male who presents after a fall around 2 PM  He is on a medication that makes him dizzy  He hit his forehead when he fell  Denies LOC  Also denies headache, visual changes, speech changes, weakness, numbness, tingling        Review of Systems   Review of Systems   Eyes: Negative for visual disturbance  Musculoskeletal: Positive for arthralgias and myalgias  Skin: Positive for color change and wound  Bruising and abrasions    Neurological: Negative for dizziness, weakness, light-headedness, numbness and headaches  Psychiatric/Behavioral: Negative for behavioral problems and confusion           Current Medications       Current Outpatient Medications:     acyclovir (ZOVIRAX) 400 MG tablet, Take 1 tablet (400 mg total) by mouth daily While on Velcade therapy, Disp: 30 tablet, Rfl: 6    allopurinol (ZYLOPRIM) 100 mg tablet, Take 2 tablets (200 mg total) by mouth daily, Disp: 180 tablet, Rfl: 3    aspirin (ECOTRIN LOW STRENGTH) 81 mg EC tablet, Take 1 tablet (81 mg total) by mouth daily, Disp: 90 tablet, Rfl: 3    Cholecalciferol (VITAMIN D3 PO), Take by mouth, Disp: , Rfl:     lenalidomide (REVLIMID) 5 MG CAPS, Take 1 capsule (5 mg total) by mouth in the morning, Disp: 28 capsule, Rfl: 0    levothyroxine 100 mcg tablet, Take 1 tablet (100 mcg total) by mouth daily, Disp: 90 tablet, Rfl: 3    triamterene-hydrochlorothiazide (DYAZIDE) 37 5-25 mg per capsule, Take 1 capsule by mouth every morning, Disp: 90 capsule, Rfl: 3    lenalidomide (REVLIMID) 5 MG CAPS, Take 1 capsule (5 mg total) by mouth in the morning TO BE TAKEN EVERY Monday Wednesday AND Friday in a 28 day cycle, Disp: 12 capsule, Rfl: 0    loperamide (IMODIUM A-D) 2 MG tablet, Take 1 tablet (2 mg total) by mouth 4 (four) times a day as needed for diarrhea (Take before meals) (Patient not taking: No sig reported), Disp: 90 tablet, Rfl: 2    Current Facility-Administered Medications:     albuterol inhalation solution 2 5 mg, 2 5 mg, Nebulization, Once, Shania Coates DO    Current Allergies     Allergies as of 06/30/2022 - Reviewed 06/30/2022   Allergen Reaction Noted    Horse protein  01/09/2003            The following portions of the patient's history were reviewed and updated as appropriate: allergies, current medications, past family history, past medical history, past social history, past surgical history and problem list      Past Medical History:   Diagnosis Date    Arthritis     Gout     Hypertension     Hyperthyroidism     Memory loss     Multiple myeloma ([address] )     Myeloma [facility] [address] [facility]     Sleep disturbance     Thyroid disease        Past Surgical History:   Procedure Laterality Date    IR BIOPSY BONE MARROW  5/11/2021    KIDNEY SURGERY      description: 30 yrs ago    PROSTATE SURGERY      description: stone removal 30 yrs ago    REPLACEMENT TOTAL KNEE BILATERAL      description: 25 yrs ago       Family History   Problem Relation Age of Onset    Other Mother         Brain tumor    Cancer Sister     Diabetes Brother     Arthritis Family     Diabetes Family     Other Family         knee replacement    Thyroid disease Family          Medications have been verified  Objective   /59   Pulse 96   Temp 98 °F (36 7 °C)   Resp 18   Ht 5' 6" (1 676 m)   Wt 55 8 kg (123 lb)   SpO2 96%   BMI 19 85 kg/m²        Physical Exam     Physical Exam  Constitutional:       General: He is not in acute distress  Appearance: He is not ill-appearing or toxic-appearing  HENT:      Right Ear: Tympanic membrane and ear canal normal       Left Ear: Tympanic membrane and ear canal normal    Eyes:      Extraocular Movements: Extraocular movements intact  Pupils: Pupils are equal, round, and reactive to light  Comments: Left eye with periorbital ecchymosis   Cardiovascular:      Rate and Rhythm: Normal rate  Heart sounds: Normal heart sounds  Pulmonary:      Effort: Pulmonary effort is normal       Breath sounds: Normal breath sounds  Skin:     Comments: Abrasions to left forehead, scalp, left eye, right elbow, and right hand  Neurological:      Mental Status: He is alert and oriented to person, place, and time  Cranial Nerves: Cranial nerves are intact  Sensory: Sensation is intact  Motor: Motor function is intact  No pronator drift  Coordination: Romberg sign negative   Finger-Nose-Finger Test normal    Psychiatric:         Mood and Affect: Mood normal          Behavior: Behavior normal

## 2022-06-30 NOTE — TELEPHONE ENCOUNTER
Received call from Katarina Garrett stating that she went to  [facility] for his appt today and pt had fallen  They are en-route to ER currently  Appt has been cancelled

## 2022-07-05 ENCOUNTER — TELEPHONE (OUTPATIENT)
Dept: HEMATOLOGY ONCOLOGY | Facility: CLINIC | Age: 87
End: 2022-07-05

## 2022-07-05 ENCOUNTER — TELEPHONE (OUTPATIENT)
Dept: FAMILY MEDICINE CLINIC | Facility: MEDICAL CENTER | Age: 87
End: 2022-07-05

## 2022-07-05 DIAGNOSIS — C90.00 MULTIPLE MYELOMA NOT HAVING ACHIEVED REMISSION (HCC): Primary | ICD-10-CM

## 2022-07-05 RX ORDER — OMEPRAZOLE 20 MG/1
20 CAPSULE, DELAYED RELEASE ORAL DAILY
Qty: 30 CAPSULE | Refills: 1 | Status: SHIPPED | OUTPATIENT
Start: 2022-07-05 | End: 2022-08-24 | Stop reason: SDUPTHER

## 2022-07-05 RX ORDER — DEXAMETHASONE 4 MG/1
40 TABLET ORAL WEEKLY
Qty: 40 TABLET | Refills: 1 | Status: SHIPPED | OUTPATIENT
Start: 2022-07-05 | End: 2022-07-06

## 2022-07-05 NOTE — TELEPHONE ENCOUNTER
----- Message from Damian Albrecht sent at 7/5/2022  7:59 AM EDT -----  Regarding: FW: Steve Johnson: Do you have an oncologist you recommend? Clerical:Can we reach out and  get him scheduled to come in for a follow up please   ----- Message -----  From: Steve Carcamo  Sent: 7/3/2022   3:26 PM EDT  To: [facility]  Subject: [facility] afternoon Dr Juliane Johnson  Is it possible to get Layne Javedalex in to see you asap? He's not doing well and he asked to see you  Also, do you have an oncology referral? I think we need to get a second opinion on his condition  He's currently seeing Dr Nusrat Johnson  Thank you  My number is [phone]      Serenity Mera

## 2022-07-05 NOTE — TELEPHONE ENCOUNTER
LMOM for Suzanne James to make her aware pt has been scheduled for 7/11/22  Asked her to call if that is not convenient

## 2022-07-05 NOTE — PROGRESS NOTES
Patient's niece Murali Dukes called with reports that Samantha Taylor is not doing well and he is having adverse reactions to the Revlimid  He is now taking this 3x week, MWF  She reports the day after he takes this he develops confusion, dizziness and falls  Appetite is decreased  He is weak  Patient was unable to tolerate weekly Decadron Velcade  His regimen was changed to every 2 weeks  Due to continued poor tolerance Decadron/Velcade was changed to once a month with addition of Revlimid 5 mg daily    Murali Dukes had reported that he had side effects from Revlimid daily including disorientation, lightheadedness, decreased appetite, rash, increased falls  We held this and his symptoms resolved  We restarted 3x week  He was tolerating this initially  It now appears symptoms are recurring  His Free light chain ratio greatly improved on this regimen indicating treatment response  I discussed options with Murali Dukes today to include stopping all treatment and transition to Hospice given his advanced age and inability to tolerate current treatment regimen  Unfortunately, his numbers worsened with monthly Velcade and decadron so I don't feel that will provide any benefit for him to continue  I also discussed treating him with Decadron 40 mg weekly  Murali Dukes reported that Samantha Taylor has been very upset and angry and has verbalized to her that we are trying to make him worse with treatment  I explained, that the opposite is true, we are trying to help treat his myeloma and its more likely his disease process that is making him feel poorly  She does not feel Samantha Taylor would be open to hospice at this time  She will discuss starting the weekly steroids with him and this may be a more tolerable option  I did explain that steroids may help with energy, appetite while also treating the myeloma  Dalia York verbalized understanding  She was in agreement  I have prescribed Decadron 40 mg weekly    I will also prescribe Prilosec to be taken as well to protect stomach and prevent gastritis  He should take this with food  We will give this a try and see how he tolerates this over the next several weeks  He does have a follow-up with Dr Tod Gowers already scheduled on July 20th which he will keep    He will have blood work completed prior to that visit    Zan knows to call back with any additional questions or concerns

## 2022-07-07 NOTE — TELEPHONE ENCOUNTER
Called Gemma Cohen to confirm that the appt on 7/11/22 is OK  She said it is and they will see Dr Lore Lowe on Monday

## 2022-07-11 ENCOUNTER — HOME CARE VISIT (OUTPATIENT)
Dept: HOME HEALTH SERVICES | Facility: HOME HEALTHCARE | Age: 87
End: 2022-07-11

## 2022-07-11 ENCOUNTER — OFFICE VISIT (OUTPATIENT)
Dept: FAMILY MEDICINE CLINIC | Facility: MEDICAL CENTER | Age: 87
End: 2022-07-11
Payer: COMMERCIAL

## 2022-07-11 VITALS
BODY MASS INDEX: 19.13 KG/M2 | SYSTOLIC BLOOD PRESSURE: 122 MMHG | WEIGHT: 119 LBS | HEIGHT: 66 IN | OXYGEN SATURATION: 98 % | DIASTOLIC BLOOD PRESSURE: 80 MMHG | HEART RATE: 68 BPM | TEMPERATURE: 98.2 F

## 2022-07-11 DIAGNOSIS — C90.00 MULTIPLE MYELOMA NOT HAVING ACHIEVED REMISSION (HCC): Primary | ICD-10-CM

## 2022-07-11 PROCEDURE — 99214 OFFICE O/P EST MOD 30 MIN: CPT | Performed by: FAMILY MEDICINE

## 2022-07-11 NOTE — PROGRESS NOTES
This is a delightful 68-year-old man with multiple myeloma  He cannot tolerate chemotherapy and so he has discontinued it  He was here with a niece  The three of us had a discussion about death and dying  Talked about bringing hospice in  Also talked about how to improve his appetite  We talked for 25 minutes  /80 (BP Location: Left arm, Patient Position: Sitting, Cuff Size: Adult)   Pulse 68   Temp 98 2 °F (36 8 °C)   Ht 5' 6" (1 676 m)   Wt 54 kg (119 lb)   SpO2 98%   BMI 19 21 kg/m²     Physical Exam  Constitutional:       General: He is not in acute distress  Appearance: He is well-developed  He is ill-appearing  He is not diaphoretic  HENT:      Head: Normocephalic  Eyes:      Pupils: Pupils are equal, round, and reactive to light  Cardiovascular:      Rate and Rhythm: Normal rate  Pulses: Normal pulses  Pulmonary:      Effort: Pulmonary effort is normal  No respiratory distress  Abdominal:      General: Abdomen is flat  Musculoskeletal:         General: Normal range of motion  Cervical back: Normal range of motion  Skin:     General: Skin is warm and dry  Neurological:      Mental Status: He is alert and oriented to person, place, and time  Psychiatric:         Behavior: Behavior normal          Thought Content: Thought content normal          Judgment: Judgment normal      I put in a referral for hospice  Also I think he can have a drink around dinner time and that may help to stimulate his appetite  See him back in six eight weeks

## 2022-07-18 ENCOUNTER — TELEPHONE (OUTPATIENT)
Dept: HEMATOLOGY ONCOLOGY | Facility: HOSPITAL | Age: 87
End: 2022-07-18

## 2022-07-18 NOTE — TELEPHONE ENCOUNTER
07/18/22    Pt is admitted to Hospice now and I'm not sure if pt will show up in his appt with Dr Lindsay Foote on 7/20  Per Zhanna's note on 7/5:    " I have prescribed Decadron 40 mg weekly  I will also prescribe Prilosec to be taken as well to protect stomach and prevent gastritis  He should take this with food  We will give this a try and see how he tolerates this over the next several weeks  He does have a follow-up with Dr Lindsay Foote already scheduled on July 20th which he will keep  He will have blood work completed prior to that visit"  ------------------------------------------    I will still keep pt's appt unless Nuala Dakin and would like to cancel it

## 2022-07-20 ENCOUNTER — OFFICE VISIT (OUTPATIENT)
Dept: HEMATOLOGY ONCOLOGY | Facility: CLINIC | Age: 87
End: 2022-07-20
Payer: COMMERCIAL

## 2022-07-20 VITALS
WEIGHT: 115 LBS | TEMPERATURE: 98 F | HEIGHT: 66 IN | RESPIRATION RATE: 14 BRPM | OXYGEN SATURATION: 98 % | SYSTOLIC BLOOD PRESSURE: 110 MMHG | DIASTOLIC BLOOD PRESSURE: 60 MMHG | BODY MASS INDEX: 18.48 KG/M2 | HEART RATE: 110 BPM

## 2022-07-20 DIAGNOSIS — C90.00 MULTIPLE MYELOMA NOT HAVING ACHIEVED REMISSION (HCC): Primary | ICD-10-CM

## 2022-07-20 PROCEDURE — 1160F RVW MEDS BY RX/DR IN RCRD: CPT | Performed by: INTERNAL MEDICINE

## 2022-07-20 PROCEDURE — 99214 OFFICE O/P EST MOD 30 MIN: CPT | Performed by: INTERNAL MEDICINE

## 2022-07-20 NOTE — PROGRESS NOTES
Hematology/Oncology Outpatient Follow- up Note  Lily Aviles 80 y o  male MRN: @ Encounter: [id number]        Date:  7/20/2022    Presenting Complaint/Diagnosis :   Multiple myeloma    HPI:      Lily Aviles is a 80year old male who was referred to hematology for evaluation of a M spike 1 91  SPEP with immunofixation demonstrated a monoclonal gammopathy identified as IgG kappa at 1 91   Urine immunofixation revealed  A monoclonal gammopathy identified as free kappa light chains at 2 84  He underwent a bone marrow biopsy on 5/11/21:  Final Diagnosis   A-C  Bone Marrow, Left Iliac Crest, Core, Clot and Aspirate:  - Hypercellular marrow (90% cellularity) with increased IgG Kappa restricted plasma cells (90% by  immunostain), consistent with plasma cell myeloma  - Mildly increased reticulin fibers secondary to plasma cell infiltrate      Due to his age, he is not a transplant candidate  He was started on treatment with weekly Velcade and Decadron on 6/3/2021  At patient's request, his treatment plan was adjusted to every 2 weeks  Due to symptoms of diarrhea, fatigue, weakness, balance problems resulting in falls patient requested an adjustment in his treatment regimen   His treatment regimen will be adjusted to Decadron with Velcade 1 mg per metered subcutaneously every 28 days      Previous Hematologic/ Oncologic History:    Oncology History   Multiple myeloma not having achieved remission ([facility] )   5/25/2021 Initial Diagnosis    Multiple myeloma not having achieved remission ([facility] )     6/3/2021 - 6/2/2022 Chemotherapy    bortezomib (VELCADE), 1 3 mg/m2 = 2 3 mg (81 3 % of original dose 1 6 mg/m2), Subcutaneous, Once, 13 of 21 cycles  Dose modification: 1 3 mg/m2 (original dose 1 6 mg/m2, Cycle 1, Reason: Other (Must fill in a comment), Comment: age), 1 mg/m2 (original dose 1 6 mg/m2, Cycle 10, Reason: Performance Status, Comment: Patient has diarrhea)  Administration: 2 3 mg (6/3/2021), 2 3 mg (6/10/2021), 2 3 mg (7/15/2021), 2 3 mg (6/17/2021), 2 3 mg (6/24/2021), 2 3 mg (7/29/2021), 2 3 mg (8/12/2021), 2 3 mg (8/27/2021), 2 3 mg (9/9/2021), 2 3 mg (9/23/2021), 2 3 mg (10/7/2021), 2 3 mg (10/21/2021), 2 3 mg (11/4/2021), 2 3 mg (11/18/2021), 2 3 mg (12/2/2021), 2 3 mg (12/16/2021), 2 2 mg (12/30/2021), 2 2 mg (1/13/2022), 2 2 mg (1/28/2022), 2 2 mg (2/10/2022), 1 7 mg (3/10/2022), 1 7 mg (4/7/2022), 1 7 mg (5/5/2022), 1 7 mg (6/2/2022)     Decadron 20 mg and Velcade 1 3 mg/m2 weekly   Decadron 20 mg and Velcade 1 3 mg/m2  Every 2 weeks  Decadron 20 mg and Velcade 1 mg/m2  every 28 day  Revlimid 5 mg daily  Baby Aspirin   Current Hematologic/ Oncologic Treatment:      Decadron weekly which will now be discontinued as patient is going to move to hospice    Interval History:    Patient returns for follow-up visit  He has not been doing well on his treatment so has been discussing best supportive care  According to the patient's niece who accompanied him they did discuss this with the primary care physician who had recommended hospice also  He will be moving to this as they are both agreeable on this course  In terms of symptoms he states he has no pain  Denies any nausea or vomiting  Now he is not taking most of his medications he is feeling reasonably well  He does walk with a walker and is 80years of age but otherwise is still doing reasonably well  The rest of his 14 point review of systems today was negative  Test Results:    Imaging: No results found      Labs:   Lab Results   Component Value Date    WBC 5 43 06/28/2022    HGB 12 5 06/28/2022    HCT 38 2 06/28/2022    MCV 97 06/28/2022     06/28/2022     Lab Results   Component Value Date     09/21/2015    K 3 8 06/28/2022     (H) 06/28/2022    CO2 23 06/28/2022    ANIONGAP 4 09/21/2015    BUN 21 06/28/2022    CREATININE 1 47 (H) 06/28/2022    GLUCOSE 98 09/21/2015    GLUF 118 (H) 06/28/2022    CALCIUM 8 3 06/28/2022 CORRECTEDCA 9 3 06/28/2022    AST 19 06/28/2022    ALT 17 06/28/2022    ALKPHOS 77 06/28/2022    PROT 7 2 09/21/2015    BILITOT 0 58 09/21/2015    EGFR 39 06/28/2022         Lab Results   Component Value Date    SPEP See Comment 06/28/2022    UPEP See Comment 03/16/2021       Lab Results   Component Value Date    PSA 2 4 03/05/2021    PSA 2 0 09/21/2015         Lab Results   Component Value Date    NNZCSKXG25 312 03/16/2021         ROS: As stated in the history of present illness otherwise his 14 point review of systems today was negative        Active Problems:   Patient Active Problem List   Diagnosis    Idiopathic chronic gout of multiple sites without tophus    Pure hypercholesterolemia    Essential hypertension    Acquired hypothyroidism    Spondylosis of lumbar region without myelopathy or radiculopathy    Patellofemoral disorder of left knee    Spinal stenosis of lumbosacral region    Meningioma, cerebral ([address]    General medical exam    Immunization due    Multiple myeloma not having achieved remission ([address] )    Falls    Thrombocytopenia ([address]    VINICIUS (acute kidney injury) ([address]    Abrasions of multiple sites    Mild protein-calorie malnutrition ([address]    Venous stasis ulcer of left ankle with fat layer exposed without varicose veins (HCC)    Chronic seasonal allergic rhinitis due to pollen    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    Irritable bowel syndrome with diarrhea    Irritable bowel syndrome without diarrhea       Past Medical History:   Past Medical History:   Diagnosis Date    Arthritis     Gout     Hypertension     Hyperthyroidism     Memory loss     Multiple myeloma ([address]     Myeloma ([address]     Sleep disturbance     Thyroid disease        Surgical History:   Past Surgical History:   Procedure Laterality Date    IR BIOPSY BONE MARROW  5/11/2021    KIDNEY SURGERY      description: 30 yrs ago    PROSTATE SURGERY      description: stone removal 30 yrs ago   Zena Alonso REPLACEMENT TOTAL KNEE BILATERAL      description: 25 yrs ago       Family History:    Family History   Problem Relation Age of Onset    Other Mother         Brain tumor    Cancer Sister     Diabetes Brother     Arthritis Family     Diabetes Family     Other Family         knee replacement    Thyroid disease Family        Cancer-related family history includes Cancer in his sister      Social History:   Social History     Socioeconomic History    Marital status: Single     Spouse name: Not on file    Number of children: Not on file    Years of education: Not on file    Highest education level: Not on file   Occupational History    Occupation:    Tobacco Use    Smoking status: Former Smoker     Packs/day: 0 50     Years: 25 00     Pack years: 12 50     Quit date:      Years since quittin 5    Smokeless tobacco: Never Used    Tobacco comment: "I smoked years ago"   Vaping Use    Vaping Use: Never used   Substance and Sexual Activity    Alcohol use: Yes     Comment: social "burbon once in a while"    Drug use: No    Sexual activity: Not on file     Comment: per allscripts - sexually act and denied sexually activity    Other Topics Concern    Not on file   Social History Narrative    Not on file     Social Determinants of Health     Financial Resource Strain: Not on file   Food Insecurity: Not on file   Transportation Needs: Not on file   Physical Activity: Not on file   Stress: Not on file   Social Connections: Not on file   Intimate Partner Violence: Not on file   Housing Stability: Not on file       Current Medications:   Current Outpatient Medications   Medication Sig Dispense Refill    aspirin (ECOTRIN LOW STRENGTH) 81 mg EC tablet Take 1 tablet (81 mg total) by mouth daily 90 tablet 3    Cholecalciferol (VITAMIN D3 PO) Take by mouth      levothyroxine 100 mcg tablet Take 1 tablet (100 mcg total) by mouth daily 90 tablet 3    omeprazole (PriLOSEC) 20 mg delayed release capsule Take 1 capsule (20 mg total) by mouth daily 30 capsule 1    triamterene-hydrochlorothiazide (DYAZIDE) 37 5-25 mg per capsule Take 1 capsule by mouth every morning 90 capsule 3    allopurinol (ZYLOPRIM) 100 mg tablet Take 2 tablets (200 mg total) by mouth daily (Patient not taking: Reported on 7/20/2022) 180 tablet 3    loperamide (IMODIUM A-D) 2 MG tablet Take 1 tablet (2 mg total) by mouth 4 (four) times a day as needed for diarrhea (Take before meals) (Patient not taking: Reported on 7/20/2022) 90 tablet 2     Current Facility-Administered Medications   Medication Dose Route Frequency Provider Last Rate Last Admin    albuterol inhalation solution 2 5 mg  2 5 mg Nebulization Once Stanley Malin DO           Allergies: No Known Allergies    Physical Exam:    Body surface area is 1 58 meters squared  Wt Readings from Last 3 Encounters:   07/20/22 52 2 kg (115 lb)   07/11/22 54 kg (119 lb)   06/30/22 55 8 kg (123 lb)        Temp Readings from Last 3 Encounters:   07/20/22 98 °F (36 7 °C) (Temporal)   07/11/22 98 2 °F (36 8 °C)   06/30/22 98 °F (36 7 °C)        BP Readings from Last 3 Encounters:   07/20/22 110/60   07/11/22 122/80   06/30/22 113/59         Pulse Readings from Last 3 Encounters:   07/20/22 (!) 110   07/11/22 68   06/30/22 96     @LASTSAO2(3)@      Physical Exam     Constitutional   General appearance: No acute distress, well appearing and well nourished  Eyes   Conjunctiva and lids: No swelling, erythema or discharge  Pupils and irises: Equal, round and reactive to light  Ears, Nose, Mouth, and Throat   External inspection of ears and nose: Normal     Nasal mucosa, septum, and turbinates: Normal without edema or erythema  Oropharynx: Normal with no erythema, edema, exudate or lesions  Pulmonary   Respiratory effort: No increased work of breathing or signs of respiratory distress  Auscultation of lungs: Clear to auscultation      Cardiovascular   Palpation of heart: Normal PMI, no thrills  Auscultation of heart: Normal rate and rhythm, normal S1 and S2, without murmurs  Examination of extremities for edema and/or varicosities: Normal     Carotid pulses: Normal     Abdomen   Abdomen: Non-tender, no masses  Liver and spleen: No hepatomegaly or splenomegaly  Lymphatic   Palpation of lymph nodes in neck: No lymphadenopathy  Musculoskeletal   Gait and station:   Digits and nails: Normal without clubbing or cyanosis  Inspection/palpation of joints, bones, and muscles: Normal     Skin   Skin and subcutaneous tissue: Normal without rashes or lesions  Neurologic   Cranial nerves: Cranial nerves 2-12 intact  Sensation: No sensory loss  Psychiatric   Orientation to person, place, and time: Normal     Mood and affect: Normal         Assessment / Plan:      The patient is a pleasant 26-year-old male was diagnosed with myeloma and has been on various treatments  Again his biopsy was in 2021  He has done well so far but now and 80years of age he is getting tired of the treatment  He has side effects from even low-dose Revlimid and once a month Velcade  He was on steroid which did not affect him too badly but today we had a idalmis discussion about blood work, the need for monitoring and efficacy of single agent steroid which is not very good  The patient decided to go to hospice  His niece who accompanies him agrees with this  He is at a nursing facility  We will put in a hospital referral   He will see us as needed  His primary care physician and recommended this also so they are much more comfortable with this concept now than they were previously  I will see him as needed  He will be moved to hospice  Goals and Barriers:  Current Goal:  Prolong Survival from multiple myeloma  Barriers: None  Patient's Capacity to Self Care:  Patient able to self care  Portions of the record may have been created with voice recognition software  Occasional wrong word or "sound a like" substitutions may have occurred due to the inherent limitations of voice recognition software  Read the chart carefully and recognize, using context, where substitutions have occurred

## 2022-08-05 ENCOUNTER — TELEPHONE (OUTPATIENT)
Dept: HEMATOLOGY ONCOLOGY | Facility: CLINIC | Age: 87
End: 2022-08-05

## 2022-08-05 NOTE — TELEPHONE ENCOUNTER
Medical Records Request Route to [facility]   Person calling in  [address] [facility]   Provider Dr Rich Vidales   Fax Number / Brandie Yao name (Attention:)   [phone]   Facility call back number [phone]   Patient call back number N/A     Violetta Prakash from Dr Christal Bonner and Dr Janak Ayala office calling to request patients most recent H+P and labs for patients visit that is actively happening  He is having a tooth extracted and they need this before they can complete that procedure    I explained to Violetta Prakash that this request likely will not be processed instantly  She asked that they try to do that  I reiterated that I will send it as high priority but that it will need to be processed by office  She understood

## 2022-08-05 NOTE — TELEPHONE ENCOUNTER
08/05/22    Office notes and labs performed on 6/28 sent  Pt is opt for hospice per his last visit  We don't have his recent labs  Per Maged Patrick, we don't have any further recommendation for his tooth extraction  His dentist can discuss with his hospice care provider

## 2022-08-08 PROCEDURE — 3288F FALL RISK ASSESSMENT DOCD: CPT | Performed by: FAMILY MEDICINE

## 2022-08-08 NOTE — PROGRESS NOTES
Assessment and Plan:     Problem List Items Addressed This Visit        Endocrine    Acquired hypothyroidism     He is taking levothyroxine 100 mcg q day  his TSH have been at goal       Continue levothyroxine  Relevant Medications    dexamethasone (Decadron) 0 5 mg tablet       Cardiovascular and Mediastinum    Essential hypertension     His blood pressure is good at 114/78  He is only taking Dyazide  Continue Dyazide, low-salt diet  Genitourinary    Stage 3 chronic kidney disease, unspecified whether stage 3a or 3b CKD (HCC)     Lab Results   Component Value Date    EGFR 39 06/28/2022    EGFR 45 06/01/2022    EGFR 56 05/02/2022    CREATININE 1 47 (H) 06/28/2022    CREATININE 1 32 (H) 06/01/2022    CREATININE 1 11 05/02/2022     His estimated GFR are going down slowly  He has multiple myeloma and has had chemo  I recommended him to keep well hydrated, no NSAIDs, keep blood pressure under control  We will check another BMP soon  Other    Multiple myeloma not having achieved remission [facility])     He is now under palliative care  He did not tolerate the chemotherapy  However he is taking some Decadron  He looks a lot better than when I saw him last time when he was finished with a course of chemotherapy  He obviously feels a lot better and he is in a good place of mine  Continue palliation only  Relevant Medications    dexamethasone (Decadron) 0 5 mg tablet    Mild protein-calorie malnutrition ([address] )       I have recommended frequent milkshakes, spaghetti, and other calorie dense foods  Ambulatory dysfunction     He has been under chemotherapy for multiple myeloma  It took a lot out of him  He has a hard time walking and he wants to get out of the wheelchair  Will refer to physical therapy for ambulatory training           Relevant Orders    Ambulatory Referral to Physical Therapy      Other Visit Diagnoses     Encounter for immunization    -  Primary Relevant Orders    Pneumococcal Conjugate Vaccine 20-valent (PCV20) (Completed)           Preventive health issues were discussed with patient, and age appropriate screening tests were ordered as noted in patient's After Visit Summary  Personalized health advice and appropriate referrals for health education or preventive services given if needed, as noted in patient's After Visit Summary  History of Present Illness:     Patient presents for a Medicare Wellness Visit    This is a pleasant 70-year-old man who is retired many years as a   In [place]  He was never   He has a lot of relatives and he is from [place] since birth  He was here today with his two nieces  He has multiple myeloma  He could not tolerate treatment  He is on palliation  He is doing very well actually, however he could not tolerate the chemotherapy  Patient Care Team:  Сергей Gutierrez MD as PCP - Lisette Brittle, MD as PCP - [address] [place], [place] [id number] [facility] ([facility])  Сергей Gutierrez MD as PCP - PCP-[facility] ([facility])  Mardell Boxer, MD as PCP - Hospice Physician  Akin Gutierres MD     Review of Systems:     Review of Systems   Constitutional: Negative for activity change, fatigue and fever  HENT: Negative for congestion, ear discharge, ear pain, postnasal drip, rhinorrhea, sinus pain, sneezing and sore throat  Eyes: Negative for photophobia, pain, discharge and redness  Respiratory: Negative for apnea, cough, shortness of breath and wheezing  Cardiovascular: Negative for chest pain and palpitations  Gastrointestinal: Positive for diarrhea  Negative for abdominal pain, blood in stool, constipation, nausea and vomiting  Endocrine: Negative for polydipsia, polyphagia and polyuria  Genitourinary: Negative for decreased urine volume, difficulty urinating, dysuria, frequency, penile discharge, penile pain and urgency  Musculoskeletal: Negative for arthralgias, gait problem, joint swelling and neck pain  Skin: Negative for color change and rash  Neurological: Positive for weakness (Both lower extremities)  Negative for dizziness, tremors, seizures and headaches  Psychiatric/Behavioral: Negative for agitation and sleep disturbance  The patient is not nervous/anxious           Problem List:     Patient Active Problem List   Diagnosis    Idiopathic chronic gout of multiple sites without tophus    Pure hypercholesterolemia    Essential hypertension    Acquired hypothyroidism    Spondylosis of lumbar region without myelopathy or radiculopathy    Patellofemoral disorder of left knee    Spinal stenosis of lumbosacral region    Meningioma, cerebral ([address] )    General medical exam    Immunization due    Multiple myeloma not having achieved remission ([address] )    Falls    Thrombocytopenia ([address] )    VINICIUS (acute kidney injury) ([address] )    Abrasions of multiple sites    Mild protein-calorie malnutrition ([address] )    Venous stasis ulcer of left ankle with fat layer exposed without varicose veins (HCC)    Chronic seasonal allergic rhinitis due to pollen    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    Irritable bowel syndrome with diarrhea    Irritable bowel syndrome without diarrhea    Stage 3 chronic kidney disease, unspecified whether stage 3a or 3b CKD ([address] )    Ambulatory dysfunction      Past Medical and Surgical History:     Past Medical History:   Diagnosis Date    Arthritis     Gout     Hypertension     Hyperthyroidism     Memory loss     Multiple myeloma ([address] )     Myeloma ([address] )     Sleep disturbance     Thyroid disease      Past Surgical History:   Procedure Laterality Date    IR BIOPSY BONE MARROW  5/11/2021    KIDNEY SURGERY      description: 30 yrs ago    PROSTATE SURGERY      description: stone removal 30 yrs ago    REPLACEMENT TOTAL KNEE BILATERAL      description: 18 yrs ago      Family History:     Family History   Problem Relation Age of Onset    Other Mother         Brain tumor    Cancer Sister     Diabetes Brother     Arthritis Family     Diabetes Family     Other Family         knee replacement    Thyroid disease Family       Social History:     Social History     Socioeconomic History    Marital status: Single     Spouse name: None    Number of children: None    Years of education: None    Highest education level: None   Occupational History    Occupation:    Tobacco Use    Smoking status: Former Smoker     Packs/day: 0 50     Years: 25 00     Pack years: 12 50     Quit date:      Years since quittin 6    Smokeless tobacco: Never Used    Tobacco comment: "I smoked years ago"   Vaping Use    Vaping Use: Never used   Substance and Sexual Activity    Alcohol use: Yes     Comment: social "burbon once in a while"    Drug use: No    Sexual activity: None     Comment: per allscripts - sexually act and denied sexually activity    Other Topics Concern    None   Social History Narrative    None     Social Determinants of Health     Financial Resource Strain: Not on file   Food Insecurity: Not on file   Transportation Needs: Not on file   Physical Activity: Not on file   Stress: Not on file   Social Connections: Not on file   Intimate Partner Violence: Not on file   Housing Stability: Not on file      Medications and Allergies:     Current Outpatient Medications   Medication Sig Dispense Refill    dexamethasone (Decadron) 0 5 mg tablet Take 1 tablet (0 5 mg total) by mouth 2 (two) times a day with meals 60 tablet 0    aspirin (ECOTRIN LOW STRENGTH) 81 mg EC tablet Take 1 tablet (81 mg total) by mouth daily 90 tablet 3    Cholecalciferol (VITAMIN D3 PO) Take by mouth      levothyroxine 100 mcg tablet Take 1 tablet (100 mcg total) by mouth daily 90 tablet 3    omeprazole (PriLOSEC) 20 mg delayed release capsule Take 1 capsule (20 mg total) by mouth daily 30 capsule 1    triamterene-hydrochlorothiazide (DYAZIDE) 37 5-25 mg per capsule Take 1 capsule by mouth every morning 90 capsule 3     Current Facility-Administered Medications   Medication Dose Route Frequency Provider Last Rate Last Admin    albuterol inhalation solution 2 5 mg  2 5 mg Nebulization Once Fredi Donnelly, DO         No Known Allergies   Immunizations:     Immunization History   Administered Date(s) Administered    COVID-19 MODERNA VACC 0 5 ML IM 12/29/2020, 01/26/2021    INFLUENZA 10/05/2016    Influenza Split High Dose Preservative Free IM 10/07/2013, 10/14/2014, 10/14/2015, 10/05/2016, 09/21/2017, 10/24/2019    Influenza, high dose seasonal 0 7 mL 09/24/2018, 12/11/2020    Influenza, seasonal, injectable 10/22/2012    Pneumococcal Conjugate 13-Valent 10/05/2016    Pneumococcal Conjugate Vaccine 20-valent (Pcv20), Polysace 08/09/2022    Pneumococcal Polysaccharide PPV23 10/22/2012    Tdap 12/15/2015    Zoster 10/06/2016      Health Maintenance: There are no preventive care reminders to display for this patient  Topic Date Due    COVID-19 Vaccine (3 - Moderna risk series) 02/23/2021    Influenza Vaccine (1) 09/01/2022      Medicare Screening Tests and Risk Assessments:     Marie Clark is here for his Subsequent Wellness visit  Health Risk Assessment:   Patient rates overall health as good  Patient feels that their physical health rating is slightly better  Patient is satisfied with their life  Eyesight was rated as same  Hearing was rated as same  Patient feels that their emotional and mental health rating is same  Patients states they are sometimes angry  Patient states they are sometimes unusually tired/fatigued  Pain experienced in the last 7 days has been none  Patient states that he has experienced weight loss or gain in last 6 months  Fall Risk Screening: In the past year, patient has experienced: history of falling in past year      Home Safety:  Patient does not have trouble with stairs inside or outside of their home   Patient has working smoke alarms and has working carbon monoxide detector  Home safety hazards include: none  Nutrition:   Current diet is Regular  Medications:   Patient is not currently taking any over-the-counter supplements  Patient is able to manage medications  Activities of Daily Living (ADLs)/Instrumental Activities of Daily Living (IADLs):   Walk and transfer into and out of bed and chair?: Yes  Dress and groom yourself?: Yes    Bathe or shower yourself?: Yes    Feed yourself? Yes  Do your laundry/housekeeping?: No  Manage your money, pay your bills and track your expenses?: No  Make your own meals?: No    Do your own shopping?: Yes    Previous Hospitalizations:   Any hospitalizations or ED visits within the last 12 months?: Yes    How many hospitalizations have you had in the last year?: 1-2    Advance Care Planning:   Living will: Yes    Durable POA for healthcare: Yes    Advanced directive: Yes      PREVENTIVE SCREENINGS      Cardiovascular Screening:    General: Screening Not Indicated and History Lipid Disorder      Diabetes Screening:     General: Screening Current      Colorectal Cancer Screening:     General: Screening Not Indicated      Prostate Cancer Screening:    General: Screening Not Indicated      Abdominal Aortic Aneurysm (AAA) Screening:    Risk factors include: tobacco use        Lung Cancer Screening:     General: Screening Not Indicated    Screening, Brief Intervention, and Referral to Treatment (SBIRT)    Screening  Typical number of drinks in a day: 0  Typical number of drinks in a week: 1  Interpretation: Low risk drinking behavior  AUDIT-C Screenin) How often did you have a drink containing alcohol in the past year? monthly or less  2) How many drinks did you have on a typical day when you were drinking in the past year?  1 to 2  3) How often did you have 6 or more drinks on one occasion in the past year? never    AUDIT-C Score: 1  Interpretation: Score 0-3 (male): Negative screen for alcohol misuse    Single Item Drug Screening:  How often have you used an illegal drug (including marijuana) or a prescription medication for non-medical reasons in the past year? never    Single Item Drug Screen Score: 0  Interpretation: Negative screen for possible drug use disorder    No exam data present     Physical Exam:     /78 (BP Location: Left arm, Patient Position: Sitting, Cuff Size: Adult)   Pulse 87   Temp 99 1 °F (37 3 °C)   Ht 5' 6" (1 676 m)   Wt 51 3 kg (113 lb)   BMI 18 24 kg/m²     Physical Exam  Vitals and nursing note reviewed  Constitutional:       General: He is not in acute distress  Appearance: Normal appearance  He is well-developed  He is not ill-appearing  HENT:      Head: Normocephalic and atraumatic  Right Ear: Tympanic membrane, ear canal and external ear normal       Left Ear: Tympanic membrane, ear canal and external ear normal       Nose: Nose normal  No rhinorrhea  Mouth/Throat:      Mouth: Mucous membranes are moist       Pharynx: Uvula midline  No oropharyngeal exudate  Tonsils: No tonsillar exudate  Eyes:      General: Lids are normal       Extraocular Movements: Extraocular movements intact  Conjunctiva/sclera: Conjunctivae normal       Pupils: Pupils are equal, round, and reactive to light  Neck:      Thyroid: No thyromegaly  Vascular: No carotid bruit  Trachea: Trachea normal    Cardiovascular:      Rate and Rhythm: Normal rate and regular rhythm  Pulses: Normal pulses  Heart sounds: Normal heart sounds, S1 normal and S2 normal  No murmur heard  Pulmonary:      Effort: Pulmonary effort is normal  No respiratory distress  Breath sounds: Normal breath sounds  Abdominal:      General: Abdomen is flat  Bowel sounds are normal       Palpations: Abdomen is soft  Tenderness: There is no abdominal tenderness  Hernia: No hernia is present     Musculoskeletal:         General: Deformity (Bilateral wasting of the lower extremities  ) present  Normal range of motion  Cervical back: Normal range of motion and neck supple  Lymphadenopathy:      Cervical: No cervical adenopathy  Skin:     General: Skin is warm and dry  Neurological:      General: No focal deficit present  Mental Status: He is alert and oriented to person, place, and time  Cranial Nerves: No cranial nerve deficit  Sensory: No sensory deficit  Gait: Gait normal       Deep Tendon Reflexes: Reflexes are normal and symmetric  Psychiatric:         Mood and Affect: Mood normal          Speech: Speech normal          Behavior: Behavior normal  Behavior is cooperative  Thought Content:  Thought content normal           Zia Pink MD

## 2022-08-09 ENCOUNTER — OFFICE VISIT (OUTPATIENT)
Dept: FAMILY MEDICINE CLINIC | Facility: MEDICAL CENTER | Age: 87
End: 2022-08-09
Payer: COMMERCIAL

## 2022-08-09 VITALS
TEMPERATURE: 99.1 F | BODY MASS INDEX: 18.16 KG/M2 | HEIGHT: 66 IN | HEART RATE: 87 BPM | WEIGHT: 113 LBS | DIASTOLIC BLOOD PRESSURE: 78 MMHG | SYSTOLIC BLOOD PRESSURE: 114 MMHG

## 2022-08-09 DIAGNOSIS — C90.00 MULTIPLE MYELOMA NOT HAVING ACHIEVED REMISSION (HCC): ICD-10-CM

## 2022-08-09 DIAGNOSIS — I10 ESSENTIAL HYPERTENSION: ICD-10-CM

## 2022-08-09 DIAGNOSIS — Z23 ENCOUNTER FOR IMMUNIZATION: Primary | ICD-10-CM

## 2022-08-09 DIAGNOSIS — R26.2 AMBULATORY DYSFUNCTION: ICD-10-CM

## 2022-08-09 DIAGNOSIS — E03.9 ACQUIRED HYPOTHYROIDISM: ICD-10-CM

## 2022-08-09 DIAGNOSIS — N18.30 STAGE 3 CHRONIC KIDNEY DISEASE, UNSPECIFIED WHETHER STAGE 3A OR 3B CKD (HCC): ICD-10-CM

## 2022-08-09 DIAGNOSIS — E44.1 MILD PROTEIN-CALORIE MALNUTRITION (HCC): ICD-10-CM

## 2022-08-09 PROCEDURE — 99214 OFFICE O/P EST MOD 30 MIN: CPT | Performed by: FAMILY MEDICINE

## 2022-08-09 PROCEDURE — 1170F FXNL STATUS ASSESSED: CPT | Performed by: FAMILY MEDICINE

## 2022-08-09 PROCEDURE — 1160F RVW MEDS BY RX/DR IN RCRD: CPT | Performed by: FAMILY MEDICINE

## 2022-08-09 PROCEDURE — 3288F FALL RISK ASSESSMENT DOCD: CPT | Performed by: FAMILY MEDICINE

## 2022-08-09 PROCEDURE — 1125F AMNT PAIN NOTED PAIN PRSNT: CPT | Performed by: FAMILY MEDICINE

## 2022-08-09 PROCEDURE — G0439 PPPS, SUBSEQ VISIT: HCPCS | Performed by: FAMILY MEDICINE

## 2022-08-09 PROCEDURE — G0009 ADMIN PNEUMOCOCCAL VACCINE: HCPCS | Performed by: FAMILY MEDICINE

## 2022-08-09 PROCEDURE — 1101F PT FALLS ASSESS-DOCD LE1/YR: CPT | Performed by: FAMILY MEDICINE

## 2022-08-09 PROCEDURE — 3725F SCREEN DEPRESSION PERFORMED: CPT | Performed by: FAMILY MEDICINE

## 2022-08-09 PROCEDURE — 90677 PCV20 VACCINE IM: CPT | Performed by: FAMILY MEDICINE

## 2022-08-09 RX ORDER — DEXAMETHASONE 0.5 MG/1
0.5 TABLET ORAL 2 TIMES DAILY WITH MEALS
Qty: 60 TABLET | Refills: 0
Start: 2022-08-09 | End: 2022-08-24

## 2022-08-09 RX ORDER — DEXAMETHASONE 0.5 MG/1
0.5 TABLET ORAL 2 TIMES DAILY WITH MEALS
Qty: 60 TABLET | Refills: 0
Start: 2022-08-09 | End: 2022-08-09

## 2022-08-09 NOTE — ASSESSMENT & PLAN NOTE
Lab Results   Component Value Date    EGFR 39 06/28/2022    EGFR 45 06/01/2022    EGFR 56 05/02/2022    CREATININE 1 47 (H) 06/28/2022    CREATININE 1 32 (H) 06/01/2022    CREATININE 1 11 05/02/2022     His estimated GFR are going down slowly  He has multiple myeloma and has had chemo  I recommended him to keep well hydrated, no NSAIDs, keep blood pressure under control  We will check another BMP soon

## 2022-08-09 NOTE — ASSESSMENT & PLAN NOTE
He is now under palliative care  He did not tolerate the chemotherapy  However he is taking some Decadron  He looks a lot better than when I saw him last time when he was finished with a course of chemotherapy  He obviously feels a lot better and he is in a good place of mine  Continue palliation only

## 2022-08-09 NOTE — ASSESSMENT & PLAN NOTE
He has been under chemotherapy for multiple myeloma  It took a lot out of him  He has a hard time walking and he wants to get out of the wheelchair  Will refer to physical therapy for ambulatory training

## 2022-08-24 ENCOUNTER — TELEPHONE (OUTPATIENT)
Dept: HEMATOLOGY ONCOLOGY | Facility: CLINIC | Age: 87
End: 2022-08-24

## 2022-08-24 DIAGNOSIS — C90.00 MULTIPLE MYELOMA NOT HAVING ACHIEVED REMISSION (HCC): ICD-10-CM

## 2022-08-24 RX ORDER — DEXAMETHASONE 4 MG/1
40 TABLET ORAL WEEKLY
Qty: 40 TABLET | Refills: 0 | Status: SHIPPED | OUTPATIENT
Start: 2022-08-24 | End: 2022-09-07 | Stop reason: SDUPTHER

## 2022-08-24 RX ORDER — OMEPRAZOLE 20 MG/1
20 CAPSULE, DELAYED RELEASE ORAL DAILY
Qty: 30 CAPSULE | Refills: 0 | Status: SHIPPED | OUTPATIENT
Start: 2022-08-24

## 2022-08-24 NOTE — TELEPHONE ENCOUNTER
I called Warden Reyes, patient's niece today to clarify if patient was on hospice, as suggested at last office visit with Dr Moustapha White on 7/20/22  [facility] states that they have not initiated hospice services or palliative services yet because pt "feels so well"  He was prescribed Dexamethasone 40mg weekly by Cody Oconnor on 7/5 with 1 RF and has been taking as directed since then  Warden Reyes states pt is doing great on these steroids and would like to continue taking them  I suggested that they talk about bringing hospice on board as they can continue with care, and DME equipment  I will send one more month refill in of Dexamethasone 40mg weekly along with Omeprazole 20mg daily to pharmacy  Should patient decide that they do not want hospice yet, a follow up appt will be needed for further refills  Warden Reyes expressed understanding and was appreciative of the call

## 2022-09-07 ENCOUNTER — OFFICE VISIT (OUTPATIENT)
Dept: FAMILY MEDICINE CLINIC | Facility: MEDICAL CENTER | Age: 87
End: 2022-09-07
Payer: COMMERCIAL

## 2022-09-07 VITALS
HEART RATE: 88 BPM | HEIGHT: 66 IN | SYSTOLIC BLOOD PRESSURE: 134 MMHG | DIASTOLIC BLOOD PRESSURE: 70 MMHG | OXYGEN SATURATION: 98 % | WEIGHT: 123.2 LBS | BODY MASS INDEX: 19.8 KG/M2

## 2022-09-07 DIAGNOSIS — N18.30 STAGE 3 CHRONIC KIDNEY DISEASE, UNSPECIFIED WHETHER STAGE 3A OR 3B CKD (HCC): ICD-10-CM

## 2022-09-07 DIAGNOSIS — E03.9 ACQUIRED HYPOTHYROIDISM: ICD-10-CM

## 2022-09-07 DIAGNOSIS — C90.00 MULTIPLE MYELOMA NOT HAVING ACHIEVED REMISSION (HCC): Primary | ICD-10-CM

## 2022-09-07 DIAGNOSIS — I10 ESSENTIAL HYPERTENSION: ICD-10-CM

## 2022-09-07 PROCEDURE — 1160F RVW MEDS BY RX/DR IN RCRD: CPT | Performed by: FAMILY MEDICINE

## 2022-09-07 PROCEDURE — 99214 OFFICE O/P EST MOD 30 MIN: CPT | Performed by: FAMILY MEDICINE

## 2022-09-07 NOTE — ASSESSMENT & PLAN NOTE
Lab Results   Component Value Date    EGFR 39 06/28/2022    EGFR 45 06/01/2022    EGFR 56 05/02/2022    CREATININE 1 47 (H) 06/28/2022    CREATININE 1 32 (H) 06/01/2022    CREATININE 1 11 05/02/2022     Continue staying well hydrated, avoid NSAIDs, keep his blood pressure good control

## 2022-09-07 NOTE — PROGRESS NOTES
Assessment/Plan:    Multiple myeloma not having achieved remission ([facility] )  He is taking dexamethasone, he is not getting chemotherapy  I am happy to take over prescription, I just want to go ahead from his oncologist     Stage 3 chronic kidney disease, unspecified whether stage 3a or 3b CKD ([facility] )  Lab Results   Component Value Date    EGFR 39 06/28/2022    EGFR 45 06/01/2022    EGFR 56 05/02/2022    CREATININE 1 47 (H) 06/28/2022    CREATININE 1 32 (H) 06/01/2022    CREATININE 1 11 05/02/2022     Continue staying well hydrated, avoid NSAIDs, keep his blood pressure good control  Essential hypertension  Blood pressure today is 134/70  He is only taking Dyazide, and a low-salt diet  Continue treatment  Acquired hypothyroidism  He is taking levothyroxine 100 mcg  He will continue that dose  Diagnoses and all orders for this visit:    Multiple myeloma not having achieved remission ([place] [address] )    Stage 3 chronic kidney disease, unspecified whether stage 3a or 3b CKD ([place] [address] )    Essential hypertension    Acquired hypothyroidism        Subjective:      Patient ID: Magdiel Parrish is a 80 y o  male  This is a delightful 51-year-old man who lives in personal care  He has a full and interesting life  He comes in with his niece who is very involved in his care  He has multiple myeloma, he did not tolerate chemo however he is only taking dexamethasone  After stopping the chemo, he looks so much better  He is alert and active engaged  The following portions of the patient's history were reviewed and updated as appropriate: allergies, current medications, past family history, past medical history, past social history, past surgical history and problem list     Review of Systems   Constitutional: Negative  HENT: Negative  Eyes: Negative  Respiratory: Negative  Cardiovascular: Negative  Gastrointestinal: Negative  Genitourinary: Negative  Musculoskeletal: Negative      Neurological: Negative  Psychiatric/Behavioral: Negative  Objective:      /70 (BP Location: Left arm, Patient Position: Sitting, Cuff Size: Adult)   Pulse 88   Ht 5' 6" (1 676 m)   Wt 55 9 kg (123 lb 3 2 oz)   SpO2 98%   BMI 19 89 kg/m²          Physical Exam  Constitutional:       General: He is not in acute distress  Appearance: He is well-developed  He is not diaphoretic  HENT:      Head: Normocephalic  Right Ear: External ear normal       Left Ear: External ear normal       Nose: Nose normal       Mouth/Throat:      Mouth: Mucous membranes are moist    Eyes:      Extraocular Movements: Extraocular movements intact  Conjunctiva/sclera: Conjunctivae normal       Pupils: Pupils are equal, round, and reactive to light  Cardiovascular:      Rate and Rhythm: Normal rate  Pulses: Normal pulses  Pulmonary:      Effort: Pulmonary effort is normal  No respiratory distress  Musculoskeletal:         General: Normal range of motion  Cervical back: Normal range of motion  Skin:     General: Skin is warm and dry  Neurological:      General: No focal deficit present  Mental Status: He is alert and oriented to person, place, and time  Psychiatric:         Behavior: Behavior normal          Thought Content:  Thought content normal          Judgment: Judgment normal

## 2022-09-07 NOTE — ASSESSMENT & PLAN NOTE
He is taking dexamethasone, he is not getting chemotherapy      I am happy to take over prescription, I just want to go ahead from his oncologist

## 2022-09-24 DIAGNOSIS — I10 ESSENTIAL HYPERTENSION: ICD-10-CM

## 2022-09-24 DIAGNOSIS — E03.9 ACQUIRED HYPOTHYROIDISM: ICD-10-CM

## 2022-09-27 RX ORDER — TRIAMTERENE AND HYDROCHLOROTHIAZIDE 37.5; 25 MG/1; MG/1
1 CAPSULE ORAL EVERY MORNING
Qty: 90 CAPSULE | Refills: 0 | Status: SHIPPED | OUTPATIENT
Start: 2022-09-27 | End: 2022-10-27

## 2022-09-27 RX ORDER — LEVOTHYROXINE SODIUM 0.1 MG/1
100 TABLET ORAL DAILY
Qty: 90 TABLET | Refills: 0 | Status: SHIPPED | OUTPATIENT
Start: 2022-09-27

## 2022-09-27 RX ORDER — ASPIRIN 81 MG/1
81 TABLET ORAL DAILY
Qty: 90 TABLET | Refills: 0 | Status: SHIPPED | OUTPATIENT
Start: 2022-09-27 | End: 2022-10-27

## 2022-10-12 PROBLEM — Z00.00 GENERAL MEDICAL EXAM: Status: RESOLVED | Noted: 2020-12-11 | Resolved: 2022-10-12

## 2022-10-17 ENCOUNTER — HOSPITAL ENCOUNTER (INPATIENT)
Facility: HOSPITAL | Age: 87
LOS: 9 days | Discharge: NURSING, REHAB OR LONG-TERM CARE | DRG: 682 | End: 2022-10-27
Attending: EMERGENCY MEDICINE | Admitting: INTERNAL MEDICINE
Payer: COMMERCIAL

## 2022-10-17 ENCOUNTER — APPOINTMENT (EMERGENCY)
Dept: RADIOLOGY | Facility: HOSPITAL | Age: 87
DRG: 682 | End: 2022-10-17
Payer: COMMERCIAL

## 2022-10-17 ENCOUNTER — APPOINTMENT (EMERGENCY)
Dept: CT IMAGING | Facility: HOSPITAL | Age: 87
DRG: 682 | End: 2022-10-17
Payer: COMMERCIAL

## 2022-10-17 DIAGNOSIS — R42 DIZZINESS: Primary | ICD-10-CM

## 2022-10-17 DIAGNOSIS — R26.2 AMBULATORY DYSFUNCTION: ICD-10-CM

## 2022-10-17 DIAGNOSIS — R29.6 FREQUENT FALLS: ICD-10-CM

## 2022-10-17 DIAGNOSIS — K92.1 HEMATOCHEZIA: ICD-10-CM

## 2022-10-17 DIAGNOSIS — D64.9 ANEMIA, UNSPECIFIED TYPE: ICD-10-CM

## 2022-10-17 DIAGNOSIS — I10 ESSENTIAL HYPERTENSION: ICD-10-CM

## 2022-10-17 DIAGNOSIS — E46 PROTEIN-CALORIE MALNUTRITION, UNSPECIFIED SEVERITY (HCC): ICD-10-CM

## 2022-10-17 PROBLEM — E87.1 HYPONATREMIA: Status: ACTIVE | Noted: 2022-10-17

## 2022-10-17 LAB
2HR DELTA HS TROPONIN: -1 NG/L
4HR DELTA HS TROPONIN: 1 NG/L
ALBUMIN SERPL BCP-MCNC: 3.4 G/DL (ref 3.5–5)
ALP SERPL-CCNC: 72 U/L (ref 46–116)
ALT SERPL W P-5'-P-CCNC: 18 U/L (ref 12–78)
ANION GAP SERPL CALCULATED.3IONS-SCNC: 11 MMOL/L (ref 4–13)
ANION GAP SERPL CALCULATED.3IONS-SCNC: 9 MMOL/L (ref 4–13)
APTT PPP: 25 SECONDS (ref 23–37)
AST SERPL W P-5'-P-CCNC: 20 U/L (ref 5–45)
BASOPHILS # BLD AUTO: 0.01 THOUSANDS/ÂΜL (ref 0–0.1)
BASOPHILS NFR BLD AUTO: 0 % (ref 0–1)
BILIRUB SERPL-MCNC: 1.24 MG/DL (ref 0.2–1)
BILIRUB UR QL STRIP: NEGATIVE
BUN SERPL-MCNC: 34 MG/DL (ref 5–25)
BUN SERPL-MCNC: 41 MG/DL (ref 5–25)
CALCIUM ALBUM COR SERPL-MCNC: 9.3 MG/DL (ref 8.3–10.1)
CALCIUM SERPL-MCNC: 7.9 MG/DL (ref 8.3–10.1)
CALCIUM SERPL-MCNC: 8.8 MG/DL (ref 8.3–10.1)
CARDIAC TROPONIN I PNL SERPL HS: 7 NG/L
CARDIAC TROPONIN I PNL SERPL HS: 8 NG/L
CARDIAC TROPONIN I PNL SERPL HS: 9 NG/L
CHLORIDE SERPL-SCNC: 103 MMOL/L (ref 96–108)
CHLORIDE SERPL-SCNC: 94 MMOL/L (ref 96–108)
CK SERPL-CCNC: 54 U/L (ref 39–308)
CLARITY UR: CLEAR
CO2 SERPL-SCNC: 24 MMOL/L (ref 21–32)
CO2 SERPL-SCNC: 27 MMOL/L (ref 21–32)
COLOR UR: ABNORMAL
CREAT SERPL-MCNC: 1.14 MG/DL (ref 0.6–1.3)
CREAT SERPL-MCNC: 1.39 MG/DL (ref 0.6–1.3)
EOSINOPHIL # BLD AUTO: 0 THOUSAND/ÂΜL (ref 0–0.61)
EOSINOPHIL NFR BLD AUTO: 0 % (ref 0–6)
ERYTHROCYTE [DISTWIDTH] IN BLOOD BY AUTOMATED COUNT: 13.7 % (ref 11.6–15.1)
GFR SERPL CREATININE-BSD FRML MDRD: 42 ML/MIN/1.73SQ M
GFR SERPL CREATININE-BSD FRML MDRD: 54 ML/MIN/1.73SQ M
GLUCOSE SERPL-MCNC: 133 MG/DL (ref 65–140)
GLUCOSE SERPL-MCNC: 133 MG/DL (ref 65–140)
GLUCOSE UR STRIP-MCNC: NEGATIVE MG/DL
HCT VFR BLD AUTO: 32.1 % (ref 36.5–49.3)
HGB BLD-MCNC: 11 G/DL (ref 12–17)
HGB UR QL STRIP.AUTO: NEGATIVE
IMM GRANULOCYTES # BLD AUTO: 0.14 THOUSAND/UL (ref 0–0.2)
IMM GRANULOCYTES NFR BLD AUTO: 1 % (ref 0–2)
INR PPP: 1.01 (ref 0.84–1.19)
KETONES UR STRIP-MCNC: NEGATIVE MG/DL
LEUKOCYTE ESTERASE UR QL STRIP: NEGATIVE
LYMPHOCYTES # BLD AUTO: 1.64 THOUSANDS/ÂΜL (ref 0.6–4.47)
LYMPHOCYTES NFR BLD AUTO: 11 % (ref 14–44)
MCH RBC QN AUTO: 33.1 PG (ref 26.8–34.3)
MCHC RBC AUTO-ENTMCNC: 34.3 G/DL (ref 31.4–37.4)
MCV RBC AUTO: 97 FL (ref 82–98)
MONOCYTES # BLD AUTO: 1.55 THOUSAND/ÂΜL (ref 0.17–1.22)
MONOCYTES NFR BLD AUTO: 10 % (ref 4–12)
NEUTROPHILS # BLD AUTO: 11.99 THOUSANDS/ÂΜL (ref 1.85–7.62)
NEUTS SEG NFR BLD AUTO: 78 % (ref 43–75)
NITRITE UR QL STRIP: NEGATIVE
NRBC BLD AUTO-RTO: 0 /100 WBCS
PH UR STRIP.AUTO: 5.5 [PH]
PLATELET # BLD AUTO: 180 THOUSANDS/UL (ref 149–390)
PMV BLD AUTO: 10.1 FL (ref 8.9–12.7)
POTASSIUM SERPL-SCNC: 3.1 MMOL/L (ref 3.5–5.3)
POTASSIUM SERPL-SCNC: 3.8 MMOL/L (ref 3.5–5.3)
PROT SERPL-MCNC: 6.4 G/DL (ref 6.4–8.4)
PROT UR STRIP-MCNC: NEGATIVE MG/DL
PROTHROMBIN TIME: 13.1 SECONDS (ref 11.6–14.5)
RBC # BLD AUTO: 3.32 MILLION/UL (ref 3.88–5.62)
SODIUM SERPL-SCNC: 130 MMOL/L (ref 135–147)
SODIUM SERPL-SCNC: 138 MMOL/L (ref 135–147)
SP GR UR STRIP.AUTO: >=1.05 (ref 1–1.03)
T4 FREE SERPL-MCNC: 0.82 NG/DL (ref 0.76–1.46)
TSH SERPL DL<=0.05 MIU/L-ACNC: 6.19 UIU/ML (ref 0.45–4.5)
UROBILINOGEN UR STRIP-ACNC: <2 MG/DL
WBC # BLD AUTO: 15.33 THOUSAND/UL (ref 4.31–10.16)

## 2022-10-17 PROCEDURE — 71260 CT THORAX DX C+: CPT

## 2022-10-17 PROCEDURE — 99285 EMERGENCY DEPT VISIT HI MDM: CPT

## 2022-10-17 PROCEDURE — 36415 COLL VENOUS BLD VENIPUNCTURE: CPT | Performed by: EMERGENCY MEDICINE

## 2022-10-17 PROCEDURE — 84443 ASSAY THYROID STIM HORMONE: CPT | Performed by: PHYSICIAN ASSISTANT

## 2022-10-17 PROCEDURE — 73552 X-RAY EXAM OF FEMUR 2/>: CPT

## 2022-10-17 PROCEDURE — 99220 PR INITIAL OBSERVATION CARE/DAY 70 MINUTES: CPT | Performed by: INTERNAL MEDICINE

## 2022-10-17 PROCEDURE — 85025 COMPLETE CBC W/AUTO DIFF WBC: CPT | Performed by: EMERGENCY MEDICINE

## 2022-10-17 PROCEDURE — 96374 THER/PROPH/DIAG INJ IV PUSH: CPT

## 2022-10-17 PROCEDURE — 84484 ASSAY OF TROPONIN QUANT: CPT | Performed by: EMERGENCY MEDICINE

## 2022-10-17 PROCEDURE — 81003 URINALYSIS AUTO W/O SCOPE: CPT | Performed by: EMERGENCY MEDICINE

## 2022-10-17 PROCEDURE — 70470 CT HEAD/BRAIN W/O & W/DYE: CPT

## 2022-10-17 PROCEDURE — 73502 X-RAY EXAM HIP UNI 2-3 VIEWS: CPT

## 2022-10-17 PROCEDURE — 84439 ASSAY OF FREE THYROXINE: CPT | Performed by: PHYSICIAN ASSISTANT

## 2022-10-17 PROCEDURE — 85730 THROMBOPLASTIN TIME PARTIAL: CPT | Performed by: EMERGENCY MEDICINE

## 2022-10-17 PROCEDURE — 72125 CT NECK SPINE W/O DYE: CPT

## 2022-10-17 PROCEDURE — 93005 ELECTROCARDIOGRAM TRACING: CPT

## 2022-10-17 PROCEDURE — 96361 HYDRATE IV INFUSION ADD-ON: CPT

## 2022-10-17 PROCEDURE — 73560 X-RAY EXAM OF KNEE 1 OR 2: CPT

## 2022-10-17 PROCEDURE — 74177 CT ABD & PELVIS W/CONTRAST: CPT

## 2022-10-17 PROCEDURE — 80053 COMPREHEN METABOLIC PANEL: CPT | Performed by: EMERGENCY MEDICINE

## 2022-10-17 PROCEDURE — 85610 PROTHROMBIN TIME: CPT | Performed by: EMERGENCY MEDICINE

## 2022-10-17 PROCEDURE — 99285 EMERGENCY DEPT VISIT HI MDM: CPT | Performed by: EMERGENCY MEDICINE

## 2022-10-17 PROCEDURE — 82550 ASSAY OF CK (CPK): CPT | Performed by: EMERGENCY MEDICINE

## 2022-10-17 PROCEDURE — 80048 BASIC METABOLIC PNL TOTAL CA: CPT | Performed by: PHYSICIAN ASSISTANT

## 2022-10-17 RX ORDER — LEVOTHYROXINE SODIUM 0.1 MG/1
100 TABLET ORAL
Status: DISCONTINUED | OUTPATIENT
Start: 2022-10-18 | End: 2022-10-27 | Stop reason: HOSPADM

## 2022-10-17 RX ORDER — ACETAMINOPHEN 325 MG/1
650 TABLET ORAL EVERY 6 HOURS PRN
Status: DISCONTINUED | OUTPATIENT
Start: 2022-10-17 | End: 2022-10-27 | Stop reason: HOSPADM

## 2022-10-17 RX ORDER — DEXAMETHASONE 4 MG/1
4 TABLET ORAL
Status: DISCONTINUED | OUTPATIENT
Start: 2022-10-18 | End: 2022-10-27 | Stop reason: HOSPADM

## 2022-10-17 RX ORDER — ONDANSETRON 2 MG/ML
4 INJECTION INTRAMUSCULAR; INTRAVENOUS EVERY 6 HOURS PRN
Status: DISCONTINUED | OUTPATIENT
Start: 2022-10-17 | End: 2022-10-27 | Stop reason: HOSPADM

## 2022-10-17 RX ORDER — ENOXAPARIN SODIUM 100 MG/ML
40 INJECTION SUBCUTANEOUS DAILY
Status: DISCONTINUED | OUTPATIENT
Start: 2022-10-17 | End: 2022-10-17

## 2022-10-17 RX ORDER — MAGNESIUM HYDROXIDE/ALUMINUM HYDROXICE/SIMETHICONE 120; 1200; 1200 MG/30ML; MG/30ML; MG/30ML
30 SUSPENSION ORAL EVERY 6 HOURS PRN
Status: DISCONTINUED | OUTPATIENT
Start: 2022-10-17 | End: 2022-10-27 | Stop reason: HOSPADM

## 2022-10-17 RX ORDER — SODIUM CHLORIDE 9 MG/ML
125 INJECTION, SOLUTION INTRAVENOUS CONTINUOUS
Status: DISCONTINUED | OUTPATIENT
Start: 2022-10-17 | End: 2022-10-21

## 2022-10-17 RX ORDER — ALBUMIN (HUMAN) 12.5 G/50ML
25 SOLUTION INTRAVENOUS ONCE
Status: COMPLETED | OUTPATIENT
Start: 2022-10-17 | End: 2022-10-18

## 2022-10-17 RX ORDER — ENOXAPARIN SODIUM 100 MG/ML
30 INJECTION SUBCUTANEOUS DAILY
Status: DISCONTINUED | OUTPATIENT
Start: 2022-10-18 | End: 2022-10-18

## 2022-10-17 RX ORDER — FENTANYL CITRATE 50 UG/ML
25 INJECTION, SOLUTION INTRAMUSCULAR; INTRAVENOUS ONCE
Status: COMPLETED | OUTPATIENT
Start: 2022-10-17 | End: 2022-10-17

## 2022-10-17 RX ORDER — POLYETHYLENE GLYCOL 3350 17 G/17G
17 POWDER, FOR SOLUTION ORAL DAILY PRN
Status: DISCONTINUED | OUTPATIENT
Start: 2022-10-17 | End: 2022-10-27 | Stop reason: HOSPADM

## 2022-10-17 RX ORDER — PANTOPRAZOLE SODIUM 40 MG/1
40 TABLET, DELAYED RELEASE ORAL
Status: DISCONTINUED | OUTPATIENT
Start: 2022-10-18 | End: 2022-10-27 | Stop reason: HOSPADM

## 2022-10-17 RX ORDER — ASPIRIN 81 MG/1
81 TABLET ORAL DAILY
Status: DISCONTINUED | OUTPATIENT
Start: 2022-10-18 | End: 2022-10-19

## 2022-10-17 RX ADMIN — SODIUM CHLORIDE 125 ML/HR: 0.9 INJECTION, SOLUTION INTRAVENOUS at 14:37

## 2022-10-17 RX ADMIN — SODIUM CHLORIDE 1000 ML: 0.9 INJECTION, SOLUTION INTRAVENOUS at 16:56

## 2022-10-17 RX ADMIN — SODIUM CHLORIDE 1000 ML: 0.9 INJECTION, SOLUTION INTRAVENOUS at 09:45

## 2022-10-17 RX ADMIN — ALBUMIN (HUMAN) 25 G: 0.25 INJECTION, SOLUTION INTRAVENOUS at 16:55

## 2022-10-17 RX ADMIN — FENTANYL CITRATE 25 MCG: 50 INJECTION INTRAMUSCULAR; INTRAVENOUS at 08:29

## 2022-10-17 RX ADMIN — IOHEXOL 100 ML: 300 INJECTION, SOLUTION INTRAVENOUS at 08:43

## 2022-10-17 NOTE — H&P
[address] 5/24/1927, 80 y o  male MRN: [id number]  Unit/Bed#: -01 Encounter: [id number]  Primary Care Provider: Kt Flowers MD   Date and time admitted to hospital: 10/17/2022  8:16 AM    * VINICIUS (acute kidney injury) ([facility] 75 )  Assessment & Plan  · Creat 1 39 on admission, possibly related to dehydration vs medication side effect  · Hold home triamterene-hydrochlorothiazide  · Initiate 0 9% NS IV fluids  · Monitor labs and I/Os  · Follow up BMP in the morning  · Avoid hypotension  · Avoid nephrotoxins    Hyponatremia  Assessment & Plan  · Sodium noted to be 130 on admission  · 0 9% NS IV fluids ordered  · Repeat BMP tonight to monitor rate of correction    Ambulatory dysfunction  Assessment & Plan  Patient with dizziness, loss of balance and several falls over the last few days  · Fall precautions  · PT/OT consult  · Order orthostatics    Acquired hypothyroidism  Assessment & Plan  · Continue home levothyroxine  · Will order TSH    Protein calorie malnutrition ([place] [address] )  Assessment & Plan  BMI Findings: Body mass index is 18 75 kg/m²  · Consult Nutrition for evaluation      VTE Pharmacologic Prophylaxis: VTE Score: 7 High Risk (Score >/= 5) - Pharmacological DVT Prophylaxis Ordered: enoxaparin (Lovenox)  Sequential Compression Devices Ordered  Code Status: Level 1 - Full Code   Discussion with family: Spoke with patient's cousin at bedside  Anticipated Length of Stay: Patient will be admitted on an observation basis with an anticipated length of stay of less than 2 midnights secondary to weakness, falls, dehydration  Total Time for Visit, including Counseling / Coordination of Care: 60 minutes Greater than 50% of this total time spent on direct patient counseling and coordination of care      Chief Complaint: Dizziness and falls    History of Present Illness:  Socorro Khan is a 80 y o  male with a PMH of multiple myeloma on dexamethasone, CKD 3, HTN, hypothyroidism who presents with dizziness and frequent falls  Patient's cousin at bedside was present to assist with history, reports that patient fell several times over the past couple of days  This morning patient was found on the ground s/p fall which prompted visit to the hospital  Denies confusion or changes to mental status  Patient reports that he feels dizzy and off balance when he stands up  He lives at an assisted living facility and ambulates with a walker  Patient's cousin notes that recently his dexamethasone dosing has been adjusted  Review of Systems:  Review of Systems   Constitutional: Negative for appetite change, chills and fever  Respiratory: Negative for cough and chest tightness  Cardiovascular: Negative for chest pain, palpitations and leg swelling  Gastrointestinal: Negative for abdominal pain, diarrhea, nausea and vomiting  Genitourinary: Negative for dysuria and hematuria  Skin: Negative  Neurological: Positive for dizziness  Psychiatric/Behavioral: Negative  All other systems reviewed and are negative  Past Medical and Surgical History:   Past Medical History:   Diagnosis Date   • Arthritis    • Cancer ([facility] ) 2019   • Gout    • Hypertension    • Hyperthyroidism    • Memory loss    • Multiple myeloma ([facility] )    • Myeloma ([facility] )    • Sleep disturbance    • Thyroid disease        Past Surgical History:   Procedure Laterality Date   • IR BIOPSY BONE MARROW  5/11/2021   • KIDNEY SURGERY      description: 30 yrs ago   • PROSTATE SURGERY      description: stone removal 30 yrs ago   • REPLACEMENT TOTAL KNEE BILATERAL      description: 18 yrs ago       Meds/Allergies:  Prior to Admission medications    Medication Sig Start Date End Date Taking?  Authorizing Provider   aspirin (ECOTRIN LOW STRENGTH) 81 mg EC tablet Take 1 tablet (81 mg total) by mouth daily 9/27/22  Yes Gisela Freedman MD   Cholecalciferol (VITAMIN D3 PO) Take by mouth   Yes Historical Provider, MD levothyroxine 100 mcg tablet Take 1 tablet (100 mcg total) by mouth daily 22  Yes Jonn Babinski, MD   triamterene-hydrochlorothiazide (DYAZIDE) 37 5-25 mg per capsule Take 1 capsule by mouth every morning 22  Yes Jonn Babinski, MD   dexamethasone (DECADRON) 4 mg tablet Take 1 tablet (4 mg total) by mouth daily with breakfast 22   Jonn Babinski, MD   omeprazole (PriLOSEC) 20 mg delayed release capsule Take 1 capsule (20 mg total) by mouth daily 22   Connor Villalobos MD     I have reveiwed home medications using records provided by [facility]  Allergies: No Known Allergies    Social History:  Marital Status: Single   Occupation: Retired  Patient Pre-hospital Living Situation: [address] [facility] [facility] [address] [facility]   Patient Pre-hospital Level of Mobility: walks with walker  Patient Pre-hospital Diet Restrictions: None  Substance Use History:   Social History     Substance and Sexual Activity   Alcohol Use Not Currently    Comment: social "burbon once in a while"     Social History     Tobacco Use   Smoking Status Former Smoker   • Packs/day: 0 50   • Years: 25 00   • Pack years: 12 50   • Quit date: 1980   • Years since quittin 8   Smokeless Tobacco Never Used   Tobacco Comment    "I smoked years ago"     Social History     Substance and Sexual Activity   Drug Use No       Family History:  Family History   Problem Relation Age of Onset   • Other Mother         Brain tumor   • Cancer Sister    • Diabetes Brother    • Arthritis Family    • Diabetes Family    • Other Family         knee replacement   • Thyroid disease Family        Physical Exam:     Vitals:   Blood Pressure: (!) 79/64 (10/17/22 1442)  Pulse: 88 (10/17/22 1442)  Temperature: 97 5 °F (36 4 °C) (10/17/22 1336)  Temp Source: Oral (10/17/22 0823)  Respirations: 16 (10/17/22 1336)  Weight - Scale: 52 7 kg (116 lb 2 9 oz) (10/17/22 0915)  SpO2: 97 % (10/17/22 1442)    Physical Exam  Vitals reviewed     Constitutional:       General: He is not in acute distress  Appearance: He is not toxic-appearing  HENT:      Head: Normocephalic and atraumatic  Mouth/Throat:      Mouth: Mucous membranes are dry  Eyes:      General: No scleral icterus  Comments: Arcus senilis bilat    Cardiovascular:      Rate and Rhythm: Normal rate and regular rhythm  Pulses: Normal pulses  Heart sounds: No murmur heard  Pulmonary:      Effort: Pulmonary effort is normal  No respiratory distress  Breath sounds: Normal breath sounds  Abdominal:      General: Bowel sounds are normal  There is no distension  Palpations: Abdomen is soft  Tenderness: There is no abdominal tenderness  Musculoskeletal:      Right lower leg: No edema  Left lower leg: No edema  Skin:     General: Skin is warm and dry  Neurological:      Mental Status: He is alert and oriented to person, place, and time  Mental status is at baseline     Psychiatric:         Mood and Affect: Mood normal          Behavior: Behavior normal           Additional Data:     Lab Results:  Results from last 7 days   Lab Units 10/17/22  0829   WBC Thousand/uL 15 33*   HEMOGLOBIN g/dL 11 0*   HEMATOCRIT % 32 1*   PLATELETS Thousands/uL 180   NEUTROS PCT % 78*   LYMPHS PCT % 11*   MONOS PCT % 10   EOS PCT % 0     Results from last 7 days   Lab Units 10/17/22  0829   SODIUM mmol/L 130*   POTASSIUM mmol/L 3 8   CHLORIDE mmol/L 94*   CO2 mmol/L 27   BUN mg/dL 41*   CREATININE mg/dL 1 39*   ANION GAP mmol/L 9   CALCIUM mg/dL 8 8   ALBUMIN g/dL 3 4*   TOTAL BILIRUBIN mg/dL 1 24*   ALK PHOS U/L 72   ALT U/L 18   AST U/L 20   GLUCOSE RANDOM mg/dL 133     Results from last 7 days   Lab Units 10/17/22  0829   INR  1 01                   Imaging: Reviewed radiology reports from this admission including: chest CT scan, abdominal/pelvic CT, CT head and xray(s)  XR hip/pelv 2-3 vws left if performed   ED Interpretation by Ruthie Paz DO (10/17 1983)   Progressive degenerative changes     No acute osseous abnormality  Final Result by Dennis Coon MD (63/39 5573)   Progressive degenerative changes      No acute osseous abnormality  Workstation performed: [id number]         XR femur 2 views LEFT   ED Interpretation by Costa Camarena DO (10/17 7827)   No acute osseous abnormality  Final Result by Dennis Coon MD (63/52 5877)      No acute osseous abnormality  Workstation performed: [id number]         XR knee 1 or 2 vw left   ED Interpretation by Costa Camarena DO (10/17 2871)   Intact total knee arthroplasty  Dystrophic calcifications  No acute osseous abnormality      Findings are stable      Final Result by Dennis Coon MD (10/17 7990)   Intact total knee arthroplasty   Dystrophic calcifications   No acute osseous abnormality  Findings are stable      Workstation performed: [id number]         CT spine cervical without contrast   ED Interpretation by Costa Camarena DO (10/17 3911)   1  No acute cervical spine fracture or traumatic malalignment      2  Heterogeneous marrow attenuation and lucencies of the vertebrae, likely related to reported history of multiple myeloma  Final Result by Chico Zuleta MD (90/41 1887)      1  No acute cervical spine fracture or traumatic malalignment  2   Heterogeneous marrow attenuation and lucencies of the vertebrae, likely related to reported history of multiple myeloma  Workstation performed: [id number]         CT chest abdomen pelvis w contrast   ED Interpretation by Costa Camarena DO (10/17 1008)   1  No CT evidence of acute traumatic injury within chest, abdomen, and pelvis      2   Left upper lobe 1 2 x 1 cm spiculated nodule  Malignancy should be excluded  No prior imaging is available for comparison  Either 3 month follow-up noncontrast CT, PET/CT or tissue sampling may be considered appropriate   Considerations related to the patient's age and/or comorbidities may be used to choose among or alter these recommendations      3  A few additional subcentimeter left lower lobe nodules as described      4  Indeterminate 2 7 cm high attenuating cortical lesion in the upper pole of left kidney      5  Distended urinary bladder with a ureterocele at the base  Correlate for urinary retention  Final Result by Taylor Huber MD (10/17 1331)      1  No CT evidence of acute traumatic injury within chest, abdomen, and pelvis  2   Left upper lobe 1 2 x 1 cm spiculated nodule  Malignancy should be excluded  No prior imaging is available for comparison  Either 3 month follow-up noncontrast CT, PET/CT or tissue sampling may be considered appropriate  Considerations related to    the patient's age and/or comorbidities may be used to choose among or alter these recommendations  3   A few additional subcentimeter left lower lobe nodules as described  4   Indeterminate 2 7 cm high attenuating cortical lesion in the upper pole of left kidney  5   Distended urinary bladder with a ureterocele at the base  Correlate for urinary retention  The study was marked in EPIC for significant notification  Workstation performed: [id number]         CT head w wo contrast   ED Interpretation by Phillip Caraballo DO (10/17 8729)   1  No acute intracranial CT abnormality      2   Planum sphenoidale 2 8 cm meningioma, grossly stable compared to MRI 5/28/2019 allowing for modality differences  Associated moderate subjacent inferior bifrontal parenchymal edema without significant mass effect          Final Result by Taylor Huber MD (10/17 0384)      1  No acute intracranial CT abnormality  2   Planum sphenoidale 2 8 cm meningioma, grossly stable compared to MRI 5/28/2019 allowing for modality differences  Associated moderate subjacent inferior bifrontal parenchymal edema without significant mass effect  Workstation performed: [id number]             EKG and Other Studies Reviewed on Admission:   · EKG: NSR  HR 90s  ** Please Note: This note has been constructed using a voice recognition system   **

## 2022-10-17 NOTE — ASSESSMENT & PLAN NOTE
· Creat 1 39 on admission, possibly related to dehydration vs medication side effect  · Hold home triamterene-hydrochlorothiazide  · Initiate 0 9% NS IV fluids  · Monitor labs and I/Os  · Follow up BMP in the morning  · Avoid hypotension  · Avoid nephrotoxins

## 2022-10-17 NOTE — ED PROVIDER NOTES
History  Chief Complaint   Patient presents with   • Dizziness     Pt has been experiencing frequent falls over the past several days  Pt was found laying on the floor this morning at the assisted living home where he stays and reported he had been laying there all night after a fall      89 y o  M presents for frequent falls  Nicholas Avendano 4 times in the past few days  Last night fell, injuring L hip, leg, knee  Presents NV intact distal to injury, no deformity  Diffuse bruising, on ASA  Denies head strike, no LOC  No neck pain  + abd pain but he doesn't recall hitting his abd  States he gets lightheaded before falling  Trauma eval    Airway Intact  Breath sounds equal B/L  Circulation patent, 2+ pulses in all extremities  No deformity, no disability, GCS = 15  Exposure completed - diffuse bruising, no further injury discovered            Prior to Admission Medications   Prescriptions Last Dose Informant Patient Reported? Taking?    Cholecalciferol (VITAMIN D3 PO) 10/16/2022 at Unknown time Family Member Yes Yes   Sig: Take by mouth   aspirin (ECOTRIN LOW STRENGTH) 81 mg EC tablet 10/16/2022 at Unknown time  No Yes   Sig: Take 1 tablet (81 mg total) by mouth daily   dexamethasone (DECADRON) 4 mg tablet   No No   Sig: Take 1 tablet (4 mg total) by mouth daily with breakfast   levothyroxine 100 mcg tablet 10/16/2022 at Unknown time  No Yes   Sig: Take 1 tablet (100 mcg total) by mouth daily   omeprazole (PriLOSEC) 20 mg delayed release capsule   No No   Sig: Take 1 capsule (20 mg total) by mouth daily   triamterene-hydrochlorothiazide (DYAZIDE) 37 5-25 mg per capsule 10/16/2022 at Unknown time  No Yes   Sig: Take 1 capsule by mouth every morning      Facility-Administered Medications Last Administration Doses Remaining   albuterol inhalation solution 2 5 mg None recorded 1          Past Medical History:   Diagnosis Date   • Arthritis    • Cancer ([facility] [address] ) 2019   • Gout    • Hypertension    • Hyperthyroidism    • Memory loss    • Multiple myeloma (HCC)    • Myeloma ([facility] [address] )    • Sleep disturbance    • Thyroid disease        Past Surgical History:   Procedure Laterality Date   • IR BIOPSY BONE MARROW  2021   • KIDNEY SURGERY      description: 30 yrs ago   • PROSTATE SURGERY      description: stone removal 30 yrs ago   • REPLACEMENT TOTAL KNEE BILATERAL      description: 25 yrs ago       Family History   Problem Relation Age of Onset   • Other Mother         Brain tumor   • Cancer Sister    • Diabetes Brother    • Arthritis Family    • Diabetes Family    • Other Family         knee replacement   • Thyroid disease Family      I have reviewed and agree with the history as documented  E-Cigarette/Vaping   • E-Cigarette Use Never User      E-Cigarette/Vaping Substances     Social History     Tobacco Use   • Smoking status: Former Smoker     Packs/day: 0 50     Years: 25 00     Pack years: 12 50     Quit date: 1980     Years since quittin 8   • Smokeless tobacco: Never Used   • Tobacco comment: "I smoked years ago"   Vaping Use   • Vaping Use: Never used   Substance Use Topics   • Alcohol use: Not Currently     Comment: social "burbon once in a while"   • Drug use: No       Review of Systems   Constitutional: Negative for chills and fever  HENT: Negative for congestion and rhinorrhea  Respiratory: Negative for chest tightness and shortness of breath  Cardiovascular: Negative for chest pain and leg swelling  Gastrointestinal: Positive for abdominal pain  Negative for nausea and vomiting  Musculoskeletal: Negative for back pain, neck pain and neck stiffness  L hip, leg, knee pain  Skin: Negative for wound  Diffuse bruising, various ages   Neurological: Positive for dizziness and light-headedness  Negative for weakness, numbness and headaches  Hematological: Bruises/bleeds easily  Psychiatric/Behavioral: Negative for confusion         Physical Exam  Physical Exam  Constitutional:       General: He is not in acute distress  Appearance: He is well-developed  He is not ill-appearing, toxic-appearing or diaphoretic  HENT:      Head: Normocephalic and atraumatic  Right Ear: External ear normal       Left Ear: External ear normal    Eyes:      General:         Right eye: No discharge  Left eye: No discharge  Conjunctiva/sclera: Conjunctivae normal       Pupils: Pupils are equal, round, and reactive to light  Neck:      Trachea: No tracheal deviation  Comments: No midline tenderness, no step offs  Cardiovascular:      Rate and Rhythm: Normal rate and regular rhythm  Heart sounds: Normal heart sounds  Pulmonary:      Effort: Pulmonary effort is normal       Breath sounds: Normal breath sounds  No stridor  No wheezing  Chest:      Chest wall: No tenderness  Abdominal:      General: There is no distension  Palpations: Abdomen is soft  Tenderness: There is abdominal tenderness  There is no right CVA tenderness, left CVA tenderness or guarding  Comments: Stable pelvis   Musculoskeletal:         General: Tenderness (L hip, leg, knee) present  No swelling, deformity or signs of injury  Cervical back: Neck supple  Comments: Back is non-tender, no step offs   Skin:     General: Skin is warm and dry  Findings: Bruising (diffuse) present  Comments: No abrasions, no lacerations, no contusions  Neurological:      Mental Status: He is alert and oriented to person, place, and time  Cranial Nerves: No cranial nerve deficit  Sensory: No sensory deficit        Comments: GCS = 15   Psychiatric:         Behavior: Behavior normal          Vital Signs  ED Triage Vitals [10/17/22 0823]   Temperature Pulse Respirations Blood Pressure SpO2   98 °F (36 7 °C) 91 16 118/73 99 %      Temp Source Heart Rate Source Patient Position - Orthostatic VS BP Location FiO2 (%)   Oral Monitor Sitting Right arm --      Pain Score       4           Vitals:    10/17/22 1239 10/17/22 1256 10/17/22 1336 10/17/22 1442   BP: 119/60 109/56 135/64 (!) 79/64   Pulse: 79 80 80 88   Patient Position - Orthostatic VS: Lying Lying           Visual Acuity  Visual Acuity    Flowsheet Row Most Recent Value   L Pupil Size (mm) 2   R Pupil Size (mm) 2          ED Medications  Medications   dexamethasone (DECADRON) tablet 4 mg (has no administration in time range)   aspirin (ECOTRIN LOW STRENGTH) EC tablet 81 mg (has no administration in time range)   levothyroxine tablet 100 mcg (has no administration in time range)   pantoprazole (PROTONIX) EC tablet 40 mg (has no administration in time range)   acetaminophen (TYLENOL) tablet 650 mg (has no administration in time range)   aluminum-magnesium hydroxide-simethicone (MYLANTA) oral suspension 30 mL (has no administration in time range)   polyethylene glycol (MIRALAX) packet 17 g (has no administration in time range)   ondansetron (ZOFRAN) injection 4 mg (has no administration in time range)   sodium chloride 0 9 % infusion (125 mL/hr Intravenous New Bag 10/17/22 1437)   sodium chloride 0 9 % bolus 1,000 mL (1,000 mL Intravenous New Bag 10/17/22 1656)   enoxaparin (LOVENOX) subcutaneous injection 30 mg (has no administration in time range)   fentanyl citrate (PF) 100 MCG/2ML 25 mcg (25 mcg Intravenous Given 10/17/22 0829)   iohexol (OMNIPAQUE) 300 mg/mL injection 100 mL (100 mL Intravenous Given 10/17/22 0843)   sodium chloride 0 9 % bolus 1,000 mL (1,000 mL Intravenous New Bag 10/17/22 0945)   albumin human (FLEXBUMIN) 25 % injection 25 g (25 g Intravenous New Bag 10/17/22 1655)       Diagnostic Studies  Results Reviewed     Procedure Component Value Units Date/Time    TSH, 3rd generation with Free T4 reflex [[id number]]  (Abnormal) Collected: 10/17/22 0829    Lab Status: Final result Specimen: Blood from Arm, Left Updated: 10/17/22 1451     TSH 3RD GENERATON 6 187 uIU/mL     Narrative:      Patients undergoing fluorescein dye angiography may retain small amounts of fluorescein in the body for 48-72 hours post procedure  Samples containing fluorescein can produce falsely depressed TSH values  If the patient had this procedure,a specimen should be resubmitted post fluorescein clearance  T4, free [[id number]] Collected: 10/17/22 0829    Lab Status:  In process Specimen: Blood from Arm, Left Updated: 10/17/22 1451    HS Troponin I 4hr [[id number]]  (Normal) Collected: 10/17/22 1254    Lab Status: Final result Specimen: Blood from Arm, Left Updated: 10/17/22 1332     hs TnI 4hr 9 ng/L      Delta 4hr hsTnI 1 ng/L     UA w Reflex to Microscopic w Reflex to Culture [[id number]]  (Abnormal) Collected: 10/17/22 1150    Lab Status: Final result Specimen: Urine, Clean Catch Updated: 10/17/22 1212     Color, UA Light Yellow     Clarity, UA Clear     Specific Gravity, UA >=1 050     pH, UA 5 5     Leukocytes, UA Negative     Nitrite, UA Negative     Protein, UA Negative mg/dl      Glucose, UA Negative mg/dl      Ketones, UA Negative mg/dl      Urobilinogen, UA <2 0 mg/dl      Bilirubin, UA Negative     Occult Blood, UA Negative    HS Troponin I 2hr [[id number]]  (Normal) Collected: 10/17/22 1137    Lab Status: Final result Specimen: Blood from Arm, Right Updated: 10/17/22 1149     hs TnI 2hr 7 ng/L      Delta 2hr hsTnI -1 ng/L     HS Troponin 0hr (reflex protocol) [[id number]]  (Normal) Collected: 10/17/22 0829    Lab Status: Final result Specimen: Blood from Arm, Left Updated: 10/17/22 0907     hs TnI 0hr 8 ng/L     CK (with reflex to MB) [[id number]]  (Normal) Collected: 10/17/22 0829    Lab Status: Final result Specimen: Blood from Arm, Left Updated: 10/17/22 0902     Total CK 54 U/L     Comprehensive metabolic panel [[id number]]  (Abnormal) Collected: 10/17/22 0829    Lab Status: Final result Specimen: Blood from Arm, Left Updated: 10/17/22 0902     Sodium 130 mmol/L      Potassium 3 8 mmol/L      Chloride 94 mmol/L      CO2 27 mmol/L      ANION GAP 9 mmol/L      BUN 41 mg/dL      Creatinine 1 39 mg/dL      Glucose 133 mg/dL      Calcium 8 8 mg/dL      Corrected Calcium 9 3 mg/dL      AST 20 U/L      ALT 18 U/L      Alkaline Phosphatase 72 U/L      Total Protein 6 4 g/dL      Albumin 3 4 g/dL      Total Bilirubin 1 24 mg/dL      eGFR 42 ml/min/1 73sq m     Narrative:      National Kid[employer] guidelines for Chronic Kidney Disease (CKD):   •  Stage 1 with normal or high GFR (GFR > 90 mL/min/1 73 square meters)  •  Stage 2 Mild CKD (GFR = 60-89 mL/min/1 73 square meters)  •  Stage 3A Moderate CKD (GFR = 45-59 mL/min/1 73 square meters)  •  Stage 3B Moderate CKD (GFR = 30-44 mL/min/1 73 square meters)  •  Stage 4 Severe CKD (GFR = 15-29 mL/min/1 73 square meters)  •  Stage 5 End Stage CKD (GFR <15 mL/min/1 73 square meters)  Note: GFR calculation is accurate only with a steady state creatinine    Protime-INR [[id number]]  (Normal) Collected: 10/17/22 0829    Lab Status: Final result Specimen: Blood from Arm, Left Updated: 10/17/22 0858     Protime 13 1 seconds      INR 1 01    APTT [[id number]]  (Normal) Collected: 10/17/22 0829    Lab Status: Final result Specimen: Blood from Arm, Left Updated: 10/17/22 0858     PTT 25 seconds     CBC and differential [[id number]]  (Abnormal) Collected: 10/17/22 0829    Lab Status: Final result Specimen: Blood from Arm, Left Updated: 10/17/22 0842     WBC 15 33 Thousand/uL      RBC 3 32 Million/uL      Hemoglobin 11 0 g/dL      Hematocrit 32 1 %      MCV 97 fL      MCH 33 1 pg      MCHC 34 3 g/dL      RDW 13 7 %      MPV 10 1 fL      Platelets 199 Thousands/uL      nRBC 0 /100 WBCs      Neutrophils Relative 78 %      Immat GRANS % 1 %      Lymphocytes Relative 11 %      Monocytes Relative 10 %      Eosinophils Relative 0 %      Basophils Relative 0 %      Neutrophils Absolute 11 99 Thousands/µL      Immature Grans Absolute 0 14 Thousand/uL      Lymphocytes Absolute 1 64 Thousands/µL      Monocytes Absolute 1 55 Thousand/µL      Eosinophils Absolute 0 00 Thousand/µL      Basophils Absolute 0 01 Thousands/µL                  XR hip/pelv 2-3 vws left if performed   ED Interpretation by Bill Corey DO (10/17 3186)   Progressive degenerative changes     No acute osseous abnormality  Final Result by Britany Schmitz MD (48/84 3461)   Progressive degenerative changes      No acute osseous abnormality  Workstation performed: [id number]         XR femur 2 views LEFT   ED Interpretation by Bill Corey DO (10/17 3703)   No acute osseous abnormality  Final Result by Britany Schmitz MD (26/53 7861)      No acute osseous abnormality  Workstation performed: [id number]         XR knee 1 or 2 vw left   ED Interpretation by Bill Corey DO (10/17 2421)   Intact total knee arthroplasty  Dystrophic calcifications  No acute osseous abnormality      Findings are stable      Final Result by Britany Schmitz MD (10/17 7556)   Intact total knee arthroplasty   Dystrophic calcifications   No acute osseous abnormality  Findings are stable      Workstation performed: [id number]         CT spine cervical without contrast   ED Interpretation by Bill Corey DO (10/17 6821)   1  No acute cervical spine fracture or traumatic malalignment      2  Heterogeneous marrow attenuation and lucencies of the vertebrae, likely related to reported history of multiple myeloma  Final Result by Benita Bajwa MD (60/06 3018)      1  No acute cervical spine fracture or traumatic malalignment  2   Heterogeneous marrow attenuation and lucencies of the vertebrae, likely related to reported history of multiple myeloma  Workstation performed: [id number]         CT chest abdomen pelvis w contrast   ED Interpretation by Bill Corey DO (10/17 1009)   1    No CT evidence of acute traumatic injury within chest, abdomen, and pelvis      2   Left upper lobe 1 2 x 1 cm spiculated nodule  Malignancy should be excluded  No prior imaging is available for comparison  Either 3 month follow-up noncontrast CT, PET/CT or tissue sampling may be considered appropriate  Considerations related to   the patient's age and/or comorbidities may be used to choose among or alter these recommendations      3  A few additional subcentimeter left lower lobe nodules as described      4  Indeterminate 2 7 cm high attenuating cortical lesion in the upper pole of left kidney      5  Distended urinary bladder with a ureterocele at the base  Correlate for urinary retention  Final Result by Chico Zuleta MD (10/17 8550)      1  No CT evidence of acute traumatic injury within chest, abdomen, and pelvis  2   Left upper lobe 1 2 x 1 cm spiculated nodule  Malignancy should be excluded  No prior imaging is available for comparison  Either 3 month follow-up noncontrast CT, PET/CT or tissue sampling may be considered appropriate  Considerations related to    the patient's age and/or comorbidities may be used to choose among or alter these recommendations  3   A few additional subcentimeter left lower lobe nodules as described  4   Indeterminate 2 7 cm high attenuating cortical lesion in the upper pole of left kidney  5   Distended urinary bladder with a ureterocele at the base  Correlate for urinary retention  The study was marked in EPIC for significant notification  Workstation performed: [id number]         CT head w wo contrast   ED Interpretation by Costa Camarena DO (10/17 7399)   1  No acute intracranial CT abnormality      2   Planum sphenoidale 2 8 cm meningioma, grossly stable compared to MRI 5/28/2019 allowing for modality differences  Associated moderate subjacent inferior bifrontal parenchymal edema without significant mass effect          Final Result by Chico Zuleta MD (10/17 1900)      1  No acute intracranial CT abnormality        2   Planum sphenoidale 2 8 cm meningioma, grossly stable compared to MRI 5/28/2019 allowing for modality differences  Associated moderate subjacent inferior bifrontal parenchymal edema without significant mass effect  Workstation performed: [id number]                    Procedures  Procedures         ED Course  ED Course as of 10/17/22 1719   Mon Oct 17, 2022   2763 CT scan with delayed imaging is concerning for bleeding meningioma  Will call Radiology, will discuss with Trauma Service  9640 Radiology advises not a head bleed  A previously known meningioma  Does not appear actively bleeding                                             MDM  Number of Diagnoses or Management Options  Ambulatory dysfunction  Dizziness  Frequent falls  Diagnosis management comments: Ambulatory dysfunction - frequent falls  Concern for safety at home  Also concern for cause of frequent falls  No traumatic injury identified from this fall  Admitted to slim service          Amount and/or Complexity of Data Reviewed  Clinical lab tests: ordered and reviewed  Tests in the radiology section of CPT®: ordered and reviewed        Disposition  Final diagnoses:   Dizziness   Ambulatory dysfunction   Frequent falls     Time reflects when diagnosis was documented in both MDM as applicable and the Disposition within this note     Time User Action Codes Description Comment    10/17/2022 12:04 PM Radha Diaz Add [R42] Dizziness     10/17/2022 12:04 PM Radha Diaz Add [R26 2] Ambulatory dysfunction     10/17/2022 12:05 PM Copperbalbirith, Sandra Patch L Add [R29 6] Frequent falls     10/17/2022  4:14 PM Shanita Mensah Add [E46] Protein-calorie malnutrition, unspecified severity [facility])       ED Disposition     ED Disposition   Admit    Condition   Stable    Date/Time   Mon Oct 17, 2022 12:20 PM    Comment   Case was discussed with Cuong TORRES) and the patient's admission status was agreed to be Admission Status: observation status [facility]   Follow-up Information    None         Current Discharge Medication List      CONTINUE these medications which have NOT CHANGED    Details   aspirin (ECOTRIN LOW STRENGTH) 81 mg EC tablet Take 1 tablet (81 mg total) by mouth daily  Qty: 90 tablet, Refills: 0    Associated Diagnoses: Essential hypertension      Cholecalciferol (VITAMIN D3 PO) Take by mouth      levothyroxine 100 mcg tablet Take 1 tablet (100 mcg total) by mouth daily  Qty: 90 tablet, Refills: 0    Associated Diagnoses: Acquired hypothyroidism      triamterene-hydrochlorothiazide (DYAZIDE) 37 5-25 mg per capsule Take 1 capsule by mouth every morning  Qty: 90 capsule, Refills: 0    Associated Diagnoses: Essential hypertension      dexamethasone (DECADRON) 4 mg tablet Take 1 tablet (4 mg total) by mouth daily with breakfast  Qty: 28 tablet, Refills: 5    Associated Diagnoses: Multiple myeloma not having achieved remission (HCC)      omeprazole (PriLOSEC) 20 mg delayed release capsule Take 1 capsule (20 mg total) by mouth daily  Qty: 30 capsule, Refills: 0    Associated Diagnoses: Multiple myeloma not having achieved remission (HCC)             No discharge procedures on file      PDMP Review     None          ED Provider  Electronically Signed by           Luis Pope DO  10/17/22 3897

## 2022-10-17 NOTE — ED NOTES
Provider at bedside       Little Elias RN  10/17/22 0158 [No Acute Distress] : no acute distress [Well Nourished] : well nourished [Well Developed] : well developed [Well-Appearing] : well-appearing [Normal Oropharynx] : the oropharynx was normal [No Lymphadenopathy] : no lymphadenopathy [Supple] : supple [No Respiratory Distress] : no respiratory distress  [No Accessory Muscle Use] : no accessory muscle use [Clear to Auscultation] : lungs were clear to auscultation bilaterally [Normal Rate] : normal rate  [Regular Rhythm] : with a regular rhythm [Normal S1, S2] : normal S1 and S2 [No Edema] : there was no peripheral edema [Normal Gait] : normal gait [Alert and Oriented x3] : oriented to person, place, and time

## 2022-10-17 NOTE — ASSESSMENT & PLAN NOTE
Patient with dizziness, loss of balance and several falls over the last few days     · Fall precautions  · PT/OT consult  · Order orthostatics

## 2022-10-17 NOTE — PLAN OF CARE
Problem: MOBILITY - ADULT  Goal: Maintain or return to baseline ADL function  Description: INTERVENTIONS:  -  Assess patient's ability to carry out ADLs; assess patient's baseline for ADL function and identify physical deficits which impact ability to perform ADLs (bathing, care of mouth/teeth, toileting, grooming, dressing, etc )  - Assess/evaluate cause of self-care deficits   - Assess range of motion  - Assess patient's mobility; develop plan if impaired  - Assess patient's need for assistive devices and provide as appropriate  - Encourage maximum independence but intervene and supervise when necessary  - Involve family in performance of ADLs  - Assess for home care needs following discharge   - Consider OT consult to assist with ADL evaluation and planning for discharge  - Provide patient education as appropriate  Outcome: Progressing  Goal: Maintains/Returns to pre admission functional level  Description: INTERVENTIONS:  - Perform BMAT or MOVE assessment daily    - Set and communicate daily mobility goal to care team and patient/family/caregiver  - Collaborate with rehabilitation services on mobility goals if consulted  - Reposition patient every 2 hours    - Out of bed for toileting  - Record patient progress and toleration of activity level   Outcome: Progressing     Problem: METABOLIC, FLUID AND ELECTROLYTES - ADULT  Goal: Electrolytes maintained within normal limits  Description: INTERVENTIONS:  - Monitor labs and assess patient for signs and symptoms of electrolyte imbalances  - Administer electrolyte replacement as ordered  - Monitor response to electrolyte replacements, including repeat lab results as appropriate  - Instruct patient on fluid and nutrition as appropriate  Outcome: Progressing  Goal: Fluid balance maintained  Description: INTERVENTIONS:  - Monitor labs   - Monitor I/O and WT  - Instruct patient on fluid and nutrition as appropriate  - Assess for signs & symptoms of volume excess or deficit  Outcome: Progressing     Problem: SKIN/TISSUE INTEGRITY - ADULT  Goal: Incision(s), wounds(s) or drain site(s) healing without S/S of infection  Description: INTERVENTIONS  - Assess and document dressing, incision, wound bed, drain sites and surrounding tissue  - Provide patient and family education  - Perform skin care/dressing changes every shift  Outcome: Progressing     Problem: HEMATOLOGIC - ADULT  Goal: Maintains hematologic stability  Description: INTERVENTIONS  - Assess for signs and symptoms of bleeding or hemorrhage  - Monitor labs  - Administer supportive blood products/factors as ordered and appropriate  Outcome: Progressing     Problem: MUSCULOSKELETAL - ADULT  Goal: Maintain or return mobility to safest level of function  Description: INTERVENTIONS:  - Assess patient's ability to carry out ADLs; assess patient's baseline for ADL function and identify physical deficits which impact ability to perform ADLs (bathing, care of mouth/teeth, toileting, grooming, dressing, etc )  - Assess/evaluate cause of self-care deficits   - Assess range of motion  - Assess patient's mobility  - Assess patient's need for assistive devices and provide as appropriate  - Encourage maximum independence but intervene and supervise when necessary  - Involve family in performance of ADLs  - Assess for home care needs following discharge   - Consider OT consult to assist with ADL evaluation and planning for discharge  - Provide patient education as appropriate  Outcome: Progressing

## 2022-10-17 NOTE — ASSESSMENT & PLAN NOTE
· Sodium noted to be 130 on admission  · 0 9% NS IV fluids ordered  · Repeat BMP tonight to monitor rate of correction

## 2022-10-18 PROBLEM — D64.9 ANEMIA: Status: ACTIVE | Noted: 2022-10-18

## 2022-10-18 PROBLEM — E43 SEVERE PROTEIN-CALORIE MALNUTRITION (HCC): Status: ACTIVE | Noted: 2022-10-18

## 2022-10-18 LAB
ANION GAP SERPL CALCULATED.3IONS-SCNC: 7 MMOL/L (ref 4–13)
ATRIAL RATE: 93 BPM
BUN SERPL-MCNC: 28 MG/DL (ref 5–25)
CALCIUM SERPL-MCNC: 8 MG/DL (ref 8.3–10.1)
CHLORIDE SERPL-SCNC: 108 MMOL/L (ref 96–108)
CO2 SERPL-SCNC: 26 MMOL/L (ref 21–32)
CREAT SERPL-MCNC: 0.89 MG/DL (ref 0.6–1.3)
ERYTHROCYTE [DISTWIDTH] IN BLOOD BY AUTOMATED COUNT: 13.8 % (ref 11.6–15.1)
FERRITIN SERPL-MCNC: 114 NG/ML (ref 8–388)
FOLATE SERPL-MCNC: >20 NG/ML (ref 3.1–17.5)
GFR SERPL CREATININE-BSD FRML MDRD: 72 ML/MIN/1.73SQ M
GLUCOSE P FAST SERPL-MCNC: 95 MG/DL (ref 65–99)
GLUCOSE SERPL-MCNC: 95 MG/DL (ref 65–140)
HCT VFR BLD AUTO: 21.2 % (ref 36.5–49.3)
HCT VFR BLD AUTO: 23.9 % (ref 36.5–49.3)
HCT VFR BLD AUTO: 24 % (ref 36.5–49.3)
HCT VFR BLD AUTO: 26.3 % (ref 36.5–49.3)
HEMOCCULT STL QL: POSITIVE
HGB BLD-MCNC: 7.3 G/DL (ref 12–17)
HGB BLD-MCNC: 7.7 G/DL (ref 12–17)
HGB BLD-MCNC: 7.8 G/DL (ref 12–17)
HGB BLD-MCNC: 8.4 G/DL (ref 12–17)
IRON SATN MFR SERPL: 6 % (ref 20–50)
IRON SERPL-MCNC: 12 UG/DL (ref 65–175)
MCH RBC QN AUTO: 32 PG (ref 26.8–34.3)
MCHC RBC AUTO-ENTMCNC: 32.2 G/DL (ref 31.4–37.4)
MCV RBC AUTO: 99 FL (ref 82–98)
P AXIS: 70 DEGREES
PLATELET # BLD AUTO: 115 THOUSANDS/UL (ref 149–390)
PMV BLD AUTO: 9.6 FL (ref 8.9–12.7)
POTASSIUM SERPL-SCNC: 3.3 MMOL/L (ref 3.5–5.3)
PR INTERVAL: 156 MS
QRS AXIS: 28 DEGREES
QRSD INTERVAL: 82 MS
QT INTERVAL: 406 MS
QTC INTERVAL: 504 MS
RBC # BLD AUTO: 2.41 MILLION/UL (ref 3.88–5.62)
SODIUM SERPL-SCNC: 141 MMOL/L (ref 135–147)
T WAVE AXIS: 68 DEGREES
TIBC SERPL-MCNC: 205 UG/DL (ref 250–450)
VENTRICULAR RATE: 93 BPM
VIT B12 SERPL-MCNC: 265 PG/ML (ref 100–900)
WBC # BLD AUTO: 7.52 THOUSAND/UL (ref 4.31–10.16)

## 2022-10-18 PROCEDURE — 97163 PT EVAL HIGH COMPLEX 45 MIN: CPT

## 2022-10-18 PROCEDURE — 82272 OCCULT BLD FECES 1-3 TESTS: CPT | Performed by: PHYSICIAN ASSISTANT

## 2022-10-18 PROCEDURE — 99232 SBSQ HOSP IP/OBS MODERATE 35: CPT | Performed by: PHYSICIAN ASSISTANT

## 2022-10-18 PROCEDURE — 82607 VITAMIN B-12: CPT | Performed by: PHYSICIAN ASSISTANT

## 2022-10-18 PROCEDURE — 80048 BASIC METABOLIC PNL TOTAL CA: CPT | Performed by: PHYSICIAN ASSISTANT

## 2022-10-18 PROCEDURE — 83540 ASSAY OF IRON: CPT | Performed by: PHYSICIAN ASSISTANT

## 2022-10-18 PROCEDURE — 93010 ELECTROCARDIOGRAM REPORT: CPT | Performed by: INTERNAL MEDICINE

## 2022-10-18 PROCEDURE — 85018 HEMOGLOBIN: CPT | Performed by: PHYSICIAN ASSISTANT

## 2022-10-18 PROCEDURE — 82746 ASSAY OF FOLIC ACID SERUM: CPT | Performed by: PHYSICIAN ASSISTANT

## 2022-10-18 PROCEDURE — 97110 THERAPEUTIC EXERCISES: CPT

## 2022-10-18 PROCEDURE — 85014 HEMATOCRIT: CPT | Performed by: PHYSICIAN ASSISTANT

## 2022-10-18 PROCEDURE — 82728 ASSAY OF FERRITIN: CPT | Performed by: PHYSICIAN ASSISTANT

## 2022-10-18 PROCEDURE — 85027 COMPLETE CBC AUTOMATED: CPT | Performed by: PHYSICIAN ASSISTANT

## 2022-10-18 PROCEDURE — 97167 OT EVAL HIGH COMPLEX 60 MIN: CPT

## 2022-10-18 PROCEDURE — 83550 IRON BINDING TEST: CPT | Performed by: PHYSICIAN ASSISTANT

## 2022-10-18 RX ORDER — POTASSIUM CHLORIDE 20 MEQ/1
40 TABLET, EXTENDED RELEASE ORAL 2 TIMES DAILY
Status: COMPLETED | OUTPATIENT
Start: 2022-10-18 | End: 2022-10-18

## 2022-10-18 RX ORDER — ALBUMIN (HUMAN) 12.5 G/50ML
12.5 SOLUTION INTRAVENOUS EVERY 8 HOURS
Status: COMPLETED | OUTPATIENT
Start: 2022-10-18 | End: 2022-10-19

## 2022-10-18 RX ADMIN — IRON SUCROSE 100 MG: 20 INJECTION, SOLUTION INTRAVENOUS at 16:00

## 2022-10-18 RX ADMIN — POTASSIUM CHLORIDE 40 MEQ: 1500 TABLET, EXTENDED RELEASE ORAL at 09:36

## 2022-10-18 RX ADMIN — SODIUM CHLORIDE 125 ML/HR: 0.9 INJECTION, SOLUTION INTRAVENOUS at 18:39

## 2022-10-18 RX ADMIN — POTASSIUM CHLORIDE 40 MEQ: 1500 TABLET, EXTENDED RELEASE ORAL at 13:19

## 2022-10-18 RX ADMIN — LEVOTHYROXINE SODIUM 100 MCG: 100 TABLET ORAL at 06:38

## 2022-10-18 RX ADMIN — PANTOPRAZOLE SODIUM 40 MG: 40 TABLET, DELAYED RELEASE ORAL at 06:38

## 2022-10-18 RX ADMIN — SODIUM CHLORIDE 125 ML/HR: 0.9 INJECTION, SOLUTION INTRAVENOUS at 09:44

## 2022-10-18 RX ADMIN — DEXAMETHASONE 4 MG: 4 TABLET ORAL at 09:36

## 2022-10-18 RX ADMIN — ALBUMIN (HUMAN) 12.5 G: 0.25 INJECTION, SOLUTION INTRAVENOUS at 09:35

## 2022-10-18 RX ADMIN — ALBUMIN (HUMAN) 12.5 G: 0.25 INJECTION, SOLUTION INTRAVENOUS at 16:59

## 2022-10-18 RX ADMIN — ASPIRIN 81 MG: 81 TABLET, COATED ORAL at 09:36

## 2022-10-18 NOTE — ASSESSMENT & PLAN NOTE
· Creatinine 1 39 on admission, possibly related to dehydration vs medication side effect  · Hold home triamterene-hydrochlorothiazide  · Corrected overnight to normal with IV fluids  · BMP pending this am  · Avoid hypotension, nephrotoxins

## 2022-10-18 NOTE — WOUND OSTOMY CARE
Progress Note - Wound   Socorro Khan 80 y o  male MRN: [id number]  Unit/Bed#: -01 Encounter: [id number]      Assessment:   Patient admitted due to VINICIUS  History of arthritis, cancer, gout, HTN, multiple myeloma  Wound care consulted for multiple skin tears  Patient agreeable to assessment, alert and oriented x3, lema catheter in place for urine management, continent of bowel, is OOB to chair with EHOB waffle cushion in place, heels elevated, is an assist of 1 to stand with walker for assessment, is an assist with care  Primary RN made aware of assessment findings  Patient states wounds are from a recent fall  1  Bilateral sacrum, buttock, hips, and heels- skin is dry, intact, blanchable pink  2  Skin tear mid upper back- Two wounds pictured and measured together  Wounds are oval in shape, partial thickness, no skin flap present, 100% beefy red tissue with dry black well adhered drainage to wound edges, with scant amount of sanguineous drainage noted  Shruthi-wounds are dry, intact, purple ecchymosis, and blanchable pink skin  3  Skin tear left posterior upper arm- Wound is irregular in shape, no skin flap present, is full-thickness, approx  40% beefy red tissue and 60% yellow full-thickness tissue, with scant amount of sanguineous drainage noted  Shruthi-wound is dry, intact, purple ecchymosis, and blanchable pink skin  4  Skin tear left elbow- Wound is oval in shape, no skin flap present, dry wound bed, approx  20% brown adhered scabbing and 80% pink tissue, no drainage noted  Shruthi-wound is dry, intact, purple ecchymosis, and blanchable pink skin  5  Left anterior leg abrasions- Linear wounds down the length of anterior tibial  Wounds are linear in shape, true depth unknown due to wounds 100% covered in old well adhered old drainage vs scabbing, no drainage noted  Shruthi-wounds are dry, intact, no redness      6  Right anterior knee abrasions- Wounds are scattered and irregular in shape, appears partial thickness, true depth unknown due to approx  60% wound bed covered in old well adhered old drainage vs scabbing and 40% dry pink tissue, no drainage noted  Shruthi-wounds are dry, intact, no redness  7  Bilateral toes noted with pink epithelialization- no open aspects and no drainage noted  Educated patient on importance of frequent offloading of pressure via turning, repositioning, and weight-shifting  Verbalized understanding of plan of care  No induration, fluctuance, odor, warmth, redness, or purulence noted to the above noted wounds  New dressings applied  Patient tolerated well, reports mild pain to the left arm wounds  Primary nurse aware of plan of care  See flow sheets for more detailed assessment findings  Will follow along  Skin care Plan:  1-Protect sacrum w/Allevyn foam, paresh with P, change q3d and PRN, peel back and check skin q-shift  2-Turn/reposition q2h for pressure re-distribution on skin   3-Elevate heels to offload pressure  4-Moisturize skin daily with skin nourishing cream  5-Ehob cushion in chair when out of bed  6-Hydraguard to bilateral heels and toes BID and PRN  7-Upper back and right knee- Cleanse wounds with NSS, pat dry  Apply Dermagran over wound beds, cut to fit size of wounds, and cover with small Allevyn foam dressing  Change every other day and as needed  8-Left arm and left leg- Cleanse wounds with NSS, pat dry  Apply Dermagran over wound beds, cut to fit size of wounds, cover with ABD pad and wrap with Elizabeth  Change every other day and as needed  Wound 10/17/22 Skin Tear Pretibial Left (Active)   Wound Image   10/18/22 1054   Wound Description Dry;Black; Other (Comment) 10/18/22 1054   Shruthi-wound Assessment Dry; Intact 10/18/22 1054   Wound Length (cm) 20 cm 10/18/22 1054   Wound Width (cm) 0 7 cm 10/18/22 1054   Wound Depth (cm) 0 1 cm 10/18/22 1054   Wound Surface Area (cm^2) 14 cm^2 10/18/22 1054   Wound Volume (cm^3) 1 4 cm^3 10/18/22 1054 Calculated Wound Volume (cm^3) 1 4 cm^3 10/18/22 1054   Tunneling 0 cm 10/18/22 1054   Undermining 0 10/18/22 1054   Drainage Amount None 10/18/22 1054   Non-staged Wound Description Not applicable 84/18/07 1482   Treatments Cleansed;Site care 10/18/22 1054   Dressing Dermagran gauze;ABD;Dry dressing 10/18/22 1054   Wound packed? No 10/18/22 1054   Dressing Changed Changed 10/18/22 1054   Patient Tolerance Tolerated well 10/18/22 1054   Dressing Status Clean;Dry; Intact 10/18/22 1054       Wound 10/17/22 Skin Tear Pretibial Right (Active)   Wound Image   10/18/22 1055   Wound Description Dry;Black;Drainage;Pink; Other (Comment) 10/18/22 1055   Shruthi-wound Assessment Dry; Intact 10/18/22 1055   Wound Length (cm) 4 cm 10/18/22 1055   Wound Width (cm) 3 cm 10/18/22 1055   Wound Depth (cm) 0 1 cm 10/18/22 1055   Wound Surface Area (cm^2) 12 cm^2 10/18/22 1055   Wound Volume (cm^3) 1 2 cm^3 10/18/22 1055   Calculated Wound Volume (cm^3) 1 2 cm^3 10/18/22 1055   Tunneling 0 cm 10/18/22 1055   Undermining 0 10/18/22 1055   Drainage Amount None 10/18/22 1055   Non-staged Wound Description Not applicable 12/13/99 7083   Treatments Cleansed;Site care;Irrigation with NSS 10/18/22 1055   Dressing Dermagran gauze; Foam, Silicon (eg  Allevyn, etc) 10/18/22 1055   Wound packed? No 10/18/22 1055   Dressing Changed Changed 10/18/22 1055   Patient Tolerance Tolerated well 10/18/22 1055   Dressing Status Clean;Dry; Intact 10/18/22 1055       Wound 10/17/22 Skin tear Arm Anterior;Left;Proximal (Active)   Wound Image   10/18/22 1050   Wound Description Dry;Brown;Pink 10/18/22 1050   Shruthi-wound Assessment Dry; Intact 10/18/22 1050   Wound Length (cm) 0 5 cm 10/18/22 1050   Wound Width (cm) 1 cm 10/18/22 1050   Wound Depth (cm) 0 1 cm 10/18/22 1050   Wound Surface Area (cm^2) 0 5 cm^2 10/18/22 1050   Wound Volume (cm^3) 0 05 cm^3 10/18/22 1050   Calculated Wound Volume (cm^3) 0 05 cm^3 10/18/22 1050   Tunneling 0 cm 10/18/22 1050   Undermining 0 10/18/22 1050   Drainage Amount None 10/18/22 1050   Non-staged Wound Description Partial thickness 10/18/22 1050   Treatments Cleansed;Site care 10/18/22 1050   Dressing Dermagran gauze;ABD;Dry dressing 10/18/22 1050   Wound packed? No 10/18/22 1050   Dressing Changed Changed 10/18/22 1050   Patient Tolerance Tolerated well 10/18/22 1050   Dressing Status Clean;Dry; Intact 10/18/22 1050       Wound 10/17/22 Arm Left;Posterior; Upper (Active)   Wound Image   10/18/22 1052   Wound Description Beefy red;Yellow 10/18/22 1052   Shruthi-wound Assessment Dry; Intact; Purple;Pink 10/18/22 1052   Wound Length (cm) 1 4 cm 10/18/22 1052   Wound Width (cm) 3 cm 10/18/22 1052   Wound Depth (cm) 0 1 cm 10/18/22 1052   Wound Surface Area (cm^2) 4 2 cm^2 10/18/22 1052   Wound Volume (cm^3) 0 42 cm^3 10/18/22 1052   Calculated Wound Volume (cm^3) 0 42 cm^3 10/18/22 1052   Tunneling 0 cm 10/18/22 1052   Undermining 0 10/18/22 1052   Drainage Amount Scant 10/18/22 1052   Drainage Description Sanguineous 10/18/22 1052   Non-staged Wound Description Full thickness 10/18/22 1052   Treatments Cleansed;Irrigation with NSS;Site care 10/18/22 1052   Dressing Dermagran gauze;ABD;Dry dressing 10/18/22 1052   Wound packed? No 10/18/22 1052   Dressing Changed Changed 10/18/22 1052   Patient Tolerance Tolerated well 10/18/22 1052   Dressing Status Clean;Dry; Intact 10/18/22 1052       Wound 10/17/22 Skin tear Back (Active)   Wound Image   10/18/22 1111   Wound Description Beefy red;Black;Drainage; Other (Comment) 10/18/22 1111   Shruthi-wound Assessment Dry; Intact; Purple;Pink 10/18/22 1111   Wound Length (cm) 3 8 cm 10/18/22 1111   Wound Width (cm) 1 cm 10/18/22 1111   Wound Depth (cm) 0 1 cm 10/18/22 1111   Wound Surface Area (cm^2) 3 8 cm^2 10/18/22 1111   Wound Volume (cm^3) 0 38 cm^3 10/18/22 1111   Calculated Wound Volume (cm^3) 0 38 cm^3 10/18/22 1111   Tunneling 0 cm 10/18/22 1111   Undermining 0 10/18/22 1111   Drainage Amount Scant 10/18/22 1111 Drainage Description Sanguineous 10/18/22 1111   Non-staged Wound Description Partial thickness 10/18/22 1111   Treatments Cleansed;Irrigation with NSS;Site care 10/18/22 1111   Dressing Dermagran gauze; Foam, Silicon (eg  Allevyn, etc) 10/18/22 1111   Wound packed? No 10/18/22 1111   Dressing Changed Changed 10/18/22 1111   Patient Tolerance Tolerated well 10/18/22 1111   Dressing Status Clean;Dry; Intact 10/18/22 1111     Call or tigertext with any questions  Wound Care will continue to follow    Leann LOPEZN RN [facility]  Wound and Ostomy care

## 2022-10-18 NOTE — PLAN OF CARE
Problem: OCCUPATIONAL THERAPY ADULT  Goal: Performs self-care activities at highest level of function for planned discharge setting  See evaluation for individualized goals  Description: Treatment Interventions: ADL retraining, Functional transfer training, UE strengthening/ROM, Endurance training, Cognitive reorientation, Activityengagement          See flowsheet documentation for full assessment, interventions and recommendations  Note: Limitation: Decreased ADL status, Decreased UE ROM, Decreased UE strength, Decreased Safe judgement during ADL, Decreased cognition, Decreased endurance, Decreased high-level ADLs  Prognosis: Fair  Assessment: Patient is a 80 y o  male seen for OT evaluation s/p admit to [id number] [facility] on 10/17/2022 w/VINICIUS (acute kidney injury) ([facility] [address] )  Commorbidities affecting patient's functional performance at time of assessment include:  acquired hypothyroidism, protein calorie malnutrition, ambulatory dysfunction, hyponatremia, and anemia  Orders placed for OT evaluation and treatment  Performed at least two patient identifiers during session including name and wristband  Prior to admission, PTA, pt living at [address] [facility]  AMANDA is one level with no EMILEE  Pt ambulates with a walker and reports independence with functional mobility and ADLs/needs assistance with IADLs and medication  Pt reports living independently at [facility]  Pt had 5 falls in one day  Pt was an   Personal factors affecting patient at time of initial evaluation include: difficulty performing ADLs and difficulty performing IADLs  Upon evaluation, patient requires minimal  and moderate assist for UB ADLs, maximal assist for LB ADLs   Occupational performance is affected by the following deficits: orientation, degenerative arthritic joint changes, impaired gross motor coordination, dynamic sit/ stand balance deficit with poor standing tolerance time for self care and functional mobility, decreased activity tolerance, impaired memory, impaired problem solving and decreased safety awareness  Patient to benefit from continued Occupational Therapy treatment while in the hospital to address deficits as defined above and maximize level of functional independence with ADLs and functional mobility  Occupational Performance areas to address include: bathing/ shower, dressing, toilet hygiene, transfer to all surfaces, functional mobility, emergency response, health maintenance and IADLs: safety procedures  From OT standpoint, recommendation at time of d/c would be Short Term Rehab       OT Discharge Recommendation: Post acute rehabilitation services

## 2022-10-18 NOTE — DISCHARGE INSTR - OTHER ORDERS
Skin care Plan:  1-Apply thin layer of Calazime cream to sacrum/buttock three times a day and as needed  2-Turn/reposition every 2 hours for pressure re-distribution on skin   3-Elevate heels to offload pressure  4-Moisturize skin daily with skin nourishing cream  5-Waffle cushion in chair when out of bed  6-Hydraguard to bilateral heels and toes twice a day and as needed  7-Left arm, left leg, right knee- Cleanse wounds with NSS, pat dry  Apply Dermagran over wound beds, cut to fit size of wounds, cover with ABD pad and wrap with Elizabeth  Change every other day and as needed

## 2022-10-18 NOTE — MALNUTRITION/BMI
This medical record reflects one or more clinical indicators suggestive of malnutrition  Malnutrition Findings:   Adult Malnutrition type: Chronic illness  Adult Degree of Malnutrition: Other severe protein calorie malnutrition  Malnutrition Characteristics: Weight loss, Muscle loss, Fat loss                  360 Statement: related to inadequate energy intake due to decreased appetite ,as evidenced by, 21% weight loss last 11 mos (11/4/21-10/7/22), hollow supraclavicular space, depressed temples  Int: Regular diet, medical food supplements with meals  BMI Findings: Body mass index is 18 75 kg/m²  See Nutrition note dated 10/18/2022 for additional details  Completed nutrition assessment is viewable in the nutrition documentation

## 2022-10-18 NOTE — ASSESSMENT & PLAN NOTE
· Significant drop in hemoglobin overnight  · Consider hemodilution given IV fluid resuscitation  · Discontinue Lovenox  · Initiate q 6 hours hemoglobin checks, Hemoccult ordered  · Will give 3 doses IV albumin today for hypotension  · Will check iron, folate, B12  · Imaging on admission without evidence for acute bleed  · Low threshold for stat reimaging

## 2022-10-18 NOTE — PLAN OF CARE
Problem: PHYSICAL THERAPY ADULT  Goal: Performs mobility at highest level of function for planned discharge setting  See evaluation for individualized goals  Description: Treatment/Interventions: Functional transfer training, LE strengthening/ROM, Therapeutic exercise, Endurance training, Cognitive reorientation, Patient/family training, Bed mobility, Gait training, Spoke to nursing, OT          See flowsheet documentation for full assessment, interventions and recommendations  Note: Prognosis: Good  Problem List: Decreased strength, Decreased endurance, Impaired balance, Decreased mobility, Decreased cognition  Assessment: Pt is 80year old male seen for PT evaluation s/p admit to [facility] on 10/17/2022 with VINICIUS (acute kidney injury) ([facility] [address] )  PT consulted to assess pt's functional mobility and d/c needs  Order placed for PT eval and tx, with up with assist order  Comorbidities affecting pt's physical performance at time of assessment include acquired hypothyroidism, protein calorie malnutrition, ambulatory dysfunction, hyponatremia, and anemia  PTA, pt was independent with all functional mobility with a walker  Pt ambulates household and community distances  Pt resides at [facility] assisted living [facility]  Personal factors affecting pt at time of IE include inability to ambulate household distances, inability to navigate community distances, inability to navigate level surfaces without external assistance, unable to perform dynamic tasks in community, decreased cognition, limited home support, positive fall history, inability to perform IADLs, and inability to perform ADLs  Please find objective findings from PT assessment regarding body systems outlined above with impairments and limitations including weakness, impaired balance, decreased endurance, gait deviations, decreased activity tolerance, decreased functional mobility tolerance, fall risk, and decreased cognition   The following objective measures performed on IE also reveal limitations: Barthel Index: 25/100, Modified Maximilian: 4 (moderate/severe disability) and AM-PAC 6-Clicks: 01/61  Pt's clinical presentation is currently unstable/unpredictable seen in pt's presentation of need for ongoing medical management/monitoring, pt is a fall risk, and pt requires cues and assist for safety with functional mobility  Pt to benefit from continued PT tx to address deficits as defined above and maximize level of functional independent mobility and consistency  From PT/mobility standpoint, recommendation at time of d/c would be STR pending progress in order to facilitate return to PLOF  Barriers to Discharge: Decreased caregiver support     PT Discharge Recommendation: Post acute rehabilitation services    See flowsheet documentation for full assessment

## 2022-10-18 NOTE — PLAN OF CARE
Problem: MOBILITY - ADULT  Goal: Maintain or return to baseline ADL function  Description: INTERVENTIONS:  -  Assess patient's ability to carry out ADLs; assess patient's baseline for ADL function and identify physical deficits which impact ability to perform ADLs (bathing, care of mouth/teeth, toileting, grooming, dressing, etc )  - Assess/evaluate cause of self-care deficits   - Assess range of motion  - Assess patient's mobility; develop plan if impaired  - Assess patient's need for assistive devices and provide as appropriate  - Encourage maximum independence but intervene and supervise when necessary  - Involve family in performance of ADLs  - Assess for home care needs following discharge   - Consider OT consult to assist with ADL evaluation and planning for discharge  - Provide patient education as appropriate  Outcome: Progressing     Problem: METABOLIC, FLUID AND ELECTROLYTES - ADULT  Goal: Electrolytes maintained within normal limits  Description: INTERVENTIONS:  - Monitor labs and assess patient for signs and symptoms of electrolyte imbalances  - Administer electrolyte replacement as ordered  - Monitor response to electrolyte replacements, including repeat lab results as appropriate  - Instruct patient on fluid and nutrition as appropriate  Outcome: Progressing     Problem: METABOLIC, FLUID AND ELECTROLYTES - ADULT  Goal: Fluid balance maintained  Description: INTERVENTIONS:  - Monitor labs   - Monitor I/O and WT  - Instruct patient on fluid and nutrition as appropriate  - Assess for signs & symptoms of volume excess or deficit  Outcome: Progressing     Problem: SKIN/TISSUE INTEGRITY - ADULT  Goal: Incision(s), wounds(s) or drain site(s) healing without S/S of infection  Description: INTERVENTIONS  - Assess and document dressing, incision, wound bed, drain sites and surrounding tissue  - Provide patient and family education  - Perform skin care/dressing changes every shift  Outcome: Progressing Problem: MUSCULOSKELETAL - ADULT  Goal: Maintain or return mobility to safest level of function  Description: INTERVENTIONS:  - Assess patient's ability to carry out ADLs; assess patient's baseline for ADL function and identify physical deficits which impact ability to perform ADLs (bathing, care of mouth/teeth, toileting, grooming, dressing, etc )  - Assess/evaluate cause of self-care deficits   - Assess range of motion  - Assess patient's mobility  - Assess patient's need for assistive devices and provide as appropriate  - Encourage maximum independence but intervene and supervise when necessary  - Involve family in performance of ADLs  - Assess for home care needs following discharge   - Consider OT consult to assist with ADL evaluation and planning for discharge  - Provide patient education as appropriate  Outcome: Progressing     Problem: HEMATOLOGIC - ADULT  Goal: Maintains hematologic stability  Description: INTERVENTIONS  - Assess for signs and symptoms of bleeding or hemorrhage  - Monitor labs  - Administer supportive blood products/factors as ordered and appropriate  Outcome: Progressing     Problem: Potential for Falls  Goal: Patient will remain free of falls  Description: INTERVENTIONS:  - Educate patient/family on patient safety including physical limitations  - Instruct patient to call for assistance with activity   - Consult OT/PT to assist with strengthening/mobility   - Keep Call bell within reach  - Keep bed low and locked with side rails adjusted as appropriate  - Keep care items and personal belongings within reach  - Initiate and maintain comfort rounds  - Make Fall Risk Sign visible to staff  - Offer Toileting every 2 Hours, in advance of need  - Initiate/Maintain bed/chair alarm  - Obtain necessary fall risk management equipment  - Apply yellow socks and bracelet for high fall risk patients  - Consider moving patient to room near nurses station  Outcome: Progressing     Problem: Nutrition/Hydration-ADULT  Goal: Nutrient/Hydration intake appropriate for improving, restoring or maintaining nutritional needs  Description: Monitor and assess patient's nutrition/hydration status for malnutrition  Collaborate with interdisciplinary team and initiate plan and interventions as ordered  Monitor patient's weight and dietary intake as ordered or per policy  Utilize nutrition screening tool and intervene as necessary  Determine patient's food preferences and provide high-protein, high-caloric foods as appropriate       INTERVENTIONS:  - Monitor oral intake, urinary output, labs, and treatment plans  - Assess nutrition and hydration status and recommend course of action  - Evaluate amount of meals eaten  - Assist patient with eating if necessary   - Allow adequate time for meals  - Recommend/ encourage appropriate diets, oral nutritional supplements, and vitamin/mineral supplements  - Order, calculate, and assess calorie counts as needed  - Recommend, monitor, and adjust tube feedings based on assessed needs  - Assess need for intravenous fluids  - Provide nutrition/hydration education as appropriate  - Include patient/family/caregiver in decisions related to nutrition  Outcome: Progressing Home

## 2022-10-18 NOTE — UTILIZATION REVIEW
Initial Clinical Review    OBS order 10/17 1221 converted to IP On 10/18 1244 for anemia , amb dysfunction       Admission: Date/Time/Statement:   Admission Orders (From admission, onward)     Ordered        10/18/22 1244  Inpatient Admission  Once            10/17/22 1221  Place in Observation  Once                       Inpatient Admission     Standing Status:   Standing     Number of Occurrences:   1     Order Specific Question:   Level of Care     Answer:   Med Surg [16]     Order Specific Question:   Estimated length of stay     Answer:   More than 2 Midnights     Order Specific Question:   Certification     Answer:   I certify that inpatient services are medically necessary for this patient for a duration of greater than two midnights  See H&P and MD Progress Notes for additional information about the patient's course of treatment  ED Arrival Information     Expected   -    Arrival   10/17/2022 08:16    Acuity   Emergent            Means of arrival   Ambulance    Escorted by   [id number] [facility]    Admission type   Emergency            Arrival complaint   Syncope           Chief Complaint   Patient presents with   • Dizziness     Pt has been experiencing frequent falls over the past several days  Pt was found laying on the floor this morning at the assisted living home where he stays and reported he had been laying there all night after a fall  Initial Presentation: 80 y o  male to ED from assisted living  via EMS w/ dizziness and freq falls  Feels dizzy and off balance when he stands up   In ED found to have cr 1 39 and Na 130 possibly r/t dehydration vs medication side effect   Garry Cuevas Admitted OBS status w/ VINICIUS , hyponatremia , amb dysfunction   Plan to hold triamterene -HCTZ , give IVF , monitor BMP , I&O   PT OT eval , orthostatics  PE: mm dry     10/18 IM Note   Cont hgb checks q6hr , hemoccult ordered   Will give 3 doses IV albumin today for hypotension    Check Fe , folate and B12    Cr corrected overnight w/ IVF , recheck BMP in am   Na corrected to 138  Orthostatics pending   Pastrana intact   10/18 PT eval   Post acute rehab on DC     10/19 IM Note   Has been having multiple BMs   Na improved to 141 this am   Cont IV FE x3 days   hgb 6 8 , hgb checked q6hr    Orthostatics pending       ED Triage Vitals [10/17/22 0823]   Temperature Pulse Respirations Blood Pressure SpO2   98 °F (36 7 °C) 91 16 118/73 99 %      Temp Source Heart Rate Source Patient Position - Orthostatic VS BP Location FiO2 (%)   Oral Monitor Sitting Right arm --      Pain Score       4          Wt Readings from Last 1 Encounters:   10/17/22 52 7 kg (116 lb 2 9 oz)     Additional Vital Signs:   10/19/22 0828 -- -- -- -- -- 98 % None (Room air) --   10/19/22 07:19:29 -- 57 15 109/80 90 98 % -- --   10/18/22 22:18:46 97 3 °F (36 3 °C) Abnormal  68 19 110/59 76 99 % -- --   10/18/22 2033 -- -- -- -- -- -- None (Room air) --   10/18/22 16:22:52 97 6 °F (36 4 °C) 71 -- 129/58 82 99 % -- --   10/18/22 13:18:22 -- 78 -- 135/58 84 98 % -- --   10/18/22 0930 -- -- -- -- -- -- None (Room         10/18/22 08:45:30 -- 89 -- 79/39 Abnormal  52 Abnormal  97 % -- --   10/18/22 08:36:16 -- 83 -- 99/70 80 96 % -- --   10/18/22 08:32:33 -- 81 -- 89/51 Abnormal  64 Abnormal  98 % -- --   10/18/22 08:26:09 97 4 °F (36 3 °C) Abnormal  86 17 86/51 Abnormal  63 Abnormal  99 % None (Room air) Lying       10/18/22 04:57:28 -- 66 -- 105/51 69 97 % -- --   10/17/22 2300 -- -- -- -- -- -- None (Room air) --   10/17/22 22:45:57 97 2 °F (36 2 °C) Abnormal  83 18 92/46 Abnormal  61 Abnormal  96 % -- --   10/17/22 20:11:38 -- 82 -- 98/53 68 98 % -- --   10/17/22 17:45:16 -- 92 -- 93/54 67 97 % -- --   10/17/22 14:42:46 -- 88 -- 79/64 Abnormal  69 97 % -- --   10/17/22 1400 -- -- -- -- -- 94 % None (Room air) --   10/17/22 13:36:55 97 5 °F (36 4 °C) 80 16 135/64 88 99 % -- --   10/17/22 12:56:42 -- 80 16 109/56 -- 98 % None (Room air) Lying   10/17/22 1239 -- 79 16 119/60 86 96 % None (Room air) Lying   10/17/22 1200 -- 83 16 103/71 84 96 % None (Room air) Lying   10/17/22 1100 -- 75 16 137/64 -- 98 % None (Room air) Lying   10/17/22 1030 -- 80 16 127/58 -- 98 % None (Room air) Lying   10/17/22 1000 -- 77 16 131/63 -- 97 % None (Room air) Lying   10/17/22 0945 -- 80 18 144/60 -- 98 % None (Room air) Lying   10/17/22 0930 -- 80 16 125/58 -- 97 % None (Room air) Lying   10/17/22 09:18:44 -- -- -- -- -- 99 % -- --   10/17/22 0915 -- 91  16  118/73  89 99 %  None (Room air) Lying   Pulse: Simultaneous filing  User may not have seen previous data  at 10/17/22 0915   Resp: Simultaneous filing  User may not have seen previous data  at 10/17/22 0915   BP: Simultaneous filing  User may not have seen previous data  at 10/17/22 0915   SpO2: Simultaneous filing  User may not have seen previous data  at 10/17/22 0915   10/17/22 0900 -- 88 18 112/58 82 98 % -- --   10/17/22 0845 -- 91 16 140/60 87 100 % -- --   10/17/22 0830 -- 88 18 118/73 -- 98 %         Pertinent Labs/Diagnostic Test Results:   10/17 EKG NSR   XR hip/pelv 2-3 vws left if performed   ED Interpretation by Altagracia Beyer DO (10/17 0613)   Progressive degenerative changes     No acute osseous abnormality  Final Result by Chio Martínez MD (81/26 6267)   Progressive degenerative changes      No acute osseous abnormality  Workstation performed: [id number]         XR femur 2 views LEFT   ED Interpretation by Altagracia Beyer DO (10/17 2680)   No acute osseous abnormality  Final Result by Chio Martínez MD (13/58 9318)      No acute osseous abnormality              Workstation performed: [id number]         XR knee 1 or 2 vw left   ED Interpretation by Altagracia Beyer DO (10/17 6670)   Intact total knee arthroplasty  Dystrophic calcifications  No acute osseous abnormality      Findings are stable      Final Result by Chio Martínez MD (61/23 1765) Intact total knee arthroplasty   Dystrophic calcifications   No acute osseous abnormality  Findings are stable      Workstation performed: [id number]         CT spine cervical without contrast   ED Interpretation by Kyrie Farias DO (10/17 4584)   1  No acute cervical spine fracture or traumatic malalignment      2  Heterogeneous marrow attenuation and lucencies of the vertebrae, likely related to reported history of multiple myeloma  Final Result by Oli Cedeño MD (46/00 5673)      1  No acute cervical spine fracture or traumatic malalignment  2   Heterogeneous marrow attenuation and lucencies of the vertebrae, likely related to reported history of multiple myeloma  Workstation performed: [id number]         CT chest abdomen pelvis w contrast   ED Interpretation by Kyrie Farias DO (10/17 5589)   1  No CT evidence of acute traumatic injury within chest, abdomen, and pelvis      2   Left upper lobe 1 2 x 1 cm spiculated nodule  Malignancy should be excluded  No prior imaging is available for comparison  Either 3 month follow-up noncontrast CT, PET/CT or tissue sampling may be considered appropriate  Considerations related to   the patient's age and/or comorbidities may be used to choose among or alter these recommendations      3  A few additional subcentimeter left lower lobe nodules as described      4  Indeterminate 2 7 cm high attenuating cortical lesion in the upper pole of left kidney      5  Distended urinary bladder with a ureterocele at the base  Correlate for urinary retention  Final Result by Oli Cedeño MD (10/17 6350)      1  No CT evidence of acute traumatic injury within chest, abdomen, and pelvis  2   Left upper lobe 1 2 x 1 cm spiculated nodule  Malignancy should be excluded  No prior imaging is available for comparison  Either 3 month follow-up noncontrast CT, PET/CT or tissue sampling may be considered appropriate  Considerations related to    the patient's age and/or comorbidities may be used to choose among or alter these recommendations  3   A few additional subcentimeter left lower lobe nodules as described  4   Indeterminate 2 7 cm high attenuating cortical lesion in the upper pole of left kidney  5   Distended urinary bladder with a ureterocele at the base  Correlate for urinary retention  The study was marked in EPIC for significant notification  Workstation performed: [id number]         CT head w wo contrast   ED Interpretation by Loc Smith DO (10/17 5733)   1  No acute intracranial CT abnormality      2   Planum sphenoidale 2 8 cm meningioma, grossly stable compared to MRI 5/28/2019 allowing for modality differences  Associated moderate subjacent inferior bifrontal parenchymal edema without significant mass effect          Final Result by Jae Clark MD (10/17 2555)      1  No acute intracranial CT abnormality  2   Planum sphenoidale 2 8 cm meningioma, grossly stable compared to MRI 5/28/2019 allowing for modality differences  Associated moderate subjacent inferior bifrontal parenchymal edema without significant mass effect              Workstation performed: [id number]               Results from last 7 days   Lab Units 10/19/22  0559 10/19/22  0431 10/18/22  2348 10/18/22  1733 10/18/22  1048 10/18/22  0751 10/17/22  0829   WBC Thousand/uL 8 46 7 89  --   --   --  7 52 15 33*   HEMOGLOBIN g/dL 7 0* 6 8* 7 3* 8 4* 7 8* 7 7* 11 0*   HEMATOCRIT % 22 0* 20 8* 21 2* 26 3* 24 0* 23 9* 32 1*   PLATELETS Thousands/uL 92* 99*  --   --   --  115* 180   NEUTROS ABS Thousands/µL 6 64  --   --   --   --   --  11 99*     Results from last 7 days   Lab Units 10/19/22  0431 10/18/22  0751 10/17/22  1916 10/17/22  0829   SODIUM mmol/L 142 141 138 130*   POTASSIUM mmol/L 3 7 3 3* 3 1* 3 8   CHLORIDE mmol/L 110* 108 103 94*   CO2 mmol/L 21 26 24 27   ANION GAP mmol/L 11 7 11 9 BUN mg/dL 18 28* 34* 41*   CREATININE mg/dL 0 69 0 89 1 14 1 39*   EGFR ml/min/1 73sq m 80 72 54 42   CALCIUM mg/dL 8 2* 8 0* 7 9* 8 8     Results from last 7 days   Lab Units 10/17/22  0829   AST U/L 20   ALT U/L 18   ALK PHOS U/L 72   TOTAL PROTEIN g/dL 6 4   ALBUMIN g/dL 3 4*   TOTAL BILIRUBIN mg/dL 1 24*     Results from last 7 days   Lab Units 10/19/22  0431 10/18/22  0751 10/17/22  1916 10/17/22  0829   GLUCOSE RANDOM mg/dL 138 95 133 133       Results from last 7 days   Lab Units 10/17/22  0829   CK TOTAL U/L 54     Results from last 7 days   Lab Units 10/17/22  1254 10/17/22  1137 10/17/22  0829   HS TNI 0HR ng/L  --   --  8   HS TNI 2HR ng/L  --  7  --    HSTNI D2 ng/L  --  -1  --    HS TNI 4HR ng/L 9  --   --    HSTNI D4 ng/L 1  --   --      Results from last 7 days   Lab Units 10/17/22  0829   PROTIME seconds 13 1   INR  1 01   PTT seconds 25     Results from last 7 days   Lab Units 10/17/22  0829   TSH 3RD GENERATON uIU/mL 6 187*     Results from last 7 days   Lab Units 10/17/22  1150   CLARITY UA  Clear   COLOR UA  Light Yellow   SPEC GRAV UA  >=1 050*   PH UA  5 5   GLUCOSE UA mg/dl Negative   KETONES UA mg/dl Negative   BLOOD UA  Negative   PROTEIN UA mg/dl Negative   NITRITE UA  Negative   BILIRUBIN UA  Negative   UROBILINOGEN UA (BE) mg/dl <2 0   LEUKOCYTES UA  Negative     ED Treatment:   Medication Administration from 10/17/2022 0815 to 10/17/2022 1328       Date/Time Order Dose Route Action Action by Comments     10/17/2022 0829 fentanyl citrate (PF) 100 MCG/2ML 25 mcg 25 mcg Intravenous Given Kizzy Lisa      10/17/2022 0843 iohexol (OMNIPAQUE) 300 mg/mL injection 100 mL 100 mL Intravenous Given Pablo Anna      10/17/2022 0945 sodium chloride 0 9 % bolus 1,000 mL 1,000 mL Intravenous New Bag Kizzy Lisa         Past Medical History:   Diagnosis Date   • Arthritis    • Cancer ([facility] ) 2019   • Gout    • Hypertension    • Hyperthyroidism    • Memory loss    • Multiple myeloma ([facility] )    • Myeloma ([facility] )    • Sleep disturbance    • Thyroid disease      Present on Admission:  • VINICIUS (acute kidney injury) ([facility] )  • Ambulatory dysfunction  • Acquired hypothyroidism  • Hyponatremia  • Protein calorie malnutrition (HCC)      Admitting Diagnosis: Dizziness [R42]  Syncope [R55]  Frequent falls [R29 6]  Ambulatory dysfunction [R26 2]  Age/Sex: 80 y o  male  Admission Orders:  Scheduled Medications:  dexamethasone, 4 mg, Oral, Daily With Breakfast  iron sucrose, 100 mg, Intravenous, Daily  levothyroxine, 100 mcg, Oral, Early Morning  pantoprazole, 40 mg, Oral, Early Morning      Continuous IV Infusions:  sodium chloride, 125 mL/hr, Intravenous, Continuous      PRN Meds:  acetaminophen, 650 mg, Oral, Q6H PRN  aluminum-magnesium hydroxide-simethicone, 30 mL, Oral, Q6H PRN  ondansetron, 4 mg, Intravenous, Q6H PRN  polyethylene glycol, 17 g, Oral, Daily PRN    orthostatics , I&O   PT OT eval       IP CONSULT TO CASE MANAGEMENT  IP CONSULT TO NUTRITION SERVICES  IP CONSULT TO GASTROENTEROLOGY    Network Utilization Review Department  ATTENTION: Please call with any questions or concerns to [phone] and carefully listen to the prompts so that you are directed to the right person  All voicemails are confidential   [place] all requests for admission clinical reviews, approved or denied determinations and any other requests to dedicated fax number below belonging to the campus where the patient is receiving treatment   List of dedicated fax numbers for the Facilities:  [address] [place] [address] DENIALS (Administrative/Medical Necessity) [phone]   [address] [place] [address] (Maternity/NICU/Pediatrics) [phone]   [address] [place]  [phone]   [address] [address] [place] [address] [phone]   [address] [place] [facility] [facility] [address] [phone]

## 2022-10-18 NOTE — ASSESSMENT & PLAN NOTE
· Patient with dizziness, loss of balance and several falls over the last few days     · Fall precautions  · PT/OT consult  · Orthostatics pending

## 2022-10-18 NOTE — PROGRESS NOTES
[facility]  Progress Note Ken Caal 5/24/1927, 80 y o  male MRN: [id number]  Unit/Bed#: -Araceli Encounter: [id number]  Primary Care Provider: Marcel Shen MD   Date and time admitted to hospital: 10/17/2022  8:16 AM    * VINICIUS (acute kidney injury) ([facility] )  Assessment & Plan  · Creatinine 1 39 on admission, possibly related to dehydration vs medication side effect  · Hold home triamterene-hydrochlorothiazide  · Corrected overnight to normal with IV fluids  · BMP pending this am  · Avoid hypotension, nephrotoxins    Hyponatremia  Assessment & Plan  · Sodium noted to be 130 on admission, corrected to 138 overnight     Ambulatory dysfunction  Assessment & Plan  · Patient with dizziness, loss of balance and several falls over the last few days  · Fall precautions  · PT/OT consult  · Orthostatics pending     Acquired hypothyroidism  Assessment & Plan  · Continue home levothyroxine  · TSH elevated 6 187, free T4 normal 0 82  · Follow TFTs outpatient     Anemia  Assessment & Plan  · Significant drop in hemoglobin overnight  · Consider hemodilution given IV fluid resuscitation  · Discontinue Lovenox  · Initiate q 6 hours hemoglobin checks, Hemoccult ordered  · Will give 3 doses IV albumin today for hypotension  · Will check iron, folate, B12  · Imaging on admission without evidence for acute bleed  · Low threshold for stat reimaging    Protein calorie malnutrition ([place] [address] )  Assessment & Plan  BMI Findings: Body mass index is 18 75 kg/m²  · Consult Nutrition for evaluation        VTE Pharmacologic Prophylaxis: VTE Score: 7 High Risk (Score >/= 5) - Pharmacological DVT Prophylaxis Ordered: enoxaparin (Lovenox)  Sequential Compression Devices Ordered  Patient Centered Rounds: I performed bedside rounds with nursing staff today  Discussions with Specialists or Other Care Team Provider: RUBA     Education and Discussions with Family / Patient: Patient declined call to   Time Spent for Care: 20 minutes  More than 50% of total time spent on counseling and coordination of care as described above  Current Length of Stay: 0 day(s)  Current Patient Status: Observation   Certification Statement: The patient, admitted on an observation basis, will now require > 2 midnight hospital stay due to acute anemia of uncertain etiology  Discharge Plan: Anticipate discharge in 24-48 hrs to prior assisted or independent living facility  Code Status: Level 1 - Full Code    Subjective:   Patient seen this morning, out of bed to chair  Reports he did well with physical therapy this morning  Denies any chest pain, shortness breast or palpitations  Actually reports he feels better overall  Discussed his acute anemia, denies any known history of GI bleeds or anemia in the past     Objective:     Vitals:   Temp (24hrs), Av 6 °F (36 4 °C), Min:97 2 °F (36 2 °C), Max:98 °F (36 7 °C)    Temp:  [97 2 °F (36 2 °C)-98 °F (36 7 °C)] 97 2 °F (36 2 °C)  HR:  [66-92] 66  Resp:  [16-18] 18  BP: ()/(46-73) 105/51  SpO2:  [94 %-100 %] 97 %  Body mass index is 18 75 kg/m²  Input and Output Summary (last 24 hours): Intake/Output Summary (Last 24 hours) at 10/18/2022 0744  Last data filed at 10/18/2022 0546  Gross per 24 hour   Intake --   Output 1750 ml   Net -1750 ml       Physical Exam:   Physical Exam  Vitals and nursing note reviewed  Constitutional:       General: He is not in acute distress  Appearance: He is ill-appearing (chronically frail-appearing)  He is not toxic-appearing  Cardiovascular:      Rate and Rhythm: Normal rate and regular rhythm  Heart sounds: No murmur heard  Pulmonary:      Effort: Pulmonary effort is normal  No respiratory distress  Breath sounds: Normal breath sounds  No rales  Abdominal:      General: Bowel sounds are normal  There is no distension  Palpations: Abdomen is soft  Tenderness: There is no abdominal tenderness  Genitourinary:     Comments: Pastrana catheter noted  Musculoskeletal:      Right lower leg: No edema  Left lower leg: No edema  Skin:     General: Skin is warm and dry  Coloration: Skin is not pale  Neurological:      Mental Status: He is alert  Mental status is at baseline  Psychiatric:         Mood and Affect: Mood normal          Behavior: Behavior normal            Additional Data:     Labs:  Results from last 7 days   Lab Units 10/17/22  0829   WBC Thousand/uL 15 33*   HEMOGLOBIN g/dL 11 0*   HEMATOCRIT % 32 1*   PLATELETS Thousands/uL 180   NEUTROS PCT % 78*   LYMPHS PCT % 11*   MONOS PCT % 10   EOS PCT % 0     Results from last 7 days   Lab Units 10/17/22  1916 10/17/22  0829   SODIUM mmol/L 138 130*   POTASSIUM mmol/L 3 1* 3 8   CHLORIDE mmol/L 103 94*   CO2 mmol/L 24 27   BUN mg/dL 34* 41*   CREATININE mg/dL 1 14 1 39*   ANION GAP mmol/L 11 9   CALCIUM mg/dL 7 9* 8 8   ALBUMIN g/dL  --  3 4*   TOTAL BILIRUBIN mg/dL  --  1 24*   ALK PHOS U/L  --  72   ALT U/L  --  18   AST U/L  --  20   GLUCOSE RANDOM mg/dL 133 133     Results from last 7 days   Lab Units 10/17/22  0829   INR  1 01                   Lines/Drains:  Invasive Devices  Report    Peripheral Intravenous Line  Duration           Peripheral IV 10/17/22 Dorsal (posterior); Right Forearm <1 day    Peripheral IV 10/17/22 Left Antecubital <1 day    Peripheral IV 10/17/22 Left Antecubital <1 day          Drain  Duration           Urethral Catheter 16 Fr  <1 day              Urinary Catheter:  Goal for removal: N/A- Discharging with Pastrana         Imaging: No pertinent imaging reviewed      Recent Cultures (last 7 days):         Last 24 Hours Medication List:   Current Facility-Administered Medications   Medication Dose Route Frequency Provider Last Rate   • acetaminophen  650 mg Oral Q6H PRN Theodore Restrepo PA-C     • aluminum-magnesium hydroxide-simethicone  30 mL Oral Q6H PRN Theodore Restrepo PA-C     • aspirin  81 mg Oral Daily Aida BARROS NIKOLE Mensah     • dexamethasone  4 mg Oral Daily With Breakfast Hedii Sage PA-C     • enoxaparin  30 mg Subcutaneous Daily Sherrie Mensah PA-C     • levothyroxine  100 mcg Oral Early Morning Sherrie Mensah PA-C     • ondansetron  4 mg Intravenous Q6H PRN Heidi Sage PA-C     • pantoprazole  40 mg Oral Early Morning Sherrie Mensah PA-C     • polyethylene glycol  17 g Oral Daily PRN Heidi Sage PA-C     • potassium chloride  40 mEq Oral BID Heidi Sage PA-C     • sodium chloride  125 mL/hr Intravenous Continuous Heidi Sage PA-C 125 mL/hr (10/17/22 1437)        Today, Patient Was Seen By: Heidi Sage    **Please Note: This note may have been constructed using a voice recognition system  **

## 2022-10-18 NOTE — PHYSICAL THERAPY NOTE
Physical Therapy Evaluation     Patient's Name: Stefany Brooks    Admitting Diagnosis  Dizziness [R42]  Syncope [R55]  Frequent falls [R29 6]  Ambulatory dysfunction [R26 2]    Problem List  Patient Active Problem List   Diagnosis   • Idiopathic chronic gout of multiple sites without tophus   • Pure hypercholesterolemia   • Essential hypertension   • Acquired hypothyroidism   • Spondylosis of lumbar region without myelopathy or radiculopathy   • Patellofemoral disorder of left knee   • Spinal stenosis of lumbosacral region   • Meningioma, cerebral ([facility] )   • Immunization due   • Multiple myeloma not having achieved remission ([facility] )   • Falls   • Thrombocytopenia ([facility] )   • VINICIUS (acute kidney injury) ([facility] [address] )   • Abrasions of multiple sites   • Protein calorie malnutrition ([facility] )   • Venous stasis ulcer of left ankle with fat layer exposed without varicose veins (HCC)   • Chronic seasonal allergic rhinitis due to pollen   • 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   • Irritable bowel syndrome with diarrhea   • Irritable bowel syndrome without diarrhea   • Stage 3 chronic kidney disease, unspecified whether stage 3a or 3b CKD ([facility] [address] )   • Ambulatory dysfunction   • Hyponatremia   • Anemia     Past Medical History  Past Medical History:   Diagnosis Date   • Arthritis    • Cancer ([facility] ) 2019   • Gout    • Hypertension    • Hyperthyroidism    • Memory loss    • Multiple myeloma (HCC)    • Myeloma ([facility] [address] )    • Sleep disturbance    • Thyroid disease      Past Surgical History  Past Surgical History:   Procedure Laterality Date   • IR BIOPSY BONE MARROW  5/11/2021   • KIDNEY SURGERY      description: 30 yrs ago   • PROSTATE SURGERY      description: stone removal 30 yrs ago   • REPLACEMENT TOTAL KNEE BILATERAL      description: 18 yrs ago      10/18/22 0821   PT Last Visit   PT Visit Date 10/18/22   Note Type   Note type Evaluation and Treatment   Pain Assessment   Pain Assessment Tool 0-10   Pain Score No Pain Restrictions/Precautions   Weight Bearing Precautions Per Order No   Braces or Orthoses Other (Comment)  (none per patient)   Other Precautions Cognitive; Chair Alarm; Bed Alarm;Multiple lines; Fall Risk   Home Living   Type of Home Assisted living  [facility])   Home Layout One level; Able to live on main level with bedroom/bathroom   Bathroom Shower/Tub Walk-in shower   Bathroom Toilet Standard   Bathroom Equipment Grab bars in shower; Shower chair   [address] Walker   Additional Comments Pt ambulates with a walker  Prior Function   Level of [place] Independent with functional mobility; Independent with ADLs; Needs assistance with [address] staff   Receives Help From Family; Other (Comment)  (cousin; facility staff)   IADLs Family/Friend/Other provides transportation   Falls in the last 6 months (S)  5 to 10  ("I had 5 falls in one day  ")   Vocational Retired  ()   General   Family/Caregiver Present No   Cognition   Overall Cognitive Status Impaired   Arousal/Participation Alert   Attention Within functional limits   Orientation Level Oriented to person;Disoriented to place; Disoriented to time;Disoriented to situation   Memory Decreased recall of recent events   Following Commands Follows one step commands without difficulty   Comments Pt agreeable to PT  Subjective   Subjective "I'll get up "   RUE Assessment   RUE Assessment   (defer to OT assessment)   LUE Assessment   LUE Assessment   (defer to OT assessment)   RLE Assessment   RLE Assessment X   Strength RLE   RLE Overall Strength 3+/5   LLE Assessment   LLE Assessment X   Strength LLE   LLE Overall Strength 3+/5   Light Touch   RLE Light Touch Grossly intact   LLE Light Touch Grossly intact   Bed Mobility   Rolling R 4  Minimal assistance   Additional items Assist x 1;HOB elevated; Bedrails; Increased time required;Verbal cues;LE management  (log roll)   Rolling L 4  Minimal assistance   Additional items Assist x 1;HOB elevated; Bedrails; Increased time required;Verbal cues;LE management  (log roll)   Supine to Sit 4  Minimal assistance   Additional items Assist x 2;HOB elevated; Bedrails; Increased time required;Verbal cues;LE management   Additional Comments Pt's BP in supine: 89/51, pt asymptomatic; BP when sitting at EOB 99/70, pt asymptomatic   Transfers   Sit to Stand 4  Minimal assistance   Additional items Assist x 2; Increased time required;Verbal cues   Stand to Sit 4  Minimal assistance   Additional items Assist x 2; Increased time required;Verbal cues;Armrests   Ambulation/Elevation   Gait pattern Decreased toe off;Decreased heel strike;Decreased hip extension; Short stride; Shuffling   Gait Assistance 3  Moderate assist   Additional items Assist x 1;Verbal cues   Assistive Device Rolling walker   Distance 3 feet, bed to chair   Balance   Static Sitting Fair   Dynamic Sitting Fair -   Static Standing Poor +   Dynamic Standing Poor   Ambulatory Poor   Endurance Deficit   Endurance Deficit Yes   Endurance Deficit Description decreased activity tolerance   Activity Tolerance   Activity Tolerance Patient tolerated treatment well   Medical Staff Made Aware OT Klarissa Mccarthy OT student [address] Discussed case with RN Carmen   Assessment   Prognosis Good   Problem List Decreased strength;Decreased endurance; Impaired balance;Decreased mobility; Decreased cognition   Assessment Pt is 80year old male seen for PT evaluation s/p admit [facility] on 10/17/2022 with VINICIUS (acute kidney injury) ([facility] )  PT consulted to assess pt's functional mobility and d/c needs  Order placed for PT eval and tx, with up with assist order  Comorbidities affecting pt's physical performance at time of assessment include acquired hypothyroidism, protein calorie malnutrition, ambulatory dysfunction, hyponatremia, and anemia  PTA, pt was independent with all functional mobility with a walker   Pt ambulates household and community distances  Pt resides at [facility] assisted living [facility]  Personal factors affecting pt at time of IE include inability to ambulate household distances, inability to navigate community distances, inability to navigate level surfaces without external assistance, unable to perform dynamic tasks in community, decreased cognition, limited home support, positive fall history, inability to perform IADLs, and inability to perform ADLs  Please find objective findings from PT assessment regarding body systems outlined above with impairments and limitations including weakness, impaired balance, decreased endurance, gait deviations, decreased activity tolerance, decreased functional mobility tolerance, fall risk, and decreased cognition  The following objective measures performed on IE also reveal limitations: Barthel Index: 25/100, Modified [place]: 4 (moderate/severe disability) and AM-PAC 6-Clicks: 60/66  Pt's clinical presentation is currently unstable/unpredictable seen in pt's presentation of need for ongoing medical management/monitoring, pt is a fall risk, and pt requires cues and assist for safety with functional mobility  Pt to benefit from continued PT tx to address deficits as defined above and maximize level of functional independent mobility and consistency  From PT/mobility standpoint, recommendation at time of d/c would be STR pending progress in order to facilitate return to PLOF     Barriers to Discharge Decreased caregiver support   Goals   STG Expiration Date 10/28/22   Short Term Goal #1 In 10 days: Increase bilateral LE strength 1/2 grade to facilitate independent mobility, Perform all bed mobility tasks modified independent to decrease caregiver burden, Perform all transfers modified independent to improve independence, Ambulate > 150 ft  with RW modified independent w/o LOB and w/ normalized gait pattern 100% of the time and Increase all balance 1/2 grade to decrease risk for falls   Plan   Treatment/Interventions Functional transfer training;LE strengthening/ROM; Therapeutic exercise; Endurance training;Cognitive reorientation;Patient/family training;Bed mobility;Gait training;Spoke to nursing;OT   PT Frequency 3-5x/wk   Recommendation   PT Discharge Recommendation Post acute rehabilitation services   AM-PAC Basic Mobility Inpatient   Turning in Bed Without Bedrails 3   Lying on Back to Sitting on Edge of Flat Bed 2   Moving Bed to Chair 2   Standing Up From Chair 2   Walk in Room 2   Climb 3-5 Stairs 1   Basic Mobility Inpatient Raw Score 12   Basic Mobility Standardized Score 32 23   Highest Level Of Mobility   -[facility] Goal 4: Move to chair/commode   -[facility] Achieved 4: Move to chair/commode   Modified [place] Scale   Modified Maximilian Scale 4   Barthel Index   Feeding 5   Bathing 0   Grooming Score 0   Dressing Score 5   Bladder Score 0   Bowels Score 5   Toilet Use Score 5   Transfers (Bed/Chair) Score 5   Mobility (Level Surface) Score 0   Stairs Score 0   Barthel Index Score 25   Additional Treatment Session   Start Time 0846   End Time 0910   Treatment Assessment Pt agreeable to PT treatment session following PT evaluation  Pt performed seated therapeutic exercise as indicated below  Pt required verbal cues and occasional tactile cues for correct technique and form  Pt tolerated therapeutic exercise well without complaints of pain  Pt continues to exhibit decreased lower extremity strength, impaired balance, decreased endurance, gait deviations, and decreased functional mobility  PT to continue to recommend STR  PT to continue to follow and treat as appropriate  Exercises   Quad Sets Sitting;20 reps;AROM; Bilateral  (long sitting)   Heelslides Sitting;20 reps;AROM; Bilateral  (long sitting)   Hip Flexion Sitting;AROM; Bilateral  (30 reps)   Hip Abduction Sitting;20 reps;AROM; Bilateral  (long sitting)   Hip Adduction Sitting;20 reps;AROM; Bilateral   Knee AROM [place] [facility] reps;AROM; Bilateral   Ankle Pumps Sitting;AROM; Bilateral  (30 reps)   End of Consult   Patient Position at End of Consult Bedside chair;Bed/Chair alarm activated; All needs within reach  (RN aware)     PT Evaluation Time: 0625-0758    PT Treatment Time: 7905-4878  24 minutes    Olivia Bobo, PT, DPT

## 2022-10-18 NOTE — OCCUPATIONAL THERAPY NOTE
Occupational Therapy Evaluation        Patient Name: Julio Ontiveros  [facility]NMK'[facility] Date: 10/18/2022         10/18/22 0820   OT Last Visit   OT Visit Date 10/18/22   Note Type   Note type Evaluation   Additional Comments Pt received with call bell on requesting to use bathroom  Pt with bowel movement in bed  Pain Assessment   Pain Assessment Tool 0-10   Pain Score No Pain   Restrictions/Precautions   Weight Bearing Precautions Per Order No   Braces or Orthoses Other (Comment)  (none at baseline)   Other Precautions Cognitive; Chair Alarm; Bed Alarm;Multiple lines; Fall Risk   Home Living   Type of Home Assisted living  [facility])   Home Layout One level; Able to live on main level with bedroom/bathroom   Bathroom Shower/Tub Walk-in shower   Bathroom Toilet Standard   Bathroom Equipment Grab bars in shower; Shower chair   [address] [place]   Additional Comments Pt ambulates with a walker  Prior Function   Level of [place] Independent with functional mobility; Independent with ADLs; Needs assistance with [address] staff   Receives Help From Family  (cousin; facility staff)   IADLs Family/Friend/Other provides medication management   Falls in the last 6 months 5 to 10   Vocational Retired   Lifestyle   Autonomy PTA, pt living at [address] [facility]  AMANDA is one level with no EMILEE  Pt ambulates with a walker and reports independence with functional mobility and ADLs/needs assistance with IADLs and medication  Pt reports living independently at [facility]  Pt had 5 falls in one day  Pt was an      [employer]rocal Relations[employer] supportive staff, supportive cousin   Service to Others retired    [address] enjoys interior designing and looking at [address] [place] Present No   ADL   Where [facility] of bed   Equipment Provided Other (Comment)  (no AD provided)   Eating Assistance 5  Supervision/Setup   Eating Deficit Setup;Supervision/safety; Increased time to complete   Grooming Assistance 4  Minimal Assistance   Grooming Deficit Setup;Supervision/safety; Increased time to complete;Verbal cueing   UB Bathing Assistance 4  Minimal Assistance   UB Bathing Deficit Verbal cueing; Increased time to complete   LB Bathing Assistance 2  Maximal Assistance   LB Bathing Deficit Increased time to complete;Verbal cueing;Right lower leg including foot; Left lower leg including foot   UB Dressing Assistance 4  Minimal Assistance   UB Dressing Deficit Verbal cueing; Increased time to complete;Pull over head   LB Dressing Assistance 3  Moderate Assistance   LB Dressing Deficit Verbal cueing; Increased time to complete; Don/doff R sock; Don/doff L sock; Thread RLE into pants; Thread LLE into pants; Thread RLE into underwear; Thread LLE into underwear;Don/doff R shoe;Don/doff L shoe   Toileting Assistance  2  Maximal Assistance   Toileting Deficit Verbal cueing; Increased time to complete; Bedside commode;Clothing management up;Clothing management down;Perineal hygiene   Functional Assistance 3  Moderate Assistance   Functional Deficit Increased time to complete; Shower transfer;Commode transfer; Toilet transfer   Bed Mobility   Rolling R 4  Minimal assistance   Additional items Assist x 1;HOB elevated; Increased time required;Verbal cues  (log roll)   Rolling L 4  Minimal assistance   Additional items Assist x 1;Bedrails;Verbal cues; Increased time required   Supine to Sit 3  Moderate assistance   Additional items Assist x 2;HOB elevated;Verbal cues; Bedrails   Additional Comments Pt's BP in supine: 89/51, pt asymptomatic; BP when sitting at EOB 99/70, pt asymptomatic   Transfers   Sit to Stand 4  Minimal assistance   Additional items Assist x 2; Increased time required;Verbal cues   Stand to Sit 4  Minimal assistance   Additional items Assist x 2; Increased time required;Verbal cues   Functional Mobility   Functional Mobility 4  Minimal assistance   Additional Comments ambulate with assist of 2 with RW to recliner, verbal cues needed   Balance   Static Sitting Fair +   Dynamic Sitting Fair   Static Standing Fair -   Dynamic Standing Poor +   Ambulatory Poor +   Activity Tolerance   Activity Tolerance Patient limited by fatigue   Nurse Made Aware Discussed case with RN Carmen   RUE Assessment   RUE Assessment X   RUE Overall AROM   R Shoulder Flexion 95 degrees   RUE Strength   RUE Overall Strength Deficits  (4/5 overall)   LUE Assessment   LUE Assessment X   LUE Overall AROM   L Shoulder Flexion 95 degrees   LUE Strength   LUE Overall Strength Deficits  (4/5 overall)   Hand Function   Gross Motor Coordination Functional   Fine Motor Coordination Functional   Sensation   Light Touch No apparent deficits  (BUEs)   Vision-Basic Assessment   Current Vision Does not wear glasses   Psychosocial   Psychosocial (WDL) WDL   Patient Behaviors/Mood Appropriate for age;Calm; Cooperative;Elated   Cognition   Overall Cognitive Status Impaired   Arousal/Participation Alert; Responsive; Cooperative   Attention Within functional limits   Orientation Level Oriented to person;Disoriented to place; Disoriented to time;Disoriented to situation   Memory Decreased long term memory   Following Commands Follows one step commands with increased time or repetition   Assessment   Limitation Decreased ADL status; Decreased UE ROM; Decreased UE strength;Decreased Safe judgement during ADL;Decreased cognition;Decreased endurance;Decreased high-level ADLs   Prognosis Fair   Assessment Patient is a 80 y o  male seen for OT evaluation s/p admit to [address] on 10/17/2022 w/VINICIUS (acute kidney injury) ([facility] [address] )  Commorbidities affecting patient's functional performance at time of assessment include:  acquired hypothyroidism, protein calorie malnutrition, ambulatory dysfunction, hyponatremia, and anemia  Orders placed for OT evaluation and treatment    Performed at least two patient identifiers during session including name and wristband  Prior to admission, PTA, pt living at [address]  AMANDA is one level with no EMILEE  Pt ambulates with a walker and reports independence with functional mobility and ADLs/needs assistance with IADLs and medication  Pt reports living independently at [facility]  Pt had 5 falls in one day  Pt was an   Personal factors affecting patient at time of initial evaluation include: difficulty performing ADLs and difficulty performing IADLs  Upon evaluation, patient requires minimal  and moderate assist for UB ADLs, maximal assist for LB ADLs  Occupational performance is affected by the following deficits: orientation, degenerative arthritic joint changes, impaired gross motor coordination, dynamic sit/ stand balance deficit with poor standing tolerance time for self care and functional mobility, decreased activity tolerance, impaired memory, impaired problem solving and decreased safety awareness  Patient to benefit from continued Occupational Therapy treatment while in the hospital to address deficits as defined above and maximize level of functional independence with ADLs and functional mobility  Occupational Performance areas to address include: bathing/ shower, dressing, toilet hygiene, transfer to all surfaces, functional mobility, emergency response, health maintenance and IADLs: safety procedures  From OT standpoint, recommendation at time of d/c would be Short Term Rehab  Plan   Treatment Interventions ADL retraining;Functional transfer training;UE strengthening/ROM; Endurance training;Cognitive reorientation; Activityengagement   Recommendation   OT Discharge Recommendation Post acute rehabilitation services   AM-PAC Daily Activity Inpatient   Lower Body Dressing 2   Bathing 2   Toileting 2   Upper Body Dressing 3   Grooming 3   Eating 3   Daily Activity Raw Score 15   Daily Activity Standardized Score (Calc for Raw Score >=11) 34 69   AM-PAC Applied Cognition Inpatient   Following a Speech/Presentation 3   Understanding Ordinary Conversation 3   Taking Medications 2   Remembering Where Things Are Placed or Put Away 2   Remembering List of 4-5 Errands 2   Taking Care of Complicated Tasks 2   Applied Cognition Raw Score 14   Applied Cognition Standardized Score 32 02   Barthel Index   Feeding 5   Bathing 0   Grooming Score 0   Dressing Score 5   Bladder Score 0   Bowels Score 5   Toilet Use Score 5   Transfers (Bed/Chair) Score 5   Mobility (Level Surface) Score 0   Stairs Score 0   Barthel Index Score 25         Occupational Therapy goals: In 7-14 days:     1- Patient will verbalize and demonstrate use of energy conservation/ deep breathing technique and work simplification skills during functional activity with no verbal cues  2-Patient will verbalize and demonstrate good body mechanics and joint protection techniques during  ADLs/ IADLs with no verbal cues  3- Patient will increase OOB/ sitting tolerance to 2-4 hours per day for increased participation in self care and leisure tasks with no s/s of exertion  4-Patient will increase standing tolerance time to 5  minutes with unilateral UE support to complete sink level ADLs@ mod I level   5- Patient will increase sitting tolerance at edge of bed to 20 minutes to complete UB ADLs @ set up assist level  6- Patient will transfer bed to Chair / toilet at Set up assist level with AD as indicated  7- Patient will complete UB ADLs with set up assist  8- Patient will complete LB ADLs with min assist with the use of adaptive equipment  9- Patient will complete toileting hygiene with set up assist/ supervision for thoroughness    10-Patient/ Family  will demonstrate competency with UE Home Exercise Program

## 2022-10-19 PROBLEM — N32.89 BLADDER DISTENSION: Status: ACTIVE | Noted: 2022-10-19

## 2022-10-19 PROBLEM — E87.1 HYPONATREMIA: Status: RESOLVED | Noted: 2022-10-17 | Resolved: 2022-10-19

## 2022-10-19 PROBLEM — E46 PROTEIN CALORIE MALNUTRITION (HCC): Status: RESOLVED | Noted: 2021-06-30 | Resolved: 2022-10-19

## 2022-10-19 LAB
ABO GROUP BLD: NORMAL
ABO GROUP BLD: NORMAL
ANION GAP SERPL CALCULATED.3IONS-SCNC: 11 MMOL/L (ref 4–13)
BASOPHILS # BLD AUTO: 0.01 THOUSANDS/ÂΜL (ref 0–0.1)
BASOPHILS NFR BLD AUTO: 0 % (ref 0–1)
BLD GP AB SCN SERPL QL: NEGATIVE
BUN SERPL-MCNC: 18 MG/DL (ref 5–25)
CALCIUM SERPL-MCNC: 8.2 MG/DL (ref 8.3–10.1)
CHLORIDE SERPL-SCNC: 110 MMOL/L (ref 96–108)
CO2 SERPL-SCNC: 21 MMOL/L (ref 21–32)
CREAT SERPL-MCNC: 0.69 MG/DL (ref 0.6–1.3)
EOSINOPHIL # BLD AUTO: 0 THOUSAND/ÂΜL (ref 0–0.61)
EOSINOPHIL NFR BLD AUTO: 0 % (ref 0–6)
ERYTHROCYTE [DISTWIDTH] IN BLOOD BY AUTOMATED COUNT: 13.6 % (ref 11.6–15.1)
ERYTHROCYTE [DISTWIDTH] IN BLOOD BY AUTOMATED COUNT: 13.8 % (ref 11.6–15.1)
GFR SERPL CREATININE-BSD FRML MDRD: 80 ML/MIN/1.73SQ M
GLUCOSE SERPL-MCNC: 138 MG/DL (ref 65–140)
HCT VFR BLD AUTO: 20.8 % (ref 36.5–49.3)
HCT VFR BLD AUTO: 21.6 % (ref 36.5–49.3)
HCT VFR BLD AUTO: 21.7 % (ref 36.5–49.3)
HCT VFR BLD AUTO: 22 % (ref 36.5–49.3)
HCT VFR BLD AUTO: 23.3 % (ref 36.5–49.3)
HGB BLD-MCNC: 6.8 G/DL (ref 12–17)
HGB BLD-MCNC: 6.8 G/DL (ref 12–17)
HGB BLD-MCNC: 7 G/DL (ref 12–17)
HGB BLD-MCNC: 7.1 G/DL (ref 12–17)
HGB BLD-MCNC: 7.4 G/DL (ref 12–17)
IMM GRANULOCYTES # BLD AUTO: 0.09 THOUSAND/UL (ref 0–0.2)
IMM GRANULOCYTES NFR BLD AUTO: 1 % (ref 0–2)
LYMPHOCYTES # BLD AUTO: 0.88 THOUSANDS/ÂΜL (ref 0.6–4.47)
LYMPHOCYTES NFR BLD AUTO: 10 % (ref 14–44)
MCH RBC QN AUTO: 31.7 PG (ref 26.8–34.3)
MCH RBC QN AUTO: 31.9 PG (ref 26.8–34.3)
MCHC RBC AUTO-ENTMCNC: 31.8 G/DL (ref 31.4–37.4)
MCHC RBC AUTO-ENTMCNC: 32.7 G/DL (ref 31.4–37.4)
MCV RBC AUTO: 100 FL (ref 82–98)
MCV RBC AUTO: 98 FL (ref 82–98)
MONOCYTES # BLD AUTO: 0.84 THOUSAND/ÂΜL (ref 0.17–1.22)
MONOCYTES NFR BLD AUTO: 10 % (ref 4–12)
NEUTROPHILS # BLD AUTO: 6.64 THOUSANDS/ÂΜL (ref 1.85–7.62)
NEUTS SEG NFR BLD AUTO: 79 % (ref 43–75)
NRBC BLD AUTO-RTO: 0 /100 WBCS
PLATELET # BLD AUTO: 92 THOUSANDS/UL (ref 149–390)
PLATELET # BLD AUTO: 99 THOUSANDS/UL (ref 149–390)
PMV BLD AUTO: 10 FL (ref 8.9–12.7)
PMV BLD AUTO: 9.7 FL (ref 8.9–12.7)
POTASSIUM SERPL-SCNC: 3.7 MMOL/L (ref 3.5–5.3)
RBC # BLD AUTO: 2.13 MILLION/UL (ref 3.88–5.62)
RBC # BLD AUTO: 2.21 MILLION/UL (ref 3.88–5.62)
RH BLD: POSITIVE
RH BLD: POSITIVE
SODIUM SERPL-SCNC: 142 MMOL/L (ref 135–147)
SPECIMEN EXPIRATION DATE: NORMAL
WBC # BLD AUTO: 7.89 THOUSAND/UL (ref 4.31–10.16)
WBC # BLD AUTO: 8.46 THOUSAND/UL (ref 4.31–10.16)

## 2022-10-19 PROCEDURE — 85014 HEMATOCRIT: CPT | Performed by: PHYSICIAN ASSISTANT

## 2022-10-19 PROCEDURE — 86901 BLOOD TYPING SEROLOGIC RH(D): CPT

## 2022-10-19 PROCEDURE — 85014 HEMATOCRIT: CPT | Performed by: INTERNAL MEDICINE

## 2022-10-19 PROCEDURE — NC001 PR NO CHARGE: Performed by: PHYSICIAN ASSISTANT

## 2022-10-19 PROCEDURE — 85018 HEMOGLOBIN: CPT | Performed by: INTERNAL MEDICINE

## 2022-10-19 PROCEDURE — 87493 C DIFF AMPLIFIED PROBE: CPT | Performed by: INTERNAL MEDICINE

## 2022-10-19 PROCEDURE — 85027 COMPLETE CBC AUTOMATED: CPT | Performed by: PHYSICIAN ASSISTANT

## 2022-10-19 PROCEDURE — 99232 SBSQ HOSP IP/OBS MODERATE 35: CPT | Performed by: PHYSICIAN ASSISTANT

## 2022-10-19 PROCEDURE — 80048 BASIC METABOLIC PNL TOTAL CA: CPT | Performed by: PHYSICIAN ASSISTANT

## 2022-10-19 PROCEDURE — 85025 COMPLETE CBC W/AUTO DIFF WBC: CPT

## 2022-10-19 PROCEDURE — 86900 BLOOD TYPING SEROLOGIC ABO: CPT

## 2022-10-19 PROCEDURE — 85018 HEMOGLOBIN: CPT | Performed by: PHYSICIAN ASSISTANT

## 2022-10-19 PROCEDURE — 86850 RBC ANTIBODY SCREEN: CPT

## 2022-10-19 PROCEDURE — 99222 1ST HOSP IP/OBS MODERATE 55: CPT | Performed by: INTERNAL MEDICINE

## 2022-10-19 RX ADMIN — DEXAMETHASONE 4 MG: 4 TABLET ORAL at 08:20

## 2022-10-19 RX ADMIN — POLYETHYLENE GLYCOL 3350, SODIUM SULFATE ANHYDROUS, SODIUM BICARBONATE, SODIUM CHLORIDE, POTASSIUM CHLORIDE 4000 ML: 236; 22.74; 6.74; 5.86; 2.97 POWDER, FOR SOLUTION ORAL at 17:16

## 2022-10-19 RX ADMIN — PANTOPRAZOLE SODIUM 40 MG: 40 TABLET, DELAYED RELEASE ORAL at 05:26

## 2022-10-19 RX ADMIN — SODIUM CHLORIDE 125 ML/HR: 0.9 INJECTION, SOLUTION INTRAVENOUS at 11:39

## 2022-10-19 RX ADMIN — LEVOTHYROXINE SODIUM 100 MCG: 100 TABLET ORAL at 05:26

## 2022-10-19 RX ADMIN — IRON SUCROSE 100 MG: 20 INJECTION, SOLUTION INTRAVENOUS at 08:20

## 2022-10-19 RX ADMIN — ALBUMIN (HUMAN) 12.5 G: 0.25 INJECTION, SOLUTION INTRAVENOUS at 01:35

## 2022-10-19 NOTE — UTILIZATION REVIEW
NOTIFICATION OF INPATIENT ADMISSION   AUTHORIZATION REQUEST   SERVICING FACILITY:   [address] [place] [address]  Tax ID: [id number]  NPI: [id number] ATTENDING PROVIDER:  Attending Name and NPI#: Sohan Dayanna [[id number]]  Address: [address] [place] [address]  Phone: [phone]     ADMISSION INFORMATION:  Place of Service: Inpatient [address] [place] [address]  60[address]  Place of Service Code: 21  Inpatient Admission Date/Time: 10/18/22 12:44 PM  Discharge Date/Time: No discharge date for patient encounter  Admitting Diagnosis Code/Description:  Dizziness [R42]  Syncope [R55]  Frequent falls [R29 6]  Ambulatory dysfunction [R26 2]     UTILIZATION REVIEW CONTACT:  Sergei Macias Utilization   Network Utilization Review Department  Phone: [phone]  Fax [phone]  Email: [email or web address]  Contact for approvals/pending authorizations, clinical reviews, and discharge  PHYSICIAN ADVISORY SERVICES:  Medical Necessity Denial & [employer] Review  Phone: [phone]  Fax: [phone]  Email: [email or web address]

## 2022-10-19 NOTE — ASSESSMENT & PLAN NOTE
· Significant drop in hemoglobin from 11 0 to 7 7 from 10/17-10/18   · Hemoglobin 7 0 this morning  · Consider hemodilution given IV fluid resuscitation, however hemoccult (+) x3  · Lovenox discontinued  · Continue q 6 hours hemoglobin checks  · Received 3 doses IV albumin 10/18 for hypotension  · Imaging on admission without evidence for acute bleed  · Iron panel showing iron deficiency with normal ferritin and low TIBC - 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  · Transfuse for hgb <6 given national blood shortage  · Continue IV iron x 3 days

## 2022-10-19 NOTE — PHYSICAL THERAPY NOTE
Physical Therapy Cancellation Note    Chart review performed  Attempted to see patient for physical therapy treatment session; per LOS [place] Argue PT to hold at this time secondary to medical status  PT to continue to follow and treat as appropriate       10/19/22 1115   PT Last Visit   PT Visit Date 10/19/22   Note Type   Note Type Cancelled Session   Cancel Reasons Medical status     Gina Menjivar, PT, DPT

## 2022-10-19 NOTE — NURSING NOTE
Pt noted to have red/brown feces  No obvious streaks/clots  No obvious Hemorids  Occult blood/ H&H sent  /59 with plan for albumin  On call SLIM notified

## 2022-10-19 NOTE — PLAN OF CARE
Problem: SKIN/TISSUE INTEGRITY - ADULT  Goal: Incision(s), wounds(s) or drain site(s) healing without S/S of infection  Description: INTERVENTIONS  - Assess and document dressing, incision, wound bed, drain sites and surrounding tissue  - Provide patient and family education  - Perform skin care/dressing changes every  day    Outcome: Progressing

## 2022-10-19 NOTE — PLAN OF CARE
Problem: MOBILITY - ADULT  Goal: Maintain or return to baseline ADL function  Description: INTERVENTIONS:  -  Assess patient's ability to carry out ADLs; assess patient's baseline for ADL function and identify physical deficits which impact ability to perform ADLs (bathin  Problem: GASTROINTESTINAL - ADULT  Goal: Maintains or returns to baseline bowel function  Description: INTERVENTIONS:  - Assess bowel function  - Encourage oral fluids to ensure adequate hydration  - Administer IV fluids if ordered to ensure adequate hydration  - Administer ordered medications as needed  - Encourage mobilization and activity  - Consider nutritional services referral to assist patient with adequate nutrition and appropriate food choices  Outcome: Progressing  Goal: Oral mucous membranes remain intact  Description: INTERVENTIONS  - Assess oral mucosa and hygiene practices  - Implement preventative oral hygiene regimen  - Implement oral medicated treatments as ordered  - Initiate Nutrition services referral as needed  Outcome: Progressing   g, care of mouth/teeth, toileting, grooming, dressing, etc )  - Assess/evaluate cause of self-care deficits   - Assess range of motion  - Assess patient's mobility; develop plan if impaired  - Assess patient's need for assistive devices and provide as appropriate  - Encourage maximum independence but intervene and supervise when necessary  - Involve family in performance of ADLs  - Assess for home care needs following discharge   - Consider OT consult to assist with ADL evaluation and planning for discharge  - Provide patient education as appropriate  Outcome: Progressing     Problem: METABOLIC, FLUID AND ELECTROLYTES - ADULT  Goal: Electrolytes maintained within normal limits  Description: INTERVENTIONS:  - Monitor labs and assess patient for signs and symptoms of electrolyte imbalances  - Administer electrolyte replacement as ordered  - Monitor response to electrolyte replacements, including repeat lab results as appropriate  - Instruct patient on fluid and nutrition as appropriate  Outcome: Progressing     Problem: METABOLIC, FLUID AND ELECTROLYTES - ADULT  Goal: Fluid balance maintained  Description: INTERVENTIONS:  - Monitor labs   - Monitor I/O and WT  - Instruct patient on fluid and nutrition as appropriate  - Assess for signs & symptoms of volume excess or deficit  Outcome: Progressing     Problem: SKIN/TISSUE INTEGRITY - ADULT  Goal: Incision(s), wounds(s) or drain site(s) healing without S/S of infection  Description: INTERVENTIONS  - Assess and document dressing, incision, wound bed, drain sites and surrounding tissue  - Provide patient and family education  - Perform skin care/dressing changes every shift  Outcome: Progressing     Problem: HEMATOLOGIC - ADULT  Goal: Maintains hematologic stability  Description: INTERVENTIONS  - Assess for signs and symptoms of bleeding or hemorrhage  - Monitor labs  - Administer supportive blood products/factors as ordered and appropriate  Outcome: Progressing     Problem: Potential for Falls  Goal: Patient will remain free of falls  Description: INTERVENTIONS:  - Educate patient/family on patient safety including physical limitations  - Instruct patient to call for assistance with activity   - Consult OT/PT to assist with strengthening/mobility   - Keep Call bell within reach  - Keep bed low and locked with side rails adjusted as appropriate  - Keep care items and personal belongings within reach  - Initiate and maintain comfort rounds  - Make Fall Risk Sign visible to staff  - Offer Toileting every 2 Hours, in advance of need  - Initiate/Maintain bed/chair alarm  - Obtain necessary fall risk management equipment  - Apply yellow socks and bracelet for high fall risk patients  - Consider moving patient to room near nurses station  Outcome: Progressing     Problem: Nutrition/Hydration-ADULT  Goal: Nutrient/Hydration intake appropriate for improving, restoring or maintaining nutritional needs  Description: Monitor and assess patient's nutrition/hydration status for malnutrition  Collaborate with interdisciplinary team and initiate plan and interventions as ordered  Monitor patient's weight and dietary intake as ordered or per policy  Utilize nutrition screening tool and intervene as necessary  Determine patient's food preferences and provide high-protein, high-caloric foods as appropriate       INTERVENTIONS:  - Monitor oral intake, urinary output, labs, and treatment plans  - Assess nutrition and hydration status and recommend course of action  - Evaluate amount of meals eaten  - Assist patient with eating if necessary   - Allow adequate time for meals  - Recommend/ encourage appropriate diets, oral nutritional supplements, and vitamin/mineral supplements  - Order, calculate, and assess calorie counts as needed  - Recommend, monitor, and adjust tube feedings based on assessed needs  - Assess need for intravenous fluids  - Provide nutrition/hydration education as appropriate  - Include patient/family/caregiver in decisions related to nutrition  Outcome: Progressing

## 2022-10-19 NOTE — CONSULTS
Consultation - [facility]  Evin Ford 80 y o  male MRN: [id number]  Unit/Bed#: -01 Encounter: [id number]      Inpatient consult to gastroenterology  Consult performed by: Aditi Resendiz PA-C  Consult ordered by: Victor Manuel Carr PA-C           Reason for Consult / Principal Problem: Rectal bleeding, acute blood loss anemia    HPI: Evin Ford is a 80y o  year old male with a PMH of multiple myeloma, CKD, HTN, hypothyroidism who presented initially to the ED due to dizziness and ambulatory dysfunction  He then had an acute drop in his hemoglobin during the admission with the development of multiple bloody bowel movements with red blood  His hemoglobin dropped from 11 to 7  He denies any abdominal pain  No melena  No nausea or vomiting  He does have evidence of diverticulosis on CT Scan  He reports he has never had a colonoscopy  He reports a recent weight loss of 20 lbs  He denies any family history of colon cancer  He would like to proceed with further evaluation for the cause of his gastrointestinal bleeding and is a level 1 full code  REVIEW OF SYSTEMS:     CONSTITUTIONAL: Denies any fever, chills, or rigors  +Weight loss of 20 lbs  HEENT: No earache or tinnitus  Denies hearing loss or visual disturbances  CARDIOVASCULAR: No chest pain or palpitations  RESPIRATORY: Denies any cough, hemoptysis, shortness of breath or dyspnea on exertion  GASTROINTESTINAL: As noted in the History of Present Illness  GENITOURINARY: No problems with urination  Denies any hematuria or dysuria  NEUROLOGIC: No dizziness or vertigo, denies headaches  MUSCULOSKELETAL: Denies any muscle or joint pain  SKIN: Denies skin rashes or itching  ENDOCRINE: Denies excessive thirst  Denies intolerance to heat or cold  PSYCHOSOCIAL: Denies depression or anxiety  Denies any recent memory loss       Historical Information   Past Medical History:   Diagnosis Date   • Arthritis    • Cancer ([facility] ) 2019   • Gout    • Hypertension    • Hyperthyroidism    • Memory loss    • Multiple myeloma (HCC)    • Myeloma ([facility] )    • Sleep disturbance    • Thyroid disease      Past Surgical History:   Procedure Laterality Date   • IR BIOPSY BONE MARROW  2021   • KIDNEY SURGERY      description: 30 yrs ago   • PROSTATE SURGERY      description: stone removal 30 yrs ago   • REPLACEMENT TOTAL KNEE BILATERAL      description: 18 yrs ago     Social History   Social History     Substance and Sexual Activity   Alcohol Use Not Currently    Comment: social "burbon once in a while"     Social History     Substance and Sexual Activity   Drug Use No     Social History     Tobacco Use   Smoking Status Former Smoker   • Packs/day: 0 50   • Years: 25 00   • Pack years: 12 50   • Quit date: 1980   • Years since quittin 8   Smokeless Tobacco Never Used   Tobacco Comment    "I smoked years ago"     Family History   Problem Relation Age of Onset   • Other Mother         Brain tumor   • Cancer Sister    • Diabetes Brother    • Arthritis Family    • Diabetes Family    • Other Family         knee replacement   • Thyroid disease Family        Meds/Allergies     Facility-Administered Medications Prior to Admission   Medication   • albuterol inhalation solution 2 5 mg     Medications Prior to Admission   Medication   • aspirin (ECOTRIN LOW STRENGTH) 81 mg EC tablet   • Cholecalciferol (VITAMIN D3 PO)   • levothyroxine 100 mcg tablet   • triamterene-hydrochlorothiazide (DYAZIDE) 37 5-25 mg per capsule   • dexamethasone (DECADRON) 4 mg tablet   • omeprazole (PriLOSEC) 20 mg delayed release capsule     Current Facility-Administered Medications   Medication Dose Route Frequency   • acetaminophen (TYLENOL) tablet 650 mg  650 mg Oral Q6H PRN   • aluminum-magnesium hydroxide-simethicone (MYLANTA) oral suspension 30 mL  30 mL Oral Q6H PRN   • dexamethasone (DECADRON) tablet 4 mg  4 mg Oral Daily With Breakfast   • iron sucrose (VENOFER) 100 mg in sodium chloride 0 9 % 100 mL IVPB  100 mg Intravenous Daily   • levothyroxine tablet 100 mcg  100 mcg Oral Early Morning   • ondansetron (ZOFRAN) injection 4 mg  4 mg Intravenous Q6H PRN   • pantoprazole (PROTONIX) EC tablet 40 mg  40 mg Oral Early Morning   • polyethylene glycol (MIRALAX) packet 17 g  17 g Oral Daily PRN   • sodium chloride 0 9 % infusion  125 mL/hr Intravenous Continuous       No Known Allergies    Objective   Blood pressure 109/80, pulse 57, temperature (!) 97 3 °F (36 3 °C), resp  rate 15, height 5' 6" (1 676 m), weight 52 7 kg (116 lb 2 9 oz), SpO2 98 %  Intake/Output Summary (Last 24 hours) at 10/19/2022 1236  Last data filed at 10/19/2022 1139  Gross per 24 hour   Intake 4949 58 ml   Output 1475 ml   Net 3474 58 ml         PHYSICAL EXAM:      General Appearance:   Alert, cooperative, no distress, appears stated age    HEENT:   Normocephalic, atraumatic, anicteric    Neck:  Supple, symmetrical, trachea midline, no adenopathy;    thyroid: no enlargement/tenderness/nodules; no carotid  bruit or JVD    Lungs:   Clear to auscultation bilaterally; no rales, rhonchi or wheezing; respirations unlabored    Heart[de-identified]   S1 and S2 normal; regular rate and rhythm; no murmur, rub, or gallop     Abdomen:   Soft, non-tender, non-distended; normal bowel sounds; no masses, no organomegaly    Genitalia:   Deferred    Rectal:   Deferred    Extremities:  No cyanosis, clubbing   Pulses:  2+ and symmetric all extremities    Skin:  Skin color, texture, turgor normal, no rashes or lesions    Lymph nodes:  No palpable cervical, axillary or inguinal lymphadenopathy        Lab Results:   Admission on 10/17/2022   Component Date Value   • Total CK 10/17/2022 54    • hs TnI 0hr 10/17/2022 8    • WBC 10/17/2022 15 33 (A)   • RBC 10/17/2022 3 32 (A)   • Hemoglobin 10/17/2022 11 0 (A)   • Hematocrit 10/17/2022 32 1 (A)   • MCV 10/17/2022 97    • MCH 10/17/2022 33 1    • MCHC 10/17/2022 34 3    • RDW 10/17/2022 13 7 • MPV 10/17/2022 10 1    • Platelets 91/74/3369 180    • nRBC 10/17/2022 0    • Neutrophils Relative 10/17/2022 78 (A)   • Immat GRANS % 10/17/2022 1    • Lymphocytes Relative 10/17/2022 11 (A)   • Monocytes Relative 10/17/2022 10    • Eosinophils Relative 10/17/2022 0    • Basophils Relative 10/17/2022 0    • Neutrophils Absolute 10/17/2022 11 99 (A)   • Immature Grans Absolute 10/17/2022 0 14    • Lymphocytes Absolute 10/17/2022 1 64    • Monocytes Absolute 10/17/2022 1 55 (A)   • Eosinophils Absolute 10/17/2022 0 00    • Basophils Absolute 10/17/2022 0 01    • Sodium 10/17/2022 130 (A)   • Potassium 10/17/2022 3 8    • Chloride 10/17/2022 94 (A)   • CO2 10/17/2022 27    • ANION GAP 10/17/2022 9    • BUN 10/17/2022 41 (A)   • Creatinine 10/17/2022 1 39 (A)   • Glucose 10/17/2022 133    • Calcium 10/17/2022 8 8    • Corrected Calcium 10/17/2022 9 3    • AST 10/17/2022 20    • ALT 10/17/2022 18    • Alkaline Phosphatase 10/17/2022 72    • Total Protein 10/17/2022 6 4    • Albumin 10/17/2022 3 4 (A)   • Total Bilirubin 10/17/2022 1 24 (A)   • eGFR 10/17/2022 42    • Color, UA 10/17/2022 Light Yellow    • Clarity, UA 10/17/2022 Clear    • Specific Gravity, UA 10/17/2022 >=1 050 (A)   • pH, UA 10/17/2022 5 5    • Leukocytes, UA 10/17/2022 Negative    • Nitrite, UA 10/17/2022 Negative    • Protein, UA 10/17/2022 Negative    • Glucose, UA 10/17/2022 Negative    • Ketones, UA 10/17/2022 Negative    • Urobilinogen, UA 10/17/2022 <2 0    • Bilirubin, UA 10/17/2022 Negative    • Occult Blood, UA 10/17/2022 Negative    • Protime 10/17/2022 13 1    • INR 10/17/2022 1 01    • PTT 10/17/2022 25    • Ventricular Rate 10/17/2022 93    • Atrial Rate 10/17/2022 93    • CT Interval 10/17/2022 156    • QRSD Interval 10/17/2022 82    • QT Interval 10/17/2022 406    • QTC Interval 10/17/2022 504    • P Axis 10/17/2022 70    • QRS Axis 10/17/2022 28    • T Wave Axis 10/17/2022 68    • hs TnI 2hr 10/17/2022 7    • Delta 2hr hsTnI 10/17/2022 -1    • hs TnI 4hr 10/17/2022 9    • Delta 4hr hsTnI 10/17/2022 1    • TSH 3RD GENERATON 10/17/2022 6 187 (A)   • Free T4 10/17/2022 0 82    • Sodium 10/17/2022 138    • Potassium 10/17/2022 3 1 (A)   • Chloride 10/17/2022 103    • CO2 10/17/2022 24    • ANION GAP 10/17/2022 11    • BUN 10/17/2022 34 (A)   • Creatinine 10/17/2022 1 14    • Glucose 10/17/2022 133    • Calcium 10/17/2022 7 9 (A)   • eGFR 10/17/2022 54    • Sodium 10/18/2022 141    • Potassium 10/18/2022 3 3 (A)   • Chloride 10/18/2022 108    • CO2 10/18/2022 26    • ANION GAP 10/18/2022 7    • BUN 10/18/2022 28 (A)   • Creatinine 10/18/2022 0 89    • Glucose 10/18/2022 95    • Glucose, Fasting 10/18/2022 95    • Calcium 10/18/2022 8 0 (A)   • eGFR 10/18/2022 72    • WBC 10/18/2022 7 52    • RBC 10/18/2022 2 41 (A)   • Hemoglobin 10/18/2022 7 7 (A)   • Hematocrit 10/18/2022 23 9 (A)   • MCV 10/18/2022 99 (A)   • MCH 10/18/2022 32 0    • MCHC 10/18/2022 32 2    • RDW 10/18/2022 13 8    • Platelets 58/30/3786 115 (A)   • MPV 10/18/2022 9 6    • Hemoglobin 10/18/2022 7 8 (A)   • Hematocrit 10/18/2022 24 0 (A)   • Fecal Occult Blood Diagn* 10/18/2022 Positive (A)   • Fecal Occult Blood Diagn* 10/18/2022 Positive (A)   • Fecal Occult Blood Diagn* 10/18/2022 Positive (A)   • Vitamin B-12 10/18/2022 265    • Folate 10/18/2022 >20 0 (A)   • Iron Saturation 10/18/2022 6 (A)   • TIBC 10/18/2022 205 (A)   • Iron 10/18/2022 12 (A)   • Ferritin 10/18/2022 114    • Hemoglobin 10/18/2022 8 4 (A)   • Hematocrit 10/18/2022 26 3 (A)   • Hemoglobin 10/18/2022 7 3 (A)   • Hematocrit 10/18/2022 21 2 (A)   • Sodium 10/19/2022 142    • Potassium 10/19/2022 3 7    • Chloride 10/19/2022 110 (A)   • CO2 10/19/2022 21    • ANION GAP 10/19/2022 11    • BUN 10/19/2022 18    • Creatinine 10/19/2022 0 69    • Glucose 10/19/2022 138    • Calcium 10/19/2022 8 2 (A)   • eGFR 10/19/2022 80    • WBC 10/19/2022 7 89    • RBC 10/19/2022 2 13 (A)   • Hemoglobin 10/19/2022 6 8 (A) • Hematocrit 10/19/2022 20 8 (A)   • MCV 10/19/2022 98    • MCH 10/19/2022 31 9    • MCHC 10/19/2022 32 7    • RDW 10/19/2022 13 6    • Platelets 70/87/9519 99 (A)   • MPV 10/19/2022 10 0    • ABO Grouping 10/19/2022 A    • Rh Factor 10/19/2022 Positive    • Antibody Screen 10/19/2022 Negative    • Specimen Expiration Date 10/19/2022 [id number]    • WBC 10/19/2022 8 46    • RBC 10/19/2022 2 21 (A)   • Hemoglobin 10/19/2022 7 0 (A)   • Hematocrit 10/19/2022 22 0 (A)   • MCV 10/19/2022 100 (A)   • MCH 10/19/2022 31 7    • MCHC 10/19/2022 31 8    • RDW 10/19/2022 13 8    • MPV 10/19/2022 9 7    • Platelets 11/90/7081 92 (A)   • nRBC 10/19/2022 0    • Neutrophils Relative 10/19/2022 79 (A)   • Immat GRANS % 10/19/2022 1    • Lymphocytes Relative 10/19/2022 10 (A)   • Monocytes Relative 10/19/2022 10    • Eosinophils Relative 10/19/2022 0    • Basophils Relative 10/19/2022 0    • Neutrophils Absolute 10/19/2022 6 64    • Immature Grans Absolute 10/19/2022 0 09    • Lymphocytes Absolute 10/19/2022 0 88    • Monocytes Absolute 10/19/2022 0 84    • Eosinophils Absolute 10/19/2022 0 00    • Basophils Absolute 10/19/2022 0 01    • ABO Grouping 10/19/2022 A    • Rh Factor 10/19/2022 Positive        Imaging Studies: I have personally reviewed pertinent imaging studies  ASSESSMENT & PLAN:    Hematochezia  Acute blood loss anemia  Weight loss    - Patient with rectal bleeding/bloody stools which began yesterday with a drop in HGB from 11 to 7   - CT Scan shows evidence of diverticulosis and a spiculated lung nodule  - He reports a recent weight loss of 20 lbs and states he has never had a colonoscopy  - Monitor H&H serially and patient for further obvious bleeding  Transfuse PRBCs prn parameters  - Clear liquid diet  - We discussed colonoscopy to investigate for the cause of his gastrointestinal bleed and discussed the differential: diverticular bleed, AVMs, malignancy   We discussed risks/benefits given his advanced age   He wishes to proceed with further evaluation   - Will plan for colonoscopy tomorrow to investigate  Golytely prep this afternoon/evening and NPO after midnight  Thank you for this consultation  The patient will be seen and evaluated by Dr Rosa Maria Burgess PA-C  10/19/2022,12:36 PM

## 2022-10-19 NOTE — ASSESSMENT & PLAN NOTE
Malnutrition Findings:   Adult Malnutrition type: Chronic illness  Adult Degree of Malnutrition: Other severe protein calorie malnutrition  Malnutrition Characteristics: Weight loss, Muscle loss, Fat loss                  360 Statement: related to inadequate energy intake due to decreased appetite ,as evidenced by, 21% weight loss last 11 mos (11/4/21-10/7/22), hollow supraclavicular space, depressed temples  Int: Regular diet, medical food supplements with meals  BMI Findings: Body mass index is 18 75 kg/m²       Nutrition consulted, appreciate recommendations  · Dietary nutrition supplements ordered per Nutrition

## 2022-10-19 NOTE — ASSESSMENT & PLAN NOTE
· Pastrana catheter placed for decompression on arrival  · Plan to continue for at least 1 week with plan for outpatient voiding trial  · Will need outpatient urology evaluation

## 2022-10-19 NOTE — PROGRESS NOTES
[address] [facility]  Progress Note Cory Ramos 5/24/1927, 80 y o  male MRN: [id number]  Unit/Bed#: -Araceli Encounter: [id number]  Primary Care Provider: Laury Chan MD   Date and time admitted to hospital: 10/17/2022  8:16 AM    * VINICIUS (acute kidney injury) ([facility] 75 )  Assessment & Plan  · Creatinine 1 39 on admission, possibly related to dehydration vs medication side effect  · Hold home triamterene-hydrochlorothiazide  · Corrected to normal with IV fluids  · Monitor BMP  · Avoid hypotension, nephrotoxins    Ambulatory dysfunction  Assessment & Plan  · Patient with dizziness, loss of balance and several falls over the last few days  · Fall precautions  · PT/OT consulted, recommending post-acute rehab at this time  · Orthostatics pending     Acquired hypothyroidism  Assessment & Plan  · Continue home levothyroxine  · TSH elevated 6 187, free T4 normal 0 82  · Follow TFTs outpatient     Bladder distension  Assessment & Plan  · Pastrana catheter placed for decompression on arrival  · Plan to continue for at least 1 week with plan for outpatient voiding trial  · Will need outpatient urology evaluation    Severe protein-calorie malnutrition ([place] [address] )  Assessment & Plan  Malnutrition Findings:   Adult Malnutrition type: Chronic illness  Adult Degree of Malnutrition: Other severe protein calorie malnutrition  Malnutrition Characteristics: Weight loss, Muscle loss, Fat loss                  360 Statement: related to inadequate energy intake due to decreased appetite ,as evidenced by, 21% weight loss last 11 mos (11/4/21-10/7/22), hollow supraclavicular space, depressed temples  Int: Regular diet, medical food supplements with meals  BMI Findings: Body mass index is 18 75 kg/m²       Nutrition consulted, appreciate recommendations  · Dietary nutrition supplements ordered per Nutrition    Anemia  Assessment & Plan  · Significant drop in hemoglobin from 11 0 to 7 7 from 10/17-10/18   · Hemoglobin 7 0 this morning  · Consider hemodilution given IV fluid resuscitation, however hemoccult (+) x3  · Lovenox discontinued  · Continue q 6 hours hemoglobin checks  · Received 3 doses IV albumin 10/18 for hypotension  · Imaging on admission without evidence for acute bleed  · Iron panel showing iron deficiency with normal ferritin and low TIBC - 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  · Transfuse for hgb <6 given national blood shortage  · Continue IV iron x 3 days     Hyponatremia-resolved as of 10/19/2022  Assessment & Plan  · Sodium noted to be 130 on admission, corrected with IV fluids  · Sodium 141 on BMP this morning    Protein calorie malnutrition (HCC)-resolved as of 10/19/2022  Assessment & Plan  BMI Findings: Body mass index is 18 75 kg/m²  · Consult Nutrition for evaluation      VTE Pharmacologic Prophylaxis: VTE Score: 7 High Risk (Score >/= 5) - Pharmacological DVT Prophylaxis Contraindicated  Sequential Compression Devices Ordered  Patient Centered Rounds: I performed bedside rounds with nursing staff today  Discussions with Specialists or Other Care Team Provider: Spoke with GI, CM     Education and Discussions with Family / Patient: Patient declined call to   Time Spent for Care: 30 minutes  More than 50% of total time spent on counseling and coordination of care as described above  Current Length of Stay: 1 day(s)  Current Patient Status: Inpatient   Certification Statement: The patient will continue to require additional inpatient hospital stay due to acute anemia, possible GI bleed  Discharge Plan: Anticipate discharge in 24-48 hrs to prior assisted or independent living facility  Code Status: Level 1 - Full Code    Subjective:   Notes that he has been having multiple BMs and is unsure why  States that he otherwise feels well with no complaints  Reports that he is sleeping well  Denies chest pain, shortness of breath, abdominal pain, nausea       Objective: Vitals:   Temp (24hrs), Av 5 °F (36 4 °C), Min:97 3 °F (36 3 °C), Max:97 6 °F (36 4 °C)    Temp:  [97 3 °F (36 3 °C)-97 6 °F (36 4 °C)] 97 3 °F (36 3 °C)  HR:  [57-78] 57  Resp:  [15-19] 15  BP: (109-135)/(58-80) 109/80  SpO2:  [98 %-99 %] 98 %  Body mass index is 18 75 kg/m²  Input and Output Summary (last 24 hours): Intake/Output Summary (Last 24 hours) at 10/19/2022 1216  Last data filed at 10/19/2022 1139  Gross per 24 hour   Intake 4949 58 ml   Output 1475 ml   Net 3474 58 ml       Physical Exam:   Physical Exam  Vitals reviewed  Constitutional:       General: He is not in acute distress  Appearance: He is not toxic-appearing  Eyes:      Conjunctiva/sclera: Conjunctivae normal       Comments: Arcus senilis bilaterally   Cardiovascular:      Rate and Rhythm: Normal rate and regular rhythm  Pulmonary:      Effort: Pulmonary effort is normal  No respiratory distress  Breath sounds: Normal breath sounds  Abdominal:      Tenderness: There is no abdominal tenderness  Skin:     General: Skin is warm and dry  Neurological:      Mental Status: He is alert  Mental status is at baseline     Psychiatric:         Mood and Affect: Mood normal          Behavior: Behavior normal           Additional Data:     Labs:  Results from last 7 days   Lab Units 10/19/22  0559   WBC Thousand/uL 8 46   HEMOGLOBIN g/dL 7 0*   HEMATOCRIT % 22 0*   PLATELETS Thousands/uL 92*   NEUTROS PCT % 79*   LYMPHS PCT % 10*   MONOS PCT % 10   EOS PCT % 0     Results from last 7 days   Lab Units 10/19/22  0431 10/17/22  1916 10/17/22  0829   SODIUM mmol/L 142   < > 130*   POTASSIUM mmol/L 3 7   < > 3 8   CHLORIDE mmol/L 110*   < > 94*   CO2 mmol/L 21   < > 27   BUN mg/dL 18   < > 41*   CREATININE mg/dL 0 69   < > 1 39*   ANION GAP mmol/L 11   < > 9   CALCIUM mg/dL 8 2*   < > 8 8   ALBUMIN g/dL  --   --  3 4*   TOTAL BILIRUBIN mg/dL  --   --  1 24*   ALK PHOS U/L  --   --  72   ALT U/L  --   --  18   AST U/L  --   -- 20   GLUCOSE RANDOM mg/dL 138   < > 133    < > = values in this interval not displayed  Results from last 7 days   Lab Units 10/17/22  0829   INR  1 01                   Lines/Drains:  Invasive Devices  Report    Peripheral Intravenous Line  Duration           Peripheral IV 10/17/22 Left Antecubital 2 days    Peripheral IV 10/17/22 Dorsal (posterior); Right Forearm 1 day          Drain  Duration           Urethral Catheter 16 Fr  2 days              Urinary Catheter:  Goal for removal: N/A- Discharging with Pastrana         Imaging: Reviewed radiology reports from this admission including: abdominal/pelvic CT, CT head and xray(s)    Recent Cultures (last 7 days):         Last 24 Hours Medication List:   Current Facility-Administered Medications   Medication Dose Route Frequency Provider Last Rate   • acetaminophen  650 mg Oral Q6H PRN Mervin Mcelroy PA-C     • aluminum-magnesium hydroxide-simethicone  30 mL Oral Q6H PRN Mervin Mcelroy PA-C     • dexamethasone  4 mg Oral Daily With Breakfast Mervin Mcelroy PA-C     • iron sucrose  100 mg Intravenous Daily Mervin Mcelroy PA-C     • levothyroxine  100 mcg Oral Early Morning Sherrie Mensah PA-C     • ondansetron  4 mg Intravenous Q6H PRN Mervin Mcelroy PA-C     • pantoprazole  40 mg Oral Early Morning Sherrie Mensah PA-C     • polyethylene glycol  17 g Oral Daily PRN Mervin Mcelroy PA-C     • sodium chloride  125 mL/hr Intravenous Continuous Mervin Mcelroy PA-C 125 mL/hr (10/19/22 1139)        Today, Patient Was Seen By: Mervin Mcelroy    **Please Note: This note may have been constructed using a voice recognition system  **

## 2022-10-19 NOTE — ASSESSMENT & PLAN NOTE
· Patient with dizziness, loss of balance and several falls over the last few days     · Fall precautions  · PT/OT consulted, recommending post-acute rehab at this time  · Orthostatics pending

## 2022-10-20 ENCOUNTER — ANESTHESIA (INPATIENT)
Dept: GASTROENTEROLOGY | Facility: HOSPITAL | Age: 87
DRG: 682 | End: 2022-10-20
Payer: COMMERCIAL

## 2022-10-20 ENCOUNTER — APPOINTMENT (OUTPATIENT)
Dept: GASTROENTEROLOGY | Facility: HOSPITAL | Age: 87
DRG: 682 | End: 2022-10-20
Payer: COMMERCIAL

## 2022-10-20 ENCOUNTER — ANESTHESIA EVENT (INPATIENT)
Dept: GASTROENTEROLOGY | Facility: HOSPITAL | Age: 87
DRG: 682 | End: 2022-10-20
Payer: COMMERCIAL

## 2022-10-20 LAB
ANION GAP SERPL CALCULATED.3IONS-SCNC: 11 MMOL/L (ref 4–13)
BUN SERPL-MCNC: 13 MG/DL (ref 5–25)
C DIFF TOX GENS STL QL NAA+PROBE: NEGATIVE
CALCIUM SERPL-MCNC: 7.8 MG/DL (ref 8.3–10.1)
CHLORIDE SERPL-SCNC: 112 MMOL/L (ref 96–108)
CO2 SERPL-SCNC: 20 MMOL/L (ref 21–32)
CREAT SERPL-MCNC: 0.63 MG/DL (ref 0.6–1.3)
ERYTHROCYTE [DISTWIDTH] IN BLOOD BY AUTOMATED COUNT: 14 % (ref 11.6–15.1)
GFR SERPL CREATININE-BSD FRML MDRD: 83 ML/MIN/1.73SQ M
GLUCOSE SERPL-MCNC: 118 MG/DL (ref 65–140)
HCT VFR BLD AUTO: 21.8 % (ref 36.5–49.3)
HCT VFR BLD AUTO: 22.8 % (ref 36.5–49.3)
HGB BLD-MCNC: 7.1 G/DL (ref 12–17)
HGB BLD-MCNC: 7.4 G/DL (ref 12–17)
MCH RBC QN AUTO: 32.6 PG (ref 26.8–34.3)
MCHC RBC AUTO-ENTMCNC: 32.6 G/DL (ref 31.4–37.4)
MCV RBC AUTO: 100 FL (ref 82–98)
PLATELET # BLD AUTO: 97 THOUSANDS/UL (ref 149–390)
PMV BLD AUTO: 10.4 FL (ref 8.9–12.7)
POTASSIUM SERPL-SCNC: 3.4 MMOL/L (ref 3.5–5.3)
RBC # BLD AUTO: 2.18 MILLION/UL (ref 3.88–5.62)
SODIUM SERPL-SCNC: 143 MMOL/L (ref 135–147)
WBC # BLD AUTO: 8.43 THOUSAND/UL (ref 4.31–10.16)

## 2022-10-20 PROCEDURE — 85014 HEMATOCRIT: CPT | Performed by: INTERNAL MEDICINE

## 2022-10-20 PROCEDURE — 88305 TISSUE EXAM BY PATHOLOGIST: CPT | Performed by: PATHOLOGY

## 2022-10-20 PROCEDURE — 85018 HEMOGLOBIN: CPT | Performed by: INTERNAL MEDICINE

## 2022-10-20 PROCEDURE — 0DBL8ZX EXCISION OF TRANSVERSE COLON, VIA NATURAL OR ARTIFICIAL OPENING ENDOSCOPIC, DIAGNOSTIC: ICD-10-PCS | Performed by: INTERNAL MEDICINE

## 2022-10-20 PROCEDURE — 99232 SBSQ HOSP IP/OBS MODERATE 35: CPT | Performed by: NURSE PRACTITIONER

## 2022-10-20 PROCEDURE — 80048 BASIC METABOLIC PNL TOTAL CA: CPT | Performed by: PHYSICIAN ASSISTANT

## 2022-10-20 PROCEDURE — 88341 IMHCHEM/IMCYTCHM EA ADD ANTB: CPT | Performed by: PATHOLOGY

## 2022-10-20 PROCEDURE — 85027 COMPLETE CBC AUTOMATED: CPT | Performed by: PHYSICIAN ASSISTANT

## 2022-10-20 PROCEDURE — 82378 CARCINOEMBRYONIC ANTIGEN: CPT | Performed by: INTERNAL MEDICINE

## 2022-10-20 PROCEDURE — 88342 IMHCHEM/IMCYTCHM 1ST ANTB: CPT | Performed by: PATHOLOGY

## 2022-10-20 RX ORDER — PROPOFOL 10 MG/ML
INJECTION, EMULSION INTRAVENOUS AS NEEDED
Status: DISCONTINUED | OUTPATIENT
Start: 2022-10-20 | End: 2022-10-20

## 2022-10-20 RX ORDER — POTASSIUM CHLORIDE 20 MEQ/1
40 TABLET, EXTENDED RELEASE ORAL
Status: COMPLETED | OUTPATIENT
Start: 2022-10-20 | End: 2022-10-21

## 2022-10-20 RX ORDER — LIDOCAINE HYDROCHLORIDE 10 MG/ML
INJECTION, SOLUTION EPIDURAL; INFILTRATION; INTRACAUDAL; PERINEURAL AS NEEDED
Status: DISCONTINUED | OUTPATIENT
Start: 2022-10-20 | End: 2022-10-20

## 2022-10-20 RX ORDER — SODIUM CHLORIDE, SODIUM LACTATE, POTASSIUM CHLORIDE, CALCIUM CHLORIDE 600; 310; 30; 20 MG/100ML; MG/100ML; MG/100ML; MG/100ML
INJECTION, SOLUTION INTRAVENOUS CONTINUOUS PRN
Status: DISCONTINUED | OUTPATIENT
Start: 2022-10-20 | End: 2022-10-20

## 2022-10-20 RX ORDER — SODIUM CHLORIDE, SODIUM LACTATE, POTASSIUM CHLORIDE, CALCIUM CHLORIDE 600; 310; 30; 20 MG/100ML; MG/100ML; MG/100ML; MG/100ML
75 INJECTION, SOLUTION INTRAVENOUS CONTINUOUS
Status: DISCONTINUED | OUTPATIENT
Start: 2022-10-20 | End: 2022-10-21

## 2022-10-20 RX ADMIN — PROPOFOL 20 MG: 10 INJECTION, EMULSION INTRAVENOUS at 14:11

## 2022-10-20 RX ADMIN — LIDOCAINE HYDROCHLORIDE 50 MG: 10 INJECTION, SOLUTION EPIDURAL; INFILTRATION; INTRACAUDAL; PERINEURAL at 13:59

## 2022-10-20 RX ADMIN — PROPOFOL 20 MG: 10 INJECTION, EMULSION INTRAVENOUS at 14:05

## 2022-10-20 RX ADMIN — PROPOFOL 60 MG: 10 INJECTION, EMULSION INTRAVENOUS at 13:59

## 2022-10-20 RX ADMIN — SODIUM CHLORIDE, SODIUM LACTATE, POTASSIUM CHLORIDE, AND CALCIUM CHLORIDE: .6; .31; .03; .02 INJECTION, SOLUTION INTRAVENOUS at 13:51

## 2022-10-20 RX ADMIN — PROPOFOL 20 MG: 10 INJECTION, EMULSION INTRAVENOUS at 14:14

## 2022-10-20 RX ADMIN — PROPOFOL 20 MG: 10 INJECTION, EMULSION INTRAVENOUS at 14:01

## 2022-10-20 RX ADMIN — PROPOFOL 20 MG: 10 INJECTION, EMULSION INTRAVENOUS at 14:03

## 2022-10-20 RX ADMIN — PROPOFOL 20 MG: 10 INJECTION, EMULSION INTRAVENOUS at 14:08

## 2022-10-20 RX ADMIN — PANTOPRAZOLE SODIUM 40 MG: 40 TABLET, DELAYED RELEASE ORAL at 05:34

## 2022-10-20 RX ADMIN — LEVOTHYROXINE SODIUM 100 MCG: 100 TABLET ORAL at 05:34

## 2022-10-20 RX ADMIN — SODIUM CHLORIDE, SODIUM LACTATE, POTASSIUM CHLORIDE, AND CALCIUM CHLORIDE 75 ML/HR: .6; .31; .03; .02 INJECTION, SOLUTION INTRAVENOUS at 13:48

## 2022-10-20 RX ADMIN — POTASSIUM CHLORIDE 40 MEQ: 1500 TABLET, EXTENDED RELEASE ORAL at 17:32

## 2022-10-20 RX ADMIN — IRON SUCROSE 100 MG: 20 INJECTION, SOLUTION INTRAVENOUS at 09:06

## 2022-10-20 RX ADMIN — POTASSIUM CHLORIDE 40 MEQ: 1500 TABLET, EXTENDED RELEASE ORAL at 12:03

## 2022-10-20 NOTE — PLAN OF CARE
Problem: METABOLIC, FLUID AND ELECTROLYTES - ADULT  Goal: Electrolytes maintained within normal limits  Description: INTERVENTIONS:  - Monitor labs and assess patient for signs and symptoms of electrolyte imbalances  - Administer electrolyte replacement as ordered  - Monitor response to electrolyte replacements, including repeat lab results as appropriate  - Instruct patient on fluid and nutrition as appropriate  Outcome: Progressing     Problem: METABOLIC, FLUID AND ELECTROLYTES - ADULT  Goal: Fluid balance maintained  Description: INTERVENTIONS:  - Monitor labs   - Monitor I/O and WT  - Instruct patient on fluid and nutrition as appropriate  - Assess for signs & symptoms of volume excess or deficit  Outcome: Progressing     Problem: MOBILITY - ADULT  Goal: Maintains/Returns to pre admission functional level  Description: INTERVENTIONS:  - Perform BMAT or MOVE assessment daily    - Set and communicate daily mobility goal to care team and patient/family/caregiver  - Collaborate with rehabilitation services on mobility goals if consulted  - Perform Range of Motion 3 times a day  - Reposition patient every 2 hours    - Dangle patient 3 times a day  - Out of bed to chair 2 times a day   - Record patient progress and toleration of activity level   Outcome: Progressing     Problem: Potential for Falls  Goal: Patient will remain free of falls  Description: INTERVENTIONS:  - Educate patient/family on patient safety including physical limitations  - Instruct patient to call for assistance with activity   - Consult OT/PT to assist with strengthening/mobility   - Keep Call bell within reach  - Keep bed low and locked with side rails adjusted as appropriate  - Keep care items and personal belongings within reach  - Initiate and maintain comfort rounds  - Make Fall Risk Sign visible to staff  - Initiate/Maintain bed/chair alarm  - Apply yellow socks and bracelet for high fall risk patients  - Consider moving patient to room near nurses station  Outcome: Progressing     Problem: GASTROINTESTINAL - ADULT  Goal: Maintains or returns to baseline bowel function  Description: INTERVENTIONS:  - Assess bowel function  - Encourage oral fluids to ensure adequate hydration  - Administer IV fluids if ordered to ensure adequate hydration  - Administer ordered medications as needed  - Encourage mobilization and activity  - Consider nutritional services referral to assist patient with adequate nutrition and appropriate food choices  Outcome: Progressing     Problem: GENITOURINARY - ADULT  Goal: Urinary catheter remains patent  Description: INTERVENTIONS:  - Assess patency of urinary catheter  - If patient has a chronic lema, consider changing catheter if non-functioning  - Follow guidelines for intermittent irrigation of non-functioning urinary catheter  Outcome: Progressing

## 2022-10-20 NOTE — PROGRESS NOTES
[address] [facility]  Progress Note Gutierrez Nazario 5/24/1927, 80 y o  male MRN: [id number]  Unit/Bed#: -Araceli Encounter: [id number]  Primary Care Provider: Mulugeta Rosado MD   Date and time admitted to hospital: 10/17/2022  8:16 AM    * VINICIUS (acute kidney injury) [facility])  Assessment & Plan  · Creatinine 1 39 on admission, possibly related to dehydration vs medication side effect  · Hold home triamterene-hydrochlorothiazide  · Corrected to normal with IV fluids  · Monitor BMP  · Avoid hypotension, nephrotoxins    Anemia  Assessment & Plan  · Significant drop in hemoglobin from 11 0 to 7 7 from 10/17-10/18   · Consider hemodilution given IV fluid resuscitation, however hemoccult (+) x3  · Lovenox discontinued  · Imaging on admission without evidence for acute bleed  · Received 3 doses IV albumin 10/18 for hypotension  · Iron panel 10/18 showing iron deficiency with normal ferritin and low TIBC - 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  · Transfuse for hgb <6 given national blood shortage  · Continue IV iron x 3 days   · GI consulted, appreciate recommendations  · Colonoscopy completed-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 discussed with Dr Sara Mera  · Will order cardiology consult for possible surgical clearance  · Hgb/HCT ordered q12 hours - hgb 7 1 this morning    Bladder distension  Assessment & Plan  · Pastrana catheter placed for decompression on arrival  · Plan to continue for at least 1 week with plan for outpatient voiding trial  · Will need outpatient urology evaluation    Severe protein-calorie malnutrition ([facility] [address] )  Assessment & Plan  Malnutrition Findings:   Adult Malnutrition type: Chronic illness  Adult Degree of Malnutrition: Other severe protein calorie malnutrition  Malnutrition Characteristics: Weight loss, Muscle loss, Fat loss              360 Statement: related to inadequate energy intake due to decreased appetite ,as evidenced by, 21% weight loss last 11 mos (11/4/21-10/7/22), hollow supraclavicular space, depressed temples  Int: Regular diet, medical food supplements with meals  BMI Findings: Body mass index is 18 75 kg/m²  Nutrition consulted, appreciate recommendations  · Dietary nutrition supplements ordered per Nutrition    Ambulatory dysfunction  Assessment & Plan  · Patient with dizziness, loss of balance and several falls over the last few days  · Fall precautions  · PT/OT consulted, recommending post-acute rehab at this time  · Orthostatics pending     Acquired hypothyroidism  Assessment & Plan  · Continue home levothyroxine  · TSH elevated 6 187, free T4 normal 0 82  · Follow TFTs outpatient     VTE Pharmacologic Prophylaxis: VTE Score: 7 High Risk (Score >/= 5) - Pharmacological DVT Prophylaxis Contraindicated  Sequential Compression Devices Ordered  in the setting of acute anemia, possible GI bleed    Patient Centered Rounds: I performed bedside rounds with nursing staff today  Discussions with Specialists or Other Care Team Provider: Case management    Education and Discussions with Family / Patient: Discussed with patient    Time Spent for Care: 30 minutes  More than 50% of total time spent on counseling and coordination of care as described above  Current Length of Stay: 2 day(s)  Current Patient Status: Inpatient   Certification Statement: The patient will continue to require additional inpatient hospital stay due to plan for colonoscopy  Discharge Plan: Anticipate discharge in 24-48 hrs to prior assisted or independent living facility  Code Status: Level 1 - Full Code    Subjective:   Sleeping in bed at time of visit, easily woken  Reports that he feels good  Wants to eat but is currently NPO for colonoscopy  Denies chest pain, shortness of breath, abdominal pain      Objective:     Vitals:   Temp (24hrs), Av 8 °F (36 6 °C), Min:97 4 °F (36 3 °C), Max:98 1 °F (36 7 °C)    Temp:  [97 4 °F (36 3 °C)-98 1 °F (36 7 °C)] 98 1 °F (36 7 °C)  HR:  [55-77] 70  Resp:  [16] 16  BP: (102-128)/(56-71) 102/71  SpO2:  [98 %-100 %] 100 %  Body mass index is 18 75 kg/m²  Input and Output Summary (last 24 hours): Intake/Output Summary (Last 24 hours) at 10/20/2022 1554  Last data filed at 10/20/2022 1415  Gross per 24 hour   Intake 200 ml   Output 2100 ml   Net -1900 ml       Physical Exam:   Physical Exam  Vitals reviewed  Constitutional:       General: He is not in acute distress  Eyes:      Conjunctiva/sclera: Conjunctivae normal       Comments: Arcus senilis bilaterally   Cardiovascular:      Rate and Rhythm: Normal rate and regular rhythm  Pulmonary:      Effort: Pulmonary effort is normal  No respiratory distress  Breath sounds: Normal breath sounds  Abdominal:      Palpations: Abdomen is soft  Tenderness: There is no abdominal tenderness  Musculoskeletal:      Comments: Sequential compression device on RLE, wound dressing on LLE   Skin:     General: Skin is warm and dry  Neurological:      Mental Status: He is alert  Mental status is at baseline  Psychiatric:         Mood and Affect: Mood normal          Behavior: Behavior normal         Additional Data:     Labs:  Results from last 7 days   Lab Units 10/20/22  0841 10/19/22  1229 10/19/22  0559   WBC Thousand/uL 8 43  --  8 46   HEMOGLOBIN g/dL 7 1*   < > 7 0*   HEMATOCRIT % 21 8*   < > 22 0*   PLATELETS Thousands/uL 97*  --  92*   NEUTROS PCT %  --   --  79*   LYMPHS PCT %  --   --  10*   MONOS PCT %  --   --  10   EOS PCT %  --   --  0    < > = values in this interval not displayed       Results from last 7 days   Lab Units 10/20/22  0444 10/17/22  1916 10/17/22  0829   SODIUM mmol/L 143   < > 130*   POTASSIUM mmol/L 3 4*   < > 3 8   CHLORIDE mmol/L 112*   < > 94*   CO2 mmol/L 20*   < > 27   BUN mg/dL 13   < > 41*   CREATININE mg/dL 0 63   < > 1 39*   ANION GAP mmol/L 11   < > 9   CALCIUM mg/dL 7 8*   < > 8 8   ALBUMIN g/dL  --   --  3 4*   TOTAL BILIRUBIN mg/dL  --   --  1 24*   ALK PHOS U/L  --   --  72   ALT U/L  --   --  18   AST U/L  --   --  20   GLUCOSE RANDOM mg/dL 118   < > 133    < > = values in this interval not displayed  Results from last 7 days   Lab Units 10/17/22  0829   INR  1 01                   Lines/Drains:  Invasive Devices  Report    Peripheral Intravenous Line  Duration           Peripheral IV 10/17/22 Left Antecubital 3 days    Peripheral IV 10/17/22 Dorsal (posterior); Right Forearm 2 days          Drain  Duration           Urethral Catheter 16 Fr  3 days              Urinary Catheter:  Goal for removal: N/A- Discharging with Pastrana           Imaging: Reviewed radiology reports from this admission including: abdominal/pelvic CT, CT head, xray(s) and CT cervical spine    Recent Cultures (last 7 days):   Results from last 7 days   Lab Units 10/19/22  1431   C DIFF TOXIN B BY PCR  Negative       Last 24 Hours Medication List:   Current Facility-Administered Medications   Medication Dose Route Frequency Provider Last Rate   • acetaminophen  650 mg Oral Q6H PRN Audie Sheffield PA-C     • aluminum-magnesium hydroxide-simethicone  30 mL Oral Q6H PRN Audie Sheffield PA-C     • dexamethasone  4 mg Oral Daily With Breakfast Sherrie Mensah PA-C     • lactated ringers  75 mL/hr Intravenous Continuous Varun Macdonald MD 75 mL/hr (10/20/22 1348)   • levothyroxine  100 mcg Oral Early Morning Sherrie Mensah PA-C     • ondansetron  4 mg Intravenous Q6H PRN Audie Sheffield PA-C     • pantoprazole  40 mg Oral Early Morning Sherrie Mensah PA-C     • polyethylene glycol  4,000 mL Oral See Admin Instructions Gillian Stern PA-C     • polyethylene glycol  17 g Oral Daily PRN Audie Sheffield PA-C     • potassium chloride  40 mEq Oral TID With Meals SABRINA Leong     • sodium chloride  125 mL/hr Intravenous Continuous Audie Sheffield PA-C 125 mL/hr (10/20/22 1524)        Today, Patient Was Seen By: Ramon Londono    **Please Note: This note may have been constructed using a voice recognition system  **

## 2022-10-20 NOTE — ASSESSMENT & PLAN NOTE
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

## 2022-10-20 NOTE — ANESTHESIA POSTPROCEDURE EVALUATION
Post-Op Assessment Note    CV Status:  Stable  Pain Score: 0    Pain management: adequate     Mental Status:  Awake and sleepy   Hydration Status:  Euvolemic   PONV Controlled:  Controlled   Airway Patency:  Patent and adequate      Post Op Vitals Reviewed: Yes      Staff: CRNA         No complications documented      BP   120/57   Temp      Pulse 62   Resp 20   SpO2 100

## 2022-10-20 NOTE — ANESTHESIA PREPROCEDURE EVALUATION
Procedure:  COLONOSCOPY    This 70-year-old male with a history multiple myeloma presented to the hospital with symptomatic anemia  His hemoglobin level had dropped from a baseline of 11 down to 7 in association with multiple episodes of a bloody bowel movement  He has never undergone colonoscopy  He also reports a 20 lb weight loss which is unexplained  There is no family history for colon cancer  Relevant Problems   CARDIO   (+) Essential hypertension   (+) Pure hypercholesterolemia      ENDO   (+) Acquired hypothyroidism      /RENAL   (+) VINICIUS (acute kidney injury) (HCC)   (+) Stage 3 chronic kidney disease, unspecified whether stage 3a or 3b CKD (HCC)      HEMATOLOGY   (+) Anemia   (+) Multiple myeloma not having achieved remission (HCC)   (+) Thrombocytopenia (HCC)      MUSCULOSKELETAL   (+) Idiopathic chronic gout of multiple sites without tophus   (+) Spondylosis of lumbar region without myelopathy or radiculopathy        Physical Exam    Airway    Mallampati score: II  TM Distance: >3 FB  Neck ROM: full     Dental       Cardiovascular  Cardiovascular exam normal    Pulmonary  Pulmonary exam normal     Other Findings     * VINICIUS (acute kidney injury) ([facility] [address] )  Assessment & Plan  · Creat 1 39 on admission, possibly related to dehydration vs medication side effect  · Hold home triamterene-hydrochlorothiazide  · Initiate 0 9% NS IV fluids  · Monitor labs and I/Os  · Follow up BMP in the morning  · Avoid hypotension  · Avoid nephrotoxins     Hyponatremia  Assessment & Plan  · Sodium noted to be 130 on admission  · 0 9% NS IV fluids ordered  · Repeat BMP tonight to monitor rate of correction     Ambulatory dysfunction  Assessment & Plan  Patient with dizziness, loss of balance and several falls over the last few days     · Fall precautions  · PT/OT consult  · Order orthostatics     Acquired hypothyroidism  Assessment & Plan  · Continue home levothyroxine  · Will order TSH     Protein calorie malnutrition [facility])  Assessment & Plan  BMI Findings:         Anesthesia Plan  ASA Score- 3     Anesthesia Type- IV sedation with anesthesia with ASA Monitors  Additional Monitors:   Airway Plan:           Plan Factors-Exercise tolerance (METS): <4 METS  Chart reviewed  EKG reviewed  Existing labs reviewed  Patient summary reviewed  Patient is not a current smoker  Induction- intravenous  Postoperative Plan-     Informed Consent- Anesthetic plan and risks discussed with patient  I personally reviewed this patient with the CRNA  Discussed and agreed on the Anesthesia Plan with the CRNA  Luis E Randall

## 2022-10-20 NOTE — ASSESSMENT & PLAN NOTE
· Creatinine 1 39 on admission, possibly related to dehydration vs medication side effect  · Hold home triamterene-hydrochlorothiazide  · Corrected to normal with IV fluids  · Monitor BMP  · Avoid hypotension, nephrotoxins

## 2022-10-20 NOTE — CASE MANAGEMENT
Case Management Assessment & Discharge Planning Note    Patient name Shelby Vazquez  Location [address] [id number] MRN [id number]  : 1927 Date 10/20/2022       Current Admission Date: 10/17/2022  Current Admission Diagnosis:VINICIUS (acute kidney injury) [facility])   Patient Active Problem List    Diagnosis Date Noted   • Bladder distension 10/19/2022   • Anemia 10/18/2022   • Severe protein-calorie malnutrition ([address] ) 10/18/2022   • Stage 3 chronic kidney disease, unspecified whether stage 3a or 3b CKD ([address] ) 2022   • Ambulatory dysfunction 2022   • Irritable bowel syndrome without diarrhea 2022   • Irritable bowel syndrome with diarrhea 2022   • 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   • Chronic seasonal allergic rhinitis due to pollen 2021   • Venous stasis ulcer of left ankle with fat layer exposed without varicose veins ([address] ) 2021   • Abrasions of multiple sites 2021   • Falls 2021   • Thrombocytopenia ([address] ) 2021   • VINICIUS (acute kidney injury) ([address] ) 2021   • Multiple myeloma not having achieved remission ([address] ) 2021   • Immunization due 2020   • Spinal stenosis of lumbosacral region 2019   • Meningioma, cerebral ([address] ) 2019   • Patellofemoral disorder of left knee 2018   • Idiopathic chronic gout of multiple sites without tophus 2014   • Spondylosis of lumbar region without myelopathy or radiculopathy 2014   • Acquired hypothyroidism 2012   • Pure hypercholesterolemia 2012   • Essential hypertension 2012      LOS (days): 2  Geometric Mean LOS (GMLOS) (days): 4 30  Days to GMLOS:2 2     OBJECTIVE:    Risk of Unplanned Readmission Score: 14 39         Current admission status: Inpatient       Preferred Pharmacy:   [facility] [address] [facility] [id number] [place] [id number]  Phone: [phone] Fax: [phone]    Primary Care Provider: [facility] Courtney Frankel, MD    Primary Insurance: [facility]  Secondary Insurance: [address] [facility]    ASSESSMENT:  [address] Proxies    There are no active Health Care Proxies on file  Advance Directives  Does patient have a Health Care POA?:  (he's not sure)  Does patient have Advance Directives? :  (he's not sure)  Primary Contact: Shamika Gudino (Relative)   [phone] (Mobile)         Readmission Root Cause  30 Day Readmission: No    Patient Information  Admitted from[de-identified] Facility (pt is resident at [facility])  Mental Status: Alert, Other (Comment) (appears oriented)  During Assessment patient was accompanied by: Not accompanied during assessment  Assessment information provided by[de-identified] Patient  Primary Caregiver: Self  Support Systems: Family members  [place] of Residence: [address] [place] [address] do you live in?: [address] [facility] [address] entry access options   Select all that apply : No steps to enter home  Type of Current Residence: Facility  Upon entering residence, is there a bedroom on the main floor (no further steps)?: Yes  Upon entering residence, is there a bathroom on the main floor (no further steps)?: Yes  In the last 12 months, was there a time when you were not able to pay the mortgage or rent on time?: No  In the last 12 months, how many places have you lived?: 1  In the last 12 months, was there a time when you did not have a steady place to sleep or slept in a shelter (including now)?: No  Homeless/housing insecurity resource given?: N/A  Living Arrangements: Other (Comment) ([facility])  Is patient a ?: Yes  Is patient active with [facility])?: No    Activities of Daily Living Prior to Admission  Functional Status: Independent  Completes ADLs independently?: Yes  Ambulates independently?: Yes  Does patient use assisted devices?: Yes  Assisted Devices (DME) used: Thurnell Sic, Shower Chair  Does patient currently own DME?: Yes  What DME does the patient currently own?: Artis Hargrove  Does patient have a history of Outpatient Therapy (PT/OT)?: No  Does the patient have a history of Short-Term Rehab?: No  Does patient have a history of HHC?: No  Does patient currently have [facility]?: No         Patient Information Continued  Does patient have prescription coverage?: Yes  Within the past 12 months, you worried that your food would run out before you got the money to buy more : Never true  Within the past 12 months, the food you bought just didn't last and you didn't have money to get more : Never true  Food insecurity resource given?: N/A  Does patient receive dialysis treatments?: No  Does patient have a history of substance abuse?: No  Does patient have a history of Mental Health Diagnosis?: No         Means of Transportation  Means of Transport to Appts[de-identified] Family transport  In the past 12 months, has lack of transportation kept you from medical appointments or from getting medications?: No  In the past 12 months, has lack of transportation kept you from meetings, work, or from getting things needed for daily living?: No  Was application for public transport provided?: N/A        DISCHARGE DETAILS:    Discharge planning discussed with[de-identified] met w pt, s/w [facility] via t/c, s/w pt's cousin/POA Jaronamelie Ty via t/c  Freedom of Choice: Yes  Comments - Freedom of Choice: Met w pt to complete CMA, and verified info provided w Marcela Brown   Charles Eng reports pt is independent w ADLs, and amb independently w ;  he does reside in the independent section of facility  Charles Eng reports HH can be set up for pt at facility; [facility] is preferred agency  T/C to pt's cousin, who is his legal POA  Ms Tomlinson Daria would like to see pt return to [facility] home health, but has been told that agency only comes 2x/week  Ms Tomlinson Daria is not totally opposed to STR/SNF;  would like to s/w Charles Eng at [facility] and pt's physician before making any decisions    She wanted to know what options were available to pt;  discussed [facility], [address] [facility], [address] [place] [address] and [place] in area, and advised that sometimes only available beds are in [employer] and/or [facility]  PT and OT notes faxed to [facility] for their review (fax= [phone];  ph= [phone])  Will f/u w pt's cousin/POA in AM for her decision re: d/c plan  CM contacted family/caregiver?: Yes  Were Treatment Team discharge recommendations reviewed with patient/caregiver?: Yes  Did patient/caregiver verbalize understanding of patient care needs?: Yes  Were patient/caregiver advised of the risks associated with not following Treatment Team discharge recommendations?: Yes    Contacts  Patient Contacts: Frederic Dai (Relative)   [phone] (Mobile)  Relationship to Patient[de-identified] Family  Contact Method: Phone  Phone Number: Frederic Dai (Relative)   [phone] (Mobile)  Reason/Outcome: Continuity of Care, Discharge [address]         Is the patient interested in [facility] at discharge?:  (TBD)    DME Referral Provided  Referral made for DME?: No    Other Referral/Resources/Interventions Provided:  Referral Comments: T/C to pt's cousin, who is his legal POA  Ms Nikky Sarah would like to see pt return to [facility] w home health, but has been told that agency only comes 2x/week  Ms Nkiky Sarah is not totally opposed to STR/SNF;  would like to s/w Rosalia Friedman at [facility] and pt's physician before making any decisions  She wanted to know what options were available to pt;  discussed [facility], [address] [facility], [address] [place] [address] and [place] in area, and advised that sometimes only available beds are in [employer] and/or [facility]  PT and OT notes faxed to [facility] for their review (fax= [phone];  ph= [phone])  Will f/u w pt's cousin/POA in AM for her decision re: d/c plan           Treatment Team Recommendation: Short Term Rehab, SNF  Discharge Destination Plan[de-identified] Assisted Living, Personal Care Home, Short Term Rehab, SNF  Transport at Discharge :  Other (Comment) (TBD)

## 2022-10-20 NOTE — PLAN OF CARE
Problem: MOBILITY - ADULT  Goal: Maintain or return to baseline ADL function  Description: INTERVENTIONS:  -  Assess patient's ability to carry out ADLs; assess patient's baseline for ADL function and identify physical deficits which impact ability to perform ADLs (bathing, care of mouth/teeth, toileting, grooming, dressing, etc )  - Assess/evaluate cause of self-care deficits   - Assess range of motion  - Assess patient's mobility; develop plan if impaired  - Assess patient's need for assistive devices and provide as appropriate  - Encourage maximum independence but intervene and supervise when necessary  - Involve family in performance of ADLs  - Assess for home care needs following discharge   - Consider OT consult to assist with ADL evaluation and planning for discharge  - Provide patient education as appropriate  Outcome: Progressing  Goal: Maintains/Returns to pre admission functional level  Description: INTERVENTIONS:  - Perform BMAT or MOVE assessment daily    - Set and communicate daily mobility goal to care team and patient/family/caregiver  - Collaborate with rehabilitation services on mobility goals if consulted  - Perform Range of Motion 2 times a day  - Reposition patient every 2 hours    - Dangle patient 2 times a day  - Stand patient 2 times a day  - Ambulate patient 2 times a day  - Out of bed to chair 2 times a day   - Out of bed for meals 2 times a day  - Out of bed for toileting  - Record patient progress and toleration of activity level   Outcome: Progressing     Problem: METABOLIC, FLUID AND ELECTROLYTES - ADULT  Goal: Electrolytes maintained within normal limits  Description: INTERVENTIONS:  - Monitor labs and assess patient for signs and symptoms of electrolyte imbalances  - Administer electrolyte replacement as ordered  - Monitor response to electrolyte replacements, including repeat lab results as appropriate  - Instruct patient on fluid and nutrition as appropriate  Outcome: Progressing  Goal: Fluid balance maintained  Description: INTERVENTIONS:  - Monitor labs   - Monitor I/O and WT  - Instruct patient on fluid and nutrition as appropriate  - Assess for signs & symptoms of volume excess or deficit  Outcome: Progressing     Problem: HEMATOLOGIC - ADULT  Goal: Maintains hematologic stability  Description: INTERVENTIONS  - Assess for signs and symptoms of bleeding or hemorrhage  - Monitor labs  - Administer supportive blood products/factors as ordered and appropriate  Outcome: Progressing     Problem: MUSCULOSKELETAL - ADULT  Goal: Maintain or return mobility to safest level of function  Description: INTERVENTIONS:  - Assess patient's ability to carry out ADLs; assess patient's baseline for ADL function and identify physical deficits which impact ability to perform ADLs (bathing, care of mouth/teeth, toileting, grooming, dressing, etc )  - Assess/evaluate cause of self-care deficits   - Assess range of motion  - Assess patient's mobility  - Assess patient's need for assistive devices and provide as appropriate  - Encourage maximum independence but intervene and supervise when necessary  - Involve family in performance of ADLs  - Assess for home care needs following discharge   - Consider OT consult to assist with ADL evaluation and planning for discharge  - Provide patient education as appropriate  Outcome: Progressing     Problem: GASTROINTESTINAL - ADULT  Goal: Maintains or returns to baseline bowel function  Description: INTERVENTIONS:  - Assess bowel function  - Encourage oral fluids to ensure adequate hydration  - Administer IV fluids if ordered to ensure adequate hydration  - Administer ordered medications as needed  - Encourage mobilization and activity  - Consider nutritional services referral to assist patient with adequate nutrition and appropriate food choices  Outcome: Progressing  Goal: Oral mucous membranes remain intact  Description: INTERVENTIONS  - Assess oral mucosa and hygiene practices  - Implement preventative oral hygiene regimen  - Implement oral medicated treatments as ordered  - Initiate Nutrition services referral as needed  Outcome: Progressing     Problem: Potential for Falls  Goal: Patient will remain free of falls  Description: INTERVENTIONS:  - Educate patient/family on patient safety including physical limitations  - Instruct patient to call for assistance with activity   - Consult OT/PT to assist with strengthening/mobility   - Keep Call bell within reach  - Keep bed low and locked with side rails adjusted as appropriate  - Keep care items and personal belongings within reach  - Initiate and maintain comfort rounds  - Make Fall Risk Sign visible to staff  - Offer Toileting every 2 Hours, in advance of need  - Initiate/Maintain bed alarm  - Obtain necessary fall risk management equipment: socks  - Apply yellow socks and bracelet for high fall risk patients  - Consider moving patient to room near nurses station  Outcome: Progressing     Problem: Nutrition/Hydration-ADULT  Goal: Nutrient/Hydration intake appropriate for improving, restoring or maintaining nutritional needs  Description: Monitor and assess patient's nutrition/hydration status for malnutrition  Collaborate with interdisciplinary team and initiate plan and interventions as ordered  Monitor patient's weight and dietary intake as ordered or per policy  Utilize nutrition screening tool and intervene as necessary  Determine patient's food preferences and provide high-protein, high-caloric foods as appropriate       INTERVENTIONS:  - Monitor oral intake, urinary output, labs, and treatment plans  - Assess nutrition and hydration status and recommend course of action  - Evaluate amount of meals eaten  - Assist patient with eating if necessary   - Allow adequate time for meals  - Recommend/ encourage appropriate diets, oral nutritional supplements, and vitamin/mineral supplements  - Order, calculate, and assess calorie counts as needed  - Recommend, monitor, and adjust tube feedings based on assessed needs  - Assess need for intravenous fluids  - Provide nutrition/hydration education as appropriate  - Include patient/family/caregiver in decisions related to nutrition  Outcome: Progressing

## 2022-10-21 ENCOUNTER — APPOINTMENT (INPATIENT)
Dept: NON INVASIVE DIAGNOSTICS | Facility: HOSPITAL | Age: 87
DRG: 682 | End: 2022-10-21
Payer: COMMERCIAL

## 2022-10-21 LAB
ANION GAP SERPL CALCULATED.3IONS-SCNC: 7 MMOL/L (ref 4–13)
AORTIC ROOT: 3.5 CM
APICAL FOUR CHAMBER EJECTION FRACTION: 50 %
BUN SERPL-MCNC: 14 MG/DL (ref 5–25)
CALCIUM SERPL-MCNC: 7.4 MG/DL (ref 8.3–10.1)
CEA SERPL-MCNC: 1.4 NG/ML (ref 0–3)
CHLORIDE SERPL-SCNC: 113 MMOL/L (ref 96–108)
CO2 SERPL-SCNC: 22 MMOL/L (ref 21–32)
CREAT SERPL-MCNC: 0.71 MG/DL (ref 0.6–1.3)
E WAVE DECELERATION TIME: 166 MS
FRACTIONAL SHORTENING: 23 % (ref 28–44)
GFR SERPL CREATININE-BSD FRML MDRD: 79 ML/MIN/1.73SQ M
GLUCOSE SERPL-MCNC: 97 MG/DL (ref 65–140)
HCT VFR BLD AUTO: 22.3 % (ref 36.5–49.3)
HCT VFR BLD AUTO: 24 % (ref 36.5–49.3)
HGB BLD-MCNC: 7.2 G/DL (ref 12–17)
HGB BLD-MCNC: 7.3 G/DL (ref 12–17)
INTERVENTRICULAR SEPTUM IN DIASTOLE (PARASTERNAL SHORT AXIS VIEW): 1 CM
INTERVENTRICULAR SEPTUM: 1 CM (ref 0.6–1.1)
LAAS-AP4: 14.7 CM2
LEFT ATRIUM SIZE: 3.3 CM
LEFT INTERNAL DIMENSION IN SYSTOLE: 3.4 CM (ref 2.1–4)
LEFT VENTRICULAR INTERNAL DIMENSION IN DIASTOLE: 4.4 CM (ref 3.5–6)
LEFT VENTRICULAR POSTERIOR WALL IN END DIASTOLE: 0.9 CM
LEFT VENTRICULAR STROKE VOLUME: 42 ML
LVSV (TEICH): 42 ML
MV E'TISSUE VEL-SEP: 9 CM/S
MV PEAK A VEL: 0.92 M/S
MV PEAK E VEL: 73 CM/S
MV STENOSIS PRESSURE HALF TIME: 48 MS
MV VALVE AREA P 1/2 METHOD: 4.58 CM2
PA SYSTOLIC PRESSURE: 46 MMHG
POTASSIUM SERPL-SCNC: 3.7 MMOL/L (ref 3.5–5.3)
RIGHT ATRIAL 2D VOLUME: 44 ML
RIGHT ATRIUM AREA SYSTOLE A4C: 16.3 CM2
RIGHT VENTRICLE ID DIMENSION: 4.4 CM
SL CV LV EF: 45
SL CV PED ECHO LEFT VENTRICLE DIASTOLIC VOLUME (MOD BIPLANE) 2D: 89 ML
SL CV PED ECHO LEFT VENTRICLE SYSTOLIC VOLUME (MOD BIPLANE) 2D: 47 ML
SODIUM SERPL-SCNC: 142 MMOL/L (ref 135–147)
TR MAX PG: 37 MMHG
TR PEAK VELOCITY: 3.1 M/S
TRICUSPID VALVE PEAK REGURGITATION VELOCITY: 3.05 M/S

## 2022-10-21 PROCEDURE — 85018 HEMOGLOBIN: CPT | Performed by: INTERNAL MEDICINE

## 2022-10-21 PROCEDURE — 99232 SBSQ HOSP IP/OBS MODERATE 35: CPT | Performed by: NURSE PRACTITIONER

## 2022-10-21 PROCEDURE — NC001 PR NO CHARGE: Performed by: PHYSICIAN ASSISTANT

## 2022-10-21 PROCEDURE — 99232 SBSQ HOSP IP/OBS MODERATE 35: CPT | Performed by: INTERNAL MEDICINE

## 2022-10-21 PROCEDURE — 93306 TTE W/DOPPLER COMPLETE: CPT | Performed by: INTERNAL MEDICINE

## 2022-10-21 PROCEDURE — 85014 HEMATOCRIT: CPT | Performed by: INTERNAL MEDICINE

## 2022-10-21 PROCEDURE — 80048 BASIC METABOLIC PNL TOTAL CA: CPT | Performed by: NURSE PRACTITIONER

## 2022-10-21 PROCEDURE — C8929 TTE W OR WO FOL WCON,DOPPLER: HCPCS

## 2022-10-21 RX ADMIN — POTASSIUM CHLORIDE 40 MEQ: 1500 TABLET, EXTENDED RELEASE ORAL at 08:12

## 2022-10-21 RX ADMIN — PANTOPRAZOLE SODIUM 40 MG: 40 TABLET, DELAYED RELEASE ORAL at 05:14

## 2022-10-21 RX ADMIN — LEVOTHYROXINE SODIUM 100 MCG: 100 TABLET ORAL at 05:14

## 2022-10-21 RX ADMIN — DEXAMETHASONE 4 MG: 4 TABLET ORAL at 08:12

## 2022-10-21 RX ADMIN — ACETAMINOPHEN 650 MG: 325 TABLET ORAL at 06:02

## 2022-10-21 RX ADMIN — PERFLUTREN 0.4 ML/MIN: 6.52 INJECTION, SUSPENSION INTRAVENOUS at 15:38

## 2022-10-21 NOTE — PROGRESS NOTES
Progress Note  Brennon Zarate 80 y o  male MRN: [id number]  Unit/Bed#: -01 Encounter: [id number]    Subjective:  Patient denies any complaints at this time  He feels comfortable  No further rectal bleeding per nurse  No abdominal pain  Objective:     Vitals:   Vitals:    10/21/22 0821   BP: 101/74   Pulse: 81   Resp: 20   Temp: (!) 95 9 °F (35 5 °C)   SpO2: 97%   ,Body mass index is 18 75 kg/m²  Intake/Output Summary (Last 24 hours) at 10/21/2022 1055  Last data filed at 10/21/2022 1496  Gross per 24 hour   Intake 1690 ml   Output 1650 ml   Net 40 ml       Physical Exam:     General Appearance: Alert, appears stated age and cooperative  Lungs: Clear to auscultation bilaterally, no rales or rhonchi, no labored breathing/accessory muscle use  Heart: Regular rate and rhythm, S1, S2 normal, no murmur, click, rub or gallop  Abdomen: Soft, non-tender, non-distended; bowel sounds normal; no masses or no organomegaly  Extremities: No cyanosis    Invasive Devices  Report    Peripheral Intravenous Line  Duration           Peripheral IV 10/17/22 Left Antecubital 4 days    Peripheral IV 10/17/22 Dorsal (posterior); Right Forearm 3 days          Drain  Duration           Urethral Catheter 16 Fr  3 days                Lab Results:  No results displayed because visit has over 200 results  Imaging Studies:   I have personally reviewed pertinent imaging studies  Colonoscopy    Result Date: 10/20/2022  Narrative:  [facility] [address] [place] [id number] [phone] DATE OF SERVICE: 10/20/22 PHYSICIAN(S): Attending: Demian Foreman DO Fellow: No Staff Documented INDICATION: Hematochezia, Anemia, unspecified type POST-OP DIAGNOSIS: See the impression below  HISTORY: Prior colonoscopy: No prior colonoscopy  BOWEL PREPARATION: Golytely/Colyte/Trilyte PREPROCEDURE: Informed consent was obtained for the procedure, including sedation   Risks including but not limited to bleeding, infection, perforation, adverse drug reaction and aspiration were explained in detail  Also explained about less than 100% sensitivity with the exam and other alternatives  The patient was placed in the left lateral decubitus position  DETAILS OF PROCEDURE: Patient was taken to the procedure room where a time out was performed to confirm correct patient and correct procedure  The patient underwent monitored anesthesia care, which was administered by an anesthesia professional  The patient's blood pressure, heart rate, level of consciousness, oxygen and respirations were monitored throughout the procedure  A digital rectal exam was performed  The scope was introduced through the anus and advanced to the cecum  Retroflexion was performed in the rectum  The quality of bowel preparation was evaluated using the [facility] Bowel Preparation Scale with scores of: right colon = 2, transverse colon = 2, left colon = 2  The total BBPS score was 6  Bowel prep was adequate  The patient's estimated blood loss was minimal (<5 mL)  The procedure was not difficult  The patient tolerated the procedure well  There were no apparent complications  ANESTHESIA INFORMATION: ASA: III Anesthesia Type: IV Sedation with Anesthesia MEDICATIONS: sodium chloride 0 9 % infusion 4,239  58 mL*  *From user-documented volume lactated ringers infusion Not documented*  *Total volume has not been documented  View each administration to see the amount administered  (Totals for administrations occurring from 1320 to 1418 on 10/20/22) FINDINGS: Multiple small and large severe diverticula containing no content with no bleeding in the transverse colon and sigmoid colon Single mass (traversable) measuring 3 cm x 5 cm in the hepatic flexure, covering one third of the circumference,; bleeding occurred after intervention; performed partial removal by cold forceps biopsy; tattooed distal to the finding with chavez ink Venous blebs in the rectum    No active bleeding  The cecum, splenic flexure, descending colon and rectosigmoid appeared normal  EVENTS: Procedure Events Event Event Time ENDO CECUM REACHED 10/20/2022  2:07 PM ENDO SCOPE OUT TIME 10/20/2022  2:16 PM SPECIMENS: ID Type Source Tests Collected by Time Destination 1 : hepatic flexure mass Tissue Colon TISSUE EXAM Sunitha Hernandez DO 10/20/2022  2:10 PM  EQUIPMENT: Colonoscope -[id number]     Impression: Hepatic flexure mass, 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, non-bleeding Diverticulosis coli, severe, sigmoid colon; also in the transverse colon--likely source of bleeding Venous blebs in the rectum, non-bleeding RECOMMENDATION: No further screening colonoscopies necessary Followup on histopath CEA level I have placed a call to Dr Tommy Harding MD, for referral and eventual consultation  He said that he would consider surgery if Darin's performance status was found to be adequate  Resume diet  Sunitha Hernandez DO [place], [place]    XR hip/pelv 2-3 vws left if performed    Result Date: 10/17/2022  Narrative: LEFT HIP INDICATION:   trauma  COMPARISON:  9/7/2021 VIEWS:  XR HIP/PELV 2-3 VWS LEFT  W PELVIS IF PERFORMED Images: 3 FINDINGS: There is no acute fracture or dislocation  Progressive moderate degenerative arthritis both hips No lytic or blastic osseous lesion  Soft tissues are unremarkable  The visualized lumbar spine is unremarkable  Impression: Progressive degenerative changes No acute osseous abnormality  Workstation performed: [id number]     XR femur 2 views LEFT    Result Date: 10/17/2022  Narrative: LEFT FEMUR INDICATION:   trauma  COMPARISON:  None VIEWS:  XR FEMUR 2 VW LEFT Images: 4 FINDINGS: There is no acute fracture or dislocation  Degenerative changes at the hip  Intact total knee arthroplasty No lytic or blastic osseous lesion  Soft tissues are unremarkable  Impression: No acute osseous abnormality   Workstation performed: [id number]     XR knee 1 or 2 vw left    Result Date: 10/17/2022  Narrative: LEFT KNEE INDICATION:   trauma  COMPARISON:  5/9/2018 VIEWS:  XR KNEE 1 OR 2 VW LEFT Images: 2 FINDINGS: There is no acute fracture or dislocation  There is no joint effusion  Intact total knee arthroplasty, unchanged No lytic or blastic osseous lesion  Anterior suprapatellar dystrophic calcifications again evident     Impression: Intact total knee arthroplasty Dystrophic calcifications No acute osseous abnormality  Findings are stable Workstation performed: [id number]     CT head w wo contrast    Result Date: 10/17/2022  Narrative: CT BRAIN - WITH AND WITHOUT CONTRAST INDICATION:   trauma  COMPARISON:  Head CT 8/26/2021 and brain MRI 5/28/2019 TECHNIQUE:  CT examination of the brain was performed both prior to and after the administration of intravenous contrast   In addition to axial images, sagittal and coronal 2D reformatted images were created and submitted for interpretation  Radiation dose length product (DLP) for this visit:   mGy-cm   This examination, like all CT scans performed in the [facility], was performed utilizing techniques to minimize radiation dose exposure, including the use of iterative reconstruction and automated exposure control  IV Contrast:  100 mL of iohexol (OMNIPAQUE)  IMAGE QUALITY:  Diagnostic  FINDINGS: PARENCHYMA AND EXTRA-AXIAL SPACES: 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 2 8 x 1 1 cm, not significantly changed compared to MRI 5/28/2019  There is associated moderate subjacent inferior bifrontal parenchymal edema  Age-related cerebral atrophy  Mild nonspecific white matter change suggesting microangiopathy  No intra-axial mass, mass effect or midline shift  No CT signs of acute infarction  No acute parenchymal hemorrhage  VENTRICLES: Normal for patient's age  VISUALIZED ORBITS AND PARANASAL SINUSES:  Unremarkable  CALVARIUM:  Chronic left nasal bone fracture deformity  Impression: 1  No acute intracranial CT abnormality   2   Planum sphenoidale 2 8 cm meningioma, grossly stable compared to MRI 5/28/2019 allowing for modality differences  Associated moderate subjacent inferior bifrontal parenchymal edema without significant mass effect  Workstation performed: [id number]     CT spine cervical without contrast    Result Date: 10/17/2022  Narrative: CT CERVICAL SPINE - WITHOUT CONTRAST INDICATION:   trauma  COMPARISON:  None  TECHNIQUE:  CT examination of the cervical spine was performed without intravenous contrast   Contiguous axial images were obtained  Sagittal and coronal reconstructions were performed  Radiation dose length product (DLP) for this visit:  280 mGy-cm   This examination, like all CT scans performed in the [facility], was performed utilizing techniques to minimize radiation dose exposure, including the use of iterative reconstruction and automated exposure control  IMAGE QUALITY:  Diagnostic  FINDINGS: ALIGNMENT:  There is preserved cervical lordosis  Grade 1 anterolisthesis of C3 on C4  Mild retrolisthesis of C5 on C6  VERTEBRAL BODIES: No acute fracture  Heterogeneous marrow attenuation and lucencies of the vertebrae  DEGENERATIVE CHANGES:  Disc osteophyte complex resulting in multilevel canal narrowing  No critical canal stenosis  PREVERTEBRAL AND PARASPINAL SOFT TISSUES:  Unremarkable  THORACIC INLET:  Normal      Impression: 1  No acute cervical spine fracture or traumatic malalignment  2   Heterogeneous marrow attenuation and lucencies of the vertebrae, likely related to reported history of multiple myeloma  Workstation performed: [id number]     CT chest abdomen pelvis w contrast    Result Date: 10/17/2022  Narrative: CT CHEST, ABDOMEN AND PELVIS WITH IV CONTRAST INDICATION:   trauma  COMPARISON:  None  TECHNIQUE: CT examination of the chest, abdomen and pelvis was performed  Axial, sagittal, and coronal 2D reformatted images were created from the source data and submitted for interpretation  Radiation dose length product (DLP) for this visit:  608 mGy-cm   This examination, like all CT scans performed in the [facility], was performed utilizing techniques to minimize radiation dose exposure, including the use of iterative reconstruction and automated exposure control  IV Contrast:  100 mL of iohexol (OMNIPAQUE) Enteric Contrast: Enteric contrast was administered  FINDINGS: CHEST LUNGS:  Tracheobronchial tree is unremarkable  There is spiculated nodule in the left upper lobe measuring 1 2 x 1 cm (series 3 image 32)  Smaller nodule (6 mm) in the left lower lobe (series 3 image 66)  There is adjacent left lower lobe 2 mm nodule (series 3 image 62) PLEURA:  Unremarkable  HEART/GREAT VESSELS: Heart is unremarkable for patient's age  No thoracic aortic aneurysm  MEDIASTINUM AND GERALDINE:  Unremarkable  CHEST WALL AND LOWER NECK:  Unremarkable  ABDOMEN LIVER/BILIARY TREE:  Unremarkable  GALLBLADDER:  No calcified gallstones  No pericholecystic inflammatory change  SPLEEN:  Unremarkable  PANCREAS:  Unremarkable  ADRENAL GLANDS:  Unremarkable  KIDNEYS/URETERS: Atrophic change in both kidneys  No hydronephrosis  There is a 2 7 x 2 5 cm high attenuating cortical lesion in the upper pole of left kidney (series 2 image 56)  Right kidney midpole simple appearing cysts and additional too small to characterize bilateral lesions which are statistically favoring to represent benign cysts  STOMACH AND BOWEL:  Small to moderate size hiatal hernia  Scattered colonic diverticuli  APPENDIX:  No findings to suggest appendicitis  ABDOMINOPELVIC CAVITY:  No ascites  No pneumoperitoneum  No lymphadenopathy  VESSELS:  Unremarkable for patient's age  PELVIS REPRODUCTIVE ORGANS:  Unremarkable for patient's age  URINARY BLADDER:  Distended urinary bladder with a ureterocele at the base of the bladder (series 602 image 100)  ABDOMINAL WALL/INGUINAL REGIONS:  Tiny fat-containing umbilical hernia   OSSEOUS STRUCTURES: Spontaneous interbody fusion at levels T12-L2  No acute osseous abnormality  No acute fracture or destructive osseous lesion  Impression: 1  No CT evidence of acute traumatic injury within chest, abdomen, and pelvis  2   Left upper lobe 1 2 x 1 cm spiculated nodule  Malignancy should be excluded  No prior imaging is available for comparison  Either 3 month follow-up noncontrast CT, PET/CT or tissue sampling may be considered appropriate  Considerations related to the patient's age and/or comorbidities may be used to choose among or alter these recommendations  3   A few additional subcentimeter left lower lobe nodules as described  4   Indeterminate 2 7 cm high attenuating cortical lesion in the upper pole of left kidney  5   Distended urinary bladder with a ureterocele at the base  Correlate for urinary retention  The study was marked in EPIC for significant notification  Workstation performed: [id number]         Assessment & Plan    Hematochezia  Acute blood loss anemia  Colon mass  Diverticulosis     - Patient developed rectal bleeding/bloody stools with a drop in HGB from 11 to 7   - CT Scan 10/17 showed evidence of diverticulosis and a spiculated lung nodule  - He reports a recent weight loss of 20 lbs  - Colonoscopy yesterday revealed severe diverticulosis (likely the source of blood loss), venous blebs in the rectum, and a hepatic flexure mass, likely adenoca, non-bleeding s/p biopsies   - HGB today is stable at 7 4 and there has been no further rectal bleeding   - Monitor H&H and patient for further bleeding  Supportive care  - Follow up path  - Dr Perez Friend placed a call to Dr Jessica Villa regarding case for referral/outpatient consultation  He reports he would consider surgery if Darin's performance status was found to be adequate (will need pulmonary and cardiology evaluations)    - I reviewed the findings of the colon mass with the patient and he is unsure how he would want to proceed given his advanced age but would like to have the outpatient consultation to further discuss  I also tried to call his relative Juan Diego Hernandez to update but there was no answer  The patient will be seen by Dr Dionne Ann  GI will sign off, please contact with questions/concerns      Hnery Hope PA-C  10/21/2022,10:55 AM

## 2022-10-21 NOTE — ASSESSMENT & PLAN NOTE
· Patient with dizziness, loss of balance and several falls over the last few days     · Fall precautions  · PT/OT consulted, recommending post-acute rehab at this time

## 2022-10-21 NOTE — PLAN OF CARE
Problem: MOBILITY - ADULT  Goal: Maintain or return to baseline ADL function  Description: INTERVENTIONS:  -  Assess patient's ability to carry out ADLs; assess patient's baseline for ADL function and identify physical deficits which impact ability to perform ADLs (bathing, care of mouth/teeth, toileting, grooming, dressing, etc )  - Assess/evaluate cause of self-care deficits   - Assess range of motion  - Assess patient's mobility; develop plan if impaired  - Assess patient's need for assistive devices and provide as appropriate  - Encourage maximum independence but intervene and supervise when necessary  - Involve family in performance of ADLs  - Assess for home care needs following discharge   - Consider OT consult to assist with ADL evaluation and planning for discharge  - Provide patient education as appropriate  Outcome: Progressing  Goal: Maintains/Returns to pre admission functional level  Description: INTERVENTIONS:  - Perform BMAT or MOVE assessment daily    - Set and communicate daily mobility goal to care team and patient/family/caregiver  - Collaborate with rehabilitation services on mobility goals if consulted  - Perform Range of Motion 3 times a day  - Reposition patient every 2 hours    - Dangle patient 3 times a day  - Out of bed to chair 2 times a day   - Record patient progress and toleration of activity level   Outcome: Progressing     Problem: GASTROINTESTINAL - ADULT  Goal: Maintains or returns to baseline bowel function  Description: INTERVENTIONS:  - Assess bowel function  - Encourage oral fluids to ensure adequate hydration  - Administer IV fluids if ordered to ensure adequate hydration  - Administer ordered medications as needed  - Encourage mobilization and activity  - Consider nutritional services referral to assist patient with adequate nutrition and appropriate food choices  Outcome: Progressing  Goal: Oral mucous membranes remain intact  Description: INTERVENTIONS  - Assess oral mucosa and hygiene practices  - Implement preventative oral hygiene regimen  - Implement oral medicated treatments as ordered  - Initiate Nutrition services referral as needed  Outcome: Progressing     Problem: GENITOURINARY - ADULT  Goal: Urinary catheter remains patent  Description: INTERVENTIONS:  - Assess patency of urinary catheter  - If patient has a chronic lema, consider changing catheter if non-functioning  - Follow guidelines for intermittent irrigation of non-functioning urinary catheter  Outcome: Progressing

## 2022-10-21 NOTE — CASE MANAGEMENT
Case Management Discharge Planning Note    Patient name Jhoana Boone  Location [address] [id number] MRN [id number]  : 1927 Date 10/21/2022       Current Admission Date: 10/17/2022  Current Admission Diagnosis:VINICIUS (acute kidney injury) [facility])   Patient Active Problem List    Diagnosis Date Noted   • Bladder distension 10/19/2022   • Anemia 10/18/2022   • Severe protein-calorie malnutrition ([facility] [address] ) 10/18/2022   • Stage 3 chronic kidney disease, unspecified whether stage 3a or 3b CKD ([address] ) 2022   • Ambulatory dysfunction 2022   • Irritable bowel syndrome without diarrhea 2022   • Irritable bowel syndrome with diarrhea 2022   • 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   • Chronic seasonal allergic rhinitis due to pollen 2021   • Venous stasis ulcer of left ankle with fat layer exposed without varicose veins ([facility] [address] ) 2021   • Abrasions of multiple sites 2021   • Falls 2021   • Thrombocytopenia ([address] ) 2021   • VINICIUS (acute kidney injury) ([facility] [address] ) 2021   • Multiple myeloma not having achieved remission ([facility] [address] ) 2021   • Immunization due 2020   • Spinal stenosis of lumbosacral region 2019   • Meningioma, cerebral ([facility] [address] ) 2019   • Patellofemoral disorder of left knee 2018   • Idiopathic chronic gout of multiple sites without tophus 2014   • Spondylosis of lumbar region without myelopathy or radiculopathy 2014   • Acquired hypothyroidism 2012   • Pure hypercholesterolemia 2012   • Essential hypertension 2012      LOS (days): 3  Geometric Mean LOS (GMLOS) (days): 4 30  Days to GMLOS:1 4     OBJECTIVE:  Risk of Unplanned Readmission Score: 14 53         Current admission status: Inpatient   [facility] [address] [facility] [address] [facility] [place] [facility] [address] [facility] [address] [place] [address]  Phone: [phone] Fax: [phone]    Primary Care Provider: Saul Mendez MD    Primary Insurance: [employer] [facility] REP  Secondary Insurance: [address]    DISCHARGE DETAILS:                                          Other Referral/Resources/Interventions Provided:  Referral Comments: Received message from nurse [place] ([place]) that pt's cousin was at bedside last evening and wanted to s/w CM, as she is interested in PVM for STR placement of pt  Made PVM aware via [address];  awaiting response  Also need to know pt's medical readiness;  will follow  Treatment Team Recommendation: Short Term Rehab, SNF  Discharge Destination Plan[de-identified] Short Term Rehab, SNF  Transport at Discharge :  Other (Comment) (TBD)

## 2022-10-21 NOTE — CONSULTS
Consultation - Cardiology Team One  Jose Hazel 80 y o  male MRN: [id number]  Unit/Bed#: -01 Encounter: [id number]    Inpatient consult to Cardiology  Consult performed by: SABRINA Jane  Consult ordered by: Donna Pisano [facility]           Physician Requesting Consult: Jeronimo Sheldon MD  Reason for Consult / Principal Problem:  Preoperative cardiac risk stratification    Assessment/Plan:    1  Preoperative cardiac risk stratification  -planned for surgical intervention of hepatic flexure mass of the colon  -obtain TTE to evaluate LVEF and structural function  -patient can not achieve greater than 4 Mets, denies any chest pain, shortness of breath with activity  -further risk stratification once TTE obtained    2  Hepatic flexure mass  -noted on colonoscopy  -plan for surgical intervention at some point  -management per primary team and Gastroenterology    3  Acute anemia   -hemoglobin maintaining in the sevens  -management per primary team    4  Hypertension  -blood pressure is overall well controlled without medications  -continue to monitor closely    5  CKD stage 3  -creatinine normal today at 0 71  -continue to monitor closely    HPI: 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 80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  year old male who has a history of hypertension, hypothyroidism, CKD stage 3, multiple myeloma who presented to the emergency room on 10/17/2022 with dizziness and frequent falls  Patient was admitted and after workup he was noted to be significantly anemic with hematochezia as well as with acute kidney injury  He was evaluated by Gastroenterology and underwent a colonoscopy which revealed a hepatic flexure mass, and discuss with Surgical Oncology and plan for intervention at some point  Cardiology was consulted for preoperative cardiac risk stratification  CT of the chest and abdomen noted a left upper lobe spiculated nodule, as well as a left kidney upper pole cortical lesion      REVIEW OF SYSTEMS:  Constitutional:  Denies fever or chills   Eyes:  Denies change in visual acuity   HENT:  Denies nasal congestion or sore throat   Respiratory:  Denies cough, orthopnea, PND or shortness of breath   Cardiovascular:  Denies chest pain, palpitations or edema   GI:  Denies abdominal pain, nausea, vomiting, bloody stools or diarrhea   :  Denies dysuria, frequency, difficulty in micturition and nocturia  Musculoskeletal:  Denies back pain or joint pain   Neurologic:  Denies headache, focal weakness or sensory changes   Endocrine:  Denies polyuria or polydipsia   Lymphatic:  Denies swollen glands   Psychiatric:  Denies depression or anxiety     Historical Information   Past Medical History:   Diagnosis Date   • Arthritis    • Cancer ([facility] ) 2019   • Gout    • Hypertension    • Hyperthyroidism    • Memory loss    • Multiple myeloma ([place] [address] )    • Myeloma ([place] [address] )    • Sleep disturbance    • Thyroid disease      Past Surgical History:   Procedure Laterality Date   • IR BIOPSY BONE MARROW  2021   • KIDNEY SURGERY      description: 30 yrs ago   • PROSTATE SURGERY      description: stone removal 30 yrs ago   • REPLACEMENT TOTAL KNEE BILATERAL      description: 18 yrs ago     Social History     Substance and Sexual Activity   Alcohol Use Not Currently    Comment: social "burbon once in a while"     Social History     Substance and Sexual Activity   Drug Use No     Social History     Tobacco Use   Smoking Status Former Smoker   • Packs/day: 0 50   • Years: 25 00   • Pack years: 12 50   • Quit date: 1980   • Years since quittin 8   Smokeless Tobacco Never Used   Tobacco Comment    "I smoked years ago"       Family History: non-contributory    MEDS & ALLERGIES:  all current active meds have been reviewed and current meds:   Current Facility-Administered Medications   Medication Dose Route Frequency   • acetaminophen (TYLENOL) tablet 650 mg  650 mg Oral Q6H PRN   • aluminum-magnesium hydroxide-simethicone (MYLANTA) oral suspension 30 mL  30 mL Oral Q6H PRN   • dexamethasone (DECADRON) tablet 4 mg  4 mg Oral Daily With Breakfast   • levothyroxine tablet 100 mcg  100 mcg Oral Early Morning   • ondansetron (ZOFRAN) injection 4 mg  4 mg Intravenous Q6H PRN   • pantoprazole (PROTONIX) EC tablet 40 mg  40 mg Oral Early Morning   • polyethylene glycol (MIRALAX) packet 17 g  17 g Oral Daily PRN        No Known Allergies    OBJECTIVE:  Vitals:   Vitals:    10/21/22 0821   BP: 101/74   Pulse: 81   Resp: 20   Temp: (!) 95 9 °F (35 5 °C)   SpO2: 97%     Body mass index is 18 75 kg/m²  Systolic (27JFR), ZXR:642 , Min:101 , EHX:585     Diastolic (15UXX), LU, Min:50, Max:74      Intake/Output Summary (Last 24 hours) at 10/21/2022 1323  Last data filed at 10/21/2022 1100  Gross per 24 hour   Intake 1060 ml   Output 1200 ml   Net -140 ml     Weight (last 2 days)     None        Invasive Devices  Report    Peripheral Intravenous Line  Duration           Peripheral IV 10/17/22 Left Antecubital 4 days    Peripheral IV 10/17/22 Dorsal (posterior); Right Forearm 3 days          Drain  Duration           Urethral Catheter 16 Fr  4 days                PHYSICAL EXAMS:  General:  Patient is not in acute distress, laying in the bed comfortably, awake, alert   Head: Normocephalic, Atraumatic     HEENT: White sclera, pink conjunctiva  Neck:  Supple, no neck vein distention  Respiratory: clear to auscultation   Cardiovascular:  PMI normal, S1-S2 normal, no murmurs, thrills, gallops, rubs, regular rhythm  GI:  Abdomen soft, non-tender, non-distended  Extremities: No edema, normal pulses  Integument:  No skin rashes or ulceration  Lymphatic:  No cervical or inguinal lymphadenopathy  Neurologic:  Patient is awake alert, responding to command, oriented to person, place and time     LABORATORY RESULTS:  Results from last 7 days   Lab Units 10/17/22  0829   CK TOTAL U/L 54     CBC with diff:   Results from last 7 days   Lab Units 10/21/22  0929 10/20/22  2055 10/20/22  0841 10/19/22  1229 10/19/22  0559 10/19/22  0431 10/18/22  0751 10/17/22  0829   WBC Thousand/uL  --   --  8 43  --  8 46 7 89   < > 15 33*   HEMOGLOBIN g/dL 7 3* 7 4* 7 1*   < > 7 0* 6 8*   < > 11 0*   HEMATOCRIT % 24 0* 22 8* 21 8*   < > 22 0* 20 8*   < > 32 1*   MCV fL  --   --  100*  --  100* 98   < > 97   PLATELETS Thousands/uL  --   --  97*  --  92* 99*   < > 180   MCH pg  --   --  32 6  --  31 7 31 9   < > 33 1   MCHC g/dL  --   --  32 6  --  31 8 32 7   < > 34 3   RDW %  --   --  14 0  --  13 8 13 6   < > 13 7   MPV fL  --   --  10 4  --  9 7 10 0   < > 10 1   NRBC AUTO /100 WBCs  --   --   --   --  0  --   --  0    < > = values in this interval not displayed  CMP:  Results from last 7 days   Lab Units 10/21/22  0443 10/20/22  0444 10/19/22  0431 10/17/22  1916 10/17/22  0829   POTASSIUM mmol/L 3 7 3 4* 3 7   < > 3 8   CHLORIDE mmol/L 113* 112* 110*   < > 94*   CO2 mmol/L 22 20* 21   < > 27   BUN mg/dL 14 13 18   < > 41*   CREATININE mg/dL 0 71 0 63 0 69   < > 1 39*   CALCIUM mg/dL 7 4* 7 8* 8 2*   < > 8 8   AST U/L  --   --   --   --  20   ALT U/L  --   --   --   --  18   ALK PHOS U/L  --   --   --   --  72   EGFR ml/min/1 73sq m 79 83 80   < > 42    < > = values in this interval not displayed         BMP:  Results from last 7 days   Lab Units 10/21/22  0443 10/20/22  0444 10/19/22  0431   POTASSIUM mmol/L 3 7 3 4* 3 7   CHLORIDE mmol/L 113* 112* 110*   CO2 mmol/L 22 20* 21   BUN mg/dL 14 13 18   CREATININE mg/dL 0 71 0 63 0 69   CALCIUM mg/dL 7 4* 7 8* 8 2*                    Results from last 7 days   Lab Units 10/17/22  0829   TSH 3RD GENERATON uIU/mL 6 187*   FREE T4 ng/dL 0 82     Results from last 7 days   Lab Units 10/17/22  0829   INR  1 01       Lipid Profile:   Lab Results   Component Value Date    CHOL 211 09/21/2015    CHOL 184 10/06/2014    CHOL 208 12/10/2013     Lab Results   Component Value Date    HDL 37 (L) 03/05/2021    HDL 65 (H) 09/24/2018    HDL 56 09/21/2017     Lab Results   Component Value Date    LDLCALC 129 (H) 03/05/2021    LDLCALC 115 (H) 09/24/2018    LDLCALC 120 (H) 09/21/2017     Lab Results   Component Value Date    TRIG 184 (H) 03/05/2021    TRIG 73 09/24/2018    TRIG 124 09/21/2017       Cardiac testing:   No results found for this or any previous visit  No results found for this or any previous visit  No valid procedures specified  No results found for this or any previous visit  Imaging:   I have personally reviewed pertinent reports          EKG reviewed personally:  Normal sinus rhythm    Telemetry reviewed personally:   No telemetry      Code Status: Level 1 - Full Code      SABRINA Meyer  10/21/2022,1:23 PM

## 2022-10-21 NOTE — ASSESSMENT & PLAN NOTE
· Significant drop in hemoglobin from 11 0 to 7 7 from 10/17-10/18   · Consider hemodilution given IV fluid resuscitation, however hemoccult (+) x3  · Lovenox discontinued  · Imaging on admission without evidence for acute bleed  · Received 3 doses IV albumin 10/18 for hypotension  · Iron panel 10/18 showing iron deficiency with normal ferritin and low TIBC - 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  · Transfuse for hgb <6 given national blood shortage  · Received IV iron x 3 days   · GI consulted, appreciate recommendations  · Colonoscopy completed 10/20  · Severe diverticulosis likely the source of blood loss  · 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 discussed with Dr Daina Liriano  · Will order cardiology consult for possible surgical clearance  · Hgb/HCT ordered q12 hours

## 2022-10-21 NOTE — PROGRESS NOTES
[address] [facility]  Progress Note Kaitlin Newsome 5/24/1927, 80 y o  male MRN: [id number]  Unit/Bed#: -Araceli Encounter: [id number]  Primary Care Provider: Eliezer Dill MD   Date and time admitted to hospital: 10/17/2022  8:16 AM    * VINICIUS (acute kidney injury) [facility])  Assessment & Plan  · Creatinine 1 39 on admission, possibly related to dehydration vs medication side effect  · Hold home triamterene-hydrochlorothiazide  · Corrected to normal with IV fluids  · Monitor BMP  · Avoid hypotension, nephrotoxins    Anemia  Assessment & Plan  · Significant drop in hemoglobin from 11 0 to 7 7 from 10/17-10/18   · Consider hemodilution given IV fluid resuscitation, however hemoccult (+) x3  · Lovenox discontinued  · Imaging on admission without evidence for acute bleed  · Received 3 doses IV albumin 10/18 for hypotension  · Iron panel 10/18 showing iron deficiency with normal ferritin and low TIBC - 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  · Transfuse for hgb <6 given national blood shortage  · Received IV iron x 3 days   · GI consulted, appreciate recommendations  · Colonoscopy completed 10/20  · Severe diverticulosis likely the source of blood loss  · 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 discussed with Dr Terence Peña  · Will order cardiology consult for possible surgical clearance  · Hgb/HCT ordered q12 hours    Acquired hypothyroidism  Assessment & Plan  · Continue home levothyroxine  · TSH elevated 6 187, free T4 normal 0 82  · Follow TFTs outpatient     Ambulatory dysfunction  Assessment & Plan  · Patient with dizziness, loss of balance and several falls over the last few days     · Fall precautions  · PT/OT consulted, recommending post-acute rehab at this time  · Orthostatics pending     Bladder distension  Assessment & Plan  · Pastrana catheter placed for decompression on arrival  · Plan to continue for at least 1 week with plan for outpatient voiding trial  · Will need outpatient urology evaluation    Severe protein-calorie malnutrition ([facility] )  Assessment & Plan  Malnutrition Findings:   Adult Malnutrition type: Chronic illness  Adult Degree of Malnutrition: Other severe protein calorie malnutrition  Malnutrition Characteristics: Weight loss, Muscle loss, Fat loss              360 Statement: related to inadequate energy intake due to decreased appetite ,as evidenced by, 21% weight loss last 11 mos (21-10/7/22), hollow supraclavicular space, depressed temples  Int: Regular diet, medical food supplements with meals  BMI Findings: Body mass index is 18 75 kg/m²  Nutrition consulted, appreciate recommendations  · Dietary nutrition supplements ordered per Nutrition    VTE Pharmacologic Prophylaxis: VTE Score: 7 High Risk (Score >/= 5) - Pharmacological DVT Prophylaxis Contraindicated  Sequential Compression Devices Ordered  Contraindicated due to acute anemia, GI bleed    Patient Centered Rounds: I performed bedside rounds with nursing staff today  Discussions with Specialists or Other Care Team Provider: Case management    Education and Discussions with Family / Patient: Discussed with patient    Time Spent for Care: 30 minutes  More than 50% of total time spent on counseling and coordination of care as described above  Current Length of Stay: 3 day(s)  Current Patient Status: Inpatient   Certification Statement: The patient will continue to require additional inpatient hospital stay due to cards clearance, monitoring HGB  Discharge Plan: Anticipate discharge in 24-48 hrs to rehab facility  Code Status: Level 1 - Full Code    Subjective:   Patient seen out of bed, sitting in chair at time of visit  Reports that he feels "pretty good"  Enjoyed breakfast and is comfortable in the chair  Denies chest pain, shortness of breath, abdominal pain      Objective:     Vitals:   Temp (24hrs), Av 4 °F (36 3 °C), Min:95 9 °F (35 5 °C), Max:98 1 °F (36 7 °C)    Temp:  [95 9 °F (35 5 °C)-98 1 °F (36 7 °C)] 95 9 °F (35 5 °C)  HR:  [55-88] 81  Resp:  [16-20] 20  BP: (101-137)/(50-74) 101/74  SpO2:  [97 %-100 %] 97 %  Body mass index is 18 75 kg/m²  Input and Output Summary (last 24 hours): Intake/Output Summary (Last 24 hours) at 10/21/2022 1249  Last data filed at 10/21/2022 2413  Gross per 24 hour   Intake 1690 ml   Output 1650 ml   Net 40 ml       Physical Exam:   Physical Exam  Vitals reviewed  Constitutional:       General: He is not in acute distress  Eyes:      Conjunctiva/sclera: Conjunctivae normal       Comments: Arcus senilis bilaterally   Cardiovascular:      Rate and Rhythm: Normal rate and regular rhythm  Heart sounds: No murmur heard  Pulmonary:      Effort: Pulmonary effort is normal  No respiratory distress  Abdominal:      Tenderness: There is no abdominal tenderness  Musculoskeletal:      Right lower leg: No edema  Comments: LLE covered with wound dressing   Skin:     General: Skin is warm and dry  Neurological:      Mental Status: He is alert  Mental status is at baseline  Psychiatric:         Mood and Affect: Mood normal          Behavior: Behavior normal         Additional Data:     Labs:  Results from last 7 days   Lab Units 10/21/22  0943 10/20/22  2055 10/20/22  0841 10/19/22  1229 10/19/22  0559   WBC Thousand/uL  --   --  8 43  --  8 46   HEMOGLOBIN g/dL 7 3*   < > 7 1*   < > 7 0*   HEMATOCRIT % 24 0*   < > 21 8*   < > 22 0*   PLATELETS Thousands/uL  --   --  97*  --  92*   NEUTROS PCT %  --   --   --   --  79*   LYMPHS PCT %  --   --   --   --  10*   MONOS PCT %  --   --   --   --  10   EOS PCT %  --   --   --   --  0    < > = values in this interval not displayed       Results from last 7 days   Lab Units 10/21/22  0443 10/17/22  1916 10/17/22  0829   SODIUM mmol/L 142   < > 130*   POTASSIUM mmol/L 3 7   < > 3 8   CHLORIDE mmol/L 113*   < > 94*   CO2 mmol/L 22   < > 27   BUN mg/dL 14   < > 41*   CREATININE mg/dL 0 71   < > 1 39*   ANION GAP mmol/L 7   < > 9   CALCIUM mg/dL 7 4*   < > 8 8   ALBUMIN g/dL  --   --  3 4*   TOTAL BILIRUBIN mg/dL  --   --  1 24*   ALK PHOS U/L  --   --  72   ALT U/L  --   --  18   AST U/L  --   --  20   GLUCOSE RANDOM mg/dL 97   < > 133    < > = values in this interval not displayed  Results from last 7 days   Lab Units 10/17/22  0829   INR  1 01                   Lines/Drains:  Invasive Devices  Report    Peripheral Intravenous Line  Duration           Peripheral IV 10/17/22 Left Antecubital 4 days    Peripheral IV 10/17/22 Dorsal (posterior); Right Forearm 3 days          Drain  Duration           Urethral Catheter 16 Fr  4 days              Urinary Catheter:  Goal for removal: N/A- Discharging with Pastrana           Imaging: Reviewed radiology reports from this admission including: abdominal/pelvic CT, CT head, xray(s) and CT cervical spine    Recent Cultures (last 7 days):   Results from last 7 days   Lab Units 10/19/22  1431   C DIFF TOXIN B BY PCR  Negative       Last 24 Hours Medication List:   Current Facility-Administered Medications   Medication Dose Route Frequency Provider Last Rate   • acetaminophen  650 mg Oral Q6H PRN Cesar Mitchell PA-C     • aluminum-magnesium hydroxide-simethicone  30 mL Oral Q6H PRN Cesar Mitchell PA-C     • dexamethasone  4 mg Oral Daily With Breakfast Cesar Mitchell PA-C     • levothyroxine  100 mcg Oral Early Morning Sherrie Mensah PA-C     • ondansetron  4 mg Intravenous Q6H PRN Cesar Mitchell PA-C     • pantoprazole  40 mg Oral Early Morning Sherrie Mensah PA-C     • polyethylene glycol  17 g Oral Daily PRN Cesar Mitchell PA-C          Today, Patient Was Seen By: Tina Collins    **Please Note: This note may have been constructed using a voice recognition system  **

## 2022-10-21 NOTE — CASE MANAGEMENT
Case Management Discharge Planning Note    Patient name Percell Moritz  Location [address] [phone] MRN [id number]  : 1927 Date 10/21/2022       Current Admission Date: 10/17/2022  Current Admission Diagnosis:VINICIUS (acute kidney injury) [facility])   Patient Active Problem List    Diagnosis Date Noted   • Bladder distension 10/19/2022   • Anemia 10/18/2022   • Severe protein-calorie malnutrition ([address] ) 10/18/2022   • Stage 3 chronic kidney disease, unspecified whether stage 3a or 3b CKD ([address] ) 2022   • Ambulatory dysfunction 2022   • Irritable bowel syndrome without diarrhea 2022   • Irritable bowel syndrome with diarrhea 2022   • 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   • Chronic seasonal allergic rhinitis due to pollen 2021   • Venous stasis ulcer of left ankle with fat layer exposed without varicose veins ([facility] ) 2021   • Abrasions of multiple sites 2021   • Falls 2021   • Thrombocytopenia ([address] ) 2021   • VINICIUS (acute kidney injury) ([facility] ) 2021   • Multiple myeloma not having achieved remission ([facility] ) 2021   • Immunization due 2020   • Spinal stenosis of lumbosacral region 2019   • Meningioma, cerebral ([facility] ) 2019   • Patellofemoral disorder of left knee 2018   • Idiopathic chronic gout of multiple sites without tophus 2014   • Spondylosis of lumbar region without myelopathy or radiculopathy 2014   • Acquired hypothyroidism 2012   • Pure hypercholesterolemia 2012   • Essential hypertension 2012      LOS (days): 3  Geometric Mean LOS (GMLOS) (days): 4 30  Days to GMLOS:1 4     OBJECTIVE:  Risk of Unplanned Readmission Score: 14 53         Current admission status: Inpatient   Preferred Pharmacy:   [facility] [address] [facility], [address] - [address] [place] [address] [address] [address]  Phone: [phone] Fax: [phone]    Primary Care Provider: Jonn Babinski, MD    Primary Insurance: [employer] [facility] REP  Secondary Insurance: [address]    DISCHARGE DETAILS:                                          Other Referral/Resources/Interventions Provided:  Referral Comments: S/W pt's cousin/FRANCESCO Murillo to confirm that she would like PVM placement for pt  Reviewed IMM w her and emailed her a copy at "[email or web address]" and left another copy at bedside per her request   Answered all her questions re: rehab  She would like to s/w MD;  pt is being covered by [employer] and TT message sent to her w request to call family  Treatment Team Recommendation: Short Term Rehab, SNF  Discharge Destination Plan[de-identified] Short Term Rehab, SNF  Transport at Discharge :  Other (Comment) (TBD)                             IMM Given (Date):: 10/21/22  IMM Given to[de-identified] Family  Family notified[de-identified] s/w pt's cousin Letty Murillo via t/c

## 2022-10-22 LAB
ANION GAP SERPL CALCULATED.3IONS-SCNC: 10 MMOL/L (ref 4–13)
BUN SERPL-MCNC: 17 MG/DL (ref 5–25)
CALCIUM SERPL-MCNC: 7.9 MG/DL (ref 8.3–10.1)
CHLORIDE SERPL-SCNC: 109 MMOL/L (ref 96–108)
CO2 SERPL-SCNC: 22 MMOL/L (ref 21–32)
CREAT SERPL-MCNC: 0.71 MG/DL (ref 0.6–1.3)
GFR SERPL CREATININE-BSD FRML MDRD: 79 ML/MIN/1.73SQ M
GLUCOSE SERPL-MCNC: 131 MG/DL (ref 65–140)
HCT VFR BLD AUTO: 21.6 % (ref 36.5–49.3)
HCT VFR BLD AUTO: 21.9 % (ref 36.5–49.3)
HGB BLD-MCNC: 7 G/DL (ref 12–17)
HGB BLD-MCNC: 7.1 G/DL (ref 12–17)
POTASSIUM SERPL-SCNC: 4.1 MMOL/L (ref 3.5–5.3)
SODIUM SERPL-SCNC: 141 MMOL/L (ref 135–147)

## 2022-10-22 PROCEDURE — 85018 HEMOGLOBIN: CPT | Performed by: INTERNAL MEDICINE

## 2022-10-22 PROCEDURE — 80048 BASIC METABOLIC PNL TOTAL CA: CPT | Performed by: NURSE PRACTITIONER

## 2022-10-22 PROCEDURE — 99233 SBSQ HOSP IP/OBS HIGH 50: CPT | Performed by: NURSE PRACTITIONER

## 2022-10-22 PROCEDURE — 85014 HEMATOCRIT: CPT | Performed by: INTERNAL MEDICINE

## 2022-10-22 RX ADMIN — PANTOPRAZOLE SODIUM 40 MG: 40 TABLET, DELAYED RELEASE ORAL at 05:45

## 2022-10-22 RX ADMIN — LEVOTHYROXINE SODIUM 100 MCG: 100 TABLET ORAL at 05:45

## 2022-10-22 RX ADMIN — DEXAMETHASONE 4 MG: 4 TABLET ORAL at 08:36

## 2022-10-22 NOTE — ASSESSMENT & PLAN NOTE
· Significant drop in hemoglobin from 11 0 to 7 7 from 10/17-10/18   · Consider hemodilution given IV fluid resuscitation, however hemoccult (+) x3  · Lovenox discontinued  · Imaging on admission without evidence for acute bleed  · Received 3 doses IV albumin 10/18 for hypotension  · Iron panel 10/18 showing iron deficiency with normal ferritin and low TIBC - 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  · Transfuse for <7   · Hemoglobin 7 1 continue to monitor and insure rebound prior to discharge  · Received IV iron x 3 days   · GI consulted, appreciate recommendations  · Colonoscopy completed 10/20  · Severe diverticulosis likely the source of blood loss  · 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

## 2022-10-22 NOTE — ASSESSMENT & PLAN NOTE
· Creatinine 1 39 on admission, possibly related to dehydration vs medication side effect  · Hold home triamterene-hydrochlorothiazide  · Corrected to normal with IV fluids  · Cr now normalized  · Monitor BMP  · Avoid hypotension, nephrotoxins

## 2022-10-22 NOTE — PLAN OF CARE
Problem: SKIN/TISSUE INTEGRITY - ADULT  Goal: Incision(s), wounds(s) or drain site(s) healing without S/S of infection  Description: INTERVENTIONS  - Assess and document dressing, incision, wound bed, drain sites and surrounding tissue  - Provide patient and family education  - Perform skin care/dressing changes every shift  Outcome: Progressing     Problem: GASTROINTESTINAL - ADULT  Goal: Maintains or returns to baseline bowel function  Description: INTERVENTIONS:  - Assess bowel function  - Encourage oral fluids to ensure adequate hydration  - Administer IV fluids if ordered to ensure adequate hydration  - Administer ordered medications as needed  - Encourage mobilization and activity  - Consider nutritional services referral to assist patient with adequate nutrition and appropriate food choices  Outcome: Progressing  Goal: Oral mucous membranes remain intact  Description: INTERVENTIONS  - Assess oral mucosa and hygiene practices  - Implement preventative oral hygiene regimen  - Implement oral medicated treatments as ordered  - Initiate Nutrition services referral as needed  Outcome: Progressing

## 2022-10-22 NOTE — PROGRESS NOTES
[address] [facility]  Progress Note Molina Gilbert 5/24/1927, 80 y o  male MRN: [id number]  Unit/Bed#: -[facility] Encounter: [id number]  Primary Care Provider: Seda Batista MD   Date and time admitted to hospital: 10/17/2022  8:16 AM    * VINICIUS (acute kidney injury) [facility])  Assessment & Plan  · Creatinine 1 39 on admission, possibly related to dehydration vs medication side effect  · Hold home triamterene-hydrochlorothiazide  · Corrected to normal with IV fluids  · Cr now normalized  · Monitor BMP  · Avoid hypotension, nephrotoxins    Anemia  Assessment & Plan  · Significant drop in hemoglobin from 11 0 to 7 7 from 10/17-10/18   · Consider hemodilution given IV fluid resuscitation, however hemoccult (+) x3  · Lovenox discontinued  · Imaging on admission without evidence for acute bleed  · Received 3 doses IV albumin 10/18 for hypotension  · Iron panel 10/18 showing iron deficiency with normal ferritin and low TIBC - 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  · Transfuse for <7   · Hemoglobin 7 1 continue to monitor and insure rebound prior to discharge  · Received IV iron x 3 days   · GI consulted, appreciate recommendations  · Colonoscopy completed 10/20  · Severe diverticulosis likely the source of blood loss  · 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 discussed with Dr Radha Guerrero  · Cardiology consulted for surgical clearance if patient decides to proceed  · Hgb/HCT ordered q12 hours    Bladder distension  Assessment & Plan  · Pastrana catheter placed for decompression on arrival  · Plan to continue for at least 1 week with plan for outpatient voiding trial  · Will need outpatient urology evaluation    Severe protein-calorie malnutrition ([facility] [address] )  Assessment & Plan  Malnutrition Findings:   Adult Malnutrition type: Chronic illness  Adult Degree of Malnutrition: Other severe protein calorie malnutrition  Malnutrition Characteristics: Weight loss, Muscle loss, Fat loss              360 Statement: related to inadequate energy intake due to decreased appetite ,as evidenced by, 21% weight loss last 11 mos (11/4/21-10/7/22), hollow supraclavicular space, depressed temples  Int: Regular diet, medical food supplements with meals  BMI Findings: Body mass index is 18 72 kg/m²  Nutrition consulted, appreciate recommendations  · Dietary nutrition supplements ordered per Nutrition    Ambulatory dysfunction  Assessment & Plan  · Patient with dizziness, loss of balance and several falls over the last few days  · Fall precautions  · PT/OT consulted, recommending post-acute rehab at this time    Acquired hypothyroidism  Assessment & Plan  · Continue home levothyroxine  · TSH elevated 6 187, free T4 normal 0 82  · Follow TFTs outpatient           VTE Pharmacologic Prophylaxis: VTE Score: 7 High Risk (Score >/= 5) - Pharmacological DVT Prophylaxis Contraindicated  Sequential Compression Devices Ordered  Patient Centered Rounds: I performed bedside rounds with nursing staff today  Discussions with Specialists or Other Care Team Provider:  GI note reviewed cardiology note reviewed    Education and Discussions with Family / Patient: Updated  (nephew and niece) at bedside  Time Spent for Care: 30 minutes  More than 50% of total time spent on counseling and coordination of care as described above  Current Length of Stay: 4 day(s)  Current Patient Status: Inpatient   Certification Statement: The patient will continue to require additional inpatient hospital stay due to Anemia  Discharge Plan: Anticipate discharge in 24-48 hrs to home      Code Status: Level 1 - Full Code    Subjective:   Patient remains alert pleasant denies shortness of breath overall again asking questions regarding above findings with him and family at the bedside discussed at length concerns for immediate in-patient stay were discussed at length in May concern being hemoglobin along with making sure outpatient follow-up for biopsy results will be essential to his care  All questions and concerns appeared to be answered at this time    Objective:     Vitals:   Temp (24hrs), Av 1 °F (36 7 °C), Min:98 °F (36 7 °C), Max:98 2 °F (36 8 °C)    Temp:  [98 °F (36 7 °C)-98 2 °F (36 8 °C)] 98 °F (36 7 °C)  HR:  [72] 72  Resp:  [18] 18  BP: (105-142)/(63-67) 129/67  SpO2:  [99 %-100 %] 99 %  Body mass index is 18 72 kg/m²  Input and Output Summary (last 24 hours): Intake/Output Summary (Last 24 hours) at 10/22/2022 1554  Last data filed at 10/22/2022 1156  Gross per 24 hour   Intake 120 ml   Output 2400 ml   Net -2280 ml       Physical Exam:   Physical Exam  Vitals and nursing note reviewed  Constitutional:       Appearance: He is cachectic  HENT:      Head: Normocephalic and atraumatic  Eyes:      Conjunctiva/sclera: Conjunctivae normal    Cardiovascular:      Rate and Rhythm: Normal rate and regular rhythm  Heart sounds: No murmur heard  Pulmonary:      Effort: Pulmonary effort is normal  No respiratory distress  Breath sounds: Normal breath sounds  Abdominal:      Palpations: Abdomen is soft  Tenderness: There is no abdominal tenderness  Musculoskeletal:      Cervical back: Neck supple  Skin:     General: Skin is warm and dry  Neurological:      Mental Status: He is alert and oriented to person, place, and time  Mental status is at baseline     Psychiatric:         Mood and Affect: Mood normal          Behavior: Behavior normal           Additional Data:     Labs:  Results from last 7 days   Lab Units 10/22/22  0513 10/20/22  2055 10/20/22  0841 10/19/22  1229 10/19/22  0559   WBC Thousand/uL  --   --  8 43  --  8 46   HEMOGLOBIN g/dL 7 1*   < > 7 1*   < > 7 0*   HEMATOCRIT % 21 6*   < > 21 8*   < > 22 0*   PLATELETS Thousands/uL  --   --  97*  --  92*   NEUTROS PCT %  --   --   --   --  79*   LYMPHS PCT %  --   --   --   --  10*   MONOS PCT %  --   --   --   --  10   EOS PCT %  --   --   --   --  0    < > = values in this interval not displayed  Results from last 7 days   Lab Units 10/22/22  0513 10/17/22  1916 10/17/22  0829   SODIUM mmol/L 141   < > 130*   POTASSIUM mmol/L 4 1   < > 3 8   CHLORIDE mmol/L 109*   < > 94*   CO2 mmol/L 22   < > 27   BUN mg/dL 17   < > 41*   CREATININE mg/dL 0 71   < > 1 39*   ANION GAP mmol/L 10   < > 9   CALCIUM mg/dL 7 9*   < > 8 8   ALBUMIN g/dL  --   --  3 4*   TOTAL BILIRUBIN mg/dL  --   --  1 24*   ALK PHOS U/L  --   --  72   ALT U/L  --   --  18   AST U/L  --   --  20   GLUCOSE RANDOM mg/dL 131   < > 133    < > = values in this interval not displayed       Results from last 7 days   Lab Units 10/17/22  0829   INR  1 01                   Lines/Drains:  Invasive Devices  Report    Peripheral Intravenous Line  Duration           Peripheral IV 10/22/22 Right;Ventral (anterior) Forearm <1 day          Drain  Duration           Urethral Catheter 16 Fr  5 days              Urinary Catheter:  Goal for removal: N/A- Discharging with Pastrana               Imaging: Reviewed radiology reports from this admission including: abdominal/pelvic CT    Recent Cultures (last 7 days):   Results from last 7 days   Lab Units 10/19/22  1431   C DIFF TOXIN B BY PCR  Negative       Last 24 Hours Medication List:   Current Facility-Administered Medications   Medication Dose Route Frequency Provider Last Rate   • acetaminophen  650 mg Oral Q6H PRN Brittany Hernandez PA-C     • aluminum-magnesium hydroxide-simethicone  30 mL Oral Q6H PRN Brittany Hernandez PA-C     • dexamethasone  4 mg Oral Daily With Breakfast Brittany Hernandez PA-C     • levothyroxine  100 mcg Oral Early Morning Sherrie Mensah PA-C     • ondansetron  4 mg Intravenous Q6H PRN Brittany Hernandez PA-C     • pantoprazole  40 mg Oral Early Morning Sherrie Mensah PA-C     • polyethylene glycol  17 g Oral Daily PRN Brittany Hernandez PA-C          Today, Patient Was Seen By: Denny Paredes    **Please Note: This note may have been constructed using a voice recognition system  **

## 2022-10-22 NOTE — ASSESSMENT & PLAN NOTE
Malnutrition Findings:   Adult Malnutrition type: Chronic illness  Adult Degree of Malnutrition: Other severe protein calorie malnutrition  Malnutrition Characteristics: Weight loss, Muscle loss, Fat loss              360 Statement: related to inadequate energy intake due to decreased appetite ,as evidenced by, 21% weight loss last 11 mos (11/4/21-10/7/22), hollow supraclavicular space, depressed temples  Int: Regular diet, medical food supplements with meals  BMI Findings: Body mass index is 18 72 kg/m²       Nutrition consulted, appreciate recommendations  · Dietary nutrition supplements ordered per Nutrition

## 2022-10-23 LAB
HCT VFR BLD AUTO: 23.1 % (ref 36.5–49.3)
HGB BLD-MCNC: 7.5 G/DL (ref 12–17)

## 2022-10-23 PROCEDURE — 85018 HEMOGLOBIN: CPT | Performed by: INTERNAL MEDICINE

## 2022-10-23 PROCEDURE — 99232 SBSQ HOSP IP/OBS MODERATE 35: CPT | Performed by: NURSE PRACTITIONER

## 2022-10-23 PROCEDURE — 85014 HEMATOCRIT: CPT | Performed by: INTERNAL MEDICINE

## 2022-10-23 RX ADMIN — PANTOPRAZOLE SODIUM 40 MG: 40 TABLET, DELAYED RELEASE ORAL at 06:05

## 2022-10-23 RX ADMIN — LEVOTHYROXINE SODIUM 100 MCG: 100 TABLET ORAL at 06:05

## 2022-10-23 RX ADMIN — DEXAMETHASONE 4 MG: 4 TABLET ORAL at 09:19

## 2022-10-23 NOTE — PLAN OF CARE
Problem: MOBILITY - ADULT  Goal: Maintain or return to baseline ADL function  Description: INTERVENTIONS:  -  Assess patient's ability to carry out ADLs; assess patient's baseline for ADL function and identify physical deficits which impact ability to perform ADLs (bathing, care of mouth/teeth, toileting, grooming, dressing, etc )  - Assess/evaluate cause of self-care deficits   - Assess range of motion  - Assess patient's mobility; develop plan if impaired  - Assess patient's need for assistive devices and provide as appropriate  - Encourage maximum independence but intervene and supervise when necessary  - Involve family in performance of ADLs  - Assess for home care needs following discharge   - Consider OT consult to assist with ADL evaluation and planning for discharge  - Provide patient education as appropriate  10/23/2022 1231 by Henry Hernandez RN  Outcome: Progressing  10/23/2022 1231 by Henry Hernandez RN  Outcome: Progressing  Goal: Maintains/Returns to pre admission functional level  Description: INTERVENTIONS:  - Perform BMAT or MOVE assessment daily    - Set and communicate daily mobility goal to care team and patient/family/caregiver  - Collaborate with rehabilitation services on mobility goals if consulted  - Perform Range of Motion 3 times a day  - Reposition patient every 2 hours    - Dangle patient 3 times a day  - Out of bed to chair 2 times a day   - Record patient progress and toleration of activity level   10/23/2022 1231 by Henry Hernandez RN  Outcome: Progressing  10/23/2022 1231 by Henry Hernandez RN  Outcome: Progressing     Problem: METABOLIC, FLUID AND ELECTROLYTES - ADULT  Goal: Electrolytes maintained within normal limits  Description: INTERVENTIONS:  - Monitor labs and assess patient for signs and symptoms of electrolyte imbalances  - Administer electrolyte replacement as ordered  - Monitor response to electrolyte replacements, including repeat lab results as appropriate  - Instruct patient on fluid and nutrition as appropriate  10/23/2022 1231 by Farzana Abrams RN  Outcome: Progressing  10/23/2022 1231 by Farzana Abrams RN  Outcome: Progressing  Goal: Fluid balance maintained  Description: INTERVENTIONS:  - Monitor labs   - Monitor I/O and WT  - Instruct patient on fluid and nutrition as appropriate  - Assess for signs & symptoms of volume excess or deficit  10/23/2022 1231 by Farzana Abrams RN  Outcome: Progressing  10/23/2022 1231 by Farzana Abrams RN  Outcome: Progressing     Problem: SKIN/TISSUE INTEGRITY - ADULT  Goal: Incision(s), wounds(s) or drain site(s) healing without S/S of infection  Description: INTERVENTIONS  - Assess and document dressing, incision, wound bed, drain sites and surrounding tissue  - Provide patient and family education  - Perform skin care/dressing changes every shift  10/23/2022 1231 by Farzana Abrams RN  Outcome: Progressing  10/23/2022 1231 by Farzana Abrams RN  Outcome: Progressing     Problem: HEMATOLOGIC - ADULT  Goal: Maintains hematologic stability  Description: INTERVENTIONS  - Assess for signs and symptoms of bleeding or hemorrhage  - Monitor labs  - Administer supportive blood products/factors as ordered and appropriate  10/23/2022 1231 by Farzana Abrams RN  Outcome: Progressing  10/23/2022 1231 by Farzana Abrams RN  Outcome: Progressing     Problem: MUSCULOSKELETAL - ADULT  Goal: Maintain or return mobility to safest level of function  Description: INTERVENTIONS:  - Assess patient's ability to carry out ADLs; assess patient's baseline for ADL function and identify physical deficits which impact ability to perform ADLs (bathing, care of mouth/teeth, toileting, grooming, dressing, etc )  - Assess/evaluate cause of self-care deficits   - Assess range of motion  - Assess patient's mobility  - Assess patient's need for assistive devices and provide as appropriate  - Encourage maximum independence but intervene and supervise when necessary  - Involve family in performance of ADLs  - Assess for home care needs following discharge   - Consider OT consult to assist with ADL evaluation and planning for discharge  - Provide patient education as appropriate  10/23/2022 1231 by Omar Conner RN  Outcome: Progressing  10/23/2022 1231 by Omar Conner RN  Outcome: Progressing     Problem: Prexisting or High Potential for Compromised Skin Integrity  Goal: Skin integrity is maintained or improved  Description: INTERVENTIONS:  - Identify patients at risk for skin breakdown  - Assess and monitor skin integrity  - Assess and monitor nutrition and hydration status  - Monitor labs   - Assess for incontinence   - Turn and reposition patient  - Assist with mobility/ambulation  - Relieve pressure over bony prominences  - Avoid friction and shearing  - Provide appropriate hygiene as needed including keeping skin clean and dry  - Evaluate need for skin moisturizer/barrier cream  - Collaborate with interdisciplinary team   - Patient/family teaching  - Consider wound care consult   10/23/2022 1231 by Omar Conner RN  Outcome: Progressing  10/23/2022 1231 by Omar Conner RN  Outcome: Progressing     Problem: Potential for Falls  Goal: Patient will remain free of falls  Description: INTERVENTIONS:  - Educate patient/family on patient safety including physical limitations  - Instruct patient to call for assistance with activity   - Consult OT/PT to assist with strengthening/mobility   - Keep Call bell within reach  - Keep bed low and locked with side rails adjusted as appropriate  - Keep care items and personal belongings within reach  - Initiate and maintain comfort rounds  - Make Fall Risk Sign visible to staff  - Initiate/Maintain bed/chair alarm  - Apply yellow socks and bracelet for high fall risk patients  - Consider moving patient to room near nurses station  10/23/2022 1231 by Omar Conner RN  Outcome: Progressing  10/23/2022 1231 by Omar Conner RN  Outcome: Progressing Problem: Nutrition/Hydration-ADULT  Goal: Nutrient/Hydration intake appropriate for improving, restoring or maintaining nutritional needs  Description: Monitor and assess patient's nutrition/hydration status for malnutrition  Collaborate with interdisciplinary team and initiate plan and interventions as ordered  Monitor patient's weight and dietary intake as ordered or per policy  Utilize nutrition screening tool and intervene as necessary  Determine patient's food preferences and provide high-protein, high-caloric foods as appropriate       INTERVENTIONS:  - Monitor oral intake, urinary output, labs, and treatment plans  - Assess nutrition and hydration status and recommend course of action  - Evaluate amount of meals eaten  - Assist patient with eating if necessary   - Allow adequate time for meals  - Recommend/ encourage appropriate diets, oral nutritional supplements, and vitamin/mineral supplements  - Order, calculate, and assess calorie counts as needed  - Recommend, monitor, and adjust tube feedings based on assessed needs  - Assess need for intravenous fluids  - Provide nutrition/hydration education as appropriate  - Include patient/family/caregiver in decisions related to nutrition  10/23/2022 1231 by Dago Pickard RN  Outcome: Progressing  10/23/2022 1231 by Dago Pickard RN  Outcome: Progressing     Problem: GASTROINTESTINAL - ADULT  Goal: Maintains or returns to baseline bowel function  Description: INTERVENTIONS:  - Assess bowel function  - Encourage oral fluids to ensure adequate hydration  - Administer IV fluids if ordered to ensure adequate hydration  - Administer ordered medications as needed  - Encourage mobilization and activity  - Consider nutritional services referral to assist patient with adequate nutrition and appropriate food choices  10/23/2022 1231 by Dago Pickard RN  Outcome: Progressing  10/23/2022 1231 by Dago Pickard RN  Outcome: Progressing  Goal: Oral mucous membranes remain intact  Description: INTERVENTIONS  - Assess oral mucosa and hygiene practices  - Implement preventative oral hygiene regimen  - Implement oral medicated treatments as ordered  - Initiate Nutrition services referral as needed  10/23/2022 1231 by Ignacio Quick RN  Outcome: Progressing  10/23/2022 1231 by Ignacio Quick RN  Outcome: Progressing     Problem: GENITOURINARY - ADULT  Goal: Urinary catheter remains patent  Description: INTERVENTIONS:  - Assess patency of urinary catheter  - If patient has a chronic lema, consider changing catheter if non-functioning  - Follow guidelines for intermittent irrigation of non-functioning urinary catheter  10/23/2022 1231 by Ignacio Quick RN  Outcome: Progressing  10/23/2022 1231 by Ignacio Quick RN  Outcome: Progressing

## 2022-10-23 NOTE — ASSESSMENT & PLAN NOTE
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

## 2022-10-23 NOTE — PROGRESS NOTES
[facility]  Progress Note Bard Nelson 5/24/1927, 80 y o  male MRN: [id number]  Unit/Bed#: -[facility] Encounter: [id number]  Primary Care Provider: Lesia Garner MD   Date and time admitted to hospital: 10/17/2022  8:16 AM    * VINICIUS (acute kidney injury) [facility])  Assessment & Plan  · Creatinine 1 39 on admission, possibly related to dehydration vs medication side effect  · Hold home triamterene-hydrochlorothiazide  · Normalized with IV fluids which has since been discontinued  · Cr now normalized  · Monitor BMP  · Avoid hypotension, nephrotoxins    Anemia  Assessment & Plan  · Significant drop in hemoglobin from 11 0 to 7 7 from 10/17-10/18   · Consider hemodilution given IV fluid resuscitation, however hemoccult (+) x3  · Lovenox discontinued  · Imaging on admission without evidence for acute bleed  · Received 3 doses IV albumin 10/18 for hypotension  · Iron panel 10/18 showing iron deficiency with normal ferritin and low TIBC - 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  · Transfuse for <7   · Hemoglobin improved today slightly 7 5   · Received IV iron x 3 days   · GI consulted, appreciate recommendations  · Colonoscopy completed 10/20  · Severe diverticulosis likely the source of blood loss  · 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 discussed with Dr Michael Boyd  · Cardiology consulted for surgical clearance if patient decides to proceed  · Will check CBC in am     Bladder distension  Assessment & Plan  · Pastrana catheter placed for decompression on arrival  · Plan to continue for at least 1 week with plan for outpatient voiding trial  · Will need outpatient urology evaluation    Severe protein-calorie malnutrition ([facility] [address] )  Assessment & Plan  Malnutrition Findings:   Adult Malnutrition type: Chronic illness  Adult Degree of Malnutrition: Other severe protein calorie malnutrition  Malnutrition Characteristics: Weight loss, Muscle loss, Fat loss              360 Statement: related to inadequate energy intake due to decreased appetite ,as evidenced by, 21% weight loss last 11 mos (21-10/7/22), hollow supraclavicular space, depressed temples  Int: Regular diet, medical food supplements with meals  BMI Findings: Body mass index is 18 72 kg/m²  Nutrition consulted, appreciate recommendations  · Dietary nutrition supplements ordered per Nutrition    Ambulatory dysfunction  Assessment & Plan  · Patient with dizziness, loss of balance and several falls over the last few days  · Fall precautions  · PT/OT consulted, recommending post-acute rehab at this time    Acquired hypothyroidism  Assessment & Plan  · Continue home levothyroxine  · TSH elevated 6 187, free T4 normal 0 82  · Follow TFTs outpatient           VTE Pharmacologic Prophylaxis: VTE Score: 7 High Risk (Score >/= 5) - Pharmacological DVT Prophylaxis Contraindicated  Sequential Compression Devices Ordered  Patient Centered Rounds: I performed bedside rounds with nursing staff today  Discussions with Specialists or Other Care Team Provider: case management     Education and Discussions with Family / Patient: Updated  (ping) via phone  Fredy Pool update    Time Spent for Care: 30 minutes  More than 50% of total time spent on counseling and coordination of care as described above  Current Length of Stay: 5 day(s)  Current Patient Status: Inpatient   Certification Statement: The patient will continue to require additional inpatient hospital stay due to anemia   Discharge Plan: Anticipate discharge tomorrow to rehab facility  Code Status: Level 1 - Full Code    Subjective:   Patient has no complaints at this time  Patient agrees with plan as mentioned above    Update call to niece    Objective:     Vitals:   Temp (24hrs), Av 9 °F (36 6 °C), Min:97 7 °F (36 5 °C), Max:98 °F (36 7 °C)    Temp:  [97 7 °F (36 5 °C)-98 °F (36 7 °C)] 97 7 °F (36 5 °C)  HR:  [75] 75  BP: (119-129)/(64-67) 119/64  SpO2:  [98 %] 98 %  Body mass index is 18 72 kg/m²  Input and Output Summary (last 24 hours): Intake/Output Summary (Last 24 hours) at 10/23/2022 1253  Last data filed at 10/23/2022 1008  Gross per 24 hour   Intake 480 ml   Output 700 ml   Net -220 ml       Physical Exam:   Physical Exam  Vitals and nursing note reviewed  Constitutional:       Appearance: He is well-developed  HENT:      Head: Normocephalic and atraumatic  Eyes:      Conjunctiva/sclera: Conjunctivae normal    Cardiovascular:      Rate and Rhythm: Normal rate and regular rhythm  Heart sounds: No murmur heard  Pulmonary:      Effort: Pulmonary effort is normal  No respiratory distress  Breath sounds: Normal breath sounds  Abdominal:      Palpations: Abdomen is soft  Tenderness: There is no abdominal tenderness  Musculoskeletal:      Cervical back: Neck supple  Skin:     General: Skin is warm and dry  Neurological:      Mental Status: He is alert  Mental status is at baseline  Psychiatric:         Mood and Affect: Mood normal          Behavior: Behavior normal           Additional Data:     Labs:  Results from last 7 days   Lab Units 10/23/22  0955 10/20/22  2055 10/20/22  0841 10/19/22  1229 10/19/22  0559   WBC Thousand/uL  --   --  8 43  --  8 46   HEMOGLOBIN g/dL 7 5*   < > 7 1*   < > 7 0*   HEMATOCRIT % 23 1*   < > 21 8*   < > 22 0*   PLATELETS Thousands/uL  --   --  97*  --  92*   NEUTROS PCT %  --   --   --   --  79*   LYMPHS PCT %  --   --   --   --  10*   MONOS PCT %  --   --   --   --  10   EOS PCT %  --   --   --   --  0    < > = values in this interval not displayed       Results from last 7 days   Lab Units 10/22/22  0513 10/17/22  1916 10/17/22  0829   SODIUM mmol/L 141   < > 130*   POTASSIUM mmol/L 4 1   < > 3 8   CHLORIDE mmol/L 109*   < > 94*   CO2 mmol/L 22   < > 27   BUN mg/dL 17   < > 41*   CREATININE mg/dL 0 71   < > 1 39*   ANION GAP mmol/L 10   < > 9   CALCIUM mg/dL 7 9*   < > 8 8   ALBUMIN g/dL  --   --  3 4*   TOTAL BILIRUBIN mg/dL  --   --  1 24*   ALK PHOS U/L  --   --  72   ALT U/L  --   --  18   AST U/L  --   --  20   GLUCOSE RANDOM mg/dL 131   < > 133    < > = values in this interval not displayed  Results from last 7 days   Lab Units 10/17/22  0829   INR  1 01                   Lines/Drains:  Invasive Devices  Report    Peripheral Intravenous Line  Duration           Peripheral IV 10/22/22 Right;Ventral (anterior) Forearm 1 day          Drain  Duration           Urethral Catheter 16 Fr  6 days              Urinary Catheter:  Goal for removal: N/A- Discharging with Pastrana               Imaging: No pertinent imaging reviewed  Recent Cultures (last 7 days):   Results from last 7 days   Lab Units 10/19/22  1431   C DIFF TOXIN B BY PCR  Negative       Last 24 Hours Medication List:   Current Facility-Administered Medications   Medication Dose Route Frequency Provider Last Rate   • acetaminophen  650 mg Oral Q6H PRN Theodore Restrepo PA-C     • aluminum-magnesium hydroxide-simethicone  30 mL Oral Q6H PRN Theodore Restrepo PA-C     • dexamethasone  4 mg Oral Daily With Breakfast Theodore Restrepo PA-C     • levothyroxine  100 mcg Oral Early Morning Sherrie Mensah PA-C     • ondansetron  4 mg Intravenous Q6H PRN Theodore Restrepo PA-C     • pantoprazole  40 mg Oral Early Morning Sherrie Mensah PA-C     • polyethylene glycol  17 g Oral Daily PRN Theodore Restrepo PA-C          Today, Patient Was Seen By: Demetrice Mistry    **Please Note: This note may have been constructed using a voice recognition system  **

## 2022-10-23 NOTE — PLAN OF CARE
Problem: MOBILITY - ADULT  Goal: Maintain or return to baseline ADL function  Description: INTERVENTIONS:  -  Assess patient's ability to carry out ADLs; assess patient's baseline for ADL function and identify physical deficits which impact ability to perform ADLs (bathing, care of mouth/teeth, toileting, grooming, dressing, etc )  - Assess/evaluate cause of self-care deficits   - Assess range of motion  - Assess patient's mobility; develop plan if impaired  - Assess patient's need for assistive devices and provide as appropriate  - Encourage maximum independence but intervene and supervise when necessary  - Involve family in performance of ADLs  - Assess for home care needs following discharge   - Consider OT consult to assist with ADL evaluation and planning for discharge  - Provide patient education as appropriate  Outcome: Progressing  Goal: Maintains/Returns to pre admission functional level  Description: INTERVENTIONS:  - Perform BMAT or MOVE assessment daily    - Set and communicate daily mobility goal to care team and patient/family/caregiver  - Collaborate with rehabilitation services on mobility goals if consulted  - Perform Range of Motion 3 times a day  - Reposition patient every 2 hours    - Dangle patient 3 times a day  - Out of bed to chair 2 times a day   - Record patient progress and toleration of activity level   Outcome: Progressing     Problem: METABOLIC, FLUID AND ELECTROLYTES - ADULT  Goal: Electrolytes maintained within normal limits  Description: INTERVENTIONS:  - Monitor labs and assess patient for signs and symptoms of electrolyte imbalances  - Administer electrolyte replacement as ordered  - Monitor response to electrolyte replacements, including repeat lab results as appropriate  - Instruct patient on fluid and nutrition as appropriate  Outcome: Progressing  Goal: Fluid balance maintained  Description: INTERVENTIONS:  - Monitor labs   - Monitor I/O and WT  - Instruct patient on fluid and nutrition as appropriate  - Assess for signs & symptoms of volume excess or deficit  Outcome: Progressing     Problem: SKIN/TISSUE INTEGRITY - ADULT  Goal: Incision(s), wounds(s) or drain site(s) healing without S/S of infection  Description: INTERVENTIONS  - Assess and document dressing, incision, wound bed, drain sites and surrounding tissue  - Provide patient and family education  - Perform skin care/dressing changes every shift  Outcome: Progressing     Problem: HEMATOLOGIC - ADULT  Goal: Maintains hematologic stability  Description: INTERVENTIONS  - Assess for signs and symptoms of bleeding or hemorrhage  - Monitor labs  - Administer supportive blood products/factors as ordered and appropriate  Outcome: Progressing     Problem: MUSCULOSKELETAL - ADULT  Goal: Maintain or return mobility to safest level of function  Description: INTERVENTIONS:  - Assess patient's ability to carry out ADLs; assess patient's baseline for ADL function and identify physical deficits which impact ability to perform ADLs (bathing, care of mouth/teeth, toileting, grooming, dressing, etc )  - Assess/evaluate cause of self-care deficits   - Assess range of motion  - Assess patient's mobility  - Assess patient's need for assistive devices and provide as appropriate  - Encourage maximum independence but intervene and supervise when necessary  - Involve family in performance of ADLs  - Assess for home care needs following discharge   - Consider OT consult to assist with ADL evaluation and planning for discharge  - Provide patient education as appropriate  Outcome: Progressing     Problem: Prexisting or High Potential for Compromised Skin Integrity  Goal: Skin integrity is maintained or improved  Description: INTERVENTIONS:  - Identify patients at risk for skin breakdown  - Assess and monitor skin integrity  - Assess and monitor nutrition and hydration status  - Monitor labs   - Assess for incontinence   - Turn and reposition patient  - Assist with mobility/ambulation  - Relieve pressure over bony prominences  - Avoid friction and shearing  - Provide appropriate hygiene as needed including keeping skin clean and dry  - Evaluate need for skin moisturizer/barrier cream  - Collaborate with interdisciplinary team   - Patient/family teaching  - Consider wound care consult   Outcome: Progressing     Problem: Potential for Falls  Goal: Patient will remain free of falls  Description: INTERVENTIONS:  - Educate patient/family on patient safety including physical limitations  - Instruct patient to call for assistance with activity   - Consult OT/PT to assist with strengthening/mobility   - Keep Call bell within reach  - Keep bed low and locked with side rails adjusted as appropriate  - Keep care items and personal belongings within reach  - Initiate and maintain comfort rounds  - Make Fall Risk Sign visible to staff  - Initiate/Maintain bed/chair alarm  - Apply yellow socks and bracelet for high fall risk patients  - Consider moving patient to room near nurses station  Outcome: Progressing     Problem: Nutrition/Hydration-ADULT  Goal: Nutrient/Hydration intake appropriate for improving, restoring or maintaining nutritional needs  Description: Monitor and assess patient's nutrition/hydration status for malnutrition  Collaborate with interdisciplinary team and initiate plan and interventions as ordered  Monitor patient's weight and dietary intake as ordered or per policy  Utilize nutrition screening tool and intervene as necessary  Determine patient's food preferences and provide high-protein, high-caloric foods as appropriate       INTERVENTIONS:  - Monitor oral intake, urinary output, labs, and treatment plans  - Assess nutrition and hydration status and recommend course of action  - Evaluate amount of meals eaten  - Assist patient with eating if necessary   - Allow adequate time for meals  - Recommend/ encourage appropriate diets, oral nutritional supplements, and vitamin/mineral supplements  - Order, calculate, and assess calorie counts as needed  - Recommend, monitor, and adjust tube feedings based on assessed needs  - Assess need for intravenous fluids  - Provide nutrition/hydration education as appropriate  - Include patient/family/caregiver in decisions related to nutrition  Outcome: Progressing     Problem: GASTROINTESTINAL - ADULT  Goal: Maintains or returns to baseline bowel function  Description: INTERVENTIONS:  - Assess bowel function  - Encourage oral fluids to ensure adequate hydration  - Administer IV fluids if ordered to ensure adequate hydration  - Administer ordered medications as needed  - Encourage mobilization and activity  - Consider nutritional services referral to assist patient with adequate nutrition and appropriate food choices  Outcome: Progressing  Goal: Oral mucous membranes remain intact  Description: INTERVENTIONS  - Assess oral mucosa and hygiene practices  - Implement preventative oral hygiene regimen  - Implement oral medicated treatments as ordered  - Initiate Nutrition services referral as needed  Outcome: Progressing     Problem: GENITOURINARY - ADULT  Goal: Urinary catheter remains patent  Description: INTERVENTIONS:  - Assess patency of urinary catheter  - If patient has a chronic lema, consider changing catheter if non-functioning  - Follow guidelines for intermittent irrigation of non-functioning urinary catheter  Outcome: Progressing

## 2022-10-23 NOTE — ASSESSMENT & PLAN NOTE
· Creatinine 1 39 on admission, possibly related to dehydration vs medication side effect  · Hold home triamterene-hydrochlorothiazide  · Normalized with IV fluids which has since been discontinued  · Cr now normalized  · Monitor BMP  · Avoid hypotension, nephrotoxins

## 2022-10-24 LAB
ANION GAP SERPL CALCULATED.3IONS-SCNC: 8 MMOL/L (ref 4–13)
BUN SERPL-MCNC: 22 MG/DL (ref 5–25)
CALCIUM SERPL-MCNC: 8 MG/DL (ref 8.3–10.1)
CHLORIDE SERPL-SCNC: 106 MMOL/L (ref 96–108)
CO2 SERPL-SCNC: 26 MMOL/L (ref 21–32)
CREAT SERPL-MCNC: 0.74 MG/DL (ref 0.6–1.3)
ERYTHROCYTE [DISTWIDTH] IN BLOOD BY AUTOMATED COUNT: 14.4 % (ref 11.6–15.1)
GFR SERPL CREATININE-BSD FRML MDRD: 78 ML/MIN/1.73SQ M
GLUCOSE SERPL-MCNC: 110 MG/DL (ref 65–140)
HCT VFR BLD AUTO: 23.4 % (ref 36.5–49.3)
HGB BLD-MCNC: 7.5 G/DL (ref 12–17)
MCH RBC QN AUTO: 31.8 PG (ref 26.8–34.3)
MCHC RBC AUTO-ENTMCNC: 32.1 G/DL (ref 31.4–37.4)
MCV RBC AUTO: 99 FL (ref 82–98)
PLATELET # BLD AUTO: 86 THOUSANDS/UL (ref 149–390)
PMV BLD AUTO: 10.5 FL (ref 8.9–12.7)
POTASSIUM SERPL-SCNC: 3.9 MMOL/L (ref 3.5–5.3)
RBC # BLD AUTO: 2.36 MILLION/UL (ref 3.88–5.62)
SODIUM SERPL-SCNC: 140 MMOL/L (ref 135–147)
WBC # BLD AUTO: 15.73 THOUSAND/UL (ref 4.31–10.16)

## 2022-10-24 PROCEDURE — 85027 COMPLETE CBC AUTOMATED: CPT | Performed by: NURSE PRACTITIONER

## 2022-10-24 PROCEDURE — 97535 SELF CARE MNGMENT TRAINING: CPT

## 2022-10-24 PROCEDURE — 80048 BASIC METABOLIC PNL TOTAL CA: CPT | Performed by: NURSE PRACTITIONER

## 2022-10-24 PROCEDURE — 97116 GAIT TRAINING THERAPY: CPT

## 2022-10-24 PROCEDURE — 97110 THERAPEUTIC EXERCISES: CPT

## 2022-10-24 PROCEDURE — 99232 SBSQ HOSP IP/OBS MODERATE 35: CPT | Performed by: PHYSICIAN ASSISTANT

## 2022-10-24 RX ADMIN — PANTOPRAZOLE SODIUM 40 MG: 40 TABLET, DELAYED RELEASE ORAL at 05:52

## 2022-10-24 RX ADMIN — DEXAMETHASONE 4 MG: 4 TABLET ORAL at 08:34

## 2022-10-24 RX ADMIN — LEVOTHYROXINE SODIUM 100 MCG: 100 TABLET ORAL at 05:53

## 2022-10-24 NOTE — PLAN OF CARE
Problem: PHYSICAL THERAPY ADULT  Goal: Performs mobility at highest level of function for planned discharge setting  See evaluation for individualized goals  Description: Treatment/Interventions: Functional transfer training, LE strengthening/ROM, Therapeutic exercise, Endurance training, Cognitive reorientation, Patient/family training, Bed mobility, Gait training, Spoke to nursing, OT          See flowsheet documentation for full assessment, interventions and recommendations  Outcome: Progressing  Note: Prognosis: Good  Problem List: Decreased strength, Decreased endurance, Impaired balance, Decreased mobility, Decreased cognition  Assessment: Chart reviewed  Patient was received seated in recliner in NAD and agreeable to PT session  Today's PT treatment session consisted of therapeutic activity for facilitation of transitional movements and safe performance of correct technique for sit to stand transfers, therapeutic exercise to increase lower extremity muscle strength, and gait training to promote safe and functional ambulation on level surfaces  In comparison to the previous session the patient has made progress as evident by ability to ambulate an increased distance with use of RW  When ambulating pt exhibits retropulsion and requires verbal cues for anterior weight shift  Pt also exhibits decreased warner, decreased stride length, and decreased heel strike when ambulating  Pt tolerated all therapeutic exercise well without complaints  Pt was able to tolerate static/dynamic standing for approximately 3 minutes while performing self care; pt exhibited poor+/poor standing balance  Overall, patient tolerated today's session well and continues to be making progress towards achieving his STG's  Patient's prognosis for achieving their STG's is good as evident by pt's motivation  Co-treatment was performed with OT secondary to complex medical condition of pt   Pt requires assist of two to achieve and maintain transitional movements; requiring the need of skilled therapeutic intervention of two therapists to achieve the delivery of services  PT/OT goals were addressed separately  PT intervention continues to be appropriate as the patient continues to be limited by decreased lower extremity strength, impaired balance, decreased endurance, gait deviations, and decreased functional mobility  Continue to recommend STR  PT to continue to see patient in order to address the deficits listed above and provide interventions consistent with the POC in order to achieve STG's and optimize the patient's independence with functional mobility  Barriers to Discharge: Decreased caregiver support, Other (Comment) (decline in functional mobility)     PT Discharge Recommendation: Post acute rehabilitation services    See flowsheet documentation for full assessment

## 2022-10-24 NOTE — ASSESSMENT & PLAN NOTE
· Creatinine 1 39 on admission, possibly related to dehydration vs medication side effect vs GI bleed  · Discontinue triamterene-hydrochlorothiazide  · Normalized with IV fluids which have since been discontinued  · Monitor BMP  · Avoid hypotension, nephrotoxins

## 2022-10-24 NOTE — ASSESSMENT & PLAN NOTE
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

## 2022-10-24 NOTE — DISCHARGE SUMMARY
[address] Discharge- Jessy Moreno 5/24/1927, 80 y o  male MRN: [id number]  Unit/Bed#: -Araceli Encounter: [id number]  Primary Care Provider: Lalitha Peacock MD   Date and time admitted to hospital: 10/17/2022  8:16 AM    * VINICIUS (acute kidney injury) ([facility] [address] )  Assessment & Plan  · Creatinine 1 39 on admission, possibly related to dehydration vs medication side effect vs GI bleed  · Discontinue triamterene-hydrochlorothiazide  · Normalized with IV fluids which have since been discontinued  · Monitor BMP  · Avoid hypotension, nephrotoxins    Ambulatory dysfunction  Assessment & Plan  · Patient with dizziness, loss of balance and several falls over the last few days  · Fall precautions  · PT/OT consulted, recommending post-acute rehab at this time  · Resides at [facility]  · Plan for PVM for rehab prior to return to [facility]    Acquired hypothyroidism  Assessment & Plan  · Continue home levothyroxine  · TSH elevated 6 187, free T4 normal 0 82  · Follow TFTs outpatient     Bladder distension  Assessment & Plan  · Pastrana catheter placed for decompression on arrival  · Plan to continue for at least 1 week with plan for outpatient voiding trial at nursing facility   · Will need outpatient urology evaluation    Severe protein-calorie malnutrition [facility])  Assessment & Plan  Malnutrition Findings:   Adult Malnutrition type: Chronic illness  Adult Degree of Malnutrition: Other severe protein calorie malnutrition  Malnutrition Characteristics: Weight loss, Muscle loss, Fat loss     360 Statement: related to inadequate energy intake due to decreased appetite ,as evidenced by, 21% weight loss last 11 mos (11/4/21-10/7/22), hollow supraclavicular space, depressed temples  Int: Regular diet, medical food supplements with meals  BMI Findings: Body mass index is 18 72 kg/m²       Nutrition consulted, appreciate recommendations  · Dietary nutrition supplements ordered per Nutrition    Anemia  Assessment & Plan  · Significant drop in hemoglobin from 11 0 to 7 7 from 10/17-10/18   · Consider hemodilution given IV fluid resuscitation, however hemoccult (+) x3  · Lovenox discontinued  · Imaging on admission without evidence for acute bleed  · Iron panel 10/18 showing iron deficiency with normal ferritin and low TIBC - 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  · Transfuse for <7   · Hemoglobin stable today 8 4   · Received IV iron x 3 days   · GI consulted, appreciate recommendations  · Colonoscopy completed 10/20  · Severe diverticulosis likely the source of blood loss  · 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  · Case discussed with Dr Halima Schulz   · Cardiology consulted for surgical clearance if patient decides to proceed      Medical Problems             Resolved Problems  Date Reviewed: 10/24/2022          Resolved    Hyponatremia 10/19/2022     Resolved by  Cesar Mitchell PA-C              Discharging Physician / Practitioner: Cesar Mitchell  PCP: Deb Nichols MD  Admission Date:   Admission Orders (From admission, onward)     Ordered        10/18/22 1244  Inpatient Admission  Once            10/17/22 1221  Place in Observation  Once                      Discharge Date: 10/27/22    Consultations During Hospital Stay:  IP CONSULT TO CASE MANAGEMENT  IP CONSULT TO NUTRITION SERVICES  IP CONSULT TO GASTROENTEROLOGY  · IP CONSULT TO CARDIOLOGY     Procedures Performed:   · Colonoscopy 10/20/22:   1  Hepatic flexure mass, 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, non-bleeding  2  Diverticulosis coli, severe, sigmoid colon; also in the transverse colon--likely source of bleeding  3  Venous blebs in the rectum, non-bleeding    Significant Findings / Test Results:   XR hip/pelv 2-3 vws left if performed   Final Result by Scarlette Brunner, MD (10/17 9480)   Progressive degenerative changes      No acute osseous abnormality        XR femur 2 views LEFT   Final Result by Scarlette Brunner, MD (10/17 8001)      No acute osseous abnormality  XR knee 1 or 2 vw left   Final Result by Lois Beltran MD (42/36 2340)   Intact total knee arthroplasty   Dystrophic calcifications   No acute osseous abnormality  Findings are stable      CT spine cervical without contrast   Final Result by Valinda Cogan, MD (10/17 0911)      1  No acute cervical spine fracture or traumatic malalignment  2   Heterogeneous marrow attenuation and lucencies of the vertebrae, likely related to reported history of multiple myeloma  CT chest abdomen pelvis w contrast   Final Result by Valinda Cogan, MD (10/17 3889)      1  No CT evidence of acute traumatic injury within chest, abdomen, and pelvis  2   Left upper lobe 1 2 x 1 cm spiculated nodule  Malignancy should be excluded  No prior imaging is available for comparison  Either 3 month follow-up noncontrast CT, PET/CT or tissue sampling may be considered appropriate  Considerations related to    the patient's age and/or comorbidities may be used to choose among or alter these recommendations  3   A few additional subcentimeter left lower lobe nodules as described  4   Indeterminate 2 7 cm high attenuating cortical lesion in the upper pole of left kidney  5   Distended urinary bladder with a ureterocele at the base  Correlate for urinary retention  CT head w wo contrast   Final Result by Valinda Cogan, MD (10/17 5441)      1  No acute intracranial CT abnormality  2   Planum sphenoidale 2 8 cm meningioma, grossly stable compared to MRI 5/28/2019 allowing for modality differences  Associated moderate subjacent inferior bifrontal parenchymal edema without significant mass effect  CEA 1 4  Pathology from biopsy: adenocarcinoma     Incidental Findings:   · As above      Test Results Pending at Discharge (will require follow up):    · None      Outpatient Tests Requested:  · Surgical oncology if desired treatment   · CBC 5-7 days, sooner if hypotensive or bleeding re-occurs     Complications:  GI bleed, hypotension     Reason for Admission: weakness, falls, VINICIUS     Hospital Course:   Socorro Khan is a 80 y o  male patient who originally presented to the hospital on 10/17/2022 due to several falls  Patient was found to be severely orthostatic with hyponatremia  His hydrochlorothiazide was discontinued and patient was fluid resuscitated  Additionally, patient was found to have acute blood loss anemia and underwent GI screening for colonoscopy  Unfortunately this did show a mass which based on pathology confirms adenocarcinoma  Patient's cousin, Jaron Ty, is his power of  and will discuss with the patient as well as the family regarding potential additional workup, surgery, and/or chemotherapy/radiation  Patient will need a PET scan or a whole-body CT for additional staging  Given the patient's intolerance to treatment for multiple myeloma, they may opt for conservative treatment  Blood counts are stable, no further episodes of melena or hematochezia, no further episodes of orthostasis and hyponatremia has resolved  Discontinue HCTZ on discharge  Please see above list of diagnoses and related plan for additional information  Condition at Discharge: good    Discharge Day Visit / Exam:   Subjective:  Patient seen sleeping but easily awoken today  He is feeling well  Tells me that he is ready to go to rehab, he is tired of feeling weak  Denies any fevers or chills  No abdominal pain  Per nursing staff, no acute issues overnight  Discharge delayed due to transportation issues    Vitals: Blood Pressure: 145/57 (10/27/22 0751)  Pulse: 56 (10/27/22 0751)  Temperature: 97 8 °F (36 6 °C) (10/26/22 1538)  Temp Source: Oral (10/26/22 1538)  Respirations: 16 (10/26/22 1538)  Height: 5' 6" (167 6 cm) (10/21/22 1454)  Weight - Scale: 52 6 kg (116 lb) (10/21/22 1454)  SpO2: 97 % (10/27/22 0751)  Exam:   Physical Exam  Vitals and nursing note reviewed  Constitutional:       General: He is not in acute distress  Appearance: He is not toxic-appearing  Cardiovascular:      Rate and Rhythm: Normal rate and regular rhythm  Pulmonary:      Effort: Pulmonary effort is normal  No respiratory distress  Breath sounds: Normal breath sounds  Abdominal:      General: Bowel sounds are normal       Palpations: Abdomen is soft  Musculoskeletal:      Right lower leg: No edema  Left lower leg: No edema  Skin:     General: Skin is warm and dry  Coloration: Skin is not jaundiced or pale  Neurological:      Mental Status: He is alert  Mental status is at baseline  Psychiatric:         Mood and Affect: Mood normal          Behavior: Behavior normal            Discussion with Family: Updated  ([email or web address] ([address] [facility]) ) via phone  Discharge instructions/Information to patient and family:   See after visit summary for information provided to patient and family  Provisions for Follow-Up Care:  See after visit summary for information related to follow-up care and any pertinent home health orders  Disposition:   Nichelle Cardoso at [address] [place] [address] Readmission: none     Discharge Statement:  I spent >50 minutes discharging the patient  This time was spent on the day of discharge  I had direct contact with the patient on the day of discharge  Greater than 50% of the total time was spent examining patient, answering all patient questions, arranging and discussing plan of care with patient as well as directly providing post-discharge instructions  Additional time then spent on discharge activities  Discharge Medications:  See after visit summary for reconciled discharge medications provided to patient and/or family        **Please Note: This note may have been constructed using a voice recognition system**

## 2022-10-24 NOTE — PHYSICAL THERAPY NOTE
Physical Therapy Treatment Note    Patient Name: Evin Ford    Diagnosis: Dizziness [R42]  Syncope [R55]  Frequent falls [R29 6]  Ambulatory dysfunction [R26 2]     10/24/22 1014   PT Last Visit   PT Visit Date 10/24/22   Note Type   Note Type Treatment   Pain Assessment   Pain Assessment Tool 0-10   Pain Score No Pain   Restrictions/Precautions   Weight Bearing Precautions Per Order No   Braces or Orthoses Other (Comment)  (none per patient)   Other Precautions Cognitive; Chair Alarm; Bed Alarm; Fall Risk   General   Chart Reviewed Yes   Response to Previous Treatment Patient with no complaints from previous session  Family/Caregiver Present No   Cognition   Overall Cognitive Status Impaired   Arousal/Participation Alert; Responsive; Cooperative   Attention Within functional limits   Orientation Level Oriented to person;Oriented to place; Disoriented to time;Disoriented to situation   Memory Decreased recall of recent events;Decreased short term memory   Following Commands Follows one step commands without difficulty   Comments Pt agreeable to PT  Subjective   Subjective "I think exercise would help me "   Bed Mobility   Additional Comments Pt was received seated in recliner in NAD  Transfers   Sit to Stand 4  Minimal assistance   Additional items Assist x 2; Increased time required;Verbal cues   Stand to Sit 4  Minimal assistance   Additional items Assist x 2;Armrests; Increased time required;Verbal cues   Ambulation/Elevation   Gait pattern Decreased toe off;Decreased heel strike;Decreased hip extension; Excessively slow; Step to;Short stride; Steppage;Retropulsion; Improper Weight shift   Gait Assistance 4  Minimal assist   Additional items Assist x 2;Verbal cues   Assistive Device Rolling walker   Distance 10 feet x 2 trials   Stair Management Assistance Not tested   Balance   Static Sitting Fair   Dynamic Sitting Fair -   Static Standing Poor +   Dynamic Standing Poor   Ambulatory Poor   Endurance Deficit Endurance Deficit Yes   Endurance Deficit Description decreased activity tolerance   Activity Tolerance   Activity Tolerance Patient tolerated treatment well   Medical Staff Made Aware OT Mariella Daniels   Exercises   [employer] Sitting;15 reps;AROM; Bilateral  (long sitting)   Heelslides Sitting;15 reps;AROM; Bilateral  (long sitting)   Hip Flexion Sitting;10 reps;AROM; Bilateral   Hip Abduction Sitting;15 reps;AROM; Bilateral  (long sitting)   Knee AROM Long Arc Quad Sitting;10 reps;AROM; Bilateral   Ankle Pumps Sitting;10 reps;AROM; Bilateral   Balance training  Pt was able to tolerate static/dynamic standing for approximately 3 minutes while performing self care   Assessment   Prognosis Good   Problem List Decreased strength;Decreased endurance; Impaired balance;Decreased mobility; Decreased cognition   Assessment Chart reviewed  Patient was received seated in recliner in [facility] and agreeable to PT session  Today's PT treatment session consisted of therapeutic activity for facilitation of transitional movements and safe performance of correct technique for sit to stand transfers, therapeutic exercise to increase lower extremity muscle strength, and gait training to promote safe and functional ambulation on level surfaces  In comparison to the previous session the patient has made progress as evident by ability to ambulate an increased distance with use of RW  When ambulating pt exhibits retropulsion and requires verbal cues for anterior weight shift  Pt also exhibits decreased warner, decreased stride length, and decreased heel strike when ambulating  Pt tolerated all therapeutic exercise well without complaints  Pt was able to tolerate static/dynamic standing for approximately 3 minutes while performing self care; pt exhibited poor+/poor standing balance  Overall, patient tolerated today's session well and continues to be making progress towards achieving his STG's   Patient's prognosis for achieving their STG's is good as evident by pt's motivation  Co-treatment was performed with OT secondary to complex medical condition of pt  Pt requires assist of two to achieve and maintain transitional movements; requiring the need of skilled therapeutic intervention of two therapists to achieve the delivery of services  PT/OT goals were addressed separately  PT intervention continues to be appropriate as the patient continues to be limited by decreased lower extremity strength, impaired balance, decreased endurance, gait deviations, and decreased functional mobility  Continue to recommend STR  PT to continue to see patient in order to address the deficits listed above and provide interventions consistent with the POC in order to achieve STG's and optimize the patient's independence with functional mobility  Barriers to Discharge Decreased caregiver support; Other (Comment)  (decline in functional mobility)   Goals   STG Expiration Date 10/28/22   Plan   Treatment/Interventions Functional transfer training;LE strengthening/ROM; Therapeutic exercise; Endurance training;Cognitive reorientation;Patient/family training;Bed mobility;Gait training;OT   Progress Progressing toward goals   PT Frequency 3-5x/wk   Recommendation   PT Discharge Recommendation Post acute rehabilitation services   AM-PAC Basic Mobility Inpatient   Turning in Bed Without Bedrails 3   Lying on Back to Sitting on Edge of Flat Bed 2   Moving Bed to Chair 2   Standing Up From Chair 2   Walk in Room 2   Climb 3-5 Stairs 1   Basic Mobility Inpatient Raw Score 12   Basic Mobility Standardized Score 32 23   Highest Level Of Mobility   JH-HLM Goal 4: Move to chair/commode   JH-HLM Achieved 6: Walk 10 steps or more   Education   Education Provided Mobility training;Home exercise program;Assistive device   Patient Reinforcement needed   End of Consult   Patient Position at End of Consult Bedside chair;Bed/Chair alarm activated; All needs within reach     Jaciel Grady, PT, DPT    Time of PT treatment session: [id number]  24 minutes

## 2022-10-24 NOTE — PLAN OF CARE
Problem: OCCUPATIONAL THERAPY ADULT  Goal: Performs self-care activities at highest level of function for planned discharge setting  See evaluation for individualized goals  Description: Treatment Interventions: ADL retraining, Functional transfer training, UE strengthening/ROM, Endurance training, Cognitive reorientation, Equipment evaluation/education, Compensatory technique education, Continued evaluation, Energy conservation          See flowsheet documentation for full assessment, interventions and recommendations  Note: Limitation: Decreased ADL status, Decreased UE ROM, Decreased UE strength, Decreased Safe judgement during ADL, Decreased cognition, Decreased endurance, Decreased high-level ADLs  Prognosis: Fair  Assessment: Patient participated in Skilled OT session this date with interventions consisting of ADL re training with the use of correct body mechnaics   Patient agreeable to OT treatment session, upon arrival patient was found seated OOB to Recliner  Patient requiring verbal cues for correct technique, verbal cues for pacing thru activity steps, cognitive assistance to anticipate next step and frequent rest periods  Patient requires min assist for UB ADLs, mod assist for LB ADLs  Deficits that impact self performance include: dynamic sitting and standing balance, limited standing tolerance times, decreased UE muscle strength, postural control and alignment deficit  Patient continues to be functioning below baseline level, occupational performance remains limited secondary to factors listed above and increased risk for falls and injury  From OT standpoint, recommendation at time of d/c would be Short Term Rehab  Patient to benefit from continued Occupational Therapy treatment while in the hospital to address deficits as defined above and maximize level of functional independence with ADLs and functional mobility       OT Discharge Recommendation: Post acute rehabilitation services

## 2022-10-24 NOTE — PLAN OF CARE
Problem: MOBILITY - ADULT  Goal: Maintain or return to baseline ADL function  Description: INTERVENTIONS:  -  Assess patient's ability to carry out ADLs; assess patient's baseline for ADL function and identify physical deficits which impact ability to perform ADLs (bathing, care of mouth/teeth, toileting, grooming, dressing, etc )  - Assess/evaluate cause of self-care deficits   - Assess range of motion  - Assess patient's mobility; develop plan if impaired  - Assess patient's need for assistive devices and provide as appropriate  - Encourage maximum independence but intervene and supervise when necessary  - Involve family in performance of ADLs  - Assess for home care needs following discharge   - Consider OT consult to assist with ADL evaluation and planning for discharge  - Provide patient education as appropriate  Outcome: Progressing  Goal: Maintains/Returns to pre admission functional level  Description: INTERVENTIONS:  - Perform BMAT or MOVE assessment daily    - Set and communicate daily mobility goal to care team and patient/family/caregiver  - Collaborate with rehabilitation services on mobility goals if consulted  - Perform Range of Motion 3 times a day  - Reposition patient every 2 hours    - Dangle patient 3 times a day  - Out of bed to chair 2 times a day   - Record patient progress and toleration of activity level   Outcome: Progressing     Problem: METABOLIC, FLUID AND ELECTROLYTES - ADULT  Goal: Electrolytes maintained within normal limits  Description: INTERVENTIONS:  - Monitor labs and assess patient for signs and symptoms of electrolyte imbalances  - Administer electrolyte replacement as ordered  - Monitor response to electrolyte replacements, including repeat lab results as appropriate  - Instruct patient on fluid and nutrition as appropriate  Outcome: Progressing  Goal: Fluid balance maintained  Description: INTERVENTIONS:  - Monitor labs   - Monitor I/O and WT  - Instruct patient on fluid and nutrition as appropriate  - Assess for signs & symptoms of volume excess or deficit  Outcome: Progressing     Problem: HEMATOLOGIC - ADULT  Goal: Maintains hematologic stability  Description: INTERVENTIONS  - Assess for signs and symptoms of bleeding or hemorrhage  - Monitor labs  - Administer supportive blood products/factors as ordered and appropriate  Outcome: Progressing     Problem: MUSCULOSKELETAL - ADULT  Goal: Maintain or return mobility to safest level of function  Description: INTERVENTIONS:  - Assess patient's ability to carry out ADLs; assess patient's baseline for ADL function and identify physical deficits which impact ability to perform ADLs (bathing, care of mouth/teeth, toileting, grooming, dressing, etc )  - Assess/evaluate cause of self-care deficits   - Assess range of motion  - Assess patient's mobility  - Assess patient's need for assistive devices and provide as appropriate  - Encourage maximum independence but intervene and supervise when necessary  - Involve family in performance of ADLs  - Assess for home care needs following discharge   - Consider OT consult to assist with ADL evaluation and planning for discharge  - Provide patient education as appropriate  Outcome: Progressing     Problem: GASTROINTESTINAL - ADULT  Goal: Maintains or returns to baseline bowel function  Description: INTERVENTIONS:  - Assess bowel function  - Encourage oral fluids to ensure adequate hydration  - Administer IV fluids if ordered to ensure adequate hydration  - Administer ordered medications as needed  - Encourage mobilization and activity  - Consider nutritional services referral to assist patient with adequate nutrition and appropriate food choices  Outcome: Progressing  Goal: Oral mucous membranes remain intact  Description: INTERVENTIONS  - Assess oral mucosa and hygiene practices  - Implement preventative oral hygiene regimen  - Implement oral medicated treatments as ordered  - Initiate Nutrition services referral as needed  Outcome: Progressing     Problem: GENITOURINARY - ADULT  Goal: Urinary catheter remains patent  Description: INTERVENTIONS:  - Assess patency of urinary catheter  - If patient has a chronic lema, consider changing catheter if non-functioning  - Follow guidelines for intermittent irrigation of non-functioning urinary catheter  Outcome: Progressing

## 2022-10-24 NOTE — PROGRESS NOTES
[address] [facility] Progress Note Kaitlin Newsome 5/24/1927, 80 y o  male MRN: [id number]  Unit/Bed#: -Araceli Encounter: [id number]  Primary Care Provider: Eliezer Dill MD   Date and time admitted to hospital: 10/17/2022  8:16 AM    * VINICIUS (acute kidney injury) ([facility] )  Assessment & Plan  · Creatinine 1 39 on admission, possibly related to dehydration vs medication side effect vs GI bleed  · Discontinue triamterene-hydrochlorothiazide  · Normalized with IV fluids which have since been discontinued  · Monitor BMP  · Avoid hypotension, nephrotoxins    Ambulatory dysfunction  Assessment & Plan  · Patient with dizziness, loss of balance and several falls over the last few days  · Fall precautions  · PT/OT consulted, recommending post-acute rehab at this time  · Resides at [facility]  · Plan for PVM for rehab prior to return to [facility]    Acquired hypothyroidism  Assessment & Plan  · Continue home levothyroxine  · TSH elevated 6 187, free T4 normal 0 82  · Follow TFTs outpatient     Bladder distension  Assessment & Plan  · Pastrana catheter placed for decompression on arrival  · Plan to continue for at least 1 week with plan for outpatient voiding trial  · Will need outpatient urology evaluation    Severe protein-calorie malnutrition ([place] [address] )  Assessment & Plan  Malnutrition Findings:   Adult Malnutrition type: Chronic illness  Adult Degree of Malnutrition: Other severe protein calorie malnutrition  Malnutrition Characteristics: Weight loss, Muscle loss, Fat loss              360 Statement: related to inadequate energy intake due to decreased appetite ,as evidenced by, 21% weight loss last 11 mos (11/4/21-10/7/22), hollow supraclavicular space, depressed temples  Int: Regular diet, medical food supplements with meals  BMI Findings: Body mass index is 18 72 kg/m²       Nutrition consulted, appreciate recommendations  · Dietary nutrition supplements ordered per Nutrition    Anemia  Assessment & Plan  · Significant drop in hemoglobin from 11 0 to 7 7 from 10/17-10/18   · Consider hemodilution given IV fluid resuscitation, however hemoccult (+) x3  · Lovenox discontinued  · Imaging on admission without evidence for acute bleed  · Iron panel 10/18 showing iron deficiency with normal ferritin and low TIBC - 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  · Transfuse for <7   · Hemoglobin stable today 7 5   · Received IV iron x 3 days   · GI consulted, appreciate recommendations  · Colonoscopy completed 10/20  · Severe diverticulosis likely the source of blood loss  · 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  · Case discussed with Dr Jamaica Jones   · Cardiology consulted for surgical clearance if patient decides to proceed          VTE Pharmacologic Prophylaxis: VTE Score: 7 High Risk (Score >/= 5) - Pharmacological DVT Prophylaxis Contraindicated  Sequential Compression Devices Ordered  Patient Centered Rounds: I performed bedside rounds with nursing staff today  Discussions with Specialists or Other Care Team Provider: RUBA     Education and Discussions with Family / Patient: Updated  (family) at bedside  Time Spent for Care: 30 minutes  More than 50% of total time spent on counseling and coordination of care as described above  Current Length of Stay: 6 day(s)  Current Patient Status: Inpatient   Certification Statement: The patient will continue to require additional inpatient hospital stay due to Pending rehab placement  Discharge Plan: Anticipate discharge in 24-48 hrs to rehab facility  Code Status: Level 1 - Full Code    Subjective:   Patient seen this morning, he has several family members at the bedside  Denies any chest pain, shortness of breath or palpitations  No abdominal pain  Has not had any more hematochezia  Denies any further episodes of dizziness or unsteady gait  Patient tells me that he wants to go back to [facility], but understands need for rehab      Objective:     Vitals:   Temp (24hrs), Av 5 °F (36 9 °C), Min:97 8 °F (36 6 °C), Max:99 5 °F (37 5 °C)    Temp:  [97 8 °F (36 6 °C)-99 5 °F (37 5 °C)] 97 8 °F (36 6 °C)  HR:  [72-91] 72  Resp:  [17-22] 22  BP: (120-130)/(52-59) 126/52  SpO2:  [98 %] 98 %  Body mass index is 18 72 kg/m²  Input and Output Summary (last 24 hours): Intake/Output Summary (Last 24 hours) at 10/24/2022 1331  Last data filed at 10/24/2022 1156  Gross per 24 hour   Intake 1416 ml   Output 875 ml   Net 541 ml       Physical Exam:   Physical Exam  Vitals reviewed  Constitutional:       General: He is not in acute distress  Appearance: He is ill-appearing (frail, but not acutely ill appearin)  He is not toxic-appearing  Cardiovascular:      Rate and Rhythm: Normal rate and regular rhythm  Pulmonary:      Effort: Pulmonary effort is normal  No respiratory distress  Breath sounds: Normal breath sounds  Abdominal:      General: Bowel sounds are normal  There is no distension  Palpations: Abdomen is soft  Musculoskeletal:      Right lower leg: No edema  Left lower leg: No edema  Skin:     Coloration: Skin is not pale  Neurological:      Mental Status: He is alert  Mental status is at baseline  Psychiatric:         Mood and Affect: Mood normal          Behavior: Behavior normal            Additional Data:     Labs:  Results from last 7 days   Lab Units 10/24/22  0433 10/19/22  1229 10/19/22  0559   WBC Thousand/uL 15 73*   < > 8 46   HEMOGLOBIN g/dL 7 5*   < > 7 0*   HEMATOCRIT % 23 4*   < > 22 0*   PLATELETS Thousands/uL 86*   < > 92*   NEUTROS PCT %  --   --  79*   LYMPHS PCT %  --   --  10*   MONOS PCT %  --   --  10   EOS PCT %  --   --  0    < > = values in this interval not displayed       Results from last 7 days   Lab Units 10/24/22  0433   SODIUM mmol/L 140   POTASSIUM mmol/L 3 9   CHLORIDE mmol/L 106   CO2 mmol/L 26   BUN mg/dL 22   CREATININE mg/dL 0 74   ANION GAP mmol/L 8   CALCIUM mg/dL 8 0*   GLUCOSE RANDOM mg/dL 110 Lines/Drains:  Invasive Devices  Report    Drain  Duration           Urethral Catheter 16 Fr  7 days              Urinary Catheter:  Goal for removal: N/A- Discharging with Pastrana         Imaging: No pertinent imaging reviewed  Recent Cultures (last 7 days):   Results from last 7 days   Lab Units 10/19/22  1431   C DIFF TOXIN B BY PCR  Negative       Last 24 Hours Medication List:   Current Facility-Administered Medications   Medication Dose Route Frequency Provider Last Rate   • acetaminophen  650 mg Oral Q6H PRN Cesar Mitchell PA-C     • aluminum-magnesium hydroxide-simethicone  30 mL Oral Q6H PRN Cesar Mitchell PA-C     • dexamethasone  4 mg Oral Daily With Breakfast Cesar Mitchell PA-C     • levothyroxine  100 mcg Oral Early Morning Sherrie Mensah PA-C     • ondansetron  4 mg Intravenous Q6H PRN Cesar Mitchell PA-C     • pantoprazole  40 mg Oral Early Morning Sherrie Mensah PA-C     • polyethylene glycol  17 g Oral Daily PRN Cesar Mitchell PA-C          Today, Patient Was Seen By: Cesar Mitchell    **Please Note: This note may have been constructed using a voice recognition system  **

## 2022-10-24 NOTE — ASSESSMENT & PLAN NOTE
· Patient with dizziness, loss of balance and several falls over the last few days     · Fall precautions  · PT/OT consulted, recommending post-acute rehab at this time  · Resides at [facility]  · Plan for PVM for rehab prior to return to [facility]

## 2022-10-24 NOTE — OCCUPATIONAL THERAPY NOTE
Occupational Therapy Treatment Note        Patient Name: Daylin Clark  BSLHM'E Date: 10/24/2022       10/24/22 1030   OT Last Visit   OT Visit Date 10/24/22   Note Type   Note Type Treatment for insurance authorization   Pain Assessment   Pain Assessment Tool 0-10   Pain Score No Pain   Restrictions/Precautions   Weight Bearing Precautions Per Order No   Braces or Orthoses Other (Comment)  (none at baseline)   ADL   Eating Assistance 5  Supervision/Setup   Eating Deficit Setup; Increased time to complete   Grooming Assistance 5  Supervision/Setup   Grooming Deficit Setup; Increased time to complete;Standing with assistive device   UB Bathing Assistance 4  Minimal Assistance   UB Bathing Deficit Setup; Increased time to complete   LB Bathing Assistance 3  Moderate Assistance   LB Bathing Deficit Setup;Supervision/safety; Increased time to complete;Right lower leg including foot; Left lower leg including foot   UB Dressing Assistance 4  Minimal Assistance   UB Dressing Deficit Setup;Steadying;Verbal cueing;Supervision/safety   LB Dressing Assistance 3  Moderate Assistance   LB Dressing Deficit Setup; Requires assistive device for steadying   Toileting Assistance  2  Maximal Assistance   Toileting Deficit Setup; Increased time to complete;Perineal hygiene;Grab bar use   Transfers   Sit to Stand 4  Minimal assistance   Additional items Assist x 2; Increased time required;Verbal cues   Stand to Sit 4  Minimal assistance   Additional items Assist x 2; Increased time required;Verbal cues   Toilet transfer 4  Minimal assistance   Additional items Assist x 2; Increased time required;Verbal cues;Standard toilet   Functional Mobility   Functional Mobility 4  Minimal assistance   Additional Comments assist of 2/ RW   Additional items Rolling walker   Toilet Transfers   Toilet Transfer From Other (Comment)  (Recliner)   Toilet Transfer Type To and from   Toilet Transfer to Standard toilet   Toilet Transfer Technique Ambulating Toilet Transfers Minimal assistance  (assist of 2)   Toilet Transfers Comments multiple episodes of LOB, tendency to retropulse, needs assistance to regain balance  Therapeutic Exercise - ROM   UE-ROM Yes   ROM- Right Upper Extremities   R Shoulder AROM; Flexion;ABduction; Extension;Horizontal ABduction   R Weight/Reps/Sets 1 set of 10   ROM - Left Upper Extremities    L Shoulder AROM; Flexion;ABduction; Extension;Horizontal ABduction   L Weight/Reps/Sets 1 set of 10   Cognition   Overall Cognitive Status Impaired   Arousal/Participation Alert; Responsive; Cooperative   Attention Within functional limits   Orientation Level Oriented to person   Memory Decreased recall of recent events   Following Commands Follows one step commands with increased time or repetition   Activity Tolerance   Activity Tolerance Patient limited by fatigue;Treatment limited secondary to medical complications (Comment)  (increased heart rate with ambulation to bathroom, resolved after rest break)   Assessment   Assessment Patient participated in Skilled OT session this date with interventions consisting of ADL re training with the use of correct body mechnaics   Patient agreeable to OT treatment session, upon arrival patient was found seated OOB to Recliner  Patient requiring verbal cues for correct technique, verbal cues for pacing thru activity steps, cognitive assistance to anticipate next step and frequent rest periods  Patient requires min assist for UB ADLs, mod assist for LB ADLs  Deficits that impact self performance include: dynamic sitting and standing balance, limited standing tolerance times, decreased UE muscle strength, postural control and alignment deficit  Patient continues to be functioning below baseline level, occupational performance remains limited secondary to factors listed above and increased risk for falls and injury  From OT standpoint, recommendation at time of d/c would be Short Term Rehab     Patient to benefit from continued Occupational Therapy treatment while in the hospital to address deficits as defined above and maximize level of functional independence with ADLs and functional mobility  Plan   Treatment Interventions ADL retraining;Functional transfer training;UE strengthening/ROM; Endurance training;Cognitive reorientation;Equipment evaluation/education; Compensatory technique education;Continued evaluation; Energy conservation   Goal Expiration Date 10/31/22   OT Treatment Day 1   OT Frequency 3-5x/wk   Recommendation   OT Discharge Recommendation Post acute rehabilitation services   AM-PAC Daily Activity Inpatient   Lower Body Dressing 2   Bathing 2   Toileting 2   Upper Body Dressing 3   Grooming 3   Eating 3   Daily Activity Raw Score 15   Daily Activity Standardized Score (Calc for Raw Score >=11) 34 69   AM-PAC Applied Cognition Inpatient   Following a Speech/Presentation 3   Understanding Ordinary Conversation 3   Taking Medications 2   Remembering Where Things Are Placed or Put Away 2   Remembering List of 4-5 Errands 2   Taking Care of Complicated Tasks 1   Applied Cognition Raw Score 13   Applied Cognition Standardized Score 30 46

## 2022-10-25 ENCOUNTER — TELEPHONE (OUTPATIENT)
Dept: HEMATOLOGY ONCOLOGY | Facility: CLINIC | Age: 87
End: 2022-10-25

## 2022-10-25 PROCEDURE — 99232 SBSQ HOSP IP/OBS MODERATE 35: CPT | Performed by: NURSE PRACTITIONER

## 2022-10-25 RX ADMIN — PANTOPRAZOLE SODIUM 40 MG: 40 TABLET, DELAYED RELEASE ORAL at 06:40

## 2022-10-25 RX ADMIN — LEVOTHYROXINE SODIUM 100 MCG: 100 TABLET ORAL at 06:40

## 2022-10-25 RX ADMIN — DEXAMETHASONE 4 MG: 4 TABLET ORAL at 09:05

## 2022-10-25 RX ADMIN — ACETAMINOPHEN 650 MG: 325 TABLET ORAL at 20:05

## 2022-10-25 NOTE — UTILIZATION REVIEW
Continued Stay Review    Date: 10/25/22                          Current Patient Class: IP  Current Level of Care: Med/Surg    HPI:89 y o  male initially admitted on 10/17 as OBS, changed to IP on 10/18 for VINICIUS  Assessment/Plan: No acute breathing concerns  Exam unremarkable, lema in place  Plan: Trend labs, replete electrolytes as needed  Continue lema w/ outpatient voiding trial  Will discharge to rehab and then back to prior facility  Supportive care  Continue current meds  Vital Signs:   Date/Time Temp Pulse Resp BP MAP (mmHg) SpO2   10/25/22 07:45:40 97 8 °F (36 6 °C) 70 22 116/49 Abnormal  71 98 %   10/25/22 00:14:43 97 4 °F (36 3 °C) Abnormal  77 18 122/53 76 98 %   10/24/22 14:37:36 97 9 °F (36 6 °C) 81 20 122/53 76 98 %       Pertinent Labs/Diagnostic Results:   Results from last 7 days   Lab Units 10/24/22  0433 10/23/22  0955 10/22/22  2044 10/22/22  0513 10/21/22  2111 10/20/22  2055 10/20/22  0841 10/19/22  1229 10/19/22  0559   WBC Thousand/uL 15 73*  --   --   --   --   --  8 43  --  8 46   HEMOGLOBIN g/dL 7 5* 7 5* 7 0* 7 1* 7 2*   < > 7 1*   < > 7 0*   HEMATOCRIT % 23 4* 23 1* 21 9* 21 6* 22 3*   < > 21 8*   < > 22 0*   PLATELETS Thousands/uL 86*  --   --   --   --   --  97*  --  92*   NEUTROS ABS Thousands/µL  --   --   --   --   --   --   --   --  6 64    < > = values in this interval not displayed           Results from last 7 days   Lab Units 10/24/22  0433 10/22/22  0513 10/21/22  0443 10/20/22  0444 10/19/22  0431   SODIUM mmol/L 140 141 142 143 142   POTASSIUM mmol/L 3 9 4 1 3 7 3 4* 3 7   CHLORIDE mmol/L 106 109* 113* 112* 110*   CO2 mmol/L 26 22 22 20* 21   ANION GAP mmol/L 8 10 7 11 11   BUN mg/dL 22 17 14 13 18   CREATININE mg/dL 0 74 0 71 0 71 0 63 0 69   EGFR ml/min/1 73sq m 78 79 79 83 80   CALCIUM mg/dL 8 0* 7 9* 7 4* 7 8* 8 2*             Results from last 7 days   Lab Units 10/24/22  0433 10/22/22  0513 10/21/22  0443 10/20/22  0444 10/19/22  0431   GLUCOSE RANDOM mg/dL 110 131 97 118 138       Results from last 7 days   Lab Units 10/20/22  2055   CEA ng/mL 1 4     Results from last 7 days   Lab Units 10/19/22  1431   C DIFF TOXIN B BY PCR  Negative       Medications:   Scheduled Medications:  dexamethasone, 4 mg, Oral, Daily With Breakfast  levothyroxine, 100 mcg, Oral, Early Morning  pantoprazole, 40 mg, Oral, Early Morning    Continuous IV Infusions: none    PRN Meds:  acetaminophen, 650 mg, Oral, Q6H PRN  aluminum-magnesium hydroxide-simethicone, 30 mL, Oral, Q6H PRN  ondansetron, 4 mg, Intravenous, Q6H PRN  polyethylene glycol, 17 g, Oral, Daily PRN        Discharge Plan: TBD, pending placement for rehab    Network Utilization Review Department  ATTENTION: Please call with any questions or concerns to [phone] and carefully listen to the prompts so that you are directed to the right person  All voicemails are confidential   Hardeep Del Cid all requests for admission clinical reviews, approved or denied determinations and any other requests to dedicated fax number below belonging to the campus where the patient is receiving treatment   List of dedicated fax numbers for the Facilities:  [address] DENIALS (Administrative/Medical Necessity) [phone]   [address] (Maternity/NICU/Pediatrics) [phone]    Jeanette Cunha [phone]   Marian Kemp 77 [phone]   [address] [place] [address] Alton Rojas 28 [phone]   [address] [facility] Kevin Purcell [facility] 134 [address] [facility] [phone]

## 2022-10-25 NOTE — ASSESSMENT & PLAN NOTE
· Patient with dizziness, loss of balance and several falls over the last few days     · Fall precautions  · PT/OT consulted, recommending post-acute rehab at this time  · Resides at [facility]  · Plan for PVM for rehab prior to return to [facility]

## 2022-10-25 NOTE — ASSESSMENT & PLAN NOTE
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

## 2022-10-25 NOTE — ASSESSMENT & PLAN NOTE
· Patient with dizziness, loss of balance and several falls over the last few days     · Fall precautions  · PT/OT consulted, recommending post-acute rehab at this time  · Resides at [facility]  · Plan for PVM for rehab prior to return to [facility]

## 2022-10-25 NOTE — WOUND OSTOMY CARE
Progress Note - Wound   Violette Pichardo 80 y o  male MRN: [id number]  Unit/Bed#: -01 Encounter: [id number]      Assessment:   Patient admitted due to VINICIUS  History of arthritis, cancer, gout, HTN, multiple myeloma  Wound care follow-up for multiple skin tears and abrasions  Patient agreeable to assessment, alert and oriented x2, lema catheter in place for urine management, incontinent of bowel, wedges in use for turning and repositoning, heels elevated, is an assist of 1 to stand with walker for assessment, is an assist with care  Primary RN made aware of assessment findings  Patient states wounds are from a recent fall      1  Bilateral sacrum, buttock, hips, and heels- skin is dry, intact, blanchable pink      2  Skin tear mid upper back- Wounds on assessment have healed, skin is dry, intact, blanchable with scattered purple ecchymosis      3  Skin tear left posterior upper arm- Wound is oval in shape, no skin flap present, is full-thickness, 100% beefy red and pink tissue, with small amount of sanguineous drainage noted  Shruthi-wound is dry, intact, scattered purple ecchymosis, and blanchable pink skin      4  Skin tear left elbow- Wound is oval in shape, 100% covered in dry well adhered brown scab, no drainage noted  Shruthi-wound is dry, intact, no redness      5  Left anterior leg abrasions- Linear wounds down the length of anterior tibial  Wounds are linear in shape, full-thickness, approx  40% yellow adhered tissue and 60% pink tissue, with scant amount of serosanguineous drainage noted  Shruthi-wounds are dry, intact, no redness, with scattered ecchymosis      6  Right anterior knee abrasions- Wounds are scattered and irregular in shape, full-thickness, approx  40% pink tissue and 60% yellow adhered tissue, no drainage noted   Shruthi-wounds are dry, intact, no redness      7  Bilateral toes- skin is dry, intact, no open aspects      Educated patient on importance of frequent offloading of pressure via turning, repositioning, and weight-shifting  Verbalized understanding of plan of care      No induration, fluctuance, odor, warmth, redness, or purulence noted to the above noted wounds  New dressings applied  Patient tolerated well, reports mild pain to the left arm wounds  Primary nurse aware of plan of care  See flow sheets for more detailed assessment findings  Will follow along      Skin care Plan:  1-Apply thin layer of Calazime to sacrum/buttock TID and PRN with radha-care  2-Turn/reposition q2h for pressure re-distribution on skin   3-Elevate heels to offload pressure  4-Moisturize skin daily with skin nourishing cream  5-Ehob cushion in chair when out of bed  6-Hydraguard to bilateral heels and toes BID and PRN  7-Upper back- Cleanse wounds with NSS, pat dry  Apply small Allevyn foam dressing  Change every 3 days and as needed  8-Left arm, left leg, right knee- Cleanse wounds with NSS, pat dry  Apply Dermagran over wound beds, cut to fit size of wounds, cover with ABD pad and wrap with Elizabeth  Change every other day and as needed  Wound 10/17/22 Skin Tear Pretibial Left (Active)   Wound Image   10/25/22 1046   Wound Description     Radha-wound Assessment     Wound Length (cm) 17 cm 10/25/22 1046   Wound Width (cm) 0 6 cm 10/25/22 1046   Wound Depth (cm) 0 1 cm 10/25/22 1046   Wound Surface Area (cm^2) 10 2 cm^2 10/25/22 1046   Wound Volume (cm^3) 1 02 cm^3 10/25/22 1046   Calculated Wound Volume (cm^3) 1 02 cm^3 10/25/22 1046   Change in Wound Size % 27 14 10/25/22 1046   Tunneling 0 cm 10/25/22 1046   Undermining 0 10/25/22 1046   Drainage Amount     Drainage Description Serosanguineous 10/25/22 1046   Non-staged Wound Description Not applicable 16/94/72 9446   Treatments Cleansed;Irrigation with NSS;Site care 10/25/22 1046   Dressing Dermagran gauze;ABD;Dry dressing 10/25/22 1151   Wound packed?  No 10/25/22 1151   Dressing Changed Changed 10/25/22 1046   Patient Tolerance Tolerated well 10/25/22 1046 Dressing Status Clean;Dry; Intact 10/25/22 1151       Wound 10/17/22 Skin Tear Pretibial Right (Active)   Wound Image   10/25/22 1045   Wound Description     Shruthi-wound Assessment     Wound Length (cm) 2 5 cm 10/25/22 1045   Wound Width (cm) 2 cm 10/25/22 1045   Wound Depth (cm) 0 1 cm 10/25/22 1045   Wound Surface Area (cm^2) 5 cm^2 10/25/22 1045   Wound Volume (cm^3) 0 5 cm^3 10/25/22 1045   Calculated Wound Volume (cm^3) 0 5 cm^3 10/25/22 1045   Change in Wound Size % 58 33 10/25/22 1045   Tunneling 0 cm 10/25/22 1045   Undermining 0 10/25/22 1045   Drainage Amount None 10/25/22 1151   Non-staged Wound Description Not applicable 17/41/77 1867   Treatments Cleansed;Irrigation with NSS;Site care 10/25/22 1045   Dressing Dermagran gauze; Foam, Silicon (eg  Allevyn, etc) 10/25/22 1151   Wound packed? No 10/25/22 1151   Dressing Changed Changed 10/25/22 1045   Patient Tolerance Tolerated well 10/25/22 1045   Dressing Status Clean;Dry; Intact 10/25/22 1151       Wound 10/17/22 Skin tear Arm Anterior;Left;Proximal (Active)   Wound Image   10/25/22 1048   Wound Description     Shruthi-wound Assessment     Wound Length (cm) 0 4 cm 10/25/22 1048   Wound Width (cm) 0 8 cm 10/25/22 1048   Wound Depth (cm) 0 cm 10/25/22 1048   Wound Surface Area (cm^2) 0 32 cm^2 10/25/22 1048   Wound Volume (cm^3) 0 cm^3 10/25/22 1048   Calculated Wound Volume (cm^3) 0 cm^3 10/25/22 1048   Change in Wound Size % 100 10/25/22 1048   Tunneling 0 cm 10/25/22 1048   Undermining 0 10/25/22 1048   Drainage Amount None 10/25/22 1151   Non-staged Wound Description Partial thickness 10/25/22 1151   Treatments Cleansed;Site care 10/25/22 1048   Dressing Dermagran gauze;ABD;Dry dressing 10/25/22 1151   Wound packed? No 10/25/22 1151   Dressing Changed Changed 10/25/22 1048   Patient Tolerance Tolerated well 10/25/22 1048   Dressing Status Clean;Dry; Intact 10/25/22 1151       Wound 10/17/22 Arm Left;Posterior; Upper (Active)   Wound Image   10/25/22 1047 Wound Description     Shruthi-wound Assessment     Wound Length (cm) 0 7 cm 10/25/22 1047   Wound Width (cm) 1 7 cm 10/25/22 1047   Wound Depth (cm) 0 1 cm 10/25/22 1047   Wound Surface Area (cm^2) 1 19 cm^2 10/25/22 1047   Wound Volume (cm^3) 0 119 cm^3 10/25/22 1047   Calculated Wound Volume (cm^3) 0 12 cm^3 10/25/22 1047   Change in Wound Size % 71 43 10/25/22 1047   Tunneling 0 cm 10/25/22 1047   Undermining 0 10/25/22 1047   Drainage Amount     Drainage Description Sanguineous 10/25/22 1151   Non-staged Wound Description Full thickness 10/25/22 1151   Treatments Cleansed;Site care;Irrigation with NSS 10/25/22 1047   Dressing Dermagran gauze 10/25/22 1151   Wound packed? No 10/25/22 1151   Dressing Changed Changed 10/25/22 1047   Patient Tolerance Tolerated well 10/25/22 1047   Dressing Status Clean;Dry; Intact 10/25/22 1151       Wound 10/17/22 Skin tear Back (Active)   Wound Image   10/25/22 1054   Wound Description     Shruthi-wound Assessment     Wound Length (cm) 0 cm 10/25/22 1054   Wound Width (cm) 0 cm 10/25/22 1054   Wound Depth (cm) 0 cm 10/25/22 1054   Wound Surface Area (cm^2) 0 cm^2 10/25/22 1054   Wound Volume (cm^3) 0 cm^3 10/25/22 1054   Calculated Wound Volume (cm^3) 0 cm^3 10/25/22 1054   Change in Wound Size % 100 10/25/22 1054   Tunneling 0 cm 10/25/22 1054   Undermining 0 10/25/22 1054   Drainage Amount None 10/25/22 1151   Drainage Description     Non-staged Wound Description     Treatments Cleansed;Site care 10/25/22 1054   Dressing Foam, Silicon (eg  Allevyn, etc) 10/25/22 1151   Wound packed? No 10/25/22 1151   Dressing Changed New 10/25/22 1054   Patient Tolerance Tolerated well 10/25/22 1054   Dressing Status Clean;Dry; Intact 10/25/22 1151         Call or tigertext with any questions  Wound Care will continue to follow    Ritesh Ojeda BSN RN CWON  Wound and Ostomy care

## 2022-10-25 NOTE — TELEPHONE ENCOUNTER
CALL RETURN FORM   Reason for patient call? Harriet Bhatia (on communication consent 02/2022) would like to speak with Dr Janiya Coon team to receive input on whether or not they think it is beneficial to bring patient in for a consult with surgical oncology given the patients age and health status  Patient's primary oncologist? Dr Cloud Many (Referred to - not a current patient)    Name of person the patient was calling for? Dr Janiya Coon team    Any additional information to add, if applicable? Please call Harriet Bhatia at [phone] to discuss   Informed patient that the message will be forwarded to the team and someone will get back to them as soon as possible    Did you relay this information to the patient?  Yes

## 2022-10-25 NOTE — PLAN OF CARE
Problem: MOBILITY - ADULT  Goal: Maintain or return to baseline ADL function  Description: INTERVENTIONS:  -  Assess patient's ability to carry out ADLs; assess patient's baseline for ADL function and identify physical deficits which impact ability to perform ADLs (bathing, care of mouth/teeth, toileting, grooming, dressing, etc )  - Assess/evaluate cause of self-care deficits   - Assess range of motion  - Assess patient's mobility; develop plan if impaired  - Assess patient's need for assistive devices and provide as appropriate  - Encourage maximum independence but intervene and supervise when necessary  - Involve family in performance of ADLs  - Assess for home care needs following discharge   - Consider OT consult to assist with ADL evaluation and planning for discharge  - Provide patient education as appropriate  Outcome: Progressing  Goal: Maintains/Returns to pre admission functional level  Description: INTERVENTIONS:  - Perform BMAT or MOVE assessment daily    - Set and communicate daily mobility goal to care team and patient/family/caregiver  - Collaborate with rehabilitation services on mobility goals if consulted  - Perform Range of Motion 3 times a day  - Reposition patient every 2 hours    - Dangle patient 3 times a day  - Out of bed to chair 2 times a day   - Record patient progress and toleration of activity level   Outcome: Progressing     Problem: Prexisting or High Potential for Compromised Skin Integrity  Goal: Skin integrity is maintained or improved  Description: INTERVENTIONS:  - Identify patients at risk for skin breakdown  - Assess and monitor skin integrity  - Assess and monitor nutrition and hydration status  - Monitor labs   - Assess for incontinence   - Turn and reposition patient  - Assist with mobility/ambulation  - Relieve pressure over bony prominences  - Avoid friction and shearing  - Provide appropriate hygiene as needed including keeping skin clean and dry  - Evaluate need for skin moisturizer/barrier cream  - Collaborate with interdisciplinary team   - Patient/family teaching  - Consider wound care consult   Outcome: Progressing

## 2022-10-25 NOTE — ASSESSMENT & PLAN NOTE
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

## 2022-10-25 NOTE — ASSESSMENT & PLAN NOTE
· Pastrana catheter placed for decompression on arrival  · Plan to continue for at least 1 week with plan for outpatient voiding trial  · Will need outpatient urology evaluation no

## 2022-10-25 NOTE — PROGRESS NOTES
[address]  Progress Note Clau Leal 5/24/1927, 80 y o  male MRN: [id number]  Unit/Bed#: -Araceli Encounter: [id number]  Primary Care Provider: Mica Zarate MD   Date and time admitted to hospital: 10/17/2022  8:16 AM    * VINICIUS (acute kidney injury) [facility])  Assessment & Plan  · Creatinine 1 39 on admission, possibly related to dehydration vs medication side effect vs GI bleed  · Discontinue triamterene-hydrochlorothiazide  · Normalized with IV fluids which have since been discontinued  · Monitor BMP  · Avoid hypotension, nephrotoxins    Anemia  Assessment & Plan  · Significant drop in hemoglobin from 11 0 to 7 7 from 10/17-10/18   · Consider hemodilution given IV fluid resuscitation, however hemoccult (+) x3  · Lovenox discontinued  · Imaging on admission without evidence for acute bleed  · Iron panel 10/18 showing iron deficiency with normal ferritin and low TIBC - 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  · Transfuse for <7 Received IV iron x 3 days   · GI consulted, appreciate recommendations  · Colonoscopy completed 10/20  · Severe diverticulosis likely the source of blood loss  · 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  · Case discussed with Dr Lesley Walton   · Cardiology consulted for surgical clearance if patient decides to proceed  · Patient is at least a moderate cardiac risk patient going in for an intermediate risk surgery  No further cardiac workup is needed at this time  No cardiac contraindications for surgery      Bladder distension  Assessment & Plan  · Pastrana catheter placed for decompression on arrival  · Plan to continue for at least 1 week with plan for outpatient voiding trial  · Will need outpatient urology evaluation    Severe protein-calorie malnutrition ([facility] [address] )  Assessment & Plan  Malnutrition Findings:   Adult Malnutrition type: Chronic illness  Adult Degree of Malnutrition: Other severe protein calorie malnutrition  Malnutrition Characteristics: Weight loss, Muscle loss, Fat loss 360 Statement: related to inadequate energy intake due to decreased appetite ,as evidenced by, 21% weight loss last 11 mos (21-10/7/22), hollow supraclavicular space, depressed temples  Int: Regular diet, medical food supplements with meals  BMI Findings: Body mass index is 18 72 kg/m²  Nutrition consulted, appreciate recommendations  · Dietary nutrition supplements ordered per Nutrition    Ambulatory dysfunction  Assessment & Plan  · Patient with dizziness, loss of balance and several falls over the last few days  · Fall precautions  · PT/OT consulted, recommending post-acute rehab at this time  · Resides at [facility]  · Plan for PVM for rehab prior to return to [facility]    Acquired hypothyroidism  Assessment & Plan  · Continue home levothyroxine  · TSH elevated 6 187, free T4 normal 0 82  · Follow TFTs outpatient       VTE Pharmacologic Prophylaxis: VTE Score: 7 High Risk (Score >/= 5) - Pharmacological DVT Prophylaxis Contraindicated  Sequential Compression Devices Ordered  Patient Centered Rounds: I performed bedside rounds with nursing staff today  Discussions with Specialists or Other Care Team Provider:  Reviewed notes    Education and Discussions with Family / Patient:  Discussed with patient family    Time Spent for Care: 20 minutes  More than 50% of total time spent on counseling and coordination of care as described above  Current Length of Stay: 7 day(s)  Current Patient Status: Inpatient   Certification Statement: The patient will continue to require additional inpatient hospital stay due to Rehab placement  Discharge Plan: Anticipate discharge tomorrow to rehab facility      Code Status: Level 1 - Full Code    Subjective:   Denies any chest pain chest pain shortness of breath and discussing breathing offers no complaints does not appear to be in any acute distress  Objective:     Vitals:   Temp (24hrs), Av 7 °F (36 5 °C), Min:97 4 °F (36 3 °C), Max:97 9 °F (36 6 °C)    Temp:  [97 4 °F (36 3 °C)-97 9 °F (36 6 °C)] 97 8 °F (36 6 °C)  HR:  [70-81] 70  Resp:  [18-22] 22  BP: (116-122)/(49-53) 116/49  SpO2:  [95 %-98 %] 98 %  Body mass index is 18 72 kg/m²  Input and Output Summary (last 24 hours): Intake/Output Summary (Last 24 hours) at 10/25/2022 1002  Last data filed at 10/25/2022 0751  Gross per 24 hour   Intake 456 ml   Output 875 ml   Net -419 ml       Physical Exam:   Physical Exam  Vitals and nursing note reviewed  Cardiovascular:      Rate and Rhythm: Normal rate  Pulmonary:      Effort: Pulmonary effort is normal  No respiratory distress  Musculoskeletal:         General: Normal range of motion  Skin:     General: Skin is warm  Neurological:      Mental Status: He is alert  Mental status is at baseline  Psychiatric:         Mood and Affect: Mood normal        Additional Data:     Labs:  Results from last 7 days   Lab Units 10/24/22  0433 10/19/22  1229 10/19/22  0559   WBC Thousand/uL 15 73*   < > 8 46   HEMOGLOBIN g/dL 7 5*   < > 7 0*   HEMATOCRIT % 23 4*   < > 22 0*   PLATELETS Thousands/uL 86*   < > 92*   NEUTROS PCT %  --   --  79*   LYMPHS PCT %  --   --  10*   MONOS PCT %  --   --  10   EOS PCT %  --   --  0    < > = values in this interval not displayed       Results from last 7 days   Lab Units 10/24/22  0433   SODIUM mmol/L 140   POTASSIUM mmol/L 3 9   CHLORIDE mmol/L 106   CO2 mmol/L 26   BUN mg/dL 22   CREATININE mg/dL 0 74   ANION GAP mmol/L 8   CALCIUM mg/dL 8 0*   GLUCOSE RANDOM mg/dL 110                       Lines/Drains:  Invasive Devices  Report    Drain  Duration           Urethral Catheter 16 Fr  7 days              Urinary Catheter:  Goal for removal: N/A- Discharging with Pastrana         Recent Cultures (last 7 days):   Results from last 7 days   Lab Units 10/19/22  1431   C DIFF TOXIN B BY PCR  Negative       Last 24 Hours Medication List:   Current Facility-Administered Medications   Medication Dose Route Frequency Provider Last Rate   • acetaminophen  650 mg Oral Q6H PRN Mort Begun, NIKOLE     • aluminum-magnesium hydroxide-simethicone  30 mL Oral Q6H PRN Mort Begun, NIKOLE     • dexamethasone  4 mg Oral Daily With Breakfast Mort Begun, NIKOLE     • levothyroxine  100 mcg Oral Early Morning Sherrie Mensah PA-C     • ondansetron  4 mg Intravenous Q6H PRN Mort Begun, NIKOLE     • pantoprazole  40 mg Oral Early Morning Sherrie Mensah PA-C     • polyethylene glycol  17 g Oral Daily PRN Mort Begun, NIKOLE          Today, Patient Was Seen By: Kim Millan    **Please Note: This note may have been constructed using a voice recognition system  **

## 2022-10-26 ENCOUNTER — TELEPHONE (OUTPATIENT)
Dept: GASTROENTEROLOGY | Facility: CLINIC | Age: 87
End: 2022-10-26

## 2022-10-26 LAB
ANION GAP SERPL CALCULATED.3IONS-SCNC: 7 MMOL/L (ref 4–13)
BUN SERPL-MCNC: 25 MG/DL (ref 5–25)
CALCIUM SERPL-MCNC: 7.6 MG/DL (ref 8.3–10.1)
CHLORIDE SERPL-SCNC: 106 MMOL/L (ref 96–108)
CO2 SERPL-SCNC: 28 MMOL/L (ref 21–32)
CREAT SERPL-MCNC: 0.74 MG/DL (ref 0.6–1.3)
ERYTHROCYTE [DISTWIDTH] IN BLOOD BY AUTOMATED COUNT: 14.7 % (ref 11.6–15.1)
FLUAV RNA RESP QL NAA+PROBE: NEGATIVE
FLUBV RNA RESP QL NAA+PROBE: NEGATIVE
GFR SERPL CREATININE-BSD FRML MDRD: 78 ML/MIN/1.73SQ M
GLUCOSE SERPL-MCNC: 124 MG/DL (ref 65–140)
HCT VFR BLD AUTO: 21.3 % (ref 36.5–49.3)
HGB BLD-MCNC: 6.7 G/DL (ref 12–17)
MCH RBC QN AUTO: 31.9 PG (ref 26.8–34.3)
MCHC RBC AUTO-ENTMCNC: 31.5 G/DL (ref 31.4–37.4)
MCV RBC AUTO: 101 FL (ref 82–98)
PLATELET # BLD AUTO: 71 THOUSANDS/UL (ref 149–390)
PMV BLD AUTO: 10.6 FL (ref 8.9–12.7)
POTASSIUM SERPL-SCNC: 3.7 MMOL/L (ref 3.5–5.3)
RBC # BLD AUTO: 2.1 MILLION/UL (ref 3.88–5.62)
RSV RNA RESP QL NAA+PROBE: NEGATIVE
SARS-COV-2 RNA RESP QL NAA+PROBE: NEGATIVE
SODIUM SERPL-SCNC: 141 MMOL/L (ref 135–147)
WBC # BLD AUTO: 6.98 THOUSAND/UL (ref 4.31–10.16)

## 2022-10-26 PROCEDURE — 0241U HB NFCT DS VIR RESP RNA 4 TRGT: CPT | Performed by: PHYSICIAN ASSISTANT

## 2022-10-26 PROCEDURE — 80048 BASIC METABOLIC PNL TOTAL CA: CPT | Performed by: NURSE PRACTITIONER

## 2022-10-26 PROCEDURE — 85027 COMPLETE CBC AUTOMATED: CPT | Performed by: NURSE PRACTITIONER

## 2022-10-26 PROCEDURE — 99232 SBSQ HOSP IP/OBS MODERATE 35: CPT | Performed by: PHYSICIAN ASSISTANT

## 2022-10-26 PROCEDURE — 88305 TISSUE EXAM BY PATHOLOGIST: CPT | Performed by: PATHOLOGY

## 2022-10-26 RX ADMIN — LEVOTHYROXINE SODIUM 100 MCG: 100 TABLET ORAL at 06:12

## 2022-10-26 RX ADMIN — PANTOPRAZOLE SODIUM 40 MG: 40 TABLET, DELAYED RELEASE ORAL at 06:12

## 2022-10-26 RX ADMIN — DEXAMETHASONE 4 MG: 4 TABLET ORAL at 08:31

## 2022-10-26 NOTE — CASE MANAGEMENT
Case Management Discharge Planning Note    Patient name Hillary Mesa  Location [address] [id number] MRN [id number]  : 1927 Date 10/26/2022       Current Admission Date: 10/17/2022  Current Admission Diagnosis:VINICIUS (acute kidney injury) [facility])   Patient Active Problem List    Diagnosis Date Noted   • Bladder distension 10/19/2022   • Anemia 10/18/2022   • Severe protein-calorie malnutrition ([facility] [address] ) 10/18/2022   • Stage 3 chronic kidney disease, unspecified whether stage 3a or 3b CKD ([address] ) 2022   • Ambulatory dysfunction 2022   • Irritable bowel syndrome without diarrhea 2022   • Irritable bowel syndrome with diarrhea 2022   • 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   • Chronic seasonal allergic rhinitis due to pollen 2021   • Venous stasis ulcer of left ankle with fat layer exposed without varicose veins ([facility] [address] ) 2021   • Abrasions of multiple sites 2021   • Falls 2021   • Thrombocytopenia ([address] ) 2021   • VINICIUS (acute kidney injury) ([facility] [address] ) 2021   • Multiple myeloma not having achieved remission ([facility] [address] ) 2021   • Immunization due 2020   • Spinal stenosis of lumbosacral region 2019   • Meningioma, cerebral ([facility] [address] ) 2019   • Patellofemoral disorder of left knee 2018   • Idiopathic chronic gout of multiple sites without tophus 2014   • Spondylosis of lumbar region without myelopathy or radiculopathy 2014   • Acquired hypothyroidism 2012   • Pure hypercholesterolemia 2012   • Essential hypertension 2012      LOS (days): 8  Geometric Mean LOS (GMLOS) (days): 4 30  Days to GMLOS:-3 7     OBJECTIVE:  Risk of Unplanned Readmission Score: 15 04         Current admission status: Inpatient   Preferred Pharmacy:   [facility] [address] [facility] [id number] [place] [id number]  Phone: [phone] Fax: [phone]    Primary Care Provider: Seda Batista MD    Primary Insurance: Juanito Gerardo [facility] HOSPITAL REP  Secondary Insurance: Connor Tucker    DISCHARGE DETAILS:                                                                                                               Facility Insurance Auth Number: [id number] Effective 10/26/22 for patient to be transferred to [facility]  Care Coordinator: Melissa Ramires Fax: [phone]

## 2022-10-26 NOTE — TELEPHONE ENCOUNTER
Called patients family member tommy and got VM   LMOM with message as per Jackie  Left office phone # as well

## 2022-10-26 NOTE — CASE MANAGEMENT
Case Management Discharge Planning Note    Patient name Stefany Brooks  Location [address] 310/-11 MRN [id number]  : 1927 Date 10/26/2022       Current Admission Date: 10/17/2022  Current Admission Diagnosis:VINICIUS (acute kidney injury) [facility])   Patient Active Problem List    Diagnosis Date Noted   • Bladder distension 10/19/2022   • Anemia 10/18/2022   • Severe protein-calorie malnutrition ([address] ) 10/18/2022   • Stage 3 chronic kidney disease, unspecified whether stage 3a or 3b CKD ([address] ) 2022   • Ambulatory dysfunction 2022   • Irritable bowel syndrome without diarrhea 2022   • Irritable bowel syndrome with diarrhea 2022   • 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   • Chronic seasonal allergic rhinitis due to pollen 2021   • Venous stasis ulcer of left ankle with fat layer exposed without varicose veins ([facility] ) 2021   • Abrasions of multiple sites 2021   • Falls 2021   • Thrombocytopenia ([address] ) 2021   • VINICIUS (acute kidney injury) ([facility] ) 2021   • Multiple myeloma not having achieved remission ([facility] ) 2021   • Immunization due 2020   • Spinal stenosis of lumbosacral region 2019   • Meningioma, cerebral ([facility] ) 2019   • Patellofemoral disorder of left knee 2018   • Idiopathic chronic gout of multiple sites without tophus 2014   • Spondylosis of lumbar region without myelopathy or radiculopathy 2014   • Acquired hypothyroidism 2012   • Pure hypercholesterolemia 2012   • Essential hypertension 2012      LOS (days): 8  Geometric Mean LOS (GMLOS) (days): 4 30  Days to GMLOS:-3 8     OBJECTIVE:  Risk of Unplanned Readmission Score: 15 04         Current admission status: Inpatient   Preferred Pharmacy:   [facility] [address] [facility] [address] [place], [place] [address]  Phone: [phone] Fax: [phone]    Primary Care Provider: Avery Fitzgerald MD    [facility] Insurance: [facility] REP  Secondary Insurance: Ebonie Lacho [facility]    DISCHARGE DETAILS:                                          Other Referral/Resources/Interventions Provided:  Referral Comments: Bed offer from PVM  S/W FRANCESCO Kay;  she wants to accept bed  S/W Jorje Montano at [place] [address] to make aware and confirm bed availability for today  LMN completed and copy placed in record  CM area of AVS completed  Requested 1600 p/u;  awaiting confirmation of trasport  Nurse Arminda Mcdermott updated  TT to Ryan Aguilar to make aware           Treatment Team Recommendation: Short Term Rehab, SNF  Discharge Destination Plan[de-identified] Short Term Rehab, SNF  Transport at Discharge : BLS Ambulance                             IMM Given (Date):: 10/26/22  IMM Given to[de-identified] Family  Family notified[de-identified] reviewed w pt's cousin/FRANCESCO Jarquin;  she reports she does not need another copy       Accepting Facility Name, [address] : [address] [place] [address]  Receiving Facility/Agency Phone Number: [address] [place]     Phone: [phone]  Facility/Agency Fax Number: [phone]    Fax: [phone]

## 2022-10-26 NOTE — CASE MANAGEMENT
Case Management Discharge Planning Note    Patient name Socorro Khan  Location [address] 310/-93 MRN [id number]  : 1927 Date 10/26/2022       Current Admission Date: 10/17/2022  Current Admission Diagnosis:VINICIUS (acute kidney injury) [facility])   Patient Active Problem List    Diagnosis Date Noted   • Bladder distension 10/19/2022   • Anemia 10/18/2022   • Severe protein-calorie malnutrition ([address] ) 10/18/2022   • Stage 3 chronic kidney disease, unspecified whether stage 3a or 3b CKD ([address] ) 2022   • Ambulatory dysfunction 2022   • Irritable bowel syndrome without diarrhea 2022   • Irritable bowel syndrome with diarrhea 2022   • 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   • Chronic seasonal allergic rhinitis due to pollen 2021   • Venous stasis ulcer of left ankle with fat layer exposed without varicose veins ([facility] ) 2021   • Abrasions of multiple sites 2021   • Falls 2021   • Thrombocytopenia ([address] ) 2021   • VINICIUS (acute kidney injury) ([facility] ) 2021   • Multiple myeloma not having achieved remission ([facility] ) 2021   • Immunization due 2020   • Spinal stenosis of lumbosacral region 2019   • Meningioma, cerebral ([facility] ) 2019   • Patellofemoral disorder of left knee 2018   • Idiopathic chronic gout of multiple sites without tophus 2014   • Spondylosis of lumbar region without myelopathy or radiculopathy 2014   • Acquired hypothyroidism 2012   • Pure hypercholesterolemia 2012   • Essential hypertension 2012      LOS (days): 8  Geometric Mean LOS (GMLOS) (days): 4 30  Days to GMLOS:-3 5     OBJECTIVE:  Risk of Unplanned Readmission Score: 15         Current admission status: Inpatient   Preferred Pharmacy:   [facility] [address] [address]  Phone: [phone] Fax: [phone]    Primary Care Provider: Kt Flowers MD    Primary Insurance: [address] REP  Secondary Insurance: [address] Number: [id number] for patient to be transferred to [place] [address] with clinicals submitted via fax in [address] [place] [address]

## 2022-10-26 NOTE — PROGRESS NOTES
[address] [facility]  Progress Note Clau Leal 5/24/1927, 80 y o  male MRN: [id number]  Unit/Bed#: [address] Encounter: [id number]  Primary Care Provider: Mica Zarate MD   Date and time admitted to hospital: 10/17/2022  8:16 AM    * VINICIUS (acute kidney injury) ([place] [address] )  Assessment & Plan  · Creatinine 1 39 on admission, possibly related to dehydration vs medication side effect vs GI bleed  · Discontinue triamterene-hydrochlorothiazide  · Normalized with IV fluids which have since been discontinued  · Monitor BMP  · Avoid hypotension, nephrotoxins    Ambulatory dysfunction  Assessment & Plan  · Patient with dizziness, loss of balance and several falls over the last few days  · Fall precautions  · PT/OT consulted, recommending post-acute rehab at this time  · Resides at [facility]  · Plan for PVM for rehab prior to return to [facility]    Acquired hypothyroidism  Assessment & Plan  · Continue home levothyroxine  · TSH elevated 6 187, free T4 normal 0 82  · Follow TFTs outpatient     Bladder distension  Assessment & Plan  · Pastrana catheter placed for decompression on arrival  · Plan to continue for at least 1 week with plan for outpatient voiding trial  · Will need outpatient urology evaluation    Severe protein-calorie malnutrition ([place] [address] )  Assessment & Plan  Malnutrition Findings:   Adult Malnutrition type: Chronic illness  Adult Degree of Malnutrition: Other severe protein calorie malnutrition  Malnutrition Characteristics: Weight loss, Muscle loss, Fat loss      360 Statement: related to inadequate energy intake due to decreased appetite ,as evidenced by, 21% weight loss last 11 mos (11/4/21-10/7/22), hollow supraclavicular space, depressed temples  Int: Regular diet, medical food supplements with meals  BMI Findings: Body mass index is 18 72 kg/m²       Nutrition consulted, appreciate recommendations  · Dietary nutrition supplements ordered per Nutrition    Anemia  Assessment & Plan  · Significant drop in hemoglobin from 11 0 to 7 7 from 10/17-10/18   · Consider hemodilution given IV fluid resuscitation, however hemoccult (+) x3  · Lovenox discontinued  · Imaging on admission without evidence for acute bleed  · Iron panel 10/18 showing iron deficiency with normal ferritin and low TIBC - 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  · Transfuse for <7 Received IV iron x 3 days   · GI consulted, appreciate recommendations  · Colonoscopy completed 10/20  · Severe diverticulosis likely the source of blood loss  · Mass in sigmoid colon  · Case discussed with Dr Maria Esther Sharpe   · Cardiology consulted for surgical clearance if patient decides to proceed  · Patient is at least a moderate cardiac risk patient going in for an intermediate risk surgery  No further cardiac workup is needed at this time  No cardiac contraindications for surgery  VTE Pharmacologic Prophylaxis: VTE Score: 7 High Risk (Score >/= 5) - Pharmacological DVT Prophylaxis Contraindicated  Sequential Compression Devices Ordered  Patient Centered Rounds: I performed bedside rounds with nursing staff today  Discussions with Specialists or Other Care Team Provider: GI, CM     Education and Discussions with Family / Patient: Updated  (Prosper benitez ) via phone  Time Spent for Care: 20 minutes  More than 50% of total time spent on counseling and coordination of care as described above  Current Length of Stay: 8 day(s)  Current Patient Status: Inpatient   Certification Statement: The patient will continue to require additional inpatient hospital stay due to pending STR placement  Discharge Plan: Anticipate discharge later today or tomorrow to remains medically stable pending accepting facility for rehab    Code Status: Level 1 - Full Code    Subjective:   Patient seen this morning sleeping but easily woken  Denies any chest pain or shortness of breath  No palpitations    We discussed again his colonoscopy findings, he does not remember this conversation  Denies any bloody bowel movements  Per nursing staff he had a large, tan bowel movement this morning without any evidence of hematochezia or melena  Objective:     Vitals:   Temp (24hrs), Av 9 °F (36 6 °C), Min:97 9 °F (36 6 °C), Max:97 9 °F (36 6 °C)    Temp:  [97 9 °F (36 6 °C)] 97 9 °F (36 6 °C)  HR:  [86-89] 86  Resp:  [18] 18  BP: (124-126)/() 126/102  SpO2:  [97 %-99 %] 97 %  Body mass index is 18 72 kg/m²  Input and Output Summary (last 24 hours): Intake/Output Summary (Last 24 hours) at 10/26/2022 0755  Last data filed at 10/26/2022 0601  Gross per 24 hour   Intake 1116 ml   Output 950 ml   Net 166 ml       Physical Exam:   Physical Exam  Vitals and nursing note reviewed  Constitutional:       General: He is not in acute distress  Appearance: He is not toxic-appearing  Cardiovascular:      Rate and Rhythm: Normal rate  Pulmonary:      Effort: Pulmonary effort is normal  No respiratory distress  Abdominal:      Palpations: Abdomen is soft  Musculoskeletal:      Right lower leg: No edema  Left lower leg: No edema  Skin:     Coloration: Skin is not pale  Neurological:      Mental Status: He is alert  Mental status is at baseline     Psychiatric:         Mood and Affect: Mood normal            Additional Data:     Labs:  Results from last 7 days   Lab Units 10/26/22  0439   WBC Thousand/uL 6 98   HEMOGLOBIN g/dL 6 7*   HEMATOCRIT % 21 3*   PLATELETS Thousands/uL 71*     Results from last 7 days   Lab Units 10/26/22  0439   SODIUM mmol/L 141   POTASSIUM mmol/L 3 7   CHLORIDE mmol/L 106   CO2 mmol/L 28   BUN mg/dL 25   CREATININE mg/dL 0 74   ANION GAP mmol/L 7   CALCIUM mg/dL 7 6*   GLUCOSE RANDOM mg/dL 124                       Lines/Drains:  Invasive Devices  Report    Drain  Duration           Urethral Catheter 16 Fr  8 days              Urinary Catheter:  Goal for removal: N/A- Discharging with Pastrana Imaging: No pertinent imaging reviewed  Recent Cultures (last 7 days):   Results from last 7 days   Lab Units 10/19/22  1431   C DIFF TOXIN B BY PCR  Negative       Last 24 Hours Medication List:   Current Facility-Administered Medications   Medication Dose Route Frequency Provider Last Rate   • acetaminophen  650 mg Oral Q6H PRN Charlene Crum PA-C     • aluminum-magnesium hydroxide-simethicone  30 mL Oral Q6H PRN Charlene Crum PA-C     • dexamethasone  4 mg Oral Daily With Breakfast Charlene Crum PA-C     • levothyroxine  100 mcg Oral Early Morning Sherrie Mensah PA-C     • ondansetron  4 mg Intravenous Q6H PRN Charlene Crum PA-C     • pantoprazole  40 mg Oral Early Morning Sherrie Mensah PA-C     • polyethylene glycol  17 g Oral Daily PRN Charlene Crum PA-C          Today, Patient Was Seen By: Charlene Crum    **Please Note: This note may have been constructed using a voice recognition system  **

## 2022-10-26 NOTE — TELEPHONE ENCOUNTER
----- Message from Kenya Rodriges PA-C sent at 10/26/2022 11:49 AM EDT -----  Please call patient's cousin Zohaib Marcuskfloyd to let her know that we asked the pathology department to read the biopsy  Hopefully will be done tentatively by tomorrow end of day  I also messaged Dr Ruiz Morgan  Thanks!

## 2022-10-26 NOTE — NURSING NOTE
Upon assessment pt noted to have open area on sacrum  Foam protector applied to sacrum and pt denies pain or discomfort  Pt turned and repositioned using wedge

## 2022-10-26 NOTE — TELEPHONE ENCOUNTER
Called and spoke to [facility]  Gave her message as per Jackie  She stated that patients hemoglobin is 6 8 and is asking if he will still be released from hospital with it that low? Will route message to Jackie

## 2022-10-26 NOTE — PLAN OF CARE
Problem: MUSCULOSKELETAL - ADULT  Goal: Maintain or return mobility to safest level of function  Description: INTERVENTIONS:  - Assess patient's ability to carry out ADLs; assess patient's baseline for ADL function and identify physical deficits which impact ability to perform ADLs (bathing, care of mouth/teeth, toileting, grooming, dressing, etc )  - Assess/evaluate cause of self-care deficits   - Assess range of motion  - Assess patient's mobility  - Assess patient's need for assistive devices and provide as appropriate  - Encourage maximum independence but intervene and supervise when necessary  - Involve family in performance of ADLs  - Assess for home care needs following discharge   - Consider OT consult to assist with ADL evaluation and planning for discharge  - Provide patient education as appropriate  Outcome: Progressing     Problem: GENITOURINARY - ADULT  Goal: Urinary catheter remains patent  Description: INTERVENTIONS:  - Assess patency of urinary catheter  - If patient has a chronic lema, consider changing catheter if non-functioning  - Follow guidelines for intermittent irrigation of non-functioning urinary catheter  Outcome: Progressing     Problem: Prexisting or High Potential for Compromised Skin Integrity  Goal: Skin integrity is maintained or improved  Description: INTERVENTIONS:  - Identify patients at risk for skin breakdown  - Assess and monitor skin integrity  - Assess and monitor nutrition and hydration status  - Monitor labs   - Assess for incontinence   - Turn and reposition patient  - Assist with mobility/ambulation  - Relieve pressure over bony prominences  - Avoid friction and shearing  - Provide appropriate hygiene as needed including keeping skin clean and dry  - Evaluate need for skin moisturizer/barrier cream  - Collaborate with interdisciplinary team   - Patient/family teaching  - Consider wound care consult   Outcome: Progressing

## 2022-10-26 NOTE — PLAN OF CARE
Patient preparing for discharge to [facility]  VSS  POA at bedside  Awaiting transport

## 2022-10-26 NOTE — PLAN OF CARE
Problem: METABOLIC, FLUID AND ELECTROLYTES - ADULT  Goal: Electrolytes maintained within normal limits  Description: INTERVENTIONS:  - Monitor labs and assess patient for signs and symptoms of electrolyte imbalances  - Administer electrolyte replacement as ordered  - Monitor response to electrolyte replacements, including repeat lab results as appropriate  - Instruct patient on fluid and nutrition as appropriate  Outcome: Progressing  Goal: Fluid balance maintained  Description: INTERVENTIONS:  - Monitor labs   - Monitor I/O and WT  - Instruct patient on fluid and nutrition as appropriate  - Assess for signs & symptoms of volume excess or deficit  Outcome: Progressing     Problem: HEMATOLOGIC - ADULT  Goal: Maintains hematologic stability  Description: INTERVENTIONS  - Assess for signs and symptoms of bleeding or hemorrhage  - Monitor labs  - Administer supportive blood products/factors as ordered and appropriate  Outcome: Progressing     Problem: GASTROINTESTINAL - ADULT  Goal: Maintains or returns to baseline bowel function  Description: INTERVENTIONS:  - Assess bowel function  - Encourage oral fluids to ensure adequate hydration  - Administer IV fluids if ordered to ensure adequate hydration  - Administer ordered medications as needed  - Encourage mobilization and activity  - Consider nutritional services referral to assist patient with adequate nutrition and appropriate food choices  Outcome: Progressing  Goal: Oral mucous membranes remain intact  Description: INTERVENTIONS  - Assess oral mucosa and hygiene practices  - Implement preventative oral hygiene regimen  - Implement oral medicated treatments as ordered  - Initiate Nutrition services referral as needed  Outcome: Progressing     Problem: MUSCULOSKELETAL - ADULT  Goal: Maintain or return mobility to safest level of function  Description: INTERVENTIONS:  - Assess patient's ability to carry out ADLs; assess patient's baseline for ADL function and identify physical deficits which impact ability to perform ADLs (bathing, care of mouth/teeth, toileting, grooming, dressing, etc )  - Assess/evaluate cause of self-care deficits   - Assess range of motion  - Assess patient's mobility  - Assess patient's need for assistive devices and provide as appropriate  - Encourage maximum independence but intervene and supervise when necessary  - Involve family in performance of ADLs  - Assess for home care needs following discharge   - Consider OT consult to assist with ADL evaluation and planning for discharge  - Provide patient education as appropriate  Outcome: Progressing

## 2022-10-26 NOTE — DISCHARGE INSTRUCTIONS
Voiding trial at discretion of nursing facility as lema is now in place >7 days     If fails, replace lema and refer to urology for additional evaluation

## 2022-10-26 NOTE — CASE MANAGEMENT
Case Management Discharge Planning Note    Patient name Hillary Mesa  Location [address] 310/-26 MRN [id number]  : 1927 Date 10/26/2022       Current Admission Date: 10/17/2022  Current Admission Diagnosis:VINICIUS (acute kidney injury) [facility])   Patient Active Problem List    Diagnosis Date Noted   • Bladder distension 10/19/2022   • Anemia 10/18/2022   • Severe protein-calorie malnutrition ([address] ) 10/18/2022   • Stage 3 chronic kidney disease, unspecified whether stage 3a or 3b CKD ([address] ) 2022   • Ambulatory dysfunction 2022   • Irritable bowel syndrome without diarrhea 2022   • Irritable bowel syndrome with diarrhea 2022   • 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   • Chronic seasonal allergic rhinitis due to pollen 2021   • Venous stasis ulcer of left ankle with fat layer exposed without varicose veins ([facility] ) 2021   • Abrasions of multiple sites 2021   • Falls 2021   • Thrombocytopenia ([address] ) 2021   • VINICIUS (acute kidney injury) ([facility] ) 2021   • Multiple myeloma not having achieved remission ([facility] ) 2021   • Immunization due 2020   • Spinal stenosis of lumbosacral region 2019   • Meningioma, cerebral ([facility] ) 2019   • Patellofemoral disorder of left knee 2018   • Idiopathic chronic gout of multiple sites without tophus 2014   • Spondylosis of lumbar region without myelopathy or radiculopathy 2014   • Acquired hypothyroidism 2012   • Pure hypercholesterolemia 2012   • Essential hypertension 2012      LOS (days): 8  Geometric Mean LOS (GMLOS) (days): 4 30  Days to GMLOS:-3 9     OBJECTIVE:  Risk of Unplanned Readmission Score: 15 08         Current admission status: Inpatient   Preferred Pharmacy:   [facility] [address] [facility] [address] [place] [address]  Phone: [phone] Fax: [phone]    Primary Care Provider: Seda Batista MD    Primary Insurance: Damon Deng [address] REP  Secondary Insurance: [address]    DISCHARGE DETAILS:                                          Other Referral/Resources/Interventions Provided:  Referral Comments: P/U confirmed for 2000  FRANCESCO Greenwood made aware via t/c, vmm left for Mychal Shen at [facility], note entered in [address] to [facility] and TT to nurse Kamryn Junior to update    Kamryn Junior made aware via TT that phone and fax numbers are on AVS                                                               Receiving Facility/Agency Phone Number: report:   P: [phone] [address] [address]   F: [phone]  Facility/Agency Fax Number: F: [phone]

## 2022-10-26 NOTE — INCIDENTAL FINDINGS
The following findings require follow up:  Radiographic finding   Finding: Left upper lobe 1 2 x 1 cm spiculated nodule   Malignancy should be excluded   No prior imaging is available for comparison   Either 3 month follow-up noncontrast CT, PET/CT or tissue sampling may be considered appropriate  Considerations related to   the patient's age and/or comorbidities may be used to choose among or alter these recommendations      Follow up required: PET scan    Follow up should be done within 1 month     Please notify the following clinician to assist with the follow up:   PCP

## 2022-10-27 LAB
ERYTHROCYTE [DISTWIDTH] IN BLOOD BY AUTOMATED COUNT: 14.6 % (ref 11.6–15.1)
HCT VFR BLD AUTO: 26.3 % (ref 36.5–49.3)
HGB BLD-MCNC: 8.4 G/DL (ref 12–17)
MCH RBC QN AUTO: 32.4 PG (ref 26.8–34.3)
MCHC RBC AUTO-ENTMCNC: 31.9 G/DL (ref 31.4–37.4)
MCV RBC AUTO: 102 FL (ref 82–98)
PLATELET # BLD AUTO: 93 THOUSANDS/UL (ref 149–390)
PMV BLD AUTO: 11.2 FL (ref 8.9–12.7)
RBC # BLD AUTO: 2.59 MILLION/UL (ref 3.88–5.62)
WBC # BLD AUTO: 8.58 THOUSAND/UL (ref 4.31–10.16)

## 2022-10-27 PROCEDURE — 85027 COMPLETE CBC AUTOMATED: CPT | Performed by: PHYSICIAN ASSISTANT

## 2022-10-27 PROCEDURE — 99239 HOSP IP/OBS DSCHRG MGMT >30: CPT | Performed by: PHYSICIAN ASSISTANT

## 2022-10-27 RX ADMIN — LEVOTHYROXINE SODIUM 100 MCG: 100 TABLET ORAL at 05:39

## 2022-10-27 RX ADMIN — PANTOPRAZOLE SODIUM 40 MG: 40 TABLET, DELAYED RELEASE ORAL at 05:39

## 2022-10-27 RX ADMIN — DEXAMETHASONE 4 MG: 4 TABLET ORAL at 09:07

## 2022-10-27 NOTE — PLAN OF CARE
Problem: PAIN - ADULT  Goal: Verbalizes/displays adequate comfort level or baseline comfort level  Description: Interventions:  - Encourage patient to monitor pain and request assistance  - Assess pain using appropriate pain scale  - Administer analgesics based on type and severity of pain and evaluate response  - Implement non-pharmacological measures as appropriate and evaluate response  - Consider cultural and social influences on pain and pain management  - Notify physician/advanced practitioner if interventions unsuccessful or patient reports new pain  Outcome: Progressing     Problem: INFECTION - ADULT  Goal: Absence or prevention of progression during hospitalization  Description: INTERVENTIONS:  - Assess and monitor for signs and symptoms of infection  - Monitor lab/diagnostic results  - Monitor all insertion sites, i e  indwelling lines, tubes, and drains  - Monitor endotracheal if appropriate and nasal secretions for changes in amount and color  - [place] appropriate cooling/warming therapies per order  - Administer medications as ordered  - Instruct and encourage patient and family to use good hand hygiene technique  - Identify and instruct in appropriate isolation precautions for identified infection/condition  Outcome: Progressing  Goal: Absence of fever/infection during neutropenic period  Description: INTERVENTIONS:  - Monitor WBC    Outcome: Progressing     Problem: SAFETY ADULT  Goal: Patient will remain free of falls  Description: INTERVENTIONS:  - Educate patient/family on patient safety including physical limitations  - Instruct patient to call for assistance with activity   - Consult OT/PT to assist with strengthening/mobility   - Keep Call bell within reach  - Keep bed low and locked with side rails adjusted as appropriate  - Keep care items and personal belongings within reach  - Initiate and maintain comfort rounds  - Make Fall Risk Sign visible to staff  - Offer Toileting every 2 Hours, in advance of need  - Initiate/Maintain bed alarm  - Obtain necessary fall risk management equipment: bed alarm  - Apply yellow socks and bracelet for high fall risk patients  - Consider moving patient to room near nurses station  Outcome: Progressing  Goal: Maintain or return to baseline ADL function  Description: INTERVENTIONS:  -  Assess patient's ability to carry out ADLs; assess patient's baseline for ADL function and identify physical deficits which impact ability to perform ADLs (bathing, care of mouth/teeth, toileting, grooming, dressing, etc )  - Assess/evaluate cause of self-care deficits   - Assess range of motion  - Assess patient's mobility; develop plan if impaired  - Assess patient's need for assistive devices and provide as appropriate  - Encourage maximum independence but intervene and supervise when necessary  - Involve family in performance of ADLs  - Assess for home care needs following discharge   - Consider OT consult to assist with ADL evaluation and planning for discharge  - Provide patient education as appropriate  Outcome: Progressing  Goal: Maintains/Returns to pre admission functional level  Description: INTERVENTIONS:  - Perform BMAT or MOVE assessment daily    - Set and communicate daily mobility goal to care team and patient/family/caregiver  - Collaborate with rehabilitation services on mobility goals if consulted  - Perform Range of Motion 4 times a day  - Reposition patient every 2 hours    - Dangle patient 3 times a day  - Stand patient 3 times a day  - Ambulate patient 3 times a day  - Out of bed to chair 3 times a day   - Out of bed for meals 3 times a day  - Out of bed for toileting  - Record patient progress and toleration of activity level   Outcome: Progressing     Problem: DISCHARGE PLANNING  Goal: Discharge to home or other facility with appropriate resources  Description: INTERVENTIONS:  - Identify barriers to discharge w/patient and caregiver  - Arrange for needed discharge resources and transportation as appropriate  - Identify discharge learning needs (meds, wound care, etc )  - Arrange for interpretive services to assist at discharge as needed  - Refer to Case Management Department for coordinating discharge planning if the patient needs post-hospital services based on physician/advanced practitioner order or complex needs related to functional status, cognitive ability, or social support system  Outcome: Progressing     Problem: Knowledge Deficit  Goal: Patient/family/caregiver demonstrates understanding of disease process, treatment plan, medications, and discharge instructions  Description: Complete learning assessment and assess knowledge base    Interventions:  - Provide teaching at level of understanding  - Provide teaching via preferred learning methods  Outcome: Progressing

## 2022-10-27 NOTE — NURSING NOTE
1900- PVM called to give report  Nurse unavailable  Discharge AVS and transfer report faxed  1910- VMM received from [facility]  1915 PVM recalled and report provided to [facility] admission nurse  EMS  time 2000  Patient prepared for discharge  Patient PRINCEA Jaron Ty aware of  and discharge plan

## 2022-10-27 NOTE — PLAN OF CARE
Problem: PAIN - ADULT  Goal: Verbalizes/displays adequate comfort level or baseline comfort level  Description: Interventions:  - Encourage patient to monitor pain and request assistance  - Assess pain using appropriate pain scale  - Administer analgesics based on type and severity of pain and evaluate response  - Implement non-pharmacological measures as appropriate and evaluate response  - Consider cultural and social influences on pain and pain management  - Notify physician/advanced practitioner if interventions unsuccessful or patient reports new pain  Outcome: Progressing     Problem: INFECTION - ADULT  Goal: Absence or prevention of progression during hospitalization  Description: INTERVENTIONS:  - Assess and monitor for signs and symptoms of infection  - Monitor lab/diagnostic results  - Monitor all insertion sites, i e  indwelling lines, tubes, and drains  - Monitor endotracheal if appropriate and nasal secretions for changes in amount and color  - [place] appropriate cooling/warming therapies per order  - Administer medications as ordered  - Instruct and encourage patient and family to use good hand hygiene technique  - Identify and instruct in appropriate isolation precautions for identified infection/condition  Outcome: Progressing  Goal: Absence of fever/infection during neutropenic period  Description: INTERVENTIONS:  - Monitor WBC    Outcome: Progressing     Problem: DISCHARGE PLANNING  Goal: Discharge to home or other facility with appropriate resources  Description: INTERVENTIONS:  - Identify barriers to discharge w/patient and caregiver  - Arrange for needed discharge resources and transportation as appropriate  - Identify discharge learning needs (meds, wound care, etc )  - Arrange for interpretive services to assist at discharge as needed  - Refer to Case Management Department for coordinating discharge planning if the patient needs post-hospital services based on physician/advanced practitioner order or complex needs related to functional status, cognitive ability, or social support system  Outcome: Progressing     Problem: Knowledge Deficit  Goal: Patient/family/caregiver demonstrates understanding of disease process, treatment plan, medications, and discharge instructions  Description: Complete learning assessment and assess knowledge base    Interventions:  - Provide teaching at level of understanding  - Provide teaching via preferred learning methods  Outcome: Progressing

## 2022-10-27 NOTE — CASE MANAGEMENT
Case Management Assessment & Discharge Planning Note    Patient name Hillary Mesa  Location [address] 310/-27 MRN [id number]  : 1927 Date 10/27/2022       Current Admission Date: 10/17/2022  Current Admission Diagnosis:VINICIUS (acute kidney injury) [facility])   Patient Active Problem List    Diagnosis Date Noted   • Bladder distension 10/19/2022   • Anemia 10/18/2022   • Severe protein-calorie malnutrition ([address] ) 10/18/2022   • Stage 3 chronic kidney disease, unspecified whether stage 3a or 3b CKD ([address] ) 2022   • Ambulatory dysfunction 2022   • Irritable bowel syndrome without diarrhea 2022   • Irritable bowel syndrome with diarrhea 2022   • 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   • Chronic seasonal allergic rhinitis due to pollen 2021   • Venous stasis ulcer of left ankle with fat layer exposed without varicose veins ([facility] ) 2021   • Abrasions of multiple sites 2021   • Falls 2021   • Thrombocytopenia ([address] ) 2021   • VINICIUS (acute kidney injury) ([facility] ) 2021   • Multiple myeloma not having achieved remission ([facility] ) 2021   • Immunization due 2020   • Spinal stenosis of lumbosacral region 2019   • Meningioma, cerebral ([facility] ) 2019   • Patellofemoral disorder of left knee 2018   • Idiopathic chronic gout of multiple sites without tophus 2014   • Spondylosis of lumbar region without myelopathy or radiculopathy 2014   • Acquired hypothyroidism 2012   • Pure hypercholesterolemia 2012   • Essential hypertension 2012      LOS (days): 9  Geometric Mean LOS (GMLOS) (days): 4 30  Days to GMLOS:-4 6     OBJECTIVE:    Risk of Unplanned Readmission Score: 15 23         Current admission status: Inpatient       Preferred Pharmacy:   [facility] [address] [facility] [address] [address] [address]  Phone: [phone] Fax: [phone]    Primary Care Provider: Bran Lomeli Tiesha Newman MD    Primary Insurance: [facility] REP  Secondary Insurance: [address]    ASSESSMENT:  [address] Proxies    There are no active Health Care Proxies on file  DISCHARGE DETAILS:                                          Other Referral/Resources/Interventions Provided:  Referral Comments: Pt d/c cancelled last evening as transport was pushed back to 2030 and facility felt it was too late to accept pt  Per Jorje Montano at [facility], pt may be sent today  Repeat transport request initiated via [employer];  awaiting p/u time  12 noon requested but not confirmed by vendor           Treatment Team Recommendation: Short Term Rehab, SNF  Discharge Destination Plan[de-identified] Short Term Rehab, SNF  Transport at Discharge : [facility]

## 2022-10-27 NOTE — NURSING NOTE
Attempt to call [facility] admitting nurse with report  [facility] left  1210- 2nd call to [facility] to give report  Report given to [employer]  Patient prepared for discharge  No distress noted  Wound care and [place] care provided prior to

## 2022-10-27 NOTE — CASE MANAGEMENT
Case Management Discharge Planning Note    Patient name Percell Moritz  Location [address] 310/-85 MRN [id number]  : 1927 Date 10/27/2022       Current Admission Date: 10/17/2022  Current Admission Diagnosis:VINICIUS (acute kidney injury) [facility])   Patient Active Problem List    Diagnosis Date Noted   • Bladder distension 10/19/2022   • Anemia 10/18/2022   • Severe protein-calorie malnutrition ([address] ) 10/18/2022   • Stage 3 chronic kidney disease, unspecified whether stage 3a or 3b CKD ([address] ) 2022   • Ambulatory dysfunction 2022   • Irritable bowel syndrome without diarrhea 2022   • Irritable bowel syndrome with diarrhea 2022   • 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   • Chronic seasonal allergic rhinitis due to pollen 2021   • Venous stasis ulcer of left ankle with fat layer exposed without varicose veins ([facility] [address] ) 2021   • Abrasions of multiple sites 2021   • Falls 2021   • Thrombocytopenia ([address] ) 2021   • VINICIUS (acute kidney injury) ([facility] [facility] ) 2021   • Multiple myeloma not having achieved remission ([facility] [facility] ) 2021   • Immunization due 2020   • Spinal stenosis of lumbosacral region 2019   • Meningioma, cerebral ([facility] ) 2019   • Patellofemoral disorder of left knee 2018   • Idiopathic chronic gout of multiple sites without tophus 2014   • Spondylosis of lumbar region without myelopathy or radiculopathy 2014   • Acquired hypothyroidism 2012   • Pure hypercholesterolemia 2012   • Essential hypertension 2012      LOS (days): 9  Geometric Mean LOS (GMLOS) (days): 4 30  Days to GMLOS:-4 7     OBJECTIVE:  Risk of Unplanned Readmission Score: 15 23         Current admission status: Inpatient   Preferred Pharmacy:   [facility] [address] [facility] [address] [address] [facility]  [facility] [address] [address]  Phone: [phone] Fax: [phone]    Primary Care Provider: Jonn Babinski, MD    Primary Insurance: Yariel Leiva [address] REP  Secondary Insurance: [address]    DISCHARGE DETAILS:                                          Other Referral/Resources/Interventions Provided:  Referral Comments: Received notice that p/u has been scheduled for 1215  TT to nurse Harinder Magallanes, verified verbally; T/C to cousin/POA Nirali Newman reports she does not want pt moved at night if trip has to be rescheduled  Message sent to [facility] via [address] and t/c made to Sameera/Admissions  New LMN completed and provided to nurse Harinder Magallanes           Treatment Team Recommendation: Short Term Rehab, SNF  Discharge Destination Plan[de-identified] Short Term Rehab, SNF  Transport at Discharge : [facility] Ambulance

## 2022-10-28 ENCOUNTER — TRANSITIONAL CARE MANAGEMENT (OUTPATIENT)
Dept: FAMILY MEDICINE CLINIC | Facility: MEDICAL CENTER | Age: 87
End: 2022-10-28

## 2022-10-28 VITALS
HEART RATE: 97 BPM | BODY MASS INDEX: 18.64 KG/M2 | DIASTOLIC BLOOD PRESSURE: 74 MMHG | OXYGEN SATURATION: 97 % | WEIGHT: 116 LBS | RESPIRATION RATE: 18 BRPM | HEIGHT: 66 IN | SYSTOLIC BLOOD PRESSURE: 140 MMHG | TEMPERATURE: 97.3 F

## 2022-10-31 NOTE — UTILIZATION REVIEW
NOTIFICATION OF ADMISSION DISCHARGE   This is a Notification of Discharge from [facility]  Please be advised that this patient has been discharge from our facility  Below you will find the admission and discharge date and time including the patient’s disposition  UTILIZATION REVIEW CONTACT:  Ismael Spivey  Utilization   Network Utilization Review Department  Phone: [phone] x carefully listen to the prompts  All voicemails are confidential   Email: [email or web address]     ADMISSION INFORMATION  PRESENTATION DATE: 10/17/2022  8:16 AM  OBERVATION ADMISSION DATE: 10/17/22  INPATIENT ADMISSION DATE: 10/18/22 12:44 PM   DISCHARGE DATE: 10/27/2022  1:09 PM  DISPOSITION: Non [facility] SNF/TCU/SNU Non [facility] SNF/TCU/SNU      IMPORTANT INFORMATION:  Send all requests for admission clinical reviews, approved or denied determinations and any other requests to dedicated fax number below belonging to the campus where the patient is receiving treatment   List of dedicated fax numbers:  [address] DENIALS (Administrative/Medical Necessity) [phone]   [address] (Maternity/NICU/Pediatrics) [phone]   [facility] [phone]   [place] [address] [phone]   [address] [phone]   [address] [facility] [phone]   [address] [place] [address] [phone]   [address] [place] [address] [phone]   [facility]  [phone] [address] [facility] [phone]   [address] [place] [address] [facility] [phone]

## 2022-10-31 NOTE — UTILIZATION REVIEW
Reza Verdugo PA-C   Physician Assistant   Hospitalist   Discharge Summary      Attested Addendum   Date of Service:  10/27/2022  8:02 AM                     Attestation signed by Oksana Lamb DO at 10/27/2022 12:06 PM     Patient was managed independently by advanced practitioner  Katiana Goins was available for discussion or any questions  I did not see or examine the patient myself  I am co-signing this note for administrative purposes only                      Show:Clear all  [x]Manual[x]Template[x]Copied    Added by:  [x]Trell Venegas PA-C      []Sadie for details    [address] Discharge- Percell Moritz 5/24/1927, 80 y o  male MRN: [id number]  Unit/Bed#: -01 Encounter: [id number]  Primary Care Provider: Jonn Babinski, MD   Date and time admitted to hospital: 10/17/2022  8:16 AM     * VINICIUS (acute kidney injury) ([facility] [address] )  Assessment & Plan  · Creatinine 1 39 on admission, possibly related to dehydration vs medication side effect vs GI bleed  · Discontinue triamterene-hydrochlorothiazide  · Normalized with IV fluids which have since been discontinued  · Monitor BMP  · Avoid hypotension, nephrotoxins     Ambulatory dysfunction  Assessment & Plan  · Patient with dizziness, loss of balance and several falls over the last few days  · Fall precautions  · PT/OT consulted, recommending post-acute rehab at this time  ? [facility] at [facility]  ?  Plan for PVM for rehab prior to return to [facility]     Acquired hypothyroidism  Assessment & Plan  · Continue home levothyroxine  · TSH elevated 6 187, free T4 normal 0 82  · Follow TFTs outpatient      Bladder distension  Assessment & Plan  · Pastrana catheter placed for decompression on arrival  · Plan to continue for at least 1 week with plan for outpatient voiding trial at nursing facility   · Will need outpatient urology evaluation     Severe protein-calorie malnutrition [facility])  Assessment & Plan  Malnutrition Findings: Adult Malnutrition type: Chronic illness  Adult Degree of Malnutrition: Other severe protein calorie malnutrition  Malnutrition Characteristics: Weight loss, Muscle loss, Fat loss     360 Statement: related to inadequate energy intake due to decreased appetite ,as evidenced by, 21% weight loss last 11 mos (11/4/21-10/7/22), hollow supraclavicular space, depressed temples  Int: Regular diet, medical food supplements with meals      BMI Findings: Body mass index is 18 72 kg/m²       Nutrition consulted, appreciate recommendations  · Dietary nutrition supplements ordered per Nutrition     Anemia  Assessment & Plan  · Significant drop in hemoglobin from 11 0 to 7 7 from 10/17-10/18   ? Consider hemodilution given IV fluid resuscitation, however hemoccult (+) x3  ? Lovenox discontinued  ? Imaging on admission without evidence for acute bleed  ? Iron panel 10/18 showing iron deficiency with normal ferritin and low TIBC - 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  § Transfuse for <7   § Hemoglobin stable today 8 4   § Received IV iron x 3 days   ? 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  Cardiology consulted for surgical clearance if patient decides to proceed            Medical Problems                            Resolved Problems  Date Reviewed: 10/24/2022                    Resolved      Hyponatremia 10/19/2022        Resolved by  Bernardino Abdul PA-C                  Discharging Physician / Practitioner: Bernardino Abdul  PCP: Gisela Freedman MD  Admission Date:       Admission Orders (From admission, onward)              Ordered          10/18/22 1244   Inpatient Admission  Once              10/17/22 1221   Place in Observation  Once                          Discharge Date: 10/27/22     Consultations During Hospital Stay:  · IP CONSULT TO CASE MANAGEMENT  · IP CONSULT TO NUTRITION SERVICES  · IP CONSULT TO GASTROENTEROLOGY  · IP CONSULT TO CARDIOLOGY      Procedures Performed:   · Colonoscopy 10/20/22:   1  Hepatic flexure mass, 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, non-bleeding  2  Diverticulosis coli, severe, sigmoid colon; also in the transverse colon--likely source of bleeding  3  Venous blebs in the rectum, non-bleeding     Significant Findings / Test Results:   XR hip/pelv 2-3 vws left if performed   Final Result by Ivan Weber MD (10/17 7475)   Progressive degenerative changes       No acute osseous abnormality        XR femur 2 views LEFT   Final Result by Ivan Weber MD (10/17 1919)       No acute osseous abnormality        XR knee 1 or 2 vw left   Final Result by Ivan Weber MD (10/17 7172)   Intact total knee arthroplasty   Dystrophic calcifications   No acute osseous abnormality        Findings are stable       CT spine cervical without contrast   Final Result by Taylor Huber MD (10/17 0042)       1  No acute cervical spine fracture or traumatic malalignment        2  Heterogeneous marrow attenuation and lucencies of the vertebrae, likely related to reported history of multiple myeloma        CT chest abdomen pelvis w contrast   Final Result by Taylor Huber MD (10/17 1212)       1  No CT evidence of acute traumatic injury within chest, abdomen, and pelvis        2   Left upper lobe 1 2 x 1 cm spiculated nodule  Malignancy should be excluded  No prior imaging is available for comparison  Either 3 month follow-up noncontrast CT, PET/CT or tissue sampling may be considered appropriate  Considerations related to    the patient's age and/or comorbidities may be used to choose among or alter these recommendations        3  A few additional subcentimeter left lower lobe nodules as described        4  Indeterminate 2 7 cm high attenuating cortical lesion in the upper pole of left kidney        5    Distended urinary bladder with a ureterocele at the base  Correlate for urinary retention        CT head w wo contrast   Final Result by Yessica Mera MD (10/17 6317)       1  No acute intracranial CT abnormality        2   Planum sphenoidale 2 8 cm meningioma, grossly stable compared to MRI 5/28/2019 allowing for modality differences  Associated moderate subjacent inferior bifrontal parenchymal edema without significant mass effect  CEA 1 4  Pathology from biopsy: adenocarcinoma      Incidental Findings:   · As above       Test Results Pending at Discharge (will require follow up): · None      Outpatient Tests Requested:  · Surgical oncology if desired treatment   · CBC 5-7 days, sooner if hypotensive or bleeding re-occurs      Complications:  GI bleed, hypotension      Reason for Admission: weakness, falls, VINICIUS      Hospital Course:   Mariah Monterroso is a 80 y o  male patient who originally presented to the hospital on 10/17/2022 due to several falls  Patient was found to be severely orthostatic with hyponatremia  His hydrochlorothiazide was discontinued and patient was fluid resuscitated  Additionally, patient was found to have acute blood loss anemia and underwent GI screening for colonoscopy  Unfortunately this did show a mass which based on pathology confirms adenocarcinoma      Patient's cousin, Vivek Siu, is his power of  and will discuss with the patient as well as the family regarding potential additional workup, surgery, and/or chemotherapy/radiation  Patient will need a PET scan or a whole-body CT for additional staging  Given the patient's intolerance to treatment for multiple myeloma, they may opt for conservative treatment      Blood counts are stable, no further episodes of melena or hematochezia, no further episodes of orthostasis and hyponatremia has resolved    Discontinue HCTZ on discharge      Please see above list of diagnoses and related plan for additional information       Condition at Discharge: good     Discharge Day Visit / Exam:   Subjective:  Patient seen sleeping but easily awoken today  He is feeling well  Tells me that he is ready to go to rehab, he is tired of feeling weak  Denies any fevers or chills  No abdominal pain  Per nursing staff, no acute issues overnight  Discharge delayed due to transportation issues  Vitals: Blood Pressure: 145/57 (10/27/22 0751)  Pulse: 56 (10/27/22 0751)  Temperature: 97 8 °F (36 6 °C) (10/26/22 1538)  Temp Source: Oral (10/26/22 1538)  Respirations: 16 (10/26/22 1538)  Height: 5' 6" (167 6 cm) (10/21/22 1454)  Weight - Scale: 52 6 kg (116 lb) (10/21/22 1454)  SpO2: 97 % (10/27/22 0751)  Exam:   Physical Exam  Vitals and nursing note reviewed  Constitutional:       General: He is not in acute distress  Appearance: He is not toxic-appearing  Cardiovascular:      Rate and Rhythm: Normal rate and regular rhythm  Pulmonary:      Effort: Pulmonary effort is normal  No respiratory distress  Breath sounds: Normal breath sounds  Abdominal:      General: Bowel sounds are normal       Palpations: Abdomen is soft  Musculoskeletal:      Right lower leg: No edema  Left lower leg: No edema  Skin:     General: Skin is warm and dry  Coloration: Skin is not jaundiced or pale  Neurological:      Mental Status: He is alert  Mental status is at baseline  Psychiatric:         Mood and Affect: Mood normal          Behavior: Behavior normal              Discussion with Family: Updated  (cousin, Celia Blevins [employer] [facility]) ) via phone      Discharge instructions/Information to patient and family:   See after visit summary for information provided to patient and family        Provisions for Follow-Up Care:  See after visit summary for information related to follow-up care and any pertinent home health orders         Disposition:   Other [facility] Readmission: none     Discharge Statement:  I spent >50 minutes discharging the patient  This time was spent on the day of discharge  I had direct contact with the patient on the day of discharge  Greater than 50% of the total time was spent examining patient, answering all patient questions, arranging and discussing plan of care with patient as well as directly providing post-discharge instructions  Additional time then spent on discharge activities      Discharge Medications:  See after visit summary for reconciled discharge medications provided to patient and/or family        **Please Note: This note may have been constructed using a voice recognition system**         Cosigned by:  Liseth Tong DO at 10/27/2022 12:06 PM       Revision History

## 2022-11-01 ENCOUNTER — TELEPHONE (OUTPATIENT)
Dept: SURGICAL ONCOLOGY | Facility: CLINIC | Age: 87
End: 2022-11-01

## 2022-11-01 NOTE — TELEPHONE ENCOUNTER
11/01/22  Intake received  Medicare A/B  [facility] [place] [facility]   [address] / Active  Recipient ID: [id number]  09/01/22 thru 09/30/22  10/01/22 thru 10/31/22  11/01/22  No outreach needed at his time

## 2022-11-04 ENCOUNTER — TELEPHONE (OUTPATIENT)
Dept: HEMATOLOGY ONCOLOGY | Facility: CLINIC | Age: 87
End: 2022-11-04

## 2022-11-04 NOTE — TELEPHONE ENCOUNTER
CALL TRANSFER   Reason for patient call? Relative, approved yes, calling about patient    Patient's primary physician? Cheo Peyman    RN call was transferred to and time it was transferred? Alesia Gonzalez 1:02PM 11/4   Informed patient that the message will be forwarded to the team and someone will get back to them as soon as possible    Did you relay this information to the patient?   yes

## 2022-11-04 NOTE — TELEPHONE ENCOUNTER
Spoke to [facility], she is requesting to keep the consult appt with Dr Maliha Roman, but she will not be bringing the patient with her  She states that she would like to hear the options, etc before she brings it up to the patient  I let her know that I would forward this message to Dr Niya Mckenna nurse, Edgar Valentine  She verbalized understanding and thanks

## 2022-11-07 NOTE — TELEPHONE ENCOUNTER
Tc to Cosme Gaytan to discuss pts consult appt with her  Explained to her that the pt needs to be present for Dr Jamaica Stevens to do an assessment and to review all surgical options  Cosme Gaytan was hoping that she could just be there for the consult appt to hear the options before going over them with the pt as the pt does not know his diagnosis  Explained to her that Dr Jamaica Stevens will review his diagnosis at the consult appt if she is not comfortable discussing with the pt  Treatment Number: 0 Consent: Prior to the procedure, written consent was obtained and risks were reviewed, including but not limited to: redness, peeling, blistering, pigmentary change, scarring, infection, and pain. Post-Care Instructions: I reviewed with the patient in detail post-care instructions. Patient should avoid sun exposure and topical acne medications for 48 hours following the treatment. Patient to wear sun protection. Post Peel Care: The peel self-neutralized. After the procedure,sun protection and post-care instructions were reviewed with the patient. Time (Mins): 3 Prep: The treated area was degreased with pre-peel cleanser, and vaseline was applied for protection of mucous membranes. Beta Salicylic Acid %: 20% Detail Level: Zone

## 2022-11-14 PROBLEM — C18.3 PRIMARY ADENOCARCINOMA OF ASCENDING COLON AND HEPATIC FLEXURE (HCC): Status: ACTIVE | Noted: 2022-11-14

## 2022-11-14 PROBLEM — C18.2 PRIMARY ADENOCARCINOMA OF ASCENDING COLON AND HEPATIC FLEXURE (HCC): Status: ACTIVE | Noted: 2022-11-14

## 2022-11-16 ENCOUNTER — TELEPHONE (OUTPATIENT)
Dept: SURGICAL ONCOLOGY | Facility: CLINIC | Age: 87
End: 2022-11-16

## 2022-11-16 NOTE — TELEPHONE ENCOUNTER
Called and spoke with Radha Salinas to see if patient wants to reschedule consult with Dr Amato at this time  She declined and reported they will know more in a few weeks if he needs the appointment  Provided her with hopeline number to reschedule if needed  She was agreeable

## 2022-11-18 ENCOUNTER — PATIENT OUTREACH (OUTPATIENT)
Dept: HEMATOLOGY ONCOLOGY | Facility: CLINIC | Age: 87
End: 2022-11-18

## 2022-11-18 NOTE — PROGRESS NOTES
Called to find out reason for cancellation to appt w Dr Josiah Ford, no answer, left VM, stated my name and title and reason for call, provided my contact info, waiting for a call back

## 2022-11-22 ENCOUNTER — TELEPHONE (OUTPATIENT)
Dept: FAMILY MEDICINE CLINIC | Facility: MEDICAL CENTER | Age: 87
End: 2022-11-22

## 2022-11-22 ENCOUNTER — PATIENT OUTREACH (OUTPATIENT)
Dept: HEMATOLOGY ONCOLOGY | Facility: CLINIC | Age: 87
End: 2022-11-22

## 2022-11-22 DIAGNOSIS — C90.00 MULTIPLE MYELOMA NOT HAVING ACHIEVED REMISSION (HCC): Primary | ICD-10-CM

## 2022-11-22 DIAGNOSIS — C18.3 PRIMARY ADENOCARCINOMA OF ASCENDING COLON AND HEPATIC FLEXURE (HCC): ICD-10-CM

## 2022-11-22 DIAGNOSIS — C18.2 PRIMARY ADENOCARCINOMA OF ASCENDING COLON AND HEPATIC FLEXURE (HCC): ICD-10-CM

## 2022-11-22 NOTE — TELEPHONE ENCOUNTER
Please advise- Zak Manzanares is in the nursing unit at [facility]  Was going to see the house doctor and order Hospice  Kiranalireza Delgado states he is confused, crying out in pain, mostly bed ridden  asking if you could see him or call her , or  order a generic hospice order   They use their own company

## 2022-11-22 NOTE — TELEPHONE ENCOUNTER
Order placed- s/w [employer]   She will call back with information where to send referral   Varify with Alexandra Nunez the Facility name , she believes it is [facility]

## 2022-11-22 NOTE — TELEPHONE ENCOUNTER
Give them an order for their hospice, they can give him the care     They should use the house doctor because I cant give them orders and it will be a lot easier

## 2022-11-22 NOTE — TELEPHONE ENCOUNTER
Pt's cousin, Sae Mcfarlane and Zaida Chiang, called to ask if you could go to see pt at [facility] [place]  She said pt is in bad shape  Pt is crying out and is asking for you  He said he is in pain  They don't know what to do for him  Please, call Sae Mcfarlane to discuss

## 2022-11-22 NOTE — PROGRESS NOTES
2nd attempt -Contacted multiple numbers listed in pt's chart, no answer from, left multiple VMs asking someone to call me back w reason for cancelled appt w surg onc  Provided my contact info and waiting to hear back       (Last note stated pt was to be present w Grisel Hair at the time of the appt w Dr Eric Perez )

## 2022-11-23 ENCOUNTER — TELEPHONE (OUTPATIENT)
Dept: FAMILY MEDICINE CLINIC | Facility: MEDICAL CENTER | Age: 87
End: 2022-11-23

## 2022-11-23 ENCOUNTER — TELEPHONE (OUTPATIENT)
Dept: OTHER | Facility: OTHER | Age: 87
End: 2022-11-23

## 2022-11-23 NOTE — TELEPHONE ENCOUNTER
S/w Lyla Bonilla from [facility] and wanted to advise that patient is being admitted to hospice care

## 2022-11-23 NOTE — TELEPHONE ENCOUNTER
Savanah from Hospice called to find out if Dr Major Mendez would be following the pt and signing his orders  I spoke to Φ[place]ΜΑΚ[place]Σ who said that he would not be and I advised Savanah that it would be the house doctor at [facility]

## 2022-11-28 NOTE — TELEPHONE ENCOUNTER
Giovanna Reddy called to request to have Dr Louisa Wilson give her a call  She wanted to make him aware that pt passed away today      Routed to Dr Louisa Wilson - please call her at [phone]

## 2022-11-28 NOTE — TELEPHONE ENCOUNTER
Nicole Latif from [facility] called, he said to let you know Nicole Latif from [facility] passed away

## 2022-11-29 NOTE — PROGRESS NOTES
I stopped at this patient's room at a personal care facility  He is now with hospice and he is terminal and it is close  He was complaining of severe back pain  Hospice is doing everything they can to relieve the pain  He is alert and conscious, however he is very much distracted by the pain and the pain medication  His niece was there also  We talked awhile, showed him a video of my nephew and his girlfriend playing piano and violin  Classical music  He brightened up moment and the niece went out to get more music after I left  He is 80years old and he has been my patient for many years  I will mourn him when he goes

## 2024-02-24 NOTE — ASSESSMENT & PLAN NOTE
· Creatinine of 2 26 POA  · Baseline creatinine 1-1 3,  · Admission : poor oral intake  · Volume status: euvolemic, no electrolytes imbalances  · Possible etiologies: prerenal in the setting of dehydration/ vol depletion, questionable if there is a post renal component  · Initial management : IVF--> UO +1L, no concerns for urinary retention at this time  · Resolved as of today  · Encourage p o  Hydration, can be discharged home today  You can access the FollowMyHealth Patient Portal offered by [facility] by registering at the following website: [email or web address]. By joining [facility]’s FollowMyHealth portal, you will also be able to view your health information using other applications (apps) compatible with our system.

## 2025-05-28 NOTE — PROGRESS NOTES
PROGRESS NOTE    SUBJECTIVE:   Chuck Ortega is a 50 y.o. male seen in the [facility] at [place] for diffuse redness and swelling to the L upper eyelid for 1 day. Patient states no injury to site, no changes in vision, and pain only present with palpation of eyelid.     Chief Complaint    Left upper eyelid red, edematous and painful.           Eye Problem   The left eye is affected. This is a new problem. The current episode started yesterday. The problem occurs constantly. The problem has been unchanged. There was no injury mechanism. The pain is at a severity of 2/10. The pain is mild. There is No known exposure to pink eye. He Does not wear contacts. Associated symptoms include eye redness. Pertinent negatives include no blurred vision, eye discharge, double vision, fever, foreign body sensation, itching, nausea, photophobia or weakness. He has tried nothing for the symptoms.       No current outpatient medications on file.     No current facility-administered medications for this visit.      Allergies   Allergen Reactions    Zinc Nausea And Vomiting       Social History     Tobacco Use    Smoking status: Never    Smokeless tobacco: Never        Review of Systems   Constitutional:  Negative for fatigue and fever.   HENT:  Negative for rhinorrhea and sinus pressure.    Eyes:  Positive for redness. Negative for blurred vision, double vision, photophobia, pain, discharge, itching and visual disturbance.   Respiratory:  Negative for cough and shortness of breath.    Cardiovascular: Negative.    Gastrointestinal:  Negative for abdominal pain and nausea.   Musculoskeletal: Negative.    Neurological:  Negative for dizziness, weakness, light-headedness and numbness.          OBJECTIVE:  /64   Pulse 74   Temp 97.9 °F (36.6 °C) (Oral)   Resp 16   Ht 1.981 m (6' 6\")   Wt 81.6 kg (180 lb)   SpO2 96%   BMI 20.80 kg/m²      No results found for this visit on  Velcade injection given in LLQ  Pt tolerated well  Aware of future appts   Declines AVS